# Patient Record
Sex: MALE | Race: WHITE | NOT HISPANIC OR LATINO | Employment: PART TIME | URBAN - METROPOLITAN AREA
[De-identification: names, ages, dates, MRNs, and addresses within clinical notes are randomized per-mention and may not be internally consistent; named-entity substitution may affect disease eponyms.]

---

## 2023-12-28 ENCOUNTER — ANCILLARY PROCEDURE (OUTPATIENT)
Dept: RADIOLOGY | Facility: CLINIC | Age: 68
End: 2023-12-28
Payer: COMMERCIAL

## 2023-12-28 DIAGNOSIS — C67.9 MALIGNANT NEOPLASM OF BLADDER, UNSPECIFIED (MULTI): Primary | ICD-10-CM

## 2023-12-28 PROCEDURE — 74176 CT ABD & PELVIS W/O CONTRAST: CPT

## 2023-12-28 PROCEDURE — 71250 CT THORAX DX C-: CPT | Performed by: RADIOLOGY

## 2023-12-28 PROCEDURE — 74176 CT ABD & PELVIS W/O CONTRAST: CPT | Performed by: RADIOLOGY

## 2024-01-04 ENCOUNTER — OFFICE VISIT (OUTPATIENT)
Dept: HEMATOLOGY/ONCOLOGY | Facility: HOSPITAL | Age: 69
End: 2024-01-04
Payer: MEDICARE

## 2024-01-04 VITALS
DIASTOLIC BLOOD PRESSURE: 81 MMHG | HEART RATE: 91 BPM | RESPIRATION RATE: 18 BRPM | WEIGHT: 273.59 LBS | SYSTOLIC BLOOD PRESSURE: 155 MMHG | BODY MASS INDEX: 38.16 KG/M2 | OXYGEN SATURATION: 99 % | TEMPERATURE: 96.6 F

## 2024-01-04 DIAGNOSIS — C67.9 MALIGNANT NEOPLASM OF URINARY BLADDER, UNSPECIFIED SITE (MULTI): Primary | ICD-10-CM

## 2024-01-04 DIAGNOSIS — C77.9 REGIONAL LYMPH NODE METASTASIS PRESENT (MULTI): ICD-10-CM

## 2024-01-04 DIAGNOSIS — Z92.21 S/P CHEMOTHERAPY, TIME SINCE GREATER THAN 12 WEEKS: ICD-10-CM

## 2024-01-04 PROCEDURE — 1159F MED LIST DOCD IN RCRD: CPT | Performed by: INTERNAL MEDICINE

## 2024-01-04 PROCEDURE — 99213 OFFICE O/P EST LOW 20 MIN: CPT | Performed by: INTERNAL MEDICINE

## 2024-01-04 PROCEDURE — 1126F AMNT PAIN NOTED NONE PRSNT: CPT | Performed by: INTERNAL MEDICINE

## 2024-01-04 RX ORDER — ASPIRIN 81 MG/1
1 TABLET ORAL DAILY
COMMUNITY
Start: 2022-10-20

## 2024-01-04 RX ORDER — SERTRALINE HYDROCHLORIDE 50 MG/1
50 TABLET, FILM COATED ORAL DAILY
COMMUNITY

## 2024-01-04 RX ORDER — METOPROLOL TARTRATE 25 MG/1
25 TABLET, FILM COATED ORAL 2 TIMES DAILY
COMMUNITY

## 2024-01-04 RX ORDER — LISINOPRIL 20 MG/1
20 TABLET ORAL DAILY
COMMUNITY

## 2024-01-04 RX ORDER — CEPHALEXIN 250 MG/1
CAPSULE ORAL
COMMUNITY

## 2024-01-04 RX ORDER — PANTOPRAZOLE SODIUM 40 MG/1
40 TABLET, DELAYED RELEASE ORAL DAILY
COMMUNITY

## 2024-01-04 RX ORDER — ROSUVASTATIN CALCIUM 40 MG/1
40 TABLET, COATED ORAL DAILY
COMMUNITY

## 2024-01-04 RX ORDER — ALPRAZOLAM 0.5 MG/1
TABLET ORAL
COMMUNITY
Start: 2022-08-26 | End: 2024-07-22

## 2024-01-04 RX ORDER — ACETAMINOPHEN 500 MG
1000 TABLET ORAL EVERY 8 HOURS PRN
COMMUNITY
Start: 2019-02-04

## 2024-01-04 ASSESSMENT — PATIENT HEALTH QUESTIONNAIRE - PHQ9
2. FEELING DOWN, DEPRESSED OR HOPELESS: NOT AT ALL
1. LITTLE INTEREST OR PLEASURE IN DOING THINGS: NOT AT ALL
SUM OF ALL RESPONSES TO PHQ9 QUESTIONS 1 AND 2: 0

## 2024-01-04 ASSESSMENT — COLUMBIA-SUICIDE SEVERITY RATING SCALE - C-SSRS
1. IN THE PAST MONTH, HAVE YOU WISHED YOU WERE DEAD OR WISHED YOU COULD GO TO SLEEP AND NOT WAKE UP?: NO
6. HAVE YOU EVER DONE ANYTHING, STARTED TO DO ANYTHING, OR PREPARED TO DO ANYTHING TO END YOUR LIFE?: NO
2. HAVE YOU ACTUALLY HAD ANY THOUGHTS OF KILLING YOURSELF?: NO

## 2024-01-04 ASSESSMENT — PAIN SCALES - GENERAL: PAINLEVEL: 0-NO PAIN

## 2024-01-04 ASSESSMENT — ENCOUNTER SYMPTOMS
LOSS OF SENSATION IN FEET: 0
DEPRESSION: 0
OCCASIONAL FEELINGS OF UNSTEADINESS: 0

## 2024-01-04 NOTE — PROGRESS NOTES
" Medical Oncology Out Patient Clinic Note    Patient's Name: Toro Lujan  MRN: [id number]  Date of Service: 1/4/2024  In- Person Visit: YES    Reason for Visit: F/U and scans    HPI  67 yo male known to us with Met TCC. S/p radical cystoprostatectomy and extended LND. LN + disease soon after. Received JAVELIN 100 therapy = Carbo/Crane x 4 and Avelumab maintenance  TAYLER after completion of about 1 year of IO  Off therapy now and restaging scans showed PD in LNs  Dealing with family issues with children that are affecting him psychologically  No constitutional issues and no LT Us  Parastomal hernia -mostly esthetics at this time    Updated PMHx  None  Review of Systems  13 point review negative  Physical Exam:  /81 (BP Location: Left arm, Patient Position: Sitting)   Pulse 91   Temp 35.9 °C (96.6 °F)   Resp 18   Wt 124 kg (273 lb 9.5 oz)   SpO2 99%   BMI 38.16 kg/m²   ECOG Performance Status 0  HEENT: normal  ENT: normal  CV: RRR, no murmurs  Pulmonary: Clear b/l  GI: parastomal hernia; stoma clean  : NA  Extremities: Pitting edema 1/4 below knees b/l  Neurologic: NA  Skin:NA  Psych: appropriate mood      LABS  No results found for: \"PSA\"  Lab Results   Component Value Date    WBC 10.6 08/10/2023    HGB 16.3 08/10/2023    HCT 48.2 08/10/2023    MCV 88 08/10/2023     08/10/2023     Lab Results   Component Value Date    GLUCOSE 124 (H) 08/10/2023    CALCIUM 9.5 08/10/2023     (L) 08/10/2023    K 4.6 08/10/2023    CO2 25 08/10/2023     08/10/2023    BUN 18 08/10/2023    CREATININE 1.25 08/10/2023     No results found for: \"TESTOSTERONE\"  Lab Results   Component Value Date    ALT 18 08/10/2023    AST 13 08/10/2023    ALKPHOS 124 08/10/2023    BILITOT 0.6 08/10/2023       IMAGING  Narrative & Impression   Interpreted By:  Thai Orta and Tavana Shahrzad   STUDY:  CT CHEST ABDOMEN PELVIS WO CONTRAST;  12/28/2023 11:38 am      INDICATION:  Signs/Symptoms: staging bladder cancer  C67.9: " Bladder cancer.      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 Status post multiple TURP, showing disease  recurrence but no muscle invasive disease, underwent radical  cystoprostatectomy may 2021, with pathology consistent with T1  disease with no vinicio involvement at the time of surgery. Pathology:  High-grade invasive urothelial carcinoma, superficially involving the  lamina propria.      COMPARISON:  CT 08/03/2023, 08/02/2023, 10/12/2022, 08/24/2022, 04/20/2022,  12/02/2021      ACCESSION NUMBER(S):  [id number]      ORDERING CLINICIAN:  YRN MANUEL      TECHNIQUE:  Contiguous axial images of the chest , abdomen, and pelvis were  obtained without IV contrast. The study was performed with oral  contrast. Coronal and sagittal reformatted images were reconstructed  from the axial data.      FINDINGS:          CT CHEST:      MEDIASTINUM AND LYMPH NODES: No evidence of new or enlarging  intrathoracic lymphadenopathy identified.      VESSELS: Normal caliber aorta. Mild atherosclerotic calcifications  throughout the aorta.      HEART:Normal size.Severe coronary calcifications are noted with  stents in place. No significant pericardial effusion.      LUNG, AIRWAYS, PLEURA: A 3 mm nodule is noted in the left upper lobe  (series 204, image 81), unchanged dating back to 04/20/2022.  Unchanged 4 mm nodule in the left upper lobe (series 204, image 77).  No new or enlarging pulmonary nodules identified. No consolidation,  pulmonary edema, pleural effusion or pneumothorax.      CHEST WALL SOFT TISSUES:Likely an intramammary lymph node in lateral  left breast is unchanged compared to prior examinations dating back  to 04/20/2022.The visualized thyroid gland is unremarkable. No  significant abnormalities involving the esophagus.      OSSEOUS STRUCTURES:No  acute osseous abnormality. No suspicious  osseous lesions.  Diffuse demineralization of the osseous structures.          CT ABDOMEN/PELVIS:          ABDOMINAL WALL: A right lower quadrant urostomy is noted without  evidence of bowel containing parastomal hernia. Medial to the  ileostomy, a ventral abdominal wall hernia is noted with  approximately 5 cm of defect in the rectus abdominus fascia,  containing a loop of ileum. No evidence of strangulation. The  findings are unchanged compared to prior examinations dating back to  04/20/2022.      LIVER: Hypodense foci throughout the liver, some too small to  characterize, unchanged dating back to 04/20/2022. While nonspecific,  these are favored to represent small benign cysts.      BILE DUCTS: No significant intrahepatic or extrahepatic dilatation.      GALLBLADDER: No significant abnormality.      SPLEEN: The splenic size is at the upper limit of normal, measuring  12.2 cm in length, unchanged dating back to 04/20/2022.      PANCREAS: No focal lesions identified within the pancreas on this  noncontrast enhanced examination. No evidence of main pancreatic  ductal dilatation.      ADRENALS: Hyperplasia of the left adrenal gland with fat density is  unchanged compared to prior examination. There is a 1.3 cm benign  lipid rich right adrenal nodule with fat density, similar to prior  examinations.      KIDNEYS, URETERS, BLADDER: No nephroureterolithiasis or  hydroureteronephrosis. Mild perinephric stranding is noted on the  left, unchanged dating back to 03/02/2023.      Postsurgical changes radical cystectomy with ileal conduit formation.  No abnormal soft tissue density noted at the surgical bed.      REPRODUCTIVE ORGANS: Postsurgical changes of prostatectomy without  abnormal soft tissue density at the resection bed.      VESSELS: Mild vascular calcifications. No aneurysm.      RETROPERITONEUM/LYMPH NODES: Compared to prior examination on  08/03/2023, there has been   interval increase in the size of a left  para-aortic lymph node, now measuring 2.5 x 1.9 cm increased from 1.7  x 1.3 cm on 08/03/2023. The remainder of the subcentimeter  abdominopelvic lymph nodes are unchanged compared to prior  examination.      BOWEL/MESENTERY/PERITONEUM: No significant abnormalities involving  the stomach.Ventral abdominal wall hernia containing a short segment  of the ileum including the anastomosis. Ileal conduit formation is  again noted. No inflammatory bowel wall thickening or dilatation. The  appendix is normal.      No ascites, free air, or fluid collection.      MUSCULOSKELETAL: No acute osseous abnormality. Small sclerotic foci  throughout the pelvic bones are unchanged dating back to 04/20/2022  and likely represent small bone islands. These include a small  sclerotic focus involving L4 vertebral body on the left (series 201,  image 143), the right femur and the left acetabulum. Note is made of  grade 1 anterolisthesis at the level of L5-S1 with associated  bilateral pars interarticularis defects.      IMPRESSION:  Transitional carcinoma restaging scan.  1. Interval increase in the size of left para aortic lymph node  compared to prior examination on 08/03/2023, highly suspicious for  metastatic lymphadenopathy. Otherwise, no definite evidence of new  metastatic disease identified in the chest, abdomen, or pelvis.  Additional subcentimeter retroperitoneal lymph nodes are unchanged.  Scattered solid nodules within the left upper lobe are unchanged  compared to prior examinations dating back to 04/20/2022.  2. Mild left perinephric stranding is unchanged compared to prior  examinations dating back to 03/02/2023.  3.  Additional unchanged chronic findings as above.          I personally reviewed the images/study and I agree with the findings  as stated. This study was interpreted at [facility], [place], [place].      MACRO:  None      Signed by: Thai  You 12/29/2023 10:10 PM  Dictation workstation:   [id number]     GENOMICS  NA    A/P 67 yo male known to us with Met TCC    New PD in scans - PAraAo LN apx 2 x2.6 cm    Discussed significance and natural history of his disease. Also reviewed treatment options including new Pembro/EV phase III data vs. Chemo+Nivo  vs. IO alone vs. EV alone vs. Saci G vs. Chemo   Also discussed Erdafitinib if FGF mutation    Discussed trials an favored study with HER-2 Antibody drug conjugate- tissue needs to be tested for HER 2. He expressed interest and met study CRC  If for some reason not a trial candidate then I would offer him IO alone with Pembrolizumab    I have consented him for Pembrolizumab today in case a trial is a no go  Pt verbalized understanding, asked appropriate questions and wished to proceed as above    Topher Castanon MD, FACP  Chief, Solid Tumor Oncology Division   Medical Oncology  Professor of Medicine and Urology  /[facility]

## 2024-01-05 DIAGNOSIS — Z00.6 RESEARCH SUBJECT: Primary | ICD-10-CM

## 2024-01-08 DIAGNOSIS — C61 PROSTATE CANCER (MULTI): ICD-10-CM

## 2024-01-08 DIAGNOSIS — Z00.6 RESEARCH SUBJECT: Primary | ICD-10-CM

## 2024-01-09 ENCOUNTER — TELEPHONE (OUTPATIENT)
Dept: HEMATOLOGY/ONCOLOGY | Facility: HOSPITAL | Age: 69
End: 2024-01-09

## 2024-01-09 DIAGNOSIS — Z00.6 RESEARCH SUBJECT: Primary | ICD-10-CM

## 2024-01-11 ENCOUNTER — APPOINTMENT (OUTPATIENT)
Dept: HEMATOLOGY/ONCOLOGY | Facility: HOSPITAL | Age: 69
End: 2024-01-11

## 2024-01-17 DIAGNOSIS — Z00.6 ENCOUNTER FOR EXAMINATION FOR NORMAL COMPARISON AND CONTROL IN CLINICAL RESEARCH PROGRAM: Primary | ICD-10-CM

## 2024-01-18 ENCOUNTER — OFFICE VISIT (OUTPATIENT)
Dept: HEMATOLOGY/ONCOLOGY | Facility: HOSPITAL | Age: 69
End: 2024-01-18

## 2024-01-18 ENCOUNTER — HOSPITAL ENCOUNTER (OUTPATIENT)
Dept: RADIOLOGY | Facility: HOSPITAL | Age: 69
End: 2024-01-18

## 2024-01-18 ENCOUNTER — HOSPITAL ENCOUNTER (OUTPATIENT)
Dept: RESEARCH | Facility: HOSPITAL | Age: 69
Discharge: HOME | End: 2024-01-18

## 2024-01-18 ENCOUNTER — HOSPITAL ENCOUNTER (OUTPATIENT)
Dept: RADIOLOGY | Facility: HOSPITAL | Age: 69
Discharge: HOME | End: 2024-01-18

## 2024-01-18 ENCOUNTER — APPOINTMENT (OUTPATIENT)
Dept: RADIOLOGY | Facility: HOSPITAL | Age: 69
End: 2024-01-18

## 2024-01-18 ENCOUNTER — APPOINTMENT (OUTPATIENT)
Dept: CARDIOLOGY | Facility: HOSPITAL | Age: 69
End: 2024-01-18

## 2024-01-18 VITALS
RESPIRATION RATE: 17 BRPM | HEIGHT: 70 IN | TEMPERATURE: 97.3 F | WEIGHT: 272.49 LBS | OXYGEN SATURATION: 97 % | DIASTOLIC BLOOD PRESSURE: 81 MMHG | BODY MASS INDEX: 39.01 KG/M2 | SYSTOLIC BLOOD PRESSURE: 123 MMHG | HEART RATE: 72 BPM

## 2024-01-18 VITALS — BODY MASS INDEX: 39.66 KG/M2 | WEIGHT: 272.71 LBS

## 2024-01-18 DIAGNOSIS — C67.9 MALIGNANT NEOPLASM OF URINARY BLADDER, UNSPECIFIED SITE (MULTI): Primary | ICD-10-CM

## 2024-01-18 DIAGNOSIS — Z00.6 RESEARCH SUBJECT: ICD-10-CM

## 2024-01-18 LAB
ALBUMIN SERPL BCP-MCNC: 4.2 G/DL (ref 3.4–5)
ALP SERPL-CCNC: 123 U/L (ref 33–136)
ALT SERPL W P-5'-P-CCNC: 18 U/L (ref 10–52)
ANION GAP SERPL CALC-SCNC: 13 MMOL/L (ref 10–20)
APTT PPP: 32 SECONDS (ref 27–38)
AST SERPL W P-5'-P-CCNC: 14 U/L (ref 9–39)
BASOPHILS # BLD AUTO: 0.07 X10*3/UL (ref 0–0.1)
BASOPHILS NFR BLD AUTO: 0.7 %
BILIRUB DIRECT SERPL-MCNC: 0.1 MG/DL (ref 0–0.3)
BILIRUB SERPL-MCNC: 0.6 MG/DL (ref 0–1.2)
BUN SERPL-MCNC: 18 MG/DL (ref 6–23)
CALCIUM SERPL-MCNC: 9.3 MG/DL (ref 8.6–10.6)
CHLORIDE SERPL-SCNC: 105 MMOL/L (ref 98–107)
CHOLEST SERPL-MCNC: 202 MG/DL (ref 0–199)
CHOLESTEROL/HDL RATIO: 5.2
CO2 SERPL-SCNC: 25 MMOL/L (ref 21–32)
CREAT SERPL-MCNC: 1.23 MG/DL (ref 0.5–1.3)
EGFRCR SERPLBLD CKD-EPI 2021: 64 ML/MIN/1.73M*2
EOSINOPHIL # BLD AUTO: 0.42 X10*3/UL (ref 0–0.7)
EOSINOPHIL NFR BLD AUTO: 4.5 %
ERYTHROCYTE [DISTWIDTH] IN BLOOD BY AUTOMATED COUNT: 13.9 % (ref 11.5–14.5)
GLUCOSE P FAST SERPL-MCNC: 131 MG/DL (ref 74–99)
GLUCOSE SERPL-MCNC: 131 MG/DL (ref 74–99)
HCT VFR BLD AUTO: 47.8 % (ref 41–52)
HDLC SERPL-MCNC: 38.5 MG/DL
HGB BLD-MCNC: 16 G/DL (ref 13.5–17.5)
IMM GRANULOCYTES # BLD AUTO: 0.06 X10*3/UL (ref 0–0.7)
IMM GRANULOCYTES NFR BLD AUTO: 0.6 % (ref 0–0.9)
INR PPP: 1 (ref 0.9–1.1)
LDLC SERPL CALC-MCNC: 143 MG/DL
LYMPHOCYTES # BLD AUTO: 1.92 X10*3/UL (ref 1.2–4.8)
LYMPHOCYTES NFR BLD AUTO: 20.6 %
MAGNESIUM SERPL-MCNC: 1.91 MG/DL (ref 1.6–2.4)
MCH RBC QN AUTO: 29.7 PG (ref 26–34)
MCHC RBC AUTO-ENTMCNC: 33.5 G/DL (ref 32–36)
MCV RBC AUTO: 89 FL (ref 80–100)
MONOCYTES # BLD AUTO: 0.59 X10*3/UL (ref 0.1–1)
MONOCYTES NFR BLD AUTO: 6.3 %
NEUTROPHILS # BLD AUTO: 6.28 X10*3/UL (ref 1.2–7.7)
NEUTROPHILS NFR BLD AUTO: 67.3 %
NON HDL CHOLESTEROL: 164 MG/DL (ref 0–149)
NRBC BLD-RTO: 0 /100 WBCS (ref 0–0)
PHOSPHATE SERPL-MCNC: 3.3 MG/DL (ref 2.5–4.9)
PLATELET # BLD AUTO: 213 X10*3/UL (ref 150–450)
POTASSIUM SERPL-SCNC: 4.7 MMOL/L (ref 3.5–5.3)
PROT SERPL-MCNC: 6.6 G/DL (ref 6.4–8.2)
PROTHROMBIN TIME: 11 SECONDS (ref 9.8–12.8)
RBC # BLD AUTO: 5.39 X10*6/UL (ref 4.5–5.9)
SODIUM SERPL-SCNC: 138 MMOL/L (ref 136–145)
T3 SERPL-MCNC: 145 NG/DL (ref 60–200)
T3FREE SERPL-MCNC: 3.5 PG/ML (ref 2.3–4.2)
T4 FREE SERPL-MCNC: 0.95 NG/DL (ref 0.78–1.48)
TRIGL SERPL-MCNC: 104 MG/DL (ref 0–149)
TSH SERPL-ACNC: 1.69 MIU/L (ref 0.44–3.98)
VLDL: 21 MG/DL (ref 0–40)
WBC # BLD AUTO: 9.3 X10*3/UL (ref 4.4–11.3)

## 2024-01-18 PROCEDURE — 1126F AMNT PAIN NOTED NONE PRSNT: CPT | Performed by: NURSE PRACTITIONER

## 2024-01-18 PROCEDURE — 85610 PROTHROMBIN TIME: CPT | Performed by: INTERNAL MEDICINE

## 2024-01-18 PROCEDURE — 74177 CT ABD & PELVIS W/CONTRAST: CPT

## 2024-01-18 PROCEDURE — 82248 BILIRUBIN DIRECT: CPT | Performed by: INTERNAL MEDICINE

## 2024-01-18 PROCEDURE — 84480 ASSAY TRIIODOTHYRONINE (T3): CPT | Performed by: INTERNAL MEDICINE

## 2024-01-18 PROCEDURE — 82947 ASSAY GLUCOSE BLOOD QUANT: CPT | Performed by: INTERNAL MEDICINE

## 2024-01-18 PROCEDURE — 83735 ASSAY OF MAGNESIUM: CPT | Performed by: INTERNAL MEDICINE

## 2024-01-18 PROCEDURE — 1159F MED LIST DOCD IN RCRD: CPT | Performed by: NURSE PRACTITIONER

## 2024-01-18 PROCEDURE — 84481 FREE ASSAY (FT-3): CPT | Performed by: INTERNAL MEDICINE

## 2024-01-18 PROCEDURE — 84443 ASSAY THYROID STIM HORMONE: CPT | Performed by: INTERNAL MEDICINE

## 2024-01-18 PROCEDURE — 84100 ASSAY OF PHOSPHORUS: CPT | Performed by: INTERNAL MEDICINE

## 2024-01-18 PROCEDURE — 80053 COMPREHEN METABOLIC PANEL: CPT | Performed by: INTERNAL MEDICINE

## 2024-01-18 PROCEDURE — 84439 ASSAY OF FREE THYROXINE: CPT | Performed by: INTERNAL MEDICINE

## 2024-01-18 PROCEDURE — 80061 LIPID PANEL: CPT | Performed by: INTERNAL MEDICINE

## 2024-01-18 PROCEDURE — 71260 CT THORAX DX C+: CPT | Performed by: RADIOLOGY

## 2024-01-18 PROCEDURE — 99213 OFFICE O/P EST LOW 20 MIN: CPT | Performed by: NURSE PRACTITIONER

## 2024-01-18 PROCEDURE — 74177 CT ABD & PELVIS W/CONTRAST: CPT | Performed by: RADIOLOGY

## 2024-01-18 PROCEDURE — 2550000001 HC RX 255 CONTRASTS: Performed by: INTERNAL MEDICINE

## 2024-01-18 PROCEDURE — 85025 COMPLETE CBC W/AUTO DIFF WBC: CPT | Performed by: INTERNAL MEDICINE

## 2024-01-18 RX ADMIN — IOHEXOL 75 ML: 350 INJECTION, SOLUTION INTRAVENOUS at 13:07

## 2024-01-18 ASSESSMENT — PAIN SCALES - GENERAL: PAINLEVEL: 0-NO PAIN

## 2024-01-18 NOTE — PROGRESS NOTES
Patient ID: Toro Lujan is a 68 y.o. male.    Cancer History:   Treatment Synopsis:    2/17/22 commenced chemo with split dose cis/gem  3/10/22 cycle 2 cis/gem  3/31/22 cycle 3 cis/gem     4/20/22 restaging CT scans showed response      Two more cycles cis/gem on 4/21 and 5/12/22 6/24/22 commenced maintenance Avelumab  Treatment on hold due to patient working on insurance coverage issues  8/24/22 CT scans overall stable   9/2/22 restarted avelumab every 2 weeks  10/22 stable CT scans  12/2022 no new disease seen on CT scans      Subjective    HPI  Seen for a study related screening visit for SEGE 1822.     Here with daughter, Michelle.    Energy some fatigue, but active. Appetite okay.   6 weeks ago had flu like illness. Lasted 4 days. Okay now.   Denies n/v.   Bowels okay. Usually daily, intermittent constipation.   +hernia at stoma. Not causing pain/discomfort. Draining okay.   Denies any pain that is new or unusual.      Objective    BSA: 2.47 meters squared  Wt 124 kg (272 lb 11.3 oz)   BMI 39.66 kg/m²   36.3, HR 72, /81     Physical Exam    Performance Status:  Symptomatic; fully ambulatory  ECOG 1      Assessment/Plan     Labs pending    Proceed and follow up per protocol. Per research nurse, will commence treatment next week on SEGE 1822.            Melania Schilling, MERLIN-CNP

## 2024-01-18 NOTE — PROGRESS NOTES
[facility]<[id number]><SCREENING><ALL>    RESEARCH CLINICAL VISIT NOTE  Study Name: [id number]  IRB#: [id number]  DCRU#: # [id number]  Protocol Version Dated: v7 12.22.2022  PI: Fan Rapp MD.    Time point: screening    Encounter Date: 01/18/2024  Encounter Time:  8:30 AM EST  Encounter Department: [facility] HEMATOLOGY AND ONCOLOGY     #1     Phone Pager   Emma Mann [phone] [place]    #2 Phone Pager   Martita Helms [phone] [place]   Other Phone Pager          Study Regimen and Dosing   Cohort A, Cohort B & Intensive PK -patients with HER2-overexpressing immunohistochemistry (IHC) 1+, 2+, 3+ locally advanced unresectable or metastatic urothelial carcinoma who have received prior platinum-based chemotherapy.  Cohort C - patients with no prior systemic therapy for locally advanced unresectable or metastatic urothelial carcinoma  Cycle = 14 days Cohorts A & B  Cycle = 6 weeks Cohort C  Disitamab Vedotin administered IV Day 1 every 2 weeks for Cohorts A & B; Days 1, 15 & 29 every 6 weeks Cohort C  Pembrolizumab administered IV Day 1 every 6 weeks.     Admission and Prior to Starting Study Activities   1. Notify  when patient arrives to unit.  2.  will obtain consent prior to other activities.  3.  to complete study Questionnaires.  4. Complete DCRU/Jenkins intake form in EMR.  5. Obtain height in centimeters - with shoes off.  6. Obtain weight in kilograms - with shoes off & heavy items removed.  7. Obtain vital signs. Record on flow sheet & in EMR.  8. Confirm patient fasting for safety labs.  9. Perform venipuncture or access mediport/central line (if available) for sample collection procedures.  10. Physical Exam.     Dietary Guidelines   REGULAR     Subjective   Toro Lujan is a 68 y.o. male and is here for a Research clinical visit.    Visit Provider: [id number], [facility] ROOM 2 [facility]     Allergies: No Known Allergies    Objective  "    Vital Signs:    Vitals:    01/18/24 0900   BP: 123/81   Pulse: 72   Resp: 17   Temp: 36.3 °C (97.3 °F)   TempSrc: Oral   SpO2: 97%   Weight: 124 kg (272 lb 7.8 oz)   Height: 1.766 m (5' 9.53\")       Physical Exam     ASSESSMENT and PLAN:  Problem List Items Addressed This Visit    None       Medications as of the completion of today's visit:  Current Outpatient Medications   Medication Sig Dispense Refill    acetaminophen (Tylenol) 500 mg tablet Take 2 tablets (1,000 mg) by mouth every 8 hours if needed.      Advair Diskus 100-50 mcg/dose diskus inhaler INHALE 1 PUFF BY MOUTH TWICE DAILY AS DIRECTED. RINSE AND GARGLE MOUTH WITH WATER AFTER EACH USE      ALPRAZolam (Xanax) 0.5 mg tablet Take one tablet one hour before anxiety-provoking situation. Limits one tab/day, three tabs/week.      aspirin 81 mg EC tablet Take 1 tablet (81 mg) by mouth once daily.      lisinopril 20 mg tablet Take 1 tablet (20 mg) by mouth once daily.      metoprolol tartrate (Lopressor) 25 mg tablet Take 1 tablet (25 mg) by mouth 2 times a day.      pantoprazole (ProtoNix) 40 mg EC tablet Take 1 tablet (40 mg) by mouth once daily.      rosuvastatin (Crestor) 40 mg tablet Take 1 tablet (40 mg) by mouth once daily.      sertraline (Zoloft) 50 mg tablet Take 1 tablet (50 mg) by mouth once daily.       No current facility-administered medications for this encounter.           Orders placed during today's visit:  No orders of the defined types were placed in this encounter.       [facility]@     Study Specific Instructions and Documentation   HEIGHT  WEIGHT  VITALS   ECG  LABS  DISCHARGE     12 LEAD ECG   Single -Rest 2 minutes prior  MISTI 150c Study-Specific ECG machine   Contact MD/Provider if QTcF > 500 msec.  QTcF calculation of ECG using Fridericia's formula:     S:\DCRU_Staff\Nursing\Protocols\QTcF  calculator  ECG #1: QTcF ***    Time Point Completion Time   SCREENING ***        Safety Labs     Refer to [place] Treatment Plan for " orders     Discharge Instructions   Discharge patient to home after study requirements completed or PK sample obtained.    Remind patient to return: Per      Esequiel Aguilar RN  01/18/24

## 2024-01-18 NOTE — RESEARCH NOTES
[facility]<[id number]><SCREENING><ALL>    RESEARCH CLINICAL VISIT NOTE  Study Name: [id number]  IRB#: [id number]  DCRU#: # [id number]  Protocol Version Dated: v7 12.22.2022  PI: Fan Rapp MD.  Time point: screening  Encounter Date: 01/18/2024  Encounter Time:  8:30 AM EST  Encounter Department: [facility] HEMATOLOGY AND ONCOLOGY   #1     Phone Pager   Emma De Los Santosnikkie [phone] [place]    #2 Phone Pager   [facility]ms [phone] [facility]     Study Regimen and Dosing   Cohort A, Cohort B & Intensive PK -patients with HER2-overexpressing immunohistochemistry (IHC) 1+, 2+, 3+ locally advanced unresectable or metastatic urothelial carcinoma who have received prior platinum-based chemotherapy.  Cohort C - patients with no prior systemic therapy for locally advanced unresectable or metastatic urothelial carcinoma  Cycle = 14 days Cohorts A & B  Cycle = 6 weeks Cohort C  Disitamab Vedotin administered IV Day 1 every 2 weeks for Cohorts A & B; Days 1, 15 & 29 every 6 weeks Cohort C  Pembrolizumab administered IV Day 1 every 6 weeks.     Admission and Prior to Starting Study Activities   1. Notify  when patient arrives to unit.  2.  will obtain consent prior to other activities.  3.  to complete study Questionnaires.  4. Complete DCRU/Jenkins intake form in EMR.  5. Obtain height in centimeters - with shoes off.  6. Obtain weight in kilograms - with shoes off & heavy items removed.  7. Obtain vital signs. Record on flow sheet & in EMR.  8. Confirm patient fasting for safety labs.  9. Perform venipuncture or access mediport/central line (if available) for sample collection procedures.  10. Physical Exam.     Dietary Guidelines   REGULAR   Subjective   Toro Lujan is a 68 y.o. male and is here for a Research clinical visit.  Visit Provider: [id number]-Breana, [facility] ROOM 2 [facility]   Allergies: No Known Allergies  Objective   Vital Signs:    Vitals:    01/18/24  "0900   BP: 123/81   Pulse: 72   Resp: 17   Temp: 36.3 °C (97.3 °F)   TempSrc: Oral   SpO2: 97%   Weight: 124 kg (272 lb 7.8 oz)   Height: 1.766 m (5' 9.53\")     Physical Exam   ASSESSMENT and PLAN:  Problem List Items Addressed This Visit    None  Visit Diagnoses       Research subject        Relevant Orders    Bilirubin, Direct    Coagulation Screen    Comprehensive Metabolic Panel    CBC and Auto Differential    Glucose, Fasting    Lipid Panel    Magnesium    Phosphorus    Thyroid Stimulating Hormone    Thyroxine, Free    Triiodothyronine, Free    Triiodothyronine, Total         Medications as of the completion of today's visit:  Current Outpatient Medications   Medication Sig Dispense Refill   • acetaminophen (Tylenol) 500 mg tablet Take 2 tablets (1,000 mg) by mouth every 8 hours if needed.     • Advair Diskus 100-50 mcg/dose diskus inhaler INHALE 1 PUFF BY MOUTH TWICE DAILY AS DIRECTED. RINSE AND GARGLE MOUTH WITH WATER AFTER EACH USE     • ALPRAZolam (Xanax) 0.5 mg tablet Take one tablet one hour before anxiety-provoking situation. Limits one tab/day, three tabs/week.     • aspirin 81 mg EC tablet Take 1 tablet (81 mg) by mouth once daily.     • lisinopril 20 mg tablet Take 1 tablet (20 mg) by mouth once daily.     • metoprolol tartrate (Lopressor) 25 mg tablet Take 1 tablet (25 mg) by mouth 2 times a day.     • pantoprazole (ProtoNix) 40 mg EC tablet Take 1 tablet (40 mg) by mouth once daily.     • rosuvastatin (Crestor) 40 mg tablet Take 1 tablet (40 mg) by mouth once daily.     • sertraline (Zoloft) 50 mg tablet Take 1 tablet (50 mg) by mouth once daily.       No current facility-administered medications for this encounter.       Orders placed during today's visit:  Orders Placed This Encounter   Procedures   • Bilirubin, Direct     Standing Status:   Standing     Number of Occurrences:   1     Order Specific Question:   Release result to [facility]     Answer:   Immediate [1]   • Coagulation Screen     " Standing Status:   Standing     Number of Occurrences:   1     Order Specific Question:   Release result to MyChart     Answer:   Immediate [1]   • Comprehensive Metabolic Panel     Standing Status:   Standing     Number of Occurrences:   1     Order Specific Question:   Release result to MyChart     Answer:   Immediate [1]   • CBC and Auto Differential     Standing Status:   Standing     Number of Occurrences:   1     Order Specific Question:   Release result to MyChart     Answer:   Immediate [1]   • Glucose, Fasting     Standing Status:   Standing     Number of Occurrences:   1     Order Specific Question:   Release result to MyChart     Answer:   Immediate [1]   • Lipid Panel     fasting     Standing Status:   Standing     Number of Occurrences:   1     Order Specific Question:   Release result to MyChart     Answer:   Immediate [1]   • Magnesium     Standing Status:   Standing     Number of Occurrences:   1     Order Specific Question:   Release result to MyChart     Answer:   Immediate [1]   • Phosphorus     Standing Status:   Standing     Number of Occurrences:   1     Order Specific Question:   Release result to MyChart     Answer:   Immediate [1]   • Thyroid Stimulating Hormone     Standing Status:   Standing     Number of Occurrences:   1     Order Specific Question:   Release result to MyChart     Answer:   Immediate [1]   • Thyroxine, Free     Standing Status:   Standing     Number of Occurrences:   1     Order Specific Question:   Release result to MyChart     Answer:   Immediate [1]   • Triiodothyronine, Free     Standing Status:   Standing     Number of Occurrences:   1     Order Specific Question:   Release result to MyChart     Answer:   Immediate [1]   • Triiodothyronine, Total     Standing Status:   Standing     Number of Occurrences:   1     Order Specific Question:   Release result to MyChart     Answer:   Immediate [1]      Study Specific Instructions and Documentation   HEIGHT  WEIGHT  VITALS    ECG  LABS  DISCHARGE     12 LEAD ECG   Single -Rest 2 minutes prior  MISTI 150c Study-Specific ECG machine   Contact MD/Provider if QTcF > 500 msec.  QTcF calculation of ECG using Fridericia's formula:     S:\DCRU_Staff\Nursing\Protocols\QTcF  calculator  ECG #1: QTcF 382    Time Point Completion Time   SCREENING 0939        Safety Labs     Refer to [place] Treatment Plan for orders     Discharge Instructions   Discharge patient to home after study requirements completed or PK sample obtained.    Remind patient to return: Per      Esequiel Aguilar RN  01/18/24

## 2024-01-19 ENCOUNTER — APPOINTMENT (OUTPATIENT)
Dept: RADIOLOGY | Facility: HOSPITAL | Age: 69
End: 2024-01-19

## 2024-01-22 ENCOUNTER — HOSPITAL ENCOUNTER (OUTPATIENT)
Dept: RADIOLOGY | Facility: HOSPITAL | Age: 69
Discharge: HOME | End: 2024-01-22
Payer: COMMERCIAL

## 2024-01-22 ENCOUNTER — HOSPITAL ENCOUNTER (OUTPATIENT)
Dept: CARDIOLOGY | Facility: HOSPITAL | Age: 69
Discharge: HOME | End: 2024-01-22
Payer: COMMERCIAL

## 2024-01-22 DIAGNOSIS — Z00.6 RESEARCH SUBJECT: Primary | ICD-10-CM

## 2024-01-22 DIAGNOSIS — Z00.6 RESEARCH SUBJECT: ICD-10-CM

## 2024-01-22 DIAGNOSIS — C61 PROSTATE CANCER (MULTI): ICD-10-CM

## 2024-01-22 DIAGNOSIS — Z00.6 EXAMINATION OF PARTICIPANT IN CLINICAL TRIAL: Primary | ICD-10-CM

## 2024-01-22 PROCEDURE — 78306 BONE IMAGING WHOLE BODY: CPT

## 2024-01-22 PROCEDURE — 2500000004 HC RX 250 GENERAL PHARMACY W/ HCPCS (ALT 636 FOR OP/ED): Performed by: STUDENT IN AN ORGANIZED HEALTH CARE EDUCATION/TRAINING PROGRAM

## 2024-01-22 PROCEDURE — A9503 TC99M MEDRONATE: HCPCS | Performed by: INTERNAL MEDICINE

## 2024-01-22 PROCEDURE — 93306 TTE W/DOPPLER COMPLETE: CPT

## 2024-01-22 PROCEDURE — 93306 TTE W/DOPPLER COMPLETE: CPT | Performed by: INTERNAL MEDICINE

## 2024-01-22 PROCEDURE — 3430000001 HC RX 343 DIAGNOSTIC RADIOPHARMACEUTICALS: Performed by: INTERNAL MEDICINE

## 2024-01-22 RX ADMIN — TECHNETIUM TC 99M MEDRONATE 21 MILLICURIE: 25 INJECTION, POWDER, FOR SOLUTION INTRAVENOUS at 09:38

## 2024-01-22 RX ADMIN — PERFLUTREN 2 ML OF DILUTION: 6.52 INJECTION, SUSPENSION INTRAVENOUS at 15:25

## 2024-01-23 LAB
AORTIC VALVE PEAK VELOCITY: 1.59 M/S
AV PEAK GRADIENT: 10.1 MMHG
AVA (PEAK VEL): 4.15 CM2
EJECTION FRACTION APICAL 4 CHAMBER: 61.9
EJECTION FRACTION: 67 %
LEFT ATRIUM VOLUME AREA LENGTH INDEX BSA: 9.3 ML/M2
LEFT VENTRICULAR OUTFLOW TRACT DIAMETER: 2.4 CM
MITRAL VALVE E/A RATIO: 0.76
MITRAL VALVE E/E' RATIO: 7.54
RIGHT VENTRICLE FREE WALL PEAK S': 13.5 CM/S
TRICUSPID ANNULAR PLANE SYSTOLIC EXCURSION: 1.8 CM

## 2024-01-24 ENCOUNTER — APPOINTMENT (OUTPATIENT)
Dept: HEMATOLOGY/ONCOLOGY | Facility: HOSPITAL | Age: 69
End: 2024-01-24
Payer: COMMERCIAL

## 2024-01-31 ENCOUNTER — OFFICE VISIT (OUTPATIENT)
Dept: HEMATOLOGY/ONCOLOGY | Facility: HOSPITAL | Age: 69
End: 2024-01-31
Payer: COMMERCIAL

## 2024-01-31 ENCOUNTER — HOSPITAL ENCOUNTER (OUTPATIENT)
Dept: RESEARCH | Facility: HOSPITAL | Age: 69
Discharge: HOME | End: 2024-01-31
Payer: COMMERCIAL

## 2024-01-31 VITALS
HEART RATE: 78 BPM | DIASTOLIC BLOOD PRESSURE: 82 MMHG | TEMPERATURE: 97.2 F | RESPIRATION RATE: 18 BRPM | WEIGHT: 271.61 LBS | BODY MASS INDEX: 38.88 KG/M2 | OXYGEN SATURATION: 99 % | HEIGHT: 70 IN | SYSTOLIC BLOOD PRESSURE: 130 MMHG

## 2024-01-31 DIAGNOSIS — C67.9 MALIGNANT NEOPLASM OF URINARY BLADDER, UNSPECIFIED SITE (MULTI): Primary | ICD-10-CM

## 2024-01-31 DIAGNOSIS — Z00.6 RESEARCH SUBJECT: ICD-10-CM

## 2024-01-31 LAB
ALBUMIN SERPL BCP-MCNC: 4.1 G/DL (ref 3.4–5)
ALP SERPL-CCNC: 116 U/L (ref 33–136)
ALT SERPL W P-5'-P-CCNC: 17 U/L (ref 10–52)
ANION GAP SERPL CALC-SCNC: 11 MMOL/L (ref 10–20)
APPEARANCE UR: ABNORMAL
APTT PPP: 32 SECONDS (ref 27–38)
AST SERPL W P-5'-P-CCNC: 11 U/L (ref 9–39)
BACTERIA #/AREA URNS AUTO: ABNORMAL /HPF
BASOPHILS # BLD AUTO: 0.08 X10*3/UL (ref 0–0.1)
BASOPHILS NFR BLD AUTO: 0.8 %
BILIRUB DIRECT SERPL-MCNC: 0.1 MG/DL (ref 0–0.3)
BILIRUB SERPL-MCNC: 0.5 MG/DL (ref 0–1.2)
BILIRUB UR STRIP.AUTO-MCNC: NEGATIVE MG/DL
BUN SERPL-MCNC: 25 MG/DL (ref 6–23)
CALCIUM SERPL-MCNC: 9.1 MG/DL (ref 8.6–10.6)
CHLORIDE SERPL-SCNC: 105 MMOL/L (ref 98–107)
CHOLEST SERPL-MCNC: 220 MG/DL (ref 0–199)
CHOLESTEROL/HDL RATIO: 5.9
CO2 SERPL-SCNC: 25 MMOL/L (ref 21–32)
COLOR UR: YELLOW
CREAT SERPL-MCNC: 1.15 MG/DL (ref 0.5–1.3)
EGFRCR SERPLBLD CKD-EPI 2021: 69 ML/MIN/1.73M*2
EOSINOPHIL # BLD AUTO: 0.46 X10*3/UL (ref 0–0.7)
EOSINOPHIL NFR BLD AUTO: 4.5 %
ERYTHROCYTE [DISTWIDTH] IN BLOOD BY AUTOMATED COUNT: 13.8 % (ref 11.5–14.5)
GLUCOSE P FAST SERPL-MCNC: 128 MG/DL (ref 74–99)
GLUCOSE SERPL-MCNC: 128 MG/DL (ref 74–99)
GLUCOSE UR STRIP.AUTO-MCNC: NEGATIVE MG/DL
HCT VFR BLD AUTO: 48.3 % (ref 41–52)
HDLC SERPL-MCNC: 37.2 MG/DL
HGB BLD-MCNC: 16.5 G/DL (ref 13.5–17.5)
IMM GRANULOCYTES # BLD AUTO: 0.09 X10*3/UL (ref 0–0.7)
IMM GRANULOCYTES NFR BLD AUTO: 0.9 % (ref 0–0.9)
INR PPP: 1 (ref 0.9–1.1)
KETONES UR STRIP.AUTO-MCNC: NEGATIVE MG/DL
LDLC SERPL CALC-MCNC: 158 MG/DL
LEUKOCYTE ESTERASE UR QL STRIP.AUTO: ABNORMAL
LYMPHOCYTES # BLD AUTO: 2.12 X10*3/UL (ref 1.2–4.8)
LYMPHOCYTES NFR BLD AUTO: 21 %
MAGNESIUM SERPL-MCNC: 1.7 MG/DL (ref 1.6–2.4)
MCH RBC QN AUTO: 30.2 PG (ref 26–34)
MCHC RBC AUTO-ENTMCNC: 34.2 G/DL (ref 32–36)
MCV RBC AUTO: 89 FL (ref 80–100)
MONOCYTES # BLD AUTO: 0.81 X10*3/UL (ref 0.1–1)
MONOCYTES NFR BLD AUTO: 8 %
MUCOUS THREADS #/AREA URNS AUTO: ABNORMAL /LPF
NEUTROPHILS # BLD AUTO: 6.55 X10*3/UL (ref 1.2–7.7)
NEUTROPHILS NFR BLD AUTO: 64.8 %
NITRITE UR QL STRIP.AUTO: POSITIVE
NON HDL CHOLESTEROL: 183 MG/DL (ref 0–149)
NRBC BLD-RTO: 0 /100 WBCS (ref 0–0)
PH UR STRIP.AUTO: 5 [PH]
PHOSPHATE SERPL-MCNC: 3.3 MG/DL (ref 2.5–4.9)
PLATELET # BLD AUTO: 219 X10*3/UL (ref 150–450)
POTASSIUM SERPL-SCNC: 4.3 MMOL/L (ref 3.5–5.3)
PROT SERPL-MCNC: 6.7 G/DL (ref 6.4–8.2)
PROT UR STRIP.AUTO-MCNC: ABNORMAL MG/DL
PROTHROMBIN TIME: 11.7 SECONDS (ref 9.8–12.8)
RBC # BLD AUTO: 5.46 X10*6/UL (ref 4.5–5.9)
RBC # UR STRIP.AUTO: ABNORMAL /UL
RBC #/AREA URNS AUTO: ABNORMAL /HPF
SODIUM SERPL-SCNC: 137 MMOL/L (ref 136–145)
SP GR UR STRIP.AUTO: 1.01
T3 SERPL-MCNC: 122 NG/DL (ref 60–200)
T3FREE SERPL-MCNC: 3.2 PG/ML (ref 2.3–4.2)
T4 FREE SERPL-MCNC: 0.9 NG/DL (ref 0.78–1.48)
TRIGL SERPL-MCNC: 123 MG/DL (ref 0–149)
TSH SERPL-ACNC: 1.76 MIU/L (ref 0.44–3.98)
UROBILINOGEN UR STRIP.AUTO-MCNC: <2 MG/DL
VLDL: 25 MG/DL (ref 0–40)
WBC # BLD AUTO: 10.1 X10*3/UL (ref 4.4–11.3)
WBC #/AREA URNS AUTO: >50 /HPF
WBC CLUMPS #/AREA URNS AUTO: ABNORMAL /HPF

## 2024-01-31 PROCEDURE — 80061 LIPID PANEL: CPT | Performed by: INTERNAL MEDICINE

## 2024-01-31 PROCEDURE — 82248 BILIRUBIN DIRECT: CPT | Performed by: INTERNAL MEDICINE

## 2024-01-31 PROCEDURE — 99215 OFFICE O/P EST HI 40 MIN: CPT | Mod: 27 | Performed by: NURSE PRACTITIONER

## 2024-01-31 PROCEDURE — 85730 THROMBOPLASTIN TIME PARTIAL: CPT | Performed by: INTERNAL MEDICINE

## 2024-01-31 PROCEDURE — 84439 ASSAY OF FREE THYROXINE: CPT | Performed by: INTERNAL MEDICINE

## 2024-01-31 PROCEDURE — 82947 ASSAY GLUCOSE BLOOD QUANT: CPT | Performed by: INTERNAL MEDICINE

## 2024-01-31 PROCEDURE — 81001 URINALYSIS AUTO W/SCOPE: CPT | Performed by: INTERNAL MEDICINE

## 2024-01-31 PROCEDURE — 84443 ASSAY THYROID STIM HORMONE: CPT | Performed by: INTERNAL MEDICINE

## 2024-01-31 PROCEDURE — 83735 ASSAY OF MAGNESIUM: CPT | Performed by: INTERNAL MEDICINE

## 2024-01-31 PROCEDURE — 80053 COMPREHEN METABOLIC PANEL: CPT | Performed by: INTERNAL MEDICINE

## 2024-01-31 PROCEDURE — 1159F MED LIST DOCD IN RCRD: CPT | Performed by: NURSE PRACTITIONER

## 2024-01-31 PROCEDURE — 1126F AMNT PAIN NOTED NONE PRSNT: CPT | Performed by: NURSE PRACTITIONER

## 2024-01-31 PROCEDURE — 85025 COMPLETE CBC W/AUTO DIFF WBC: CPT | Performed by: INTERNAL MEDICINE

## 2024-01-31 PROCEDURE — 85610 PROTHROMBIN TIME: CPT | Performed by: INTERNAL MEDICINE

## 2024-01-31 PROCEDURE — 84481 FREE ASSAY (FT-3): CPT | Performed by: INTERNAL MEDICINE

## 2024-01-31 PROCEDURE — 99215 OFFICE O/P EST HI 40 MIN: CPT | Performed by: NURSE PRACTITIONER

## 2024-01-31 PROCEDURE — 84100 ASSAY OF PHOSPHORUS: CPT | Performed by: INTERNAL MEDICINE

## 2024-01-31 PROCEDURE — 84480 ASSAY TRIIODOTHYRONINE (T3): CPT | Mod: CCI | Performed by: INTERNAL MEDICINE

## 2024-01-31 NOTE — RESEARCH NOTES
[facility]<[id number]><SCREENING><ALL>    RESEARCH CLINICAL VISIT NOTE  Study Name: [id number]  IRB#: [id number]  DCRU#: # [id number]  Protocol Version Dated: v7 12.22.2022  PI: Fan Rapp MD.    Time point: screening    Encounter Date: 01/31/2024  Encounter Time: 10:30 AM EST  Encounter Department: [facility] HEMATOLOGY AND ONCOLOGY     #1     Phone Pager   Emma Mann [phone] [place]    #2 Phone Pager   Martita Helms [phone] [place]   Other Phone Pager          Study Regimen and Dosing   Cohort A, Cohort B & Intensive PK -patients with HER2-overexpressing immunohistochemistry (IHC) 1+, 2+, 3+ locally advanced unresectable or metastatic urothelial carcinoma who have received prior platinum-based chemotherapy.  Cohort C - patients with no prior systemic therapy for locally advanced unresectable or metastatic urothelial carcinoma  Cycle = 14 days Cohorts A & B  Cycle = 6 weeks Cohort C  Disitamab Vedotin administered IV Day 1 every 2 weeks for Cohorts A & B; Days 1, 15 & 29 every 6 weeks Cohort C  Pembrolizumab administered IV Day 1 every 6 weeks.     Admission and Prior to Starting Study Activities   1. Notify  when patient arrives to unit.  2.  will obtain consent prior to other activities.  3.  to complete study Questionnaires.  4. Complete DCRU/Jenkins intake form in EMR.  5. Obtain height in centimeters - with shoes off.  6. Obtain weight in kilograms - with shoes off & heavy items removed.  7. Obtain vital signs. Record on flow sheet & in EMR.  8. Confirm patient fasting for safety labs.  9. Perform venipuncture or access mediport/central line (if available) for sample collection procedures.  10. Physical Exam.     Dietary Guidelines   REGULAR     Subjective   Toro Lujan is a 68 y.o. male and is here for a Research clinical visit.    Visit Provider: [id number], [facility] ROOM 4 [facility]     Allergies: No Known Allergies    Objective      Vital Signs:    Visit Vitals  /82   Pulse 78   Temp 36.2 °C (97.2 °F) (Temporal)   Resp 18        Physical Exam     ASSESSMENT and PLAN:  Problem List Items Addressed This Visit    None       Medications as of the completion of today's visit:  Current Outpatient Medications   Medication Sig Dispense Refill    acetaminophen (Tylenol) 500 mg tablet Take 2 tablets (1,000 mg) by mouth every 8 hours if needed.      Advair Diskus 100-50 mcg/dose diskus inhaler INHALE 1 PUFF BY MOUTH TWICE DAILY AS DIRECTED. RINSE AND GARGLE MOUTH WITH WATER AFTER EACH USE      ALPRAZolam (Xanax) 0.5 mg tablet Take one tablet one hour before anxiety-provoking situation. Limits one tab/day, three tabs/week.      aspirin 81 mg EC tablet Take 1 tablet (81 mg) by mouth once daily.      lisinopril 20 mg tablet Take 1 tablet (20 mg) by mouth once daily.      metoprolol tartrate (Lopressor) 25 mg tablet Take 1 tablet (25 mg) by mouth 2 times a day.      pantoprazole (ProtoNix) 40 mg EC tablet Take 1 tablet (40 mg) by mouth once daily.      rosuvastatin (Crestor) 40 mg tablet Take 1 tablet (40 mg) by mouth once daily.      sertraline (Zoloft) 50 mg tablet Take 1 tablet (50 mg) by mouth once daily.       No current facility-administered medications for this visit.           Orders placed during today's visit:  No orders of the defined types were placed in this encounter.       @RSHAE (STUDY#)@     Study Specific Instructions and Documentation   HEIGHT  WEIGHT  VITALS   ECG  LABS  DISCHARGE     12 LEAD ECG   Single -Rest 2 minutes prior  MISTI 150c Study-Specific ECG machine   Contact MD/Provider if QTcF > 500 msec.  QTcF calculation of ECG using Fridericia's formula:     S:\DCRU_Staff\Nursing\Protocols\QTcF  calculator  ECG #1: QTcF 392    Time Point Completion Time   SCREENING 1057        Safety Labs     Refer to [facility] for orders     Discharge Instructions   Discharge patient to home after study requirements completed or PK  sample obtained.    Remind patient to return: Per   Discharge time: 1130     Kailee Chavarria RN  01/31/24

## 2024-01-31 NOTE — PROGRESS NOTES
Patient ID: Toro Lujan is a 68 y.o. male.  Attending Physician: Dr. Topher Castanon  Cancer Diagnosis: Cancer Staging   No matching staging information was found for the patient.   Current Therapy: Screening for SEGE 1822: Disitamab Vedotin, cohorts A and B in HER2-overexpressing IHC 1+, 2+, 3+ for locally advanced unresectable or metastatic UC who have received prior platinum-based chemotherapy  Genetics: NA    Subjective      Cancer History:  Oncology History    No history exists.     11/5/2020: CTU shows mildly asymmetric generalized urinary bladder wall thickening with mild mucosal hyperenhancement, large prostate  1/29/2021: TURBT. 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 Posterior tumor reveals papillary high-grade at least pTa urothelial carcinoma with no definite invasion, no muscle present  2/24/2021: Bladder tumor path shows high grade focally invasive urothelial carcinoma into the lamina propria, non-muscle invasive.   5/6/2021: Underwent radical cystectomy with ilial conduit due to multiple multiple disease recurrences without MIBC.  12/13/2021: CTU suspicious for new enlarged pelvic LN  2/17/2022: Started chemotherapy with split-dose gemcitabine and cisplatin  3/10/2022: C2 gem/cis  3/31/2022: C3 gem/cis  4/20/2022: Restaging scans revealed response  4/21/2022: C4 gem/cis  5/12/2022: C5 gem/cis  6/24/2022: Started maintenance avelumab. Treatment was placed on hold due to insurance coverage issues after 1 cycle  8/24/2022: CT scans stable  9/2/2022: Restarted avelumab    3/2/2023: Last dose avelumab, off after  12/28/2023: CT reveals increase in left para-aortic LN highly suspicious for metastatic LAD.  1/18/2024: Again increase in para-aortic LN.  1/31/2024: Screening for SEGE 1822: Disitamab vedotin    Interval History:  Mr. Lujan presents today in screening for DV study. He is overall feeling well. No real symptoms of his cancer of  note. He has baseline constipation about 3x per month, otherwise has daily BM's. Eats about two meals a day and is mostly vegetarian. Has some dry skin in winter. No residual neuropathy or tinnitus/hearing changes from previous cisplatin. Needs to see his ophthalmologist due to baseline vision loss. No issues with energy, though admits he could be more active. Has 9 acres and an organic garden. Considering getting some goats. The remainder of his ROS is otherwise negative.    HPI    Objective    BSA: There is no height or weight on file to calculate BSA.  There were no vitals taken for this visit.    Physical Exam  General: alert, well-dressed in NAD. Speech is fluent and coherent, words clear. Good insight. Oriented x4   Skin: warm, dry, and pink without cyanosis or nail clubbing. No rash, petechiae, or ecchymoses  HEENT: Normocephalic atraumatic. Sclera white, conjunctiva pink. EOMs intact. Hearing intact to spoken voice. Oral mucosa pink. No visible lesions  Respiratory: Chest expansion symmetric. Breath sounds vesicular in all lobes without crackles, wheezes, or rhonchi bilaterally.  CV: S1S2 audible and crisp without murmurs, rubs, or extra sounds. RRR. Radial pulses strong and regular. Extremities warm and pink. No edema bilaterally.  Lymph: no palpable cervical, submandibular, or supraclavicular lymphadenopathy.  GI: abdomen is round, soft, and non-tender. Active bowel sounds. No palpable masses or hepatosplenomegaly.   : IC bag present  Psych: engaged, polite, appropriate conversation and eye contact.    Current Medications:    Current Outpatient Medications:     acetaminophen (Tylenol) 500 mg tablet, Take 2 tablets (1,000 mg) by mouth every 8 hours if needed., Disp: , Rfl:     Advair Diskus 100-50 mcg/dose diskus inhaler, INHALE 1 PUFF BY MOUTH TWICE DAILY AS DIRECTED. RINSE AND GARGLE MOUTH WITH WATER AFTER EACH USE, Disp: , Rfl:     ALPRAZolam (Xanax) 0.5 mg tablet, Take one tablet one hour before  "anxiety-provoking situation. Limits one tab/day, three tabs/week., Disp: , Rfl:     aspirin 81 mg EC tablet, Take 1 tablet (81 mg) by mouth once daily., Disp: , Rfl:     lisinopril 20 mg tablet, Take 1 tablet (20 mg) by mouth once daily., Disp: , Rfl:     metoprolol tartrate (Lopressor) 25 mg tablet, Take 1 tablet (25 mg) by mouth 2 times a day., Disp: , Rfl:     pantoprazole (ProtoNix) 40 mg EC tablet, Take 1 tablet (40 mg) by mouth once daily., Disp: , Rfl:     rosuvastatin (Crestor) 40 mg tablet, Take 1 tablet (40 mg) by mouth once daily., Disp: , Rfl:     sertraline (Zoloft) 50 mg tablet, Take 1 tablet (50 mg) by mouth once daily., Disp: , Rfl:      Most Recent Labs:  Results for orders placed or performed during the hospital encounter of 01/22/24   Transthoracic Echo (TTE) Complete   Result Value Ref Range    AV pk isamar 1.59 m/s    LVOT diam 2.40 cm    LV biplane EF 67 %    MV avg E/e' ratio 7.54     MV E/A ratio 0.76     LA vol index A/L 9.3 ml/m2    Tricuspid annular plane systolic excursion 1.8 cm    RV free wall pk S' 13.50 cm/s    Aortic Valve Area by Continuity of Peak Velocity 4.15 cm2    AV pk grad 10.1 mmHg    LV A4C EF 61.9       No results found for: \"PSA\"     Performance Status:  ECOG Score: 0- Fully active, able to carry on all pre-disease performance w/o restriction.  Karnofsky Score: 100 - Fully active, able to carry on all pre-disease performed without restriction      Assessment/Plan   Toro Lujan is a 68 y.o. 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 He looks and feels well.      # mUC  - Plan to start SEGE 1822 with DV    # Health Maintenance  - Continue with PCP and other healthcare providers  - Advised exercise, heart-healthy diet    RTC per protocol    Total time spent on this encounter was 50 minutes, which included preparation, direct time with patient, documentation, and care coordination on the day of visit.    Daniella Pineda, MSN, APRN, AGNP-C, " AOCNP  Associate Nurse Practitioner  [facility]

## 2024-02-07 ENCOUNTER — LAB (OUTPATIENT)
Dept: LAB | Facility: HOSPITAL | Age: 69
End: 2024-02-07
Payer: COMMERCIAL

## 2024-02-07 ENCOUNTER — OFFICE VISIT (OUTPATIENT)
Dept: HEMATOLOGY/ONCOLOGY | Facility: HOSPITAL | Age: 69
End: 2024-02-07
Payer: COMMERCIAL

## 2024-02-07 ENCOUNTER — APPOINTMENT (OUTPATIENT)
Dept: RESEARCH | Facility: HOSPITAL | Age: 69
End: 2024-02-07
Payer: COMMERCIAL

## 2024-02-07 VITALS
WEIGHT: 274.91 LBS | SYSTOLIC BLOOD PRESSURE: 153 MMHG | DIASTOLIC BLOOD PRESSURE: 76 MMHG | HEART RATE: 83 BPM | TEMPERATURE: 96.6 F | OXYGEN SATURATION: 98 % | BODY MASS INDEX: 39.58 KG/M2 | RESPIRATION RATE: 18 BRPM

## 2024-02-07 DIAGNOSIS — Z00.6 RESEARCH SUBJECT: ICD-10-CM

## 2024-02-07 DIAGNOSIS — Z00.6 RESEARCH SUBJECT: Primary | ICD-10-CM

## 2024-02-07 DIAGNOSIS — C67.9 MALIGNANT NEOPLASM OF URINARY BLADDER, UNSPECIFIED SITE (MULTI): Primary | ICD-10-CM

## 2024-02-07 LAB
ALBUMIN SERPL BCP-MCNC: 4.4 G/DL (ref 3.4–5)
ALP SERPL-CCNC: 130 U/L (ref 33–136)
ALT SERPL W P-5'-P-CCNC: 19 U/L (ref 10–52)
ANION GAP SERPL CALC-SCNC: 13 MMOL/L (ref 10–20)
APTT PPP: 32 SECONDS (ref 27–38)
AST SERPL W P-5'-P-CCNC: 14 U/L (ref 9–39)
BASOPHILS # BLD AUTO: 0.06 X10*3/UL (ref 0–0.1)
BASOPHILS NFR BLD AUTO: 0.7 %
BILIRUB DIRECT SERPL-MCNC: 0.1 MG/DL (ref 0–0.3)
BILIRUB SERPL-MCNC: 0.7 MG/DL (ref 0–1.2)
BUN SERPL-MCNC: 16 MG/DL (ref 6–23)
CALCIUM SERPL-MCNC: 9.3 MG/DL (ref 8.6–10.3)
CHLORIDE SERPL-SCNC: 100 MMOL/L (ref 98–107)
CHOLEST SERPL-MCNC: 233 MG/DL (ref 0–199)
CHOLESTEROL/HDL RATIO: 5.7
CO2 SERPL-SCNC: 28 MMOL/L (ref 21–32)
CREAT SERPL-MCNC: 1.16 MG/DL (ref 0.5–1.3)
EGFRCR SERPLBLD CKD-EPI 2021: 69 ML/MIN/1.73M*2
EOSINOPHIL # BLD AUTO: 0.43 X10*3/UL (ref 0–0.7)
EOSINOPHIL NFR BLD AUTO: 5.1 %
ERYTHROCYTE [DISTWIDTH] IN BLOOD BY AUTOMATED COUNT: 13.9 % (ref 11.5–14.5)
GLUCOSE P FAST SERPL-MCNC: 117 MG/DL (ref 74–99)
GLUCOSE SERPL-MCNC: 117 MG/DL (ref 74–99)
HCT VFR BLD AUTO: 51.5 % (ref 41–52)
HDLC SERPL-MCNC: 40.9 MG/DL
HGB BLD-MCNC: 17.3 G/DL (ref 13.5–17.5)
IMM GRANULOCYTES # BLD AUTO: 0.06 X10*3/UL (ref 0–0.7)
IMM GRANULOCYTES NFR BLD AUTO: 0.7 % (ref 0–0.9)
INR PPP: 1 (ref 0.9–1.1)
LDLC SERPL CALC-MCNC: 153 MG/DL
LYMPHOCYTES # BLD AUTO: 2.06 X10*3/UL (ref 1.2–4.8)
LYMPHOCYTES NFR BLD AUTO: 24.4 %
MAGNESIUM SERPL-MCNC: 1.78 MG/DL (ref 1.6–2.4)
MCH RBC QN AUTO: 29.7 PG (ref 26–34)
MCHC RBC AUTO-ENTMCNC: 33.6 G/DL (ref 32–36)
MCV RBC AUTO: 89 FL (ref 80–100)
MONOCYTES # BLD AUTO: 0.61 X10*3/UL (ref 0.1–1)
MONOCYTES NFR BLD AUTO: 7.2 %
NEUTROPHILS # BLD AUTO: 5.22 X10*3/UL (ref 1.2–7.7)
NEUTROPHILS NFR BLD AUTO: 61.9 %
NON HDL CHOLESTEROL: 192 MG/DL (ref 0–149)
NRBC BLD-RTO: 0 /100 WBCS (ref 0–0)
PHOSPHATE SERPL-MCNC: 3.3 MG/DL (ref 2.5–4.9)
PLATELET # BLD AUTO: 229 X10*3/UL (ref 150–450)
POTASSIUM SERPL-SCNC: 4.6 MMOL/L (ref 3.5–5.3)
PROT SERPL-MCNC: 7.6 G/DL (ref 6.4–8.2)
PROTHROMBIN TIME: 11 SECONDS (ref 9.8–12.8)
RBC # BLD AUTO: 5.82 X10*6/UL (ref 4.5–5.9)
SODIUM SERPL-SCNC: 136 MMOL/L (ref 136–145)
T3 SERPL-MCNC: 157 NG/DL (ref 60–200)
T3FREE SERPL-MCNC: 3.7 PG/ML (ref 2.3–4.2)
T4 FREE SERPL-MCNC: 0.98 NG/DL (ref 0.78–1.48)
TRIGL SERPL-MCNC: 198 MG/DL (ref 0–149)
TSH SERPL-ACNC: 1.94 MIU/L (ref 0.44–3.98)
VLDL: 40 MG/DL (ref 0–40)
WBC # BLD AUTO: 8.4 X10*3/UL (ref 4.4–11.3)

## 2024-02-07 PROCEDURE — 83735 ASSAY OF MAGNESIUM: CPT

## 2024-02-07 PROCEDURE — 84480 ASSAY TRIIODOTHYRONINE (T3): CPT | Mod: 59

## 2024-02-07 PROCEDURE — 36415 COLL VENOUS BLD VENIPUNCTURE: CPT

## 2024-02-07 PROCEDURE — 84075 ASSAY ALKALINE PHOSPHATASE: CPT

## 2024-02-07 PROCEDURE — 80061 LIPID PANEL: CPT

## 2024-02-07 PROCEDURE — 82248 BILIRUBIN DIRECT: CPT

## 2024-02-07 PROCEDURE — 84443 ASSAY THYROID STIM HORMONE: CPT

## 2024-02-07 PROCEDURE — 84481 FREE ASSAY (FT-3): CPT

## 2024-02-07 PROCEDURE — 85610 PROTHROMBIN TIME: CPT

## 2024-02-07 PROCEDURE — 1126F AMNT PAIN NOTED NONE PRSNT: CPT | Performed by: NURSE PRACTITIONER

## 2024-02-07 PROCEDURE — 82947 ASSAY GLUCOSE BLOOD QUANT: CPT | Mod: 59

## 2024-02-07 PROCEDURE — 99215 OFFICE O/P EST HI 40 MIN: CPT | Performed by: NURSE PRACTITIONER

## 2024-02-07 PROCEDURE — 84100 ASSAY OF PHOSPHORUS: CPT

## 2024-02-07 PROCEDURE — 85025 COMPLETE CBC W/AUTO DIFF WBC: CPT

## 2024-02-07 PROCEDURE — 1159F MED LIST DOCD IN RCRD: CPT | Performed by: NURSE PRACTITIONER

## 2024-02-07 PROCEDURE — 85730 THROMBOPLASTIN TIME PARTIAL: CPT

## 2024-02-07 PROCEDURE — 84439 ASSAY OF FREE THYROXINE: CPT

## 2024-02-07 ASSESSMENT — PAIN SCALES - GENERAL: PAINLEVEL: 0-NO PAIN

## 2024-02-07 NOTE — PROGRESS NOTES
Patient ID: Toro Lujan is a 68 y.o. male.  Attending Physician: Dr. Topher Castanon  Cancer Diagnosis:  Cancer Staging   No matching staging information was found for the patient.     Current Therapy: Screening for SEGE 1822: Disitamab Vedotin, cohorts A and B in HER2-overexpressing IHC 1+, 2+, 3+ for locally advanced unresectable or metastatic UC who have received prior platinum-based chemotherapy  Genetics: NA    Subjective      Cancer History:  Oncology History    No history exists.     11/5/2020: CTU shows mildly asymmetric generalized urinary bladder wall thickening with mild mucosal hyperenhancement, large prostate  1/29/2021: TURBT. 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 Posterior tumor reveals papillary high-grade at least pTa urothelial carcinoma with no definite invasion, no muscle present  2/24/2021: Bladder tumor path shows high grade focally invasive urothelial carcinoma into the lamina propria, non-muscle invasive.   5/6/2021: Underwent radical cystectomy with ilial conduit due to multiple multiple disease recurrences without MIBC.  12/13/2021: CTU suspicious for new enlarged pelvic LN  2/17/2022: Started chemotherapy with split-dose gemcitabine and cisplatin  3/10/2022: C2 gem/cis  3/31/2022: C3 gem/cis  4/20/2022: Restaging scans revealed response  4/21/2022: C4 gem/cis  5/12/2022: C5 gem/cis  6/24/2022: Started maintenance avelumab. Treatment was placed on hold due to insurance coverage issues after 1 cycle  8/24/2022: CT scans stable  9/2/2022: Restarted avelumab    3/2/2023: Last dose avelumab, off after  12/28/2023: CT reveals increase in left para-aortic LN highly suspicious for metastatic LAD.  1/18/2024: Again increase in para-aortic LN.  1/31/2024: Screening for SEGE 1822: Disitamab vedotin  2/7/2024: Screening for SEGE 1822    Interval History:  Mr. Lujan presents today in screening for DV study. He continues to feel  well. No new or concerning symptoms. The remainder of his ROS is otherwise negative.  HPI    Objective    BSA: 2.48 meters squared  /76   Pulse 83   Temp 35.9 °C (96.6 °F)   Resp 18   Wt 125 kg (274 lb 14.6 oz)   SpO2 98%   BMI 39.58 kg/m²     Physical Exam  General: alert, well-dressed in NAD. Speech is fluent and coherent, words clear. Good insight. Oriented x4   Skin: warm, dry, and pink without cyanosis or nail clubbing. No rash, petechiae, or ecchymoses  HEENT: Normocephalic atraumatic. Sclera white, conjunctiva pink. EOMs intact. Hearing intact to spoken voice. Oral mucosa pink. No visible lesions  Respiratory: Chest expansion symmetric. Breath sounds vesicular in all lobes without crackles, wheezes, or rhonchi bilaterally.  CV: S1S2 audible and crisp without murmurs, rubs, or extra sounds. RRR. Radial pulses strong and regular. Extremities warm and pink. No edema bilaterally.  Lymph: no palpable cervical, submandibular, or supraclavicular lymphadenopathy.  GI: abdomen is round, soft, and non-tender. Active bowel sounds. No palpable masses or hepatosplenomegaly.   : IC bag present  Psych: engaged, polite, appropriate conversation and eye contact.    Current Medications:    Current Outpatient Medications:     acetaminophen (Tylenol) 500 mg tablet, Take 2 tablets (1,000 mg) by mouth every 8 hours if needed., Disp: , Rfl:     Advair Diskus 100-50 mcg/dose diskus inhaler, INHALE 1 PUFF BY MOUTH TWICE DAILY AS DIRECTED. RINSE AND GARGLE MOUTH WITH WATER AFTER EACH USE, Disp: , Rfl:     ALPRAZolam (Xanax) 0.5 mg tablet, Take one tablet one hour before anxiety-provoking situation. Limits one tab/day, three tabs/week., Disp: , Rfl:     aspirin 81 mg EC tablet, Take 1 tablet (81 mg) by mouth once daily., Disp: , Rfl:     lisinopril 20 mg tablet, Take 1 tablet (20 mg) by mouth once daily., Disp: , Rfl:     metoprolol tartrate (Lopressor) 25 mg tablet, Take 1 tablet (25 mg) by mouth 2 times a day., Disp: ,  Rfl:     pantoprazole (ProtoNix) 40 mg EC tablet, Take 1 tablet (40 mg) by mouth once daily., Disp: , Rfl:     rosuvastatin (Crestor) 40 mg tablet, Take 1 tablet (40 mg) by mouth once daily., Disp: , Rfl:     sertraline (Zoloft) 50 mg tablet, Take 1 tablet (50 mg) by mouth once daily., Disp: , Rfl:      Most Recent Labs:  Results for orders placed or performed during the hospital encounter of 01/31/24   Magnesium   Result Value Ref Range    Magnesium 1.70 1.60 - 2.40 mg/dL   CBC and Auto Differential   Result Value Ref Range    WBC 10.1 4.4 - 11.3 x10*3/uL    nRBC 0.0 0.0 - 0.0 /100 WBCs    RBC 5.46 4.50 - 5.90 x10*6/uL    Hemoglobin 16.5 13.5 - 17.5 g/dL    Hematocrit 48.3 41.0 - 52.0 %    MCV 89 80 - 100 fL    MCH 30.2 26.0 - 34.0 pg    MCHC 34.2 32.0 - 36.0 g/dL    RDW 13.8 11.5 - 14.5 %    Platelets 219 150 - 450 x10*3/uL    Neutrophils % 64.8 40.0 - 80.0 %    Immature Granulocytes %, Automated 0.9 0.0 - 0.9 %    Lymphocytes % 21.0 13.0 - 44.0 %    Monocytes % 8.0 2.0 - 10.0 %    Eosinophils % 4.5 0.0 - 6.0 %    Basophils % 0.8 0.0 - 2.0 %    Neutrophils Absolute 6.55 1.20 - 7.70 x10*3/uL    Immature Granulocytes Absolute, Automated 0.09 0.00 - 0.70 x10*3/uL    Lymphocytes Absolute 2.12 1.20 - 4.80 x10*3/uL    Monocytes Absolute 0.81 0.10 - 1.00 x10*3/uL    Eosinophils Absolute 0.46 0.00 - 0.70 x10*3/uL    Basophils Absolute 0.08 0.00 - 0.10 x10*3/uL   Comprehensive Metabolic Panel   Result Value Ref Range    Glucose 128 (H) 74 - 99 mg/dL    Sodium 137 136 - 145 mmol/L    Potassium 4.3 3.5 - 5.3 mmol/L    Chloride 105 98 - 107 mmol/L    Bicarbonate 25 21 - 32 mmol/L    Anion Gap 11 10 - 20 mmol/L    Urea Nitrogen 25 (H) 6 - 23 mg/dL    Creatinine 1.15 0.50 - 1.30 mg/dL    eGFR 69 >60 mL/min/1.73m*2    Calcium 9.1 8.6 - 10.6 mg/dL    Albumin 4.1 3.4 - 5.0 g/dL    Alkaline Phosphatase 116 33 - 136 U/L    Total Protein 6.7 6.4 - 8.2 g/dL    AST 11 9 - 39 U/L    Bilirubin, Total 0.5 0.0 - 1.2 mg/dL    ALT 17 10 - 52  U/L   Bilirubin, Direct   Result Value Ref Range    Bilirubin, Direct 0.1 0.0 - 0.3 mg/dL   Phosphorus   Result Value Ref Range    Phosphorus 3.3 2.5 - 4.9 mg/dL   Glucose, Fasting   Result Value Ref Range    Glucose, Fasting 128 (H) 74 - 99 mg/dL   Lipid Panel   Result Value Ref Range    Cholesterol 220 (H) 0 - 199 mg/dL    HDL-Cholesterol 37.2 mg/dL    Cholesterol/HDL Ratio 5.9     LDL Calculated 158 (H) <=99 mg/dL    VLDL 25 0 - 40 mg/dL    Triglycerides 123 0 - 149 mg/dL    Non HDL Cholesterol 183 (H) 0 - 149 mg/dL   Thyroid Stimulating Hormone   Result Value Ref Range    Thyroid Stimulating Hormone 1.76 0.44 - 3.98 mIU/L   Triiodothyronine, Free   Result Value Ref Range    Triiodothyronine, Free 3.2 2.3 - 4.2 pg/mL   Triiodothyronine, Total   Result Value Ref Range    Triiodothyronine 122 60 - 200 ng/dL   Thyroxine, Free   Result Value Ref Range    Thyroxine, Free 0.90 0.78 - 1.48 ng/dL   APTT   Result Value Ref Range    aPTT 32 27 - 38 seconds   Protime-INR   Result Value Ref Range    Protime 11.7 9.8 - 12.8 seconds    INR 1.0 0.9 - 1.1   Urinalysis with Reflex Microscopic   Result Value Ref Range    Color, Urine Yellow Straw, Yellow    Appearance, Urine Hazy (N) Clear    Specific Gravity, Urine 1.014 1.005 - 1.035    pH, Urine 5.0 5.0, 5.5, 6.0, 6.5, 7.0, 7.5, 8.0    Protein, Urine 30 (1+) (N) NEGATIVE mg/dL    Glucose, Urine NEGATIVE NEGATIVE mg/dL    Blood, Urine SMALL (1+) (A) NEGATIVE    Ketones, Urine NEGATIVE NEGATIVE mg/dL    Bilirubin, Urine NEGATIVE NEGATIVE    Urobilinogen, Urine <2.0 <2.0 mg/dL    Nitrite, Urine POSITIVE (A) NEGATIVE    Leukocyte Esterase, Urine SMALL (1+) (A) NEGATIVE   Microscopic Only, Urine   Result Value Ref Range    WBC, Urine >50 (A) 1-5, NONE /HPF    WBC Clumps, Urine RARE Reference range not established. /HPF    RBC, Urine 11-20 (A) NONE, 1-2, 3-5 /HPF    Bacteria, Urine 3+ (A) NONE SEEN /HPF    Mucus, Urine 1+ Reference range not established. /LPF      No results found  "for: \"PSA\"     Performance Status:  ECOG Score: 0- Fully active, able to carry on all pre-disease performance w/o restriction.  Karnofsky Score: 100 - Fully active, able to carry on all pre-disease performed without restriction      Assessment/Plan   Toro Lujan is a 68 y.o. 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 He looks and feels well.      # mUC  - Plan to start SEGE 1822 with DV    # Health Maintenance  - Continue with PCP and other healthcare providers  - Advised exercise, heart-healthy diet    RTC per protocol    Total time spent on this encounter was 50 minutes, which included preparation, direct time with patient, documentation, and care coordination on the day of visit.    Daniella Pineda, MSN, APRN, AGNP-C, AOCNP  Associate Nurse Practitioner  [facility]  "

## 2024-02-08 ENCOUNTER — APPOINTMENT (OUTPATIENT)
Dept: HEMATOLOGY/ONCOLOGY | Facility: HOSPITAL | Age: 69
End: 2024-02-08
Payer: COMMERCIAL

## 2024-02-09 ENCOUNTER — APPOINTMENT (OUTPATIENT)
Dept: RESEARCH | Facility: HOSPITAL | Age: 69
End: 2024-02-09
Payer: COMMERCIAL

## 2024-02-13 PROBLEM — C67.8 MALIGNANT NEOPLASM OF OVERLAPPING SITES OF BLADDER (MULTI): Status: ACTIVE | Noted: 2024-02-13

## 2024-02-14 ENCOUNTER — OFFICE VISIT (OUTPATIENT)
Dept: HEMATOLOGY/ONCOLOGY | Facility: HOSPITAL | Age: 69
End: 2024-02-14
Payer: COMMERCIAL

## 2024-02-14 ENCOUNTER — HOSPITAL ENCOUNTER (OUTPATIENT)
Dept: RESEARCH | Facility: HOSPITAL | Age: 69
Discharge: HOME | End: 2024-02-14
Payer: COMMERCIAL

## 2024-02-14 VITALS
BODY MASS INDEX: 39.64 KG/M2 | RESPIRATION RATE: 20 BRPM | DIASTOLIC BLOOD PRESSURE: 77 MMHG | OXYGEN SATURATION: 97 % | SYSTOLIC BLOOD PRESSURE: 110 MMHG | TEMPERATURE: 97.2 F | WEIGHT: 275.35 LBS | HEART RATE: 79 BPM

## 2024-02-14 VITALS
BODY MASS INDEX: 39.64 KG/M2 | WEIGHT: 275.35 LBS | TEMPERATURE: 97 F | HEART RATE: 90 BPM | SYSTOLIC BLOOD PRESSURE: 154 MMHG | OXYGEN SATURATION: 99 % | DIASTOLIC BLOOD PRESSURE: 90 MMHG | RESPIRATION RATE: 18 BRPM

## 2024-02-14 DIAGNOSIS — C67.8 MALIGNANT NEOPLASM OF OVERLAPPING SITES OF BLADDER (MULTI): Primary | ICD-10-CM

## 2024-02-14 DIAGNOSIS — C67.8 MALIGNANT NEOPLASM OF OVERLAPPING SITES OF BLADDER (MULTI): ICD-10-CM

## 2024-02-14 LAB
ALBUMIN SERPL BCP-MCNC: 4.1 G/DL (ref 3.4–5)
ALP SERPL-CCNC: 124 U/L (ref 33–136)
ALT SERPL W P-5'-P-CCNC: 19 U/L (ref 10–52)
ANION GAP SERPL CALC-SCNC: 14 MMOL/L (ref 10–20)
AST SERPL W P-5'-P-CCNC: 12 U/L (ref 9–39)
BASOPHILS # BLD AUTO: 0.07 X10*3/UL (ref 0–0.1)
BASOPHILS NFR BLD AUTO: 0.7 %
BILIRUB DIRECT SERPL-MCNC: 0.1 MG/DL (ref 0–0.3)
BILIRUB SERPL-MCNC: 0.4 MG/DL (ref 0–1.2)
BUN SERPL-MCNC: 23 MG/DL (ref 6–23)
CALCIUM SERPL-MCNC: 9.3 MG/DL (ref 8.6–10.6)
CHLORIDE SERPL-SCNC: 104 MMOL/L (ref 98–107)
CHOLEST SERPL-MCNC: 231 MG/DL (ref 0–199)
CHOLESTEROL/HDL RATIO: 6
CO2 SERPL-SCNC: 25 MMOL/L (ref 21–32)
CREAT SERPL-MCNC: 1.31 MG/DL (ref 0.5–1.3)
EGFRCR SERPLBLD CKD-EPI 2021: 59 ML/MIN/1.73M*2
EOSINOPHIL # BLD AUTO: 0.46 X10*3/UL (ref 0–0.7)
EOSINOPHIL NFR BLD AUTO: 4.7 %
ERYTHROCYTE [DISTWIDTH] IN BLOOD BY AUTOMATED COUNT: 13.8 % (ref 11.5–14.5)
GLUCOSE SERPL-MCNC: 141 MG/DL (ref 74–99)
HBV CORE AB SER QL: NONREACTIVE
HBV SURFACE AB SER-ACNC: <3.1 MIU/ML
HBV SURFACE AG SERPL QL IA: NONREACTIVE
HCT VFR BLD AUTO: 49.2 % (ref 41–52)
HDLC SERPL-MCNC: 38.8 MG/DL
HGB BLD-MCNC: 16.6 G/DL (ref 13.5–17.5)
HOLD SPECIMEN: NORMAL
IMM GRANULOCYTES # BLD AUTO: 0.07 X10*3/UL (ref 0–0.7)
IMM GRANULOCYTES NFR BLD AUTO: 0.7 % (ref 0–0.9)
LDLC SERPL CALC-MCNC: 154 MG/DL
LYMPHOCYTES # BLD AUTO: 2.28 X10*3/UL (ref 1.2–4.8)
LYMPHOCYTES NFR BLD AUTO: 23.4 %
MAGNESIUM SERPL-MCNC: 1.72 MG/DL (ref 1.6–2.4)
MCH RBC QN AUTO: 30.3 PG (ref 26–34)
MCHC RBC AUTO-ENTMCNC: 33.7 G/DL (ref 32–36)
MCV RBC AUTO: 90 FL (ref 80–100)
MONOCYTES # BLD AUTO: 0.83 X10*3/UL (ref 0.1–1)
MONOCYTES NFR BLD AUTO: 8.5 %
NEUTROPHILS # BLD AUTO: 6.05 X10*3/UL (ref 1.2–7.7)
NEUTROPHILS NFR BLD AUTO: 62 %
NON HDL CHOLESTEROL: 192 MG/DL (ref 0–149)
NRBC BLD-RTO: 0 /100 WBCS (ref 0–0)
PHOSPHATE SERPL-MCNC: 3.2 MG/DL (ref 2.5–4.9)
PLATELET # BLD AUTO: 205 X10*3/UL (ref 150–450)
POTASSIUM SERPL-SCNC: 4.4 MMOL/L (ref 3.5–5.3)
PROT SERPL-MCNC: 6.9 G/DL (ref 6.4–8.2)
RBC # BLD AUTO: 5.47 X10*6/UL (ref 4.5–5.9)
SODIUM SERPL-SCNC: 139 MMOL/L (ref 136–145)
TRIGL SERPL-MCNC: 192 MG/DL (ref 0–149)
VLDL: 38 MG/DL (ref 0–40)
WBC # BLD AUTO: 9.8 X10*3/UL (ref 4.4–11.3)

## 2024-02-14 PROCEDURE — 80053 COMPREHEN METABOLIC PANEL: CPT | Performed by: STUDENT IN AN ORGANIZED HEALTH CARE EDUCATION/TRAINING PROGRAM

## 2024-02-14 PROCEDURE — 86706 HEP B SURFACE ANTIBODY: CPT | Performed by: STUDENT IN AN ORGANIZED HEALTH CARE EDUCATION/TRAINING PROGRAM

## 2024-02-14 PROCEDURE — 83735 ASSAY OF MAGNESIUM: CPT | Performed by: STUDENT IN AN ORGANIZED HEALTH CARE EDUCATION/TRAINING PROGRAM

## 2024-02-14 PROCEDURE — 86704 HEP B CORE ANTIBODY TOTAL: CPT | Performed by: STUDENT IN AN ORGANIZED HEALTH CARE EDUCATION/TRAINING PROGRAM

## 2024-02-14 PROCEDURE — 85025 COMPLETE CBC W/AUTO DIFF WBC: CPT | Performed by: STUDENT IN AN ORGANIZED HEALTH CARE EDUCATION/TRAINING PROGRAM

## 2024-02-14 PROCEDURE — 96413 CHEMO IV INFUSION 1 HR: CPT

## 2024-02-14 PROCEDURE — 84100 ASSAY OF PHOSPHORUS: CPT | Performed by: STUDENT IN AN ORGANIZED HEALTH CARE EDUCATION/TRAINING PROGRAM

## 2024-02-14 PROCEDURE — 1159F MED LIST DOCD IN RCRD: CPT | Performed by: NURSE PRACTITIONER

## 2024-02-14 PROCEDURE — 2500000005 HC RX 250 GENERAL PHARMACY W/O HCPCS: Performed by: STUDENT IN AN ORGANIZED HEALTH CARE EDUCATION/TRAINING PROGRAM

## 2024-02-14 PROCEDURE — 99214 OFFICE O/P EST MOD 30 MIN: CPT | Performed by: NURSE PRACTITIONER

## 2024-02-14 PROCEDURE — 1126F AMNT PAIN NOTED NONE PRSNT: CPT | Performed by: NURSE PRACTITIONER

## 2024-02-14 PROCEDURE — C9399 UNCLASSIFIED DRUGS OR BIOLOG: HCPCS | Performed by: STUDENT IN AN ORGANIZED HEALTH CARE EDUCATION/TRAINING PROGRAM

## 2024-02-14 PROCEDURE — 80061 LIPID PANEL: CPT | Performed by: STUDENT IN AN ORGANIZED HEALTH CARE EDUCATION/TRAINING PROGRAM

## 2024-02-14 PROCEDURE — 82248 BILIRUBIN DIRECT: CPT | Performed by: STUDENT IN AN ORGANIZED HEALTH CARE EDUCATION/TRAINING PROGRAM

## 2024-02-14 PROCEDURE — 87340 HEPATITIS B SURFACE AG IA: CPT | Performed by: STUDENT IN AN ORGANIZED HEALTH CARE EDUCATION/TRAINING PROGRAM

## 2024-02-14 RX ORDER — FAMOTIDINE 10 MG/ML
20 INJECTION INTRAVENOUS ONCE AS NEEDED
Status: CANCELLED | OUTPATIENT
Start: 2024-02-14

## 2024-02-14 RX ORDER — PROCHLORPERAZINE MALEATE 10 MG
10 TABLET ORAL EVERY 6 HOURS PRN
Status: CANCELLED | OUTPATIENT
Start: 2024-02-14

## 2024-02-14 RX ORDER — EPINEPHRINE 0.3 MG/.3ML
0.3 INJECTION SUBCUTANEOUS EVERY 5 MIN PRN
Status: CANCELLED | OUTPATIENT
Start: 2024-02-14

## 2024-02-14 RX ORDER — PROCHLORPERAZINE EDISYLATE 5 MG/ML
10 INJECTION INTRAMUSCULAR; INTRAVENOUS EVERY 6 HOURS PRN
Status: CANCELLED | OUTPATIENT
Start: 2024-02-14

## 2024-02-14 RX ORDER — DIPHENHYDRAMINE HYDROCHLORIDE 50 MG/ML
50 INJECTION INTRAMUSCULAR; INTRAVENOUS AS NEEDED
Status: CANCELLED | OUTPATIENT
Start: 2024-02-14

## 2024-02-14 RX ORDER — ALBUTEROL SULFATE 0.83 MG/ML
3 SOLUTION RESPIRATORY (INHALATION) AS NEEDED
Status: CANCELLED | OUTPATIENT
Start: 2024-02-14

## 2024-02-14 RX ADMIN — Medication 150 MG: at 14:17

## 2024-02-14 ASSESSMENT — PAIN SCALES - GENERAL: PAINLEVEL: 0-NO PAIN

## 2024-02-14 NOTE — PROGRESS NOTES
Patient ID: Toro Lujan is a 68 y.o. male.  Attending Physician: Dr. Topher Castanon  Cancer Diagnosis:  Cancer Staging   No matching staging information was found for the patient.     Current Therapy: SEGE 1822: Disitamab Vedotin, cohorts A and B in HER2-overexpressing IHC 1+, 2+, 3+ for locally advanced unresectable or metastatic UC who have received prior platinum-based chemotherapy  Genetics: NA    Subjective      Cancer History:  Oncology History   Malignant neoplasm of overlapping sites of bladder (CMS/HCC)   2/13/2024 Initial Diagnosis    Malignant neoplasm of overlapping sites of bladder (CMS/HCC)     2/14/2024 -  Research Study Participant    (RS) [id number] Intensive PK Cohort - Disitamab Vedotin, 14 Day Cycles  Plan Provider: Fan Rapp MD PhD  Treatment goal: Control  Line of treatment: Second Line  Associated studies: A Study of Disitamab Vedotin in Subjects With HER2 Expressing Urothelial Carcinoma       11/5/2020: CTU shows mildly asymmetric generalized urinary bladder wall thickening with mild mucosal hyperenhancement, large prostate  1/29/2021: TURBT. 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 Posterior tumor reveals papillary high-grade at least pTa urothelial carcinoma with no definite invasion, no muscle present  2/24/2021: Bladder tumor path shows high grade focally invasive urothelial carcinoma into the lamina propria, non-muscle invasive.   5/6/2021: Underwent radical cystectomy with ilial conduit due to multiple multiple disease recurrences without MIBC.  12/13/2021: CTU suspicious for new enlarged pelvic LN  2/17/2022: Started chemotherapy with split-dose gemcitabine and cisplatin  3/10/2022: C2 gem/cis  3/31/2022: C3 gem/cis  4/20/2022: Restaging scans revealed response  4/21/2022: C4 gem/cis  5/12/2022: C5 gem/cis  6/24/2022: Started maintenance avelumab. Treatment was placed on hold due to insurance coverage issues after  1 cycle  8/24/2022: CT scans stable  9/2/2022: Restarted avelumab    3/2/2023: Last dose avelumab, off after  12/28/2023: CT reveals increase in left para-aortic LN highly suspicious for metastatic LAD.  1/18/2024: Again increase in para-aortic LN.  1/31/2024: Screening for SEGE 1822: Disitamab vedotin  2/7/2024: Screening for SEGE 1822  2/14/2024: Cycle 1 day 1 disitamab vedotin as part of clinical trial    Interval History:  Mr. Lujan presents today in consideration of cycle 1 day 1. He feels well overall. Notes no changes since last week's visit. The remainder of his ROS is otherwise negative.    HPI    Objective    BSA: 2.48 meters squared  /90   Pulse 90   Temp 36.1 °C (97 °F)   Resp 18   Wt 125 kg (275 lb 5.7 oz)   SpO2 99%   BMI 39.64 kg/m²     Physical Exam  General: alert, well-dressed in NAD. Speech is fluent and coherent, words clear. Good insight. Oriented x4   Skin: warm, dry, and pink without cyanosis or nail clubbing. No rash, petechiae, or ecchymoses  HEENT: Normocephalic atraumatic. Sclera white, conjunctiva pink. EOMs intact. Hearing intact to spoken voice. Oral mucosa pink. No visible lesions  Respiratory: Chest expansion symmetric. Breath sounds vesicular in all lobes without crackles, wheezes, or rhonchi bilaterally.  CV: S1S2 audible and crisp without murmurs, rubs, or extra sounds. RRR. Radial pulses strong and regular. Extremities warm and pink. No edema bilaterally.  Lymph: no palpable cervical, submandibular, or supraclavicular lymphadenopathy.  GI: abdomen is round, soft, and non-tender. Active bowel sounds. No palpable masses or hepatosplenomegaly.   : IC bag present  Psych: engaged, polite, appropriate conversation and eye contact.    Current Medications:    Current Outpatient Medications:     acetaminophen (Tylenol) 500 mg tablet, Take 2 tablets (1,000 mg) by mouth every 8 hours if needed., Disp: , Rfl:     Advair Diskus 100-50 mcg/dose diskus inhaler, INHALE 1 PUFF BY  MOUTH TWICE DAILY AS DIRECTED. RINSE AND GARGLE MOUTH WITH WATER AFTER EACH USE, Disp: , Rfl:     ALPRAZolam (Xanax) 0.5 mg tablet, Take one tablet one hour before anxiety-provoking situation. Limits one tab/day, three tabs/week., Disp: , Rfl:     aspirin 81 mg EC tablet, Take 1 tablet (81 mg) by mouth once daily., Disp: , Rfl:     lisinopril 20 mg tablet, Take 1 tablet (20 mg) by mouth once daily., Disp: , Rfl:     metoprolol tartrate (Lopressor) 25 mg tablet, Take 1 tablet (25 mg) by mouth 2 times a day., Disp: , Rfl:     pantoprazole (ProtoNix) 40 mg EC tablet, Take 1 tablet (40 mg) by mouth once daily., Disp: , Rfl:     rosuvastatin (Crestor) 40 mg tablet, Take 1 tablet (40 mg) by mouth once daily., Disp: , Rfl:     sertraline (Zoloft) 50 mg tablet, Take 1 tablet (50 mg) by mouth once daily., Disp: , Rfl:      Most Recent Labs:  Results for orders placed or performed in visit on 02/07/24   CBC and Auto Differential   Result Value Ref Range    WBC 8.4 4.4 - 11.3 x10*3/uL    nRBC 0.0 0.0 - 0.0 /100 WBCs    RBC 5.82 4.50 - 5.90 x10*6/uL    Hemoglobin 17.3 13.5 - 17.5 g/dL    Hematocrit 51.5 41.0 - 52.0 %    MCV 89 80 - 100 fL    MCH 29.7 26.0 - 34.0 pg    MCHC 33.6 32.0 - 36.0 g/dL    RDW 13.9 11.5 - 14.5 %    Platelets 229 150 - 450 x10*3/uL    Neutrophils % 61.9 40.0 - 80.0 %    Immature Granulocytes %, Automated 0.7 0.0 - 0.9 %    Lymphocytes % 24.4 13.0 - 44.0 %    Monocytes % 7.2 2.0 - 10.0 %    Eosinophils % 5.1 0.0 - 6.0 %    Basophils % 0.7 0.0 - 2.0 %    Neutrophils Absolute 5.22 1.20 - 7.70 x10*3/uL    Immature Granulocytes Absolute, Automated 0.06 0.00 - 0.70 x10*3/uL    Lymphocytes Absolute 2.06 1.20 - 4.80 x10*3/uL    Monocytes Absolute 0.61 0.10 - 1.00 x10*3/uL    Eosinophils Absolute 0.43 0.00 - 0.70 x10*3/uL    Basophils Absolute 0.06 0.00 - 0.10 x10*3/uL   Comprehensive Metabolic Panel   Result Value Ref Range    Glucose 117 (H) 74 - 99 mg/dL    Sodium 136 136 - 145 mmol/L    Potassium 4.6 3.5 -  "5.3 mmol/L    Chloride 100 98 - 107 mmol/L    Bicarbonate 28 21 - 32 mmol/L    Anion Gap 13 10 - 20 mmol/L    Urea Nitrogen 16 6 - 23 mg/dL    Creatinine 1.16 0.50 - 1.30 mg/dL    eGFR 69 >60 mL/min/1.73m*2    Calcium 9.3 8.6 - 10.3 mg/dL    Albumin 4.4 3.4 - 5.0 g/dL    Alkaline Phosphatase 130 33 - 136 U/L    Total Protein 7.6 6.4 - 8.2 g/dL    AST 14 9 - 39 U/L    Bilirubin, Total 0.7 0.0 - 1.2 mg/dL    ALT 19 10 - 52 U/L   Magnesium   Result Value Ref Range    Magnesium 1.78 1.60 - 2.40 mg/dL   Bilirubin, Direct   Result Value Ref Range    Bilirubin, Direct 0.1 0.0 - 0.3 mg/dL   Phosphorus   Result Value Ref Range    Phosphorus 3.3 2.5 - 4.9 mg/dL   Glucose, Fasting   Result Value Ref Range    Glucose, Fasting 117 (H) 74 - 99 mg/dL   Lipid Panel   Result Value Ref Range    Cholesterol 233 (H) 0 - 199 mg/dL    HDL-Cholesterol 40.9 mg/dL    Cholesterol/HDL Ratio 5.7     LDL Calculated 153 (H) <=99 mg/dL    VLDL 40 0 - 40 mg/dL    Triglycerides 198 (H) 0 - 149 mg/dL    Non HDL Cholesterol 192 (H) 0 - 149 mg/dL   Thyroid Stimulating Hormone   Result Value Ref Range    Thyroid Stimulating Hormone 1.94 0.44 - 3.98 mIU/L   Triiodothyronine, Free   Result Value Ref Range    Triiodothyronine, Free 3.7 2.3 - 4.2 pg/mL   Triiodothyronine, Total   Result Value Ref Range    Triiodothyronine 157 60 - 200 ng/dL   Thyroxine, Free   Result Value Ref Range    Thyroxine, Free 0.98 0.78 - 1.48 ng/dL   APTT   Result Value Ref Range    aPTT 32 27 - 38 seconds   Protime-INR   Result Value Ref Range    Protime 11.0 9.8 - 12.8 seconds    INR 1.0 0.9 - 1.1      No results found for: \"PSA\"     Performance Status:  ECOG Score: 0- Fully active, able to carry on all pre-disease performance w/o restriction.  Karnofsky Score: 100 - Fully active, able to carry on all pre-disease performed without restriction      Assessment/Plan   Toro Lujan is a 68 y.o. male with mUC to LN who presents in follow up and consideration of cycle 1 day 1 SEGE " 1822 on DV. He looks and feels well. Labs pending. To start therapy today    # mUC  - Plan to start SEGE 1822 with DV today  - Continue to monitor labs    # Health Maintenance  - Continue with PCP and other healthcare providers  - Advised exercise, heart-healthy diet    RTC per protocol    Total time spent on this encounter was 30 minutes, which included preparation, direct time with patient, documentation, and care coordination on the day of visit.    Daniella Pineda, MSN, APRN, AGNP-C, AOCNP  Associate Nurse Practitioner  [facility]

## 2024-02-14 NOTE — ADDENDUM NOTE
Encounter addended by: Kailee Quezada RN on: 2/14/2024 6:26 PM   Actions taken: Specialty comments modified, MAR administration accepted

## 2024-02-14 NOTE — RESEARCH NOTES
[facility]<[id number]>    DCRU NURSING VISIT NOTE  Study Name: [id number]  IRB#: [id number]  DCRU#: # [id number]  Protocol Version Dated: v7 12.22.2022  PI: Fan Rapp MD.    Time point: Cycle 1 AND 4 - Day 1 INTENSIVE PK  CYCLE 1    Encounter Date: 02/14/2024  Encounter Time: 10:30 AM EST  Encounter Department: [facility] HEMATOLOGY AND ONCOLOGY     #1     Phone Pager   Emma Mackenzie [phone] [place]    #2 Phone Pager   [facility] Helms [phone] [facility]   Other Phone Pager          Study Regimen and Dosing   Intensive PK patients with HER2-overexpressing immunohistochemistry (IHC) 1+, 2+, 3+ locally advanced unresectable or metastatic urothelial carcinoma who have received prior platinum-based chemotherapy.  Cycle = 14 days  Disitamab Vedotin administered IV Day 1 every 2 weeks     Admission and Prior to Starting Study Activities   1. Notify  when patient arrives to unit.  2. Complete DCRU/[facility] intake form in EPIC.  4. Study Questionnaires   5. Obtain weight in kilograms - with shoes off & heavy items removed.  6. Obtain vital signs. Record in EMR.  7. Insert one peripheral IV line for sample collection procedures (flush line with normal saline following each blood draw). Access mediport (if available) otherwise insert second peripheral line in opposite arm for Investigative drug administration (if peripheral line, flush line with normal saline before & after infusion)  8. Do Not draw research samples from the same line the investigational drug infused through.  9. Physical Exam < 1 day  10. Confirm patient fasting for clinical safety labs (every 3rd month, if ordered).     Criteria to Treat   DCRU RN reviewed and meets eligibility to proceed with treatment plan   Time team notified: 1114   DCRU RN notifies study team to review eligibility and approval before dosing procedures  Time team approves: 1239      Dietary Guidelines   REGULAR      Subjective   Toro Lujan is a 68 y.o. male and is here for a Research clinical visit.    Visit Provider: Dorinda, [facility] ROOM 13 [facility]     Allergies: No Known Allergies    Objective     Vital Signs:    Vitals:    02/14/24 1100 02/14/24 1300   BP: (!) 135/93 110/77   Pulse: 86 79   Resp: 20 20   Temp: 36.2 °C (97.2 °F) 36.2 °C (97.2 °F)   TempSrc: Temporal Temporal   SpO2: 97% 97%   Weight: 125 kg (275 lb 5.7 oz)        Physical Exam     ASSESSMENT and PLAN:  Problem List Items Addressed This Visit          Hematology and Neoplasia    Malignant neoplasm of overlapping sites of bladder (CMS/HCC)    Relevant Medications    Study [id number] disitamab vedotin 150 mg in sodium chloride 0.9% 250 mL IV (Completed)    Other Relevant Orders    CBC and Auto Differential (Completed)    Comprehensive metabolic panel (Completed)    Hepatitis B surface antigen (Completed)    Hepatitis B Core Antibody, Total (Completed)    Hepatitis B surface antibody (Completed)    Bilirubin, Direct (Completed)    Lipid panel (Completed)    Magnesium (Completed)    Phosphorus (Completed)    Research Triplicate ECG (Completed)    Research Triplicate ECG (Completed)    Research collection: 6mL Red Top - Research Collect (Completed)    Research collection: 6mL Red Top - Research Collect (Completed)    Research collection: 4mL Red Top - Research Collect (Completed)    Research collection: 6mL Red Top - Research Collect    Research collection: 6mL Red Top - Research Collect        Medications as of the completion of today's visit:  Current Outpatient Medications   Medication Sig Dispense Refill    acetaminophen (Tylenol) 500 mg tablet Take 2 tablets (1,000 mg) by mouth every 8 hours if needed.      Advair Diskus 100-50 mcg/dose diskus inhaler INHALE 1 PUFF BY MOUTH TWICE DAILY AS DIRECTED. RINSE AND GARGLE MOUTH WITH WATER AFTER EACH USE      ALPRAZolam (Xanax) 0.5 mg tablet Take one tablet one hour before anxiety-provoking situation. Limits one  tab/day, three tabs/week.      aspirin 81 mg EC tablet Take 1 tablet (81 mg) by mouth once daily.      lisinopril 20 mg tablet Take 1 tablet (20 mg) by mouth once daily.      metoprolol tartrate (Lopressor) 25 mg tablet Take 1 tablet (25 mg) by mouth 2 times a day.      pantoprazole (ProtoNix) 40 mg EC tablet Take 1 tablet (40 mg) by mouth once daily.      rosuvastatin (Crestor) 40 mg tablet Take 1 tablet (40 mg) by mouth once daily.      sertraline (Zoloft) 50 mg tablet Take 1 tablet (50 mg) by mouth once daily.       No current facility-administered medications for this encounter.       Administrations This Visit       Study [id number] disitamab vedotin 150 mg in sodium chloride 0.9% 250 mL IV       Admin Date  02/14/2024 Action  New Bag Dose  150 mg Rate  250 mL/hr Route  intravenous Administered By  Kailee Quezada RN                    Orders placed during today's visit:  Orders Placed This Encounter   Procedures    CBC and Auto Differential     Standing Status:   Standing     Number of Occurrences:   1     Order Specific Question:   Release result to MyChart     Answer:   Immediate [1]    Comprehensive metabolic panel     Standing Status:   Standing     Number of Occurrences:   1     Order Specific Question:   Release result to MyChart     Answer:   Immediate [1]    Hepatitis B surface antigen     Standing Status:   Standing     Number of Occurrences:   1     Order Specific Question:   Release result to MyChart     Answer:   Immediate [1]    Hepatitis B Core Antibody, Total     Standing Status:   Standing     Number of Occurrences:   1     Order Specific Question:   Release result to MyChart     Answer:   Immediate [1]    Hepatitis B surface antibody     Standing Status:   Standing     Number of Occurrences:   1     Order Specific Question:   Release result to MyChart     Answer:   Immediate [1]    Bilirubin, Direct     Standing Status:   Standing     Number of Occurrences:   1     Order Specific Question:    Release result to MyChart     Answer:   Immediate [1]    Lipid panel     Standing Status:   Standing     Number of Occurrences:   1     Order Specific Question:   Release result to MyChart     Answer:   Immediate [1]    Magnesium     Standing Status:   Standing     Number of Occurrences:   1     Order Specific Question:   Release result to MyChart     Answer:   Immediate [1]    Phosphorus     Standing Status:   Standing     Number of Occurrences:   1     Order Specific Question:   Release result to MyChart     Answer:   Immediate [1]    Research collection: 6mL Red Top Blockboard Collect     Standing Status:   Standing     Number of Occurrences:   1     Order Specific Question:   Test     Answer:   PK     Order Specific Question:   Timepoint     Answer:   Pre-Dose     Order Specific Question:   Pre-Dose     Answer:   Within 1 hour     Order Specific Question:   Temperature     Answer:   Ambient     Order Specific Question:   Invert     Answer:   5x     Order Specific Question:   Optional?     Answer:   No    Research collection: 6mL Red Top Blockboard Collect     Standing Status:   Standing     Number of Occurrences:   1     Order Specific Question:   Test     Answer:   PK     Order Specific Question:   Timepoint     Answer:   Pre-Dose     Order Specific Question:   Pre-Dose     Answer:   Within 1 hour     Order Specific Question:   Temperature     Answer:   Ambient     Order Specific Question:   Invert     Answer:   5x     Order Specific Question:   Optional?     Answer:   No    Research collection: 4mL Red Top Blockboard Collect     Standing Status:   Standing     Number of Occurrences:   1     Order Specific Question:   Test     Answer:   ADA     Order Specific Question:   Timepoint     Answer:   Pre-Dose     Order Specific Question:   Pre-Dose     Answer:   Within 1 hour     Order Specific Question:   Temperature     Answer:   Ambient     Order Specific Question:   Invert     Answer:   5x     Order Specific  Question:   Optional?     Answer:   No    Research collection: 6mL Red Top - Research Collect     Standing Status:   Standing     Number of Occurrences:   1     Order Specific Question:   Test     Answer:   PK     Order Specific Question:   Timepoint     Answer:   Post-Dose     Order Specific Question:   Post-Dose     Answer:   EOI     Order Specific Question:   Post-Dose Window     Answer:   +/- 15 minutes     Order Specific Question:   Temperature     Answer:   Ambient     Order Specific Question:   Invert     Answer:   5x     Order Specific Question:   Optional?     Answer:   No    Research collection: 6mL Red Top - Research Collect     Standing Status:   Standing     Number of Occurrences:   1     Order Specific Question:   Test     Answer:   PK     Order Specific Question:   Timepoint     Answer:   Post-Dose     Order Specific Question:   Post-Dose     Answer:   EOI     Order Specific Question:   Post-Dose Window     Answer:   +/- 15 minutes     Order Specific Question:   Temperature     Answer:   Ambient     Order Specific Question:   Invert     Answer:   5x     Order Specific Question:   Optional?     Answer:   No    Research Triplicate ECG     Refer to study [employer] for instructions and documentation of the research triplicate ECG.     Standing Status:   Standing     Number of Occurrences:   1    Research Triplicate ECG     Refer to study [facility] for instructions and documentation of the research triplicate ECG.     Standing Status:   Standing     Number of Occurrences:   1        No study found     Study Specific Instructions and Documentation   VITALS  LABS  VITALS   ECG   CORRELATIVES   STUDY DRUG (DV)  ECG  CORRELATIVES  DISCHARGE     PRE-DOSE Safety Labs  < 1 Day prior to Day 1     Refer to [place] Treatment Plan for orders     Weight-Based Dosing   Baseline (screening) weight (kg) ____125kg___       Date measured 02/07/2024 Actual weight (kg) ___124.9kg____   (Weight on Day 1)   Date measured  02/14/2024   Dosing should be based on the patient's actual weight    Adjust dose if patient's weight changes +/- 10% from baseline during the study.   Patients > 100 kg, total dose will be calculated using 100 kg (Maximum = 200 mg) once every 2 week cycle.      Safety Parameters and Special Instructions   Disitamab Vedotin (EL83-ETZ) is an antibody drug conjugate composed of a recombinant humanized IgG1 anti-HER2 mAb linked to the cytotoxic agent MMAE (monomethyl auristatin E).  Potential Side Effects/Adverse Events: infusion-related reaction, neutropenia, thrombocytopenia, anemia, peripheral neuropathy, elevated liver enzymes, fever, n/v, constipation, diarrhea, pruritus, arthralgia, alopecia, hyperglycemia, elevated triglycerides, hypokalemia, proteinuria, hypoalbuminemia.     PRE-DOSE Vital Signs   Temp, Heart Rate, Respiration, Blood Pressure      PRE-DOSE ECG: Before Research Labs   Triplicate - Each approximately 2 minutes apart  MISTI 150c Study-specific ECG Machine   Rest x2 min prior  Collect within 30 minutes prior to pre-dose research labs.   Contact MD/Provider &  if abnormal from baseline or if QTcF >500 msec  QTcF calculation Fridericia's formula: S:\DCRU_Staff\Nursing\Protocols\QTcF calculator    ECG #1: QTcF 385    ECG #2: QTcF 393    ECG #3: QTcF 385  Time Point Completion Time      Pre-dose   1349    1351    1353        PRE-DOSE Research Correlatives: After ECGs  [facility]   Access Type: #22 PIV Location: R lateral AC   Refer to [place] Treatment Plan for orders   Time point Specimen Test Volume Tube Handling Draw Time   Pre-dose  (within 1hour) PK 2 x 6 mL Red Top Ambient, invert 5x 1401    ADA 4 mL Red Top Ambient, invert 5x 1401        Safety, Side and Adverse Effects with Special Instructions  Research Study Drugs   SEE  Safety Parameters and Special Instructions     Infusion-Related Reactions   [facility] SOC Hypersensivity Orders      Day 1 Disitamab  Vedotin Investigational Product Administration (sponsor provided)   Refer to [place] Treatment Plan for orders   Document medication administration in MAR activity. Document Research specific instructions below.   Disitamab Vedotin   If Infusion-Related Reaction, notify MD/Provider &     Grade 1: Mild reaction (transient flushing, rash or fever 38° C): may continue at investigator's discretion; administer symptomatic treatment following DCRU management of hypersensitivity reaction; monitor vital signs.    Grade 2: Moderate reaction (rash, flushing, urticaria, dyspnea, fever 38° C, chest discomfort, or back pain): interrupt infusion & follow DCRU management of hypersensitivity reaction; monitor vital signs.   If promptly responds to symptomatic treatment & is medically stable in the opinion of the investigator, infusion may be resumed at a slower rate.   Grade 3: Prolonged recurrence of symptoms or Grade 4: Life-threatening: stop infusion & follow DCRU management of hypersensitivity reaction. Remain at bedside and monitor until recovery from symptoms. Do not restart infusion.      POST-DOSE ECG: Before Research Labs   Triplicate - Each approximately 2 minutes apart  MISTI 150c Study-specific ECG Machine   Rest x2 min prior  Collect within 30 minutes prior to pre-dose research labs.   Contact MD/Provider &  if abnormal from baseline or if QTcF >500 msec  QTcF calculation Fridericia's formula: S:\DCRU_Staff\Nursing\Protocols\QTcF calculator    ECG #1: QTcF 377    ECG #2: QTcF 402    ECG #3: QTcF 377  Time Point Completion Time      End of Infusion (+/- 15 mins ) 1525    1527    1529        Day 1 Post-Dose Disitamab Vedotin Research Correlatives  [facility]   Access Type: #22 PIV Location: RAC   Refer to [place] Treatment Plan for orders   Time point Specimen Test Volume Tube Handling Draw Time   End of Infusion 1518 (+/-15mins ) PK  2 x 6 mL  Red Top  Ambient,  invert 5x  1533   +2 hr (+/- 1 hour) from EOI     1645,1712        Discharge Instructions   Discharge patient to home after study requirements completed or PK sample obtained.    Remind patient to return: on Day 3 for +48 hr PK & ECGs (+/- 4 hrs) from EOI of Day 1 Disitamab Vedotin  Discharge time: 1730     Kailee Quezada RN  02/14/24

## 2024-02-15 DIAGNOSIS — Z00.6 RESEARCH SUBJECT: Primary | ICD-10-CM

## 2024-02-16 ENCOUNTER — HOSPITAL ENCOUNTER (OUTPATIENT)
Dept: RESEARCH | Facility: HOSPITAL | Age: 69
Discharge: HOME | End: 2024-02-16
Payer: COMMERCIAL

## 2024-02-16 VITALS
RESPIRATION RATE: 20 BRPM | SYSTOLIC BLOOD PRESSURE: 118 MMHG | OXYGEN SATURATION: 95 % | DIASTOLIC BLOOD PRESSURE: 77 MMHG | HEART RATE: 80 BPM | TEMPERATURE: 98.2 F

## 2024-02-16 DIAGNOSIS — C67.8 MALIGNANT NEOPLASM OF OVERLAPPING SITES OF BLADDER (MULTI): ICD-10-CM

## 2024-02-16 LAB
HOLD SPECIMEN: NORMAL
HOLD SPECIMEN: NORMAL

## 2024-02-16 PROCEDURE — 36415 COLL VENOUS BLD VENIPUNCTURE: CPT

## 2024-02-16 NOTE — RESEARCH NOTES
"              After Visit Summary   2018    Deshawn Burrows    MRN: [id number]           Patient Information     Date Of Birth          3/8/1933        Visit Information        Provider Department      2018 3:00 PM Tess Leigh MD [facility]        Today's Diagnoses     Essential hypertension with goal blood pressure less than 140/90    -  1    Coronary artery disease involving native heart, angina presence unspecified, unspecified vessel or lesion type           Follow-ups after your visit        Who to contact     If you have questions or need follow up information about today's clinic visit or your schedule please contact [facility] directly at [phone].  Normal or non-critical lab and imaging results will be communicated to you by MyChart, letter or phone within 4 business days after the clinic has received the results. If you do not hear from us within 7 days, please contact the clinic through MyChart or phone. If you have a critical or abnormal lab result, we will notify you by phone as soon as possible.  Submit refill requests through [employer] or call your pharmacy and they will forward the refill request to us. Please allow 3 business days for your refill to be completed.          Additional Information About Your Visit        MyChart Information     [employer] lets you send messages to your doctor, view your test results, renew your prescriptions, schedule appointments and more. To sign up, go to [email or web address] . Click on \"Log in\" on the left side of the screen, which will take you to the Welcome page. Then click on \"Sign up Now\" on the right side of the page.     You will be asked to enter the access code listed below, as well as some personal information. Please follow the directions to create your username and password.     Your access code is: [email or web address]  Expires: 2018  3:47 PM     Your access code will  in 90 days. If " [facility]<[id number]><[id number] D3><INTENSIVEPK>    DCRU NURSING VISIT NOTE  Study Name: [id number]  IRB#: [id number]  DCRU#: # [id number]  Protocol Version Dated: v7 12.22.2022  PI: Fan Rapp MD.    Time point: Cycle 1 AND 4 Day 3 INTENSIVE PK    Encounter Date: 02/16/2024  Encounter Time: 12:00 PM EST  Encounter Department: [facility] HEMATOLOGY AND ONCOLOGY     #1     Phone Pager   Emma De Los Santos[place]kie [phone] [facility]    #2 Phone Pager   Martita Helms [phone] [facility]   Other Phone Pager          Study Regimen and Dosing   Intensive PK patients with HER2-overexpressing immunohistochemistry (IHC) 1+, 2+, 3+ locally advanced unresectable or metastatic urothelial carcinoma who have received prior platinum-based chemotherapy.  Cycle = 14 days  Disitamab Vedotin administered IV Day 1 every 2 weeks     Admission and Prior to Starting Study Activities   1. Notify  when patient arrives to unit.  2. Patient review/update DCRU/[facility] intake form.  3. Obtain vital signs. Record on flowsheet & in EMR - Day 8 only  4. Perform venipuncture for sample collection procedures. Do Not draw research samples from mediport/central line if used for infusion     Criteria to Treat   DCRU RN reviewed and meets eligibility to proceed with treatment plan   Time team notified: N/A   DCRU RN notifies study team to review eligibility and approval before dosing procedures  Time team approves: N/A      Dietary Guidelines   REGULAR     Subjective   Toro Lujan is a 68 y.o. male and is here for a Research clinical visit.    Visit Provider: 6[address]Breana [facility] ROOM 10 [facility]     Allergies: No Known Allergies    Objective     Vital Signs:    Vitals:    02/16/24 1221   BP: 118/77   Pulse: 80   Resp: 20   Temp: 36.8 °C (98.2 °F)   SpO2: 95%         Physical Exam     ASSESSMENT and PLAN:  Problem List Items Addressed This Visit       Malignant neoplasm of overlapping sites of bladder (CMS/HCC)    Relevant Orders     Infusion Appointment Request (Completed)        Medications as of the completion of today's visit:  Current Outpatient Medications   Medication Sig Dispense Refill    acetaminophen (Tylenol) 500 mg tablet Take 2 tablets (1,000 mg) by mouth every 8 hours if needed.      Advair Diskus 100-50 mcg/dose diskus inhaler INHALE 1 PUFF BY MOUTH TWICE DAILY AS DIRECTED. RINSE AND GARGLE MOUTH WITH WATER AFTER EACH USE      ALPRAZolam (Xanax) 0.5 mg tablet Take one tablet one hour before anxiety-provoking situation. Limits one tab/day, three tabs/week.      aspirin 81 mg EC tablet Take 1 tablet (81 mg) by mouth once daily.      lisinopril 20 mg tablet Take 1 tablet (20 mg) by mouth once daily.      metoprolol tartrate (Lopressor) 25 mg tablet Take 1 tablet (25 mg) by mouth 2 times a day.      pantoprazole (ProtoNix) 40 mg EC tablet Take 1 tablet (40 mg) by mouth once daily.      rosuvastatin (Crestor) 40 mg tablet Take 1 tablet (40 mg) by mouth once daily.      sertraline (Zoloft) 50 mg tablet Take 1 tablet (50 mg) by mouth once daily.       No current facility-administered medications for this encounter.           Orders placed during today's visit:  No orders of the defined types were placed in this encounter.       [id number] - A Study of Disitamab Vedotin in Subjects With HER2 Expressing Urothelial Carcinoma    Patient has no adverse events documented in the Research Adverse Events activity.       Study Specific Instructions and Documentation   ECG   CORRELATIVES   DISCHARGE     Day 3 ECG: Before Research Labs   Triplicate - Each approximately 2 minutes apart  MISTI 150c Study-specific ECG Machine   Rest x2 min prior  Collect within 30 minutes prior to pre-dose research labs.   Contact MD/Provider &  if abnormal from baseline or if QTcF >500 msec  QTcF calculation Fridericia's formula: S:\DCRU_Staff\Nursing\Protocols\QTcF calculator    ECG #1: QTcF 375    ECG #2: QTcF 377    ECG #3: QTcF 374  Time Point  you need help or a new code, please call your [place] clinic or [phone].        Care EveryWhere ID     This is your Care EveryWhere ID. This could be used by other organizations to access your [place] medical records  [id number]        Your Vitals Were     Pulse Temperature Respirations Pulse Oximetry BMI (Body Mass Index)       77 98.1  F (36.7  C) (Oral) 12 97% 20.34 kg/m2        Blood Pressure from Last 3 Encounters:   07/20/18 132/84   05/15/18 162/80   09/26/17 130/80    Weight from Last 3 Encounters:   07/20/18 154 lb 3.2 oz (69.9 kg)   05/15/18 153 lb (69.4 kg)   09/26/17 157 lb (71.2 kg)              Today, you had the following     No orders found for display         Today's Medication Changes          These changes are accurate as of 7/20/18 11:59 PM.  If you have any questions, ask your nurse or doctor.               These medicines have changed or have updated prescriptions.        Dose/Directions    metoprolol succinate 50 MG 24 hr tablet   Commonly known as:  TOPROL-XL   This may have changed:  Another medication with the same name was removed. Continue taking this medication, and follow the directions you see here.   Used for:  Coronary artery disease involving native heart, angina presence unspecified, unspecified vessel or lesion type, Essential hypertension with goal blood pressure less than 140/90   Changed by:  Tess Leigh MD        Dose:  50 mg   Take 1 tablet (50 mg) by mouth daily   Quantity:  90 tablet   Refills:  3            Where to get your medicines      These medications were sent to [id number] [address] [address]     Phone:  [phone]     metoprolol succinate 50 MG 24 hr tablet                Primary Care Provider Office Phone # Fax #    Tess Leigh [phone]       [address] [facility] [address]        Equal Access to Services     [facility]  Completion Time      +48 hours from EOI (+/- 4 hours ) 1213    1215    1217        Day 3 Post-Dose Disitamab Vedotin Research Correlatives  Deliver to DCRU/TRPC for Processing   Access Type: butterfly 23g Location: Left Hand   Refer to [place] Treatment Plan for orders   Time point Specimen Test Volume Tube Handling Draw Time Draw Time   +48 hours (+/- 4 hours) from EOI PK  2 x 6 mL  Red Top  Ambient, invert 5x    1231 1231                 Discharge Instructions   Discharge patient to home after study requirements completed or PK sample obtained.    Remind patient to return: on Day 8 for +168 hr PK (+/- 4 hrs) from EOI of Day 1 Disitamab Vedotin  Discharge time: 1240     Frankie Nation RN  02/16/24    GAAR : Hadii aad ame syd Stevens, waaxda luqadaha, qaybta kajayroda buster, waxdhruv pradeep haykarla watsonmaxineifeoma rosales . So [facility] [phone].    ATENCIÓN: Si habla español, tiene a christina disposición servicios gratuitos de asistencia lingüística. Llame al [phone].    We comply with applicable federal civil rights laws and [place] laws. We do not discriminate on the basis of race, color, national origin, age, disability, sex, sexual orientation, or gender identity.            Thank you!     Thank you for choosing [facility]  for your care. Our goal is always to provide you with excellent care. Hearing back from our patients is one way we can continue to improve our services. Please take a few minutes to complete the written survey that you may receive in the mail after your visit with us. Thank you!             Your Updated Medication List - Protect others around you: Learn how to safely use, store and throw away your medicines at www.disposemymeds.org.          This list is accurate as of 7/20/18 11:59 PM.  Always use your most recent med list.                   Brand Name Dispense Instructions for use Diagnosis    atorvastatin 40 MG tablet    LIPITOR    45 tablet    TAKE 1/2 TABLET BY MOUTH AT BEDTIME    Coronary artery disease involving native heart, angina presence unspecified, unspecified vessel or lesion type       metoprolol succinate 50 MG 24 hr tablet    TOPROL-XL    90 tablet    Take 1 tablet (50 mg) by mouth daily    Coronary artery disease involving native heart, angina presence unspecified, unspecified vessel or lesion type, Essential hypertension with goal blood pressure less than 140/90       multivitamin, therapeutic with minerals Tabs tablet      Take 1 tablet by mouth daily

## 2024-02-21 ENCOUNTER — APPOINTMENT (OUTPATIENT)
Dept: HEMATOLOGY/ONCOLOGY | Facility: HOSPITAL | Age: 69
End: 2024-02-21
Payer: COMMERCIAL

## 2024-02-21 ENCOUNTER — HOSPITAL ENCOUNTER (OUTPATIENT)
Dept: RESEARCH | Facility: HOSPITAL | Age: 69
Discharge: HOME | End: 2024-02-21
Payer: COMMERCIAL

## 2024-02-21 ENCOUNTER — EDUCATION (OUTPATIENT)
Dept: HEMATOLOGY/ONCOLOGY | Facility: HOSPITAL | Age: 69
End: 2024-02-21
Payer: COMMERCIAL

## 2024-02-21 VITALS
OXYGEN SATURATION: 96 % | HEART RATE: 91 BPM | DIASTOLIC BLOOD PRESSURE: 81 MMHG | SYSTOLIC BLOOD PRESSURE: 131 MMHG | RESPIRATION RATE: 20 BRPM | TEMPERATURE: 97.9 F

## 2024-02-21 DIAGNOSIS — C67.8 MALIGNANT NEOPLASM OF OVERLAPPING SITES OF BLADDER (MULTI): ICD-10-CM

## 2024-02-21 LAB
ALBUMIN SERPL BCP-MCNC: 4.4 G/DL (ref 3.4–5)
ALP SERPL-CCNC: 116 U/L (ref 33–136)
ALT SERPL W P-5'-P-CCNC: 32 U/L (ref 10–52)
ANION GAP SERPL CALC-SCNC: 19 MMOL/L (ref 10–20)
AST SERPL W P-5'-P-CCNC: 21 U/L (ref 9–39)
BASOPHILS # BLD AUTO: 0.04 X10*3/UL (ref 0–0.1)
BASOPHILS NFR BLD AUTO: 0.5 %
BILIRUB DIRECT SERPL-MCNC: 0.1 MG/DL (ref 0–0.3)
BILIRUB SERPL-MCNC: 0.7 MG/DL (ref 0–1.2)
BUN SERPL-MCNC: 15 MG/DL (ref 6–23)
CALCIUM SERPL-MCNC: 9.2 MG/DL (ref 8.6–10.6)
CHLORIDE SERPL-SCNC: 98 MMOL/L (ref 98–107)
CO2 SERPL-SCNC: 25 MMOL/L (ref 21–32)
CREAT SERPL-MCNC: 1.37 MG/DL (ref 0.5–1.3)
EGFRCR SERPLBLD CKD-EPI 2021: 56 ML/MIN/1.73M*2
EOSINOPHIL # BLD AUTO: 0.41 X10*3/UL (ref 0–0.7)
EOSINOPHIL NFR BLD AUTO: 4.9 %
ERYTHROCYTE [DISTWIDTH] IN BLOOD BY AUTOMATED COUNT: 13.3 % (ref 11.5–14.5)
GLUCOSE SERPL-MCNC: 228 MG/DL (ref 74–99)
HCT VFR BLD AUTO: 47 % (ref 41–52)
HGB BLD-MCNC: 16 G/DL (ref 13.5–17.5)
HOLD SPECIMEN: NORMAL
HOLD SPECIMEN: NORMAL
IMM GRANULOCYTES # BLD AUTO: 0.04 X10*3/UL (ref 0–0.7)
IMM GRANULOCYTES NFR BLD AUTO: 0.5 % (ref 0–0.9)
LYMPHOCYTES # BLD AUTO: 1.91 X10*3/UL (ref 1.2–4.8)
LYMPHOCYTES NFR BLD AUTO: 23 %
MAGNESIUM SERPL-MCNC: 1.63 MG/DL (ref 1.6–2.4)
MCH RBC QN AUTO: 29.7 PG (ref 26–34)
MCHC RBC AUTO-ENTMCNC: 34 G/DL (ref 32–36)
MCV RBC AUTO: 87 FL (ref 80–100)
MONOCYTES # BLD AUTO: 0.71 X10*3/UL (ref 0.1–1)
MONOCYTES NFR BLD AUTO: 8.6 %
NEUTROPHILS # BLD AUTO: 5.19 X10*3/UL (ref 1.2–7.7)
NEUTROPHILS NFR BLD AUTO: 62.5 %
NRBC BLD-RTO: 0 /100 WBCS (ref 0–0)
PHOSPHATE SERPL-MCNC: 3.2 MG/DL (ref 2.5–4.9)
PLATELET # BLD AUTO: 243 X10*3/UL (ref 150–450)
POTASSIUM SERPL-SCNC: 4.5 MMOL/L (ref 3.5–5.3)
PROT SERPL-MCNC: 6.6 G/DL (ref 6.4–8.2)
RBC # BLD AUTO: 5.38 X10*6/UL (ref 4.5–5.9)
SODIUM SERPL-SCNC: 137 MMOL/L (ref 136–145)
WBC # BLD AUTO: 8.3 X10*3/UL (ref 4.4–11.3)

## 2024-02-21 PROCEDURE — 83735 ASSAY OF MAGNESIUM: CPT | Performed by: STUDENT IN AN ORGANIZED HEALTH CARE EDUCATION/TRAINING PROGRAM

## 2024-02-21 PROCEDURE — 84075 ASSAY ALKALINE PHOSPHATASE: CPT | Performed by: STUDENT IN AN ORGANIZED HEALTH CARE EDUCATION/TRAINING PROGRAM

## 2024-02-21 PROCEDURE — 82248 BILIRUBIN DIRECT: CPT | Performed by: STUDENT IN AN ORGANIZED HEALTH CARE EDUCATION/TRAINING PROGRAM

## 2024-02-21 PROCEDURE — 85025 COMPLETE CBC W/AUTO DIFF WBC: CPT | Performed by: STUDENT IN AN ORGANIZED HEALTH CARE EDUCATION/TRAINING PROGRAM

## 2024-02-21 PROCEDURE — 84100 ASSAY OF PHOSPHORUS: CPT | Performed by: STUDENT IN AN ORGANIZED HEALTH CARE EDUCATION/TRAINING PROGRAM

## 2024-02-21 PROCEDURE — 36415 COLL VENOUS BLD VENIPUNCTURE: CPT

## 2024-02-21 NOTE — RESEARCH NOTES
[facility]<[id number]><[id number]><INTENSIVEPK>    DCRU NURSING VISIT NOTE  Study Name: [id number]  IRB#: [id number]  DCRU#: # [id number]  Protocol Version Dated: v7 12.22.2022  PI: Fan Rapp MD.    Time point: Cycle 1 AND 4 - Day 8 INTENSIVE PK    Encounter Date: 02/21/2024  Encounter Time: 12:00 PM EST  Encounter Department: [facility] HEMATOLOGY AND ONCOLOGY     #1     Phone Pager   Emma De Los Santos[place]kie [phone] [facility]    #2 Phone Pager   Martita Helms [phone] [facility]   Other Phone Pager          Study Regimen and Dosing   Intensive PK patients with HER2-overexpressing immunohistochemistry (IHC) 1+, 2+, 3+ locally advanced unresectable or metastatic urothelial carcinoma who have received prior platinum-based chemotherapy.  Cycle = 14 days  Disitamab Vedotin administered IV Day 1 every 2 weeks     Admission and Prior to Starting Study Activities   1. Notify  when patient arrives to unit.  2. Patient review/update DCRU/[facility] intake form.  3. Obtain vital signs. Record on flowsheet & in EMR - Day 8 only  4. Perform venipuncture for sample collection procedures. Do Not draw research samples from mediport/central line if used for infusion     Criteria to Treat   DCRU RN reviewed and meets eligibility to proceed with treatment plan   Time team notified: N/A   DCRU RN notifies study team to review eligibility and approval before dosing procedures  Time team approves: N/A      Dietary Guidelines   REGULAR     Subjective   Toro Lujan is a 68 y.o. male and is here for a Research clinical visit.    Visit Provider: [address] ROOM 12 [facility]     Allergies: No Known Allergies    Objective     Vital Signs:    Vitals:    02/21/24 1210   BP: 131/81   Pulse: 91   Resp: 20   Temp: 36.6 °C (97.9 °F)   TempSrc: Temporal   SpO2: 96%       Physical Exam     ASSESSMENT and PLAN:  Problem List Items Addressed This Visit          Hematology and Neoplasia    Malignant neoplasm of overlapping  sites of bladder (CMS/[facility])    Relevant Orders    Infusion Appointment Request (Completed)    CBC and Auto Differential    Comprehensive metabolic panel    Bilirubin, Direct    Magnesium    Phosphorus    Research collection: 6mL Red Top - Research Collect    Research collection: 6mL Red Top - Research Collect        Medications as of the completion of today's visit:  Current Outpatient Medications   Medication Sig Dispense Refill    acetaminophen (Tylenol) 500 mg tablet Take 2 tablets (1,000 mg) by mouth every 8 hours if needed.      Advair Diskus 100-50 mcg/dose diskus inhaler INHALE 1 PUFF BY MOUTH TWICE DAILY AS DIRECTED. RINSE AND GARGLE MOUTH WITH WATER AFTER EACH USE      ALPRAZolam (Xanax) 0.5 mg tablet Take one tablet one hour before anxiety-provoking situation. Limits one tab/day, three tabs/week.      aspirin 81 mg EC tablet Take 1 tablet (81 mg) by mouth once daily.      lisinopril 20 mg tablet Take 1 tablet (20 mg) by mouth once daily.      metoprolol tartrate (Lopressor) 25 mg tablet Take 1 tablet (25 mg) by mouth 2 times a day.      pantoprazole (ProtoNix) 40 mg EC tablet Take 1 tablet (40 mg) by mouth once daily.      rosuvastatin (Crestor) 40 mg tablet Take 1 tablet (40 mg) by mouth once daily.      sertraline (Zoloft) 50 mg tablet Take 1 tablet (50 mg) by mouth once daily.       No current facility-administered medications for this encounter.           Orders placed during today's visit:  Orders Placed This Encounter   Procedures    CBC and Auto Differential     Standing Status:   Standing     Number of Occurrences:   1     Order Specific Question:   [facility]     Answer:   Immediate [1]    Comprehensive metabolic panel     Standing Status:   Standing     Number of Occurrences:   1     Order Specific Question:   Release result to MyChart     Answer:   Immediate [1]    Bilirubin, Direct     Standing Status:   Standing     Number of Occurrences:   1     Order Specific Question:    Release result to MyChart     Answer:   Immediate [1]    Magnesium     Standing Status:   Standing     Number of Occurrences:   1     Order Specific Question:   Release result to MyChart     Answer:   Immediate [1]    Phosphorus     Standing Status:   Standing     Number of Occurrences:   1     Order Specific Question:   Release result to MyChart     Answer:   Immediate [1]    Research collection: 6mL Red Top - Research Collect     Standing Status:   Standing     Number of Occurrences:   1     Order Specific Question:   Test     Answer:   PK     Order Specific Question:   Timepoint     Answer:   Post-Dose     Order Specific Question:   Post-Dose     Answer:   Other     Comments:   EOI +168 hours     Order Specific Question:   Post-Dose Window     Answer:   +/-4 hours     Order Specific Question:   Temperature     Answer:   Ambient     Order Specific Question:   Invert     Answer:   5x     Order Specific Question:   Optional?     Answer:   No    Research collection: 6mL Red Top - Research Collect     Standing Status:   Standing     Number of Occurrences:   1     Order Specific Question:   Test     Answer:   PK     Order Specific Question:   Timepoint     Answer:   Post-Dose     Order Specific Question:   Post-Dose     Answer:   Other     Comments:   EOI +168 hours     Order Specific Question:   Post-Dose Window     Answer:   +/-4 hours     Order Specific Question:   Temperature     Answer:   Ambient     Order Specific Question:   Invert     Answer:   5x     Order Specific Question:   Optional?     Answer:   No        [id number] - A Study of Disitamab Vedotin in Subjects With HER2 Expressing Urothelial Carcinoma    Patient has no adverse events documented in the Research Adverse Events activity.       Study Specific Instructions and Documentation   VITALS (flowsheet)  CORRELATIVES   DISCHARGE     Clinical Safety Labs (Local) - Day 8   Refer to [place] Treatment Plan for orders     Day 8 Post-Dose Disitamab Vedotin  Research Correlatives  Deliver to DCRU/TRPC for Processing   Access Type: Venipuncture Location: [facility]   Refer to [place] Treatment Plan for orders   Time point Specimen Test Volume Tube Handling Draw Time Draw Time   +168 hours   (+/- 4 hours) from EOI PK  2 x 6 mL  Red Top  Ambient, invert 5x    1222 1222                 Discharge Instructions   Discharge patient to home after study requirements completed or PK sample obtained.    Remind patient to return: per   Discharge time: 1243     VASQUEZ DOHERTY RN  02/21/24

## 2024-02-22 ENCOUNTER — APPOINTMENT (OUTPATIENT)
Dept: HEMATOLOGY/ONCOLOGY | Facility: HOSPITAL | Age: 69
End: 2024-02-22
Payer: COMMERCIAL

## 2024-02-22 ENCOUNTER — APPOINTMENT (OUTPATIENT)
Dept: RESEARCH | Facility: HOSPITAL | Age: 69
End: 2024-02-22
Payer: COMMERCIAL

## 2024-02-28 DIAGNOSIS — C67.8 MALIGNANT NEOPLASM OF OVERLAPPING SITES OF BLADDER (MULTI): ICD-10-CM

## 2024-02-29 ENCOUNTER — OFFICE VISIT (OUTPATIENT)
Dept: HEMATOLOGY/ONCOLOGY | Facility: HOSPITAL | Age: 69
End: 2024-02-29
Payer: MEDICARE

## 2024-02-29 ENCOUNTER — EDUCATION (OUTPATIENT)
Dept: HEMATOLOGY/ONCOLOGY | Facility: HOSPITAL | Age: 69
End: 2024-02-29
Payer: COMMERCIAL

## 2024-02-29 ENCOUNTER — HOSPITAL ENCOUNTER (OUTPATIENT)
Dept: RESEARCH | Facility: HOSPITAL | Age: 69
Discharge: HOME | End: 2024-02-29
Payer: MEDICARE

## 2024-02-29 VITALS
SYSTOLIC BLOOD PRESSURE: 121 MMHG | BODY MASS INDEX: 39.07 KG/M2 | DIASTOLIC BLOOD PRESSURE: 75 MMHG | TEMPERATURE: 97.9 F | RESPIRATION RATE: 18 BRPM | HEART RATE: 77 BPM | WEIGHT: 271.39 LBS | OXYGEN SATURATION: 96 %

## 2024-02-29 DIAGNOSIS — C67.8 MALIGNANT NEOPLASM OF OVERLAPPING SITES OF BLADDER (MULTI): Primary | ICD-10-CM

## 2024-02-29 DIAGNOSIS — C67.8 MALIGNANT NEOPLASM OF OVERLAPPING SITES OF BLADDER (MULTI): ICD-10-CM

## 2024-02-29 LAB
ALBUMIN SERPL BCP-MCNC: 4.4 G/DL (ref 3.4–5)
ALP SERPL-CCNC: 122 U/L (ref 33–136)
ALT SERPL W P-5'-P-CCNC: 32 U/L (ref 10–52)
ANION GAP SERPL CALC-SCNC: 13 MMOL/L (ref 10–20)
AST SERPL W P-5'-P-CCNC: 18 U/L (ref 9–39)
BASOPHILS # BLD AUTO: 0.08 X10*3/UL (ref 0–0.1)
BASOPHILS NFR BLD AUTO: 0.9 %
BILIRUB DIRECT SERPL-MCNC: 0.1 MG/DL (ref 0–0.3)
BILIRUB SERPL-MCNC: 0.7 MG/DL (ref 0–1.2)
BUN SERPL-MCNC: 20 MG/DL (ref 6–23)
CALCIUM SERPL-MCNC: 9 MG/DL (ref 8.6–10.6)
CHLORIDE SERPL-SCNC: 100 MMOL/L (ref 98–107)
CHOLEST SERPL-MCNC: 219 MG/DL (ref 0–199)
CHOLESTEROL/HDL RATIO: 6.3
CO2 SERPL-SCNC: 28 MMOL/L (ref 21–32)
CREAT SERPL-MCNC: 1.25 MG/DL (ref 0.5–1.3)
EGFRCR SERPLBLD CKD-EPI 2021: 63 ML/MIN/1.73M*2
EOSINOPHIL # BLD AUTO: 0.45 X10*3/UL (ref 0–0.7)
EOSINOPHIL NFR BLD AUTO: 5.3 %
ERYTHROCYTE [DISTWIDTH] IN BLOOD BY AUTOMATED COUNT: 13.9 % (ref 11.5–14.5)
GLUCOSE P FAST SERPL-MCNC: 146 MG/DL (ref 74–99)
GLUCOSE SERPL-MCNC: 146 MG/DL (ref 74–99)
HCT VFR BLD AUTO: 47.8 % (ref 41–52)
HDLC SERPL-MCNC: 34.6 MG/DL
HGB BLD-MCNC: 15.6 G/DL (ref 13.5–17.5)
HOLD SPECIMEN: NORMAL
IMM GRANULOCYTES # BLD AUTO: 0.06 X10*3/UL (ref 0–0.7)
IMM GRANULOCYTES NFR BLD AUTO: 0.7 % (ref 0–0.9)
LDLC SERPL CALC-MCNC: 143 MG/DL
LYMPHOCYTES # BLD AUTO: 1.88 X10*3/UL (ref 1.2–4.8)
LYMPHOCYTES NFR BLD AUTO: 22.2 %
MAGNESIUM SERPL-MCNC: 1.93 MG/DL (ref 1.6–2.4)
MCH RBC QN AUTO: 29.2 PG (ref 26–34)
MCHC RBC AUTO-ENTMCNC: 32.6 G/DL (ref 32–36)
MCV RBC AUTO: 89 FL (ref 80–100)
MONOCYTES # BLD AUTO: 0.78 X10*3/UL (ref 0.1–1)
MONOCYTES NFR BLD AUTO: 9.2 %
NEUTROPHILS # BLD AUTO: 5.23 X10*3/UL (ref 1.2–7.7)
NEUTROPHILS NFR BLD AUTO: 61.7 %
NON HDL CHOLESTEROL: 184 MG/DL (ref 0–149)
NRBC BLD-RTO: 0 /100 WBCS (ref 0–0)
PHOSPHATE SERPL-MCNC: 3.1 MG/DL (ref 2.5–4.9)
PLATELET # BLD AUTO: 233 X10*3/UL (ref 150–450)
POTASSIUM SERPL-SCNC: 4.6 MMOL/L (ref 3.5–5.3)
PROT SERPL-MCNC: 6.8 G/DL (ref 6.4–8.2)
RBC # BLD AUTO: 5.35 X10*6/UL (ref 4.5–5.9)
SODIUM SERPL-SCNC: 136 MMOL/L (ref 136–145)
TRIGL SERPL-MCNC: 206 MG/DL (ref 0–149)
VLDL: 41 MG/DL (ref 0–40)
WBC # BLD AUTO: 8.5 X10*3/UL (ref 4.4–11.3)

## 2024-02-29 PROCEDURE — 99214 OFFICE O/P EST MOD 30 MIN: CPT | Performed by: INTERNAL MEDICINE

## 2024-02-29 PROCEDURE — 80053 COMPREHEN METABOLIC PANEL: CPT | Performed by: INTERNAL MEDICINE

## 2024-02-29 PROCEDURE — 1159F MED LIST DOCD IN RCRD: CPT | Performed by: INTERNAL MEDICINE

## 2024-02-29 PROCEDURE — 82248 BILIRUBIN DIRECT: CPT | Performed by: INTERNAL MEDICINE

## 2024-02-29 PROCEDURE — 1126F AMNT PAIN NOTED NONE PRSNT: CPT | Performed by: INTERNAL MEDICINE

## 2024-02-29 PROCEDURE — 2500000005 HC RX 250 GENERAL PHARMACY W/O HCPCS: Performed by: INTERNAL MEDICINE

## 2024-02-29 PROCEDURE — 84100 ASSAY OF PHOSPHORUS: CPT | Performed by: INTERNAL MEDICINE

## 2024-02-29 PROCEDURE — 96413 CHEMO IV INFUSION 1 HR: CPT

## 2024-02-29 PROCEDURE — 80061 LIPID PANEL: CPT | Performed by: INTERNAL MEDICINE

## 2024-02-29 PROCEDURE — C9399 UNCLASSIFIED DRUGS OR BIOLOG: HCPCS | Performed by: INTERNAL MEDICINE

## 2024-02-29 PROCEDURE — 83735 ASSAY OF MAGNESIUM: CPT | Performed by: INTERNAL MEDICINE

## 2024-02-29 PROCEDURE — 99214 OFFICE O/P EST MOD 30 MIN: CPT | Mod: 25 | Performed by: INTERNAL MEDICINE

## 2024-02-29 PROCEDURE — 82947 ASSAY GLUCOSE BLOOD QUANT: CPT | Mod: 59 | Performed by: INTERNAL MEDICINE

## 2024-02-29 PROCEDURE — 85025 COMPLETE CBC W/AUTO DIFF WBC: CPT | Performed by: INTERNAL MEDICINE

## 2024-02-29 RX ORDER — EPINEPHRINE 0.3 MG/.3ML
0.3 INJECTION SUBCUTANEOUS EVERY 5 MIN PRN
Status: CANCELLED | OUTPATIENT
Start: 2024-02-29

## 2024-02-29 RX ORDER — FAMOTIDINE 10 MG/ML
20 INJECTION INTRAVENOUS ONCE AS NEEDED
Status: DISCONTINUED | OUTPATIENT
Start: 2024-02-29 | End: 2024-03-01 | Stop reason: HOSPADM

## 2024-02-29 RX ORDER — PROCHLORPERAZINE EDISYLATE 5 MG/ML
10 INJECTION INTRAMUSCULAR; INTRAVENOUS EVERY 6 HOURS PRN
Status: CANCELLED | OUTPATIENT
Start: 2024-02-29

## 2024-02-29 RX ORDER — EPINEPHRINE 1 MG/ML
0.3 INJECTION INTRAMUSCULAR; INTRAVENOUS; SUBCUTANEOUS EVERY 5 MIN PRN
Status: DISCONTINUED | OUTPATIENT
Start: 2024-02-29 | End: 2024-03-01 | Stop reason: HOSPADM

## 2024-02-29 RX ORDER — PROCHLORPERAZINE MALEATE 10 MG
10 TABLET ORAL EVERY 6 HOURS PRN
Status: DISCONTINUED | OUTPATIENT
Start: 2024-02-29 | End: 2024-03-01 | Stop reason: HOSPADM

## 2024-02-29 RX ORDER — PROCHLORPERAZINE MALEATE 10 MG
10 TABLET ORAL EVERY 6 HOURS PRN
Status: CANCELLED | OUTPATIENT
Start: 2024-02-29

## 2024-02-29 RX ORDER — ALBUTEROL SULFATE 0.83 MG/ML
3 SOLUTION RESPIRATORY (INHALATION) AS NEEDED
Status: CANCELLED | OUTPATIENT
Start: 2024-02-29

## 2024-02-29 RX ORDER — PROCHLORPERAZINE EDISYLATE 5 MG/ML
10 INJECTION INTRAMUSCULAR; INTRAVENOUS EVERY 6 HOURS PRN
Status: DISCONTINUED | OUTPATIENT
Start: 2024-02-29 | End: 2024-03-01 | Stop reason: HOSPADM

## 2024-02-29 RX ORDER — DIPHENHYDRAMINE HYDROCHLORIDE 50 MG/ML
50 INJECTION INTRAMUSCULAR; INTRAVENOUS AS NEEDED
Status: DISCONTINUED | OUTPATIENT
Start: 2024-02-29 | End: 2024-03-01 | Stop reason: HOSPADM

## 2024-02-29 RX ORDER — DIPHENHYDRAMINE HYDROCHLORIDE 50 MG/ML
50 INJECTION INTRAMUSCULAR; INTRAVENOUS AS NEEDED
Status: CANCELLED | OUTPATIENT
Start: 2024-02-29

## 2024-02-29 RX ORDER — ALBUTEROL SULFATE 0.83 MG/ML
3 SOLUTION RESPIRATORY (INHALATION) AS NEEDED
Status: DISCONTINUED | OUTPATIENT
Start: 2024-02-29 | End: 2024-03-01 | Stop reason: HOSPADM

## 2024-02-29 RX ORDER — FAMOTIDINE 10 MG/ML
20 INJECTION INTRAVENOUS ONCE AS NEEDED
Status: CANCELLED | OUTPATIENT
Start: 2024-02-29

## 2024-02-29 RX ADMIN — Medication 150 MG: at 13:35

## 2024-02-29 NOTE — PROGRESS NOTES
Research Note Follow Up Visit       Toro Lujan is here today for C1D8 follow up on SEGE 1822.  Follow up procedures completed per protocol. Patient is aware of continued follow up plan.    Saturday ate carol chili mussels.  More Bms than normal; small amount; formed. Saturday went 2-3 times.    Sunday 2-3 times. Small volume; formed.    Monday daughter david called and shared argument with sister, Michelle, which was stressful.    Monday evening, through this morning,   pain in abdomen bottom center; cramps; sometimes relieved by gas release; sometimes would self resolve.    No pain now.     Appetite less since Monday.    Fatigue grade 1.    No PN.  Itchy spot on right hand same as BL - a few weeks old.    Friday afternoon Left front top tooth broke off on Friday to gum line.    Discussed seeing a counselor and meditation for stress relief and grief counseling.     BL = 1x daily          Education Documentation  Tips for Daily Living, taught by Emma Mann RN at 2/29/2024  3:04 PM.  Learner: Patient  Readiness: Eager  Method: Explanation  Response: Verbalizes Understanding    Nutrition/Diet, taught by Emma Mann RN at 2/29/2024  3:04 PM.  Learner: Patient  Readiness: Eager  Method: Explanation  Response: Verbalizes Understanding    Nutrition, taught by Emma Mann RN at 2/29/2024  3:04 PM.  Learner: Patient  Readiness: Eager  Method: Explanation  Response: Verbalizes Understanding    Healthy Lifestyle, taught by Emma Mann RN at 2/29/2024  3:04 PM.  Learner: Patient  Readiness: Eager  Method: Explanation  Response: Verbalizes Understanding    Bleeding Precautions, taught by Emma Mann RN at 2/29/2024  3:04 PM.  Learner: Patient  Readiness: Eager  Method: Explanation  Response: Verbalizes Understanding    Edema Management, taught by Emma Mann RN at 2/29/2024  3:04 PM.  Learner: Patient  Readiness: Eager  Method: Explanation  Response: Verbalizes Understanding    Shortness of Breath, taught by  Emma Mann RN at 2/29/2024  3:04 PM.  Learner: Patient  Readiness: Eager  Method: Explanation  Response: Verbalizes Understanding    Changes in Appetite, taught by Emma Mann RN at 2/29/2024  3:04 PM.  Learner: Patient  Readiness: Eager  Method: Explanation  Response: Verbalizes Understanding    Memory Problems, taught by Emma Mann RN at 2/29/2024  3:04 PM.  Learner: Patient  Readiness: Eager  Method: Explanation  Response: Verbalizes Understanding    Skin and Nail Changes, taught by Emma Mann RN at 2/29/2024  3:04 PM.  Learner: Patient  Readiness: Eager  Method: Explanation  Response: Verbalizes Understanding    Changes in Urination, taught by Emma Mann RN at 2/29/2024  3:04 PM.  Learner: Patient  Readiness: Eager  Method: Explanation  Response: Verbalizes Understanding    Care of Neuropathy, taught by Emma Mann RN at 2/29/2024  3:04 PM.  Learner: Patient  Readiness: Eager  Method: Explanation  Response: Verbalizes Understanding    Infection Control, taught by Emma Mann RN at 2/29/2024  3:04 PM.  Learner: Patient  Readiness: Eager  Method: Explanation  Response: Verbalizes Understanding    Alopecia, taught by Emma Mann RN at 2/29/2024  3:04 PM.  Learner: Patient  Readiness: Eager  Method: Explanation  Response: Verbalizes Understanding    Diarrhea, taught by Emma Mann RN at 2/29/2024  3:04 PM.  Learner: Patient  Readiness: Eager  Method: Explanation  Response: Verbalizes Understanding    Constipation, taught by Emma Mann RN at 2/29/2024  3:04 PM.  Learner: Patient  Readiness: Eager  Method: Explanation  Response: Verbalizes Understanding    Mouth Sores, taught by Emma Mann RN at 2/29/2024  3:04 PM.  Learner: Patient  Readiness: Eager  Method: Explanation  Response: Verbalizes Understanding    Nausea Management, taught by Emma Mann RN at 2/29/2024  3:04 PM.  Learner: Patient  Readiness: Eager  Method: Explanation  Response: Verbalizes  Understanding    Fatigue, taught by Emma Mann RN at 2/29/2024  3:04 PM.  Learner: Patient  Readiness: Eager  Method: Explanation  Response: Verbalizes Understanding    Treatment Plan and Schedule, taught by Emma Mann RN at 2/29/2024  3:04 PM.  Learner: Patient  Readiness: Eager  Method: Explanation  Response: Verbalizes Understanding    General Medication Information, taught by Emma Mann RN at 2/29/2024  3:04 PM.  Learner: Patient  Readiness: Eager  Method: Explanation  Response: Verbalizes Understanding    Healthy Lifestyle, taught by Emma Mann RN at 2/29/2024  2:59 PM.  Learner: Patient  Readiness: Eager  Method: Explanation  Response: Verbalizes Understanding    Bleeding Precautions, taught by Emma Mann RN at 2/29/2024  2:59 PM.  Learner: Patient  Readiness: Eager  Method: Explanation  Response: Verbalizes Understanding    Edema Management, taught by Emma Mann RN at 2/29/2024  2:59 PM.  Learner: Patient  Readiness: Eager  Method: Explanation  Response: Verbalizes Understanding    Shortness of Breath, taught by Emma Mann RN at 2/29/2024  2:59 PM.  Learner: Patient  Readiness: Eager  Method: Explanation  Response: Verbalizes Understanding    Changes in Appetite, taught by Emma Mann RN at 2/29/2024  2:59 PM.  Learner: Patient  Readiness: Eager  Method: Explanation  Response: Verbalizes Understanding    Memory Problems, taught by Emma Mann RN at 2/29/2024  2:59 PM.  Learner: Patient  Readiness: Eager  Method: Explanation  Response: Verbalizes Understanding    Skin and Nail Changes, taught by Emma Mann RN at 2/29/2024  2:59 PM.  Learner: Patient  Readiness: Eager  Method: Explanation  Response: Verbalizes Understanding    Changes in Urination, taught by Emma Mann RN at 2/29/2024  2:59 PM.  Learner: Patient  Readiness: Eager  Method: Explanation  Response: Verbalizes Understanding    Care of Neuropathy, taught by Emma Mann RN at 2/29/2024  2:59 PM.  Learner:  Patient  Readiness: Eager  Method: Explanation  Response: Verbalizes Understanding    Infection Control, taught by Emma Mann RN at 2/29/2024  2:59 PM.  Learner: Patient  Readiness: Eager  Method: Explanation  Response: Verbalizes Understanding    Alopecia, taught by Emma Mann RN at 2/29/2024  2:59 PM.  Learner: Patient  Readiness: Eager  Method: Explanation  Response: Verbalizes Understanding    Diarrhea, taught by Emma Mann RN at 2/29/2024  2:59 PM.  Learner: Patient  Readiness: Eager  Method: Explanation  Response: Verbalizes Understanding    Constipation, taught by Emma Mann RN at 2/29/2024  2:59 PM.  Learner: Patient  Readiness: Eager  Method: Explanation  Response: Verbalizes Understanding    Mouth Sores, taught by Emma Mann RN at 2/29/2024  2:59 PM.  Learner: Patient  Readiness: Eager  Method: Explanation  Response: Verbalizes Understanding    Nausea Management, taught by Emma Mann RN at 2/29/2024  2:59 PM.  Learner: Patient  Readiness: Eager  Method: Explanation  Response: Verbalizes Understanding    Fatigue, taught by Emma Mann RN at 2/29/2024  2:59 PM.  Learner: Patient  Readiness: Eager  Method: Explanation  Response: Verbalizes Understanding    Treatment Plan and Schedule, taught by Emma Mann RN at 2/29/2024  2:59 PM.  Learner: Patient  Readiness: Eager  Method: Explanation  Response: Verbalizes Understanding    General Medication Information, taught by Emma Mann RN at 2/29/2024  2:59 PM.  Learner: Patient  Readiness: Eager  Method: Explanation  Response: Verbalizes Understanding    Education Comments  No comments found.

## 2024-02-29 NOTE — SIGNIFICANT EVENT
02/29/24 1315   Prechemo Checklist   Has the patient been in the hospital, ED, or urgent care since last date of service No   Chemo/Immuno Consent Signed Yes   Protocol/Indications Verified Yes   Confirmed to previous date/time of medication Yes   Compared to previous dose Yes   All medications are dated accurately Yes   Pregnancy Test Negative Not applicable   Parameters Met Yes   BSA/Weight-Height Verified Yes   Dose Calculations Verified Yes

## 2024-02-29 NOTE — RESEARCH NOTES
[facility]<[id number]><C2,3,5+D1><INTENSIVEPK>  DCRU NURSING VISIT NOTE  Study Name: [id number]  IRB#: [id number]  DCRU#: # [id number]  Protocol Version Dated: v7 12.22.2022  PI: Fan Rapp MD.    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  Cycle = 14 days  Disitamab Vedotin administered IV Day 1 every 2 weeks     Admission and Prior to Starting Study Activities   1. Notify  when patient arrives to unit.  2. Complete DCRU/[facility] intake form in EPIC.  4. Study Questionnaires   Coordinator will obtain - Cycle 2, 3, then every other cycle.  5. Obtain weight in kilograms - with shoes off & heavy items removed. (Done @ 0935)  6. Obtain vital signs. Record in EMR. (Done @ 0935)  7. Insert one peripheral IV line for sample collection procedures (flush line with normal saline following each blood draw). Access mediport (if available) otherwise insert second peripheral line in opposite arm for Investigative drug administration (if peripheral line, flush line with normal saline before & after infusion)  8. Do Not draw research samples from the same line the investigational drug infused through.  9. Physical Exam < 1 day  10. Confirm patient fasting for clinical safety labs (every 3rd month, if ordered). (Pt. had tea with sugar @ 0700, Mackenzie CANALES aware)     Criteria to Treat   DCRU RN reviewed and meets eligibility to proceed with treatment plan   Time team notified: 1104 am  DCRU  RN notifies study team to review eligibility and approval before dosing procedures  Time team approves: 1104 am     Dietary Guidelines   REGULAR     Subjective   Toro Lujan is a 68 y.o. male and is here for a Research clinical visit.    Visit Provider: Steve, [facility] ROOM 4 [facility]     Allergies: No Known Allergies    Objective     Vital Signs:    Vitals:    02/29/24 0935 02/29/24 1313 02/29/24 1457   BP: 125/85 102/69 121/75   Pulse: 71 73 77   Resp: 18 18 18   Temp: 36.3 °C (97.3 °F) 36.4 °C (97.5 °F) 36.6 °C (97.9 °F)   TempSrc: Oral Oral Oral   SpO2: 99% 95% 96%   Weight: 123 kg (271 lb 6.2 oz)         Physical Exam     ASSESSMENT and PLAN:  Problem List Items Addressed This Visit          Hematology and Neoplasia    Malignant neoplasm of overlapping sites of bladder (CMS/HCC)    Relevant Medications    prochlorperazine (Compazine) tablet 10 mg    prochlorperazine (Compazine) injection 10 mg    Study [id number] disitamab vedotin 150 mg in sodium chloride 0.9% 250 mL IV (Completed)    sodium chloride 0.9 % bolus 500 mL    dextrose 5 % in water (D5W) bolus    diphenhydrAMINE (BENADryl) injection 50 mg    methylPREDNISolone sod succinate (PF) (SOLU-Medrol) 40 mg/mL injection 40 mg    famotidine PF (Pepcid) injection 20 mg    EPINEPHrine (Adrenalin) injection 0.3 mg    albuterol 2.5 mg /3 mL (0.083 %) nebulizer solution 3 mL    Other Relevant Orders    CBC and Auto Differential (Completed)    Comprehensive metabolic panel (Completed)    Bilirubin, Direct (Completed)    Glucose, Fasting (Completed)    Lipid panel (Completed)    Magnesium (Completed)    Phosphorus (Completed)    Treatment Conditions (Completed)    Provider Communication - [id number] (Completed)    Research Triplicate ECG (Completed)    Research Triplicate ECG (Completed)    Nursing Communication - Hypersensitivity Management, Moderate (Completed)    Nursing Communication - Hypersensitivity Management, Severe (Completed)    Nursing Communication -  Respiratory Management (Completed)    Pulse oximetry, continuous        Medications as of the completion of today's visit:  Current Outpatient Medications   Medication Sig Dispense Refill    acetaminophen (Tylenol) 500 mg tablet Take 2 tablets (1,000 mg) by mouth every 8 hours if needed.      Advair Diskus 100-50 mcg/dose diskus inhaler INHALE 1 PUFF BY MOUTH TWICE DAILY AS DIRECTED. RINSE AND GARGLE MOUTH WITH WATER AFTER EACH USE      ALPRAZolam (Xanax) 0.5 mg tablet Take one tablet one hour before anxiety-provoking situation. Limits one tab/day, three tabs/week.      aspirin 81 mg EC tablet Take 1 tablet (81 mg) by mouth once daily.      lisinopril 20 mg tablet Take 1 tablet (20 mg) by mouth once daily.      metoprolol tartrate (Lopressor) 25 mg tablet Take 1 tablet (25 mg) by mouth 2 times a day.      pantoprazole (ProtoNix) 40 mg EC tablet Take 1 tablet (40 mg) by mouth once daily.      rosuvastatin (Crestor) 40 mg tablet Take 1 tablet (40 mg) by mouth once daily.      sertraline (Zoloft) 50 mg tablet Take 1 tablet (50 mg) by mouth once daily.       Current Facility-Administered Medications   Medication Dose Route Frequency Provider Last Rate Last Admin    albuterol 2.5 mg /3 mL (0.083 %) nebulizer solution 3 mL  3 mL nebulization PRN Topher Castanon MD        dextrose 5 % in water (D5W) bolus  500 mL intravenous PRN Topher Castanon MD        diphenhydrAMINE (BENADryl) injection 50 mg  50 mg intravenous PRN Topher Castanon MD        EPINEPHrine (Adrenalin) injection 0.3 mg  0.3 mg intramuscular q5 min PRN Topher Castanon MD        famotidine PF (Pepcid) injection 20 mg  20 mg intravenous Once PRN Topher Castanon MD        methylPREDNISolone sod succinate (PF) (SOLU-Medrol) 40 mg/mL injection 40 mg  40 mg intravenous PRN Topher Castanon MD        prochlorperazine (Compazine) injection 10 mg  10 mg intravenous q6h PRN Topher Castanon MD        prochlorperazine (Compazine) tablet 10 mg  10 mg oral q6h PRKRISTEN GARZA  MD Lg        sodium chloride 0.9 % bolus 500 mL  500 mL intravenous PRN Topher Castanon MD           Administrations This Visit       Study [id number] disitamab vedotin 150 mg in sodium chloride 0.9% 250 mL IV       Admin Date  02/29/2024 Action  New Bag Dose  150 mg Rate  250 mL/hr Route  intravenous Administered By  Philomena Holley RN                    Orders placed during today's visit:  Orders Placed This Encounter   Procedures    CBC and Auto Differential     Standing Status:   Standing     Number of Occurrences:   1     Order Specific Question:   Release result to MyChart     Answer:   Immediate [1]    Comprehensive metabolic panel     Standing Status:   Standing     Number of Occurrences:   1     Order Specific Question:   Release result to MyChart     Answer:   Immediate [1]    Bilirubin, Direct     Standing Status:   Standing     Number of Occurrences:   1     Order Specific Question:   Release result to MyChart     Answer:   Immediate [1]    Glucose, Fasting     Standing Status:   Standing     Number of Occurrences:   1     Order Specific Question:   Release result to MyChart     Answer:   Immediate [1]    Lipid panel     Standing Status:   Standing     Number of Occurrences:   1     Order Specific Question:   Release result to MyChart     Answer:   Immediate [1]    Magnesium     Standing Status:   Standing     Number of Occurrences:   1     Order Specific Question:   Release result to MyChart     Answer:   Immediate [1]    Phosphorus     Standing Status:   Standing     Number of Occurrences:   1     Order Specific Question:   Release result to MyChart     Answer:   Immediate [1]    Adult diet     Order Specific Question:   Diet type     Answer:   Regular    Treatment Conditions     Hold treatment and notify provider if:   ANC LESS THAN 1 x 10*3/uL   AST/ALT GREATER THAN 3 x ULN   Total Bilirubin GREATER THAN 2 x ULN   LVEF LESS THAN 50%    Fasting or Non-fasting Glucose GREATER THAN 250 mg/dL   Peripheral  Neuropathy GREATER THAN OR EQUAL TO Grade 2   Adverse Event GREATER THAN OR EQUAL TO Grade 3     Standing Status:   Standing     Number of Occurrences:   1    Provider Communication - [id number]      Starting Dose Level             Disitamab Vedotin 1.5 mg/kg   Dose Level -1             Disitamab Vedotin 1.1 mg/kg   Dose Level -2             Disitamab Vedotin 0.75 mg/kg     Standing Status:   Standing     Number of Occurrences:   1    Research Triplicate ECG     Refer to study SmartPhrase for instructions and documentation of the research triplicate ECG.     Standing Status:   Standing     Number of Occurrences:   1    Research Triplicate ECG     Refer to study SmartPhrase for instructions and documentation of the research triplicate ECG.     Standing Status:   Standing     Number of Occurrences:   1    Nursing Communication - Hypersensitivity Management, Moderate     Flushing, rash, pruritus, dyspnea, chest discomfort, back pain, angioedema, SBP LESS THAN 90 mmHg, and/or change in mental status above or below patient's baseline. In the event of moderate or severe hypersensitivity reaction to any medication:   Stop infusion.  Assess vital signs, pulse oximetry, nursing assessment, and patient complaints.  Administer medications per Hypersensitivity Reaction Medication Protocol.   Notify physician.     Standing Status:   Standing     Number of Occurrences:   1    Nursing Communication - Hypersensitivity Management, Severe     Worsening of moderate reaction symptoms, SBP LESS THAN 80 mmHg, and/or respiratory distress (respirations GREATER THAN 40 breaths per minute, wheezing, life-threatening symptoms). In the event of moderate or severe hypersensitivity reaction to any medication:   Stop infusion.  Assess vital signs, pulse oximetry, nursing assessment, and patient complaints.  Administer medications per Hypersensitivity Reaction Medication Protocol.   Notify physician.     Standing Status:   Standing     Number of  Occurrences:   1    Nursing Communication - Respiratory Management     Oxygen via nasal cannula at 4 L per minute for respiratory rate GREATER THAN OR EQUAL TO to 28 breaths/minute and/or wheezing. Pulse Oximetry continuous monitoring.     Standing Status:   Standing     Number of Occurrences:   1    Pulse oximetry, continuous     Continuous monitoring to maintain SPO2 GREATER THAN 90%     Standing Status:   Standing     Number of Occurrences:   1        [id number] - A Study of Disitamab Vedotin in Subjects With HER2 Expressing Urothelial Carcinoma    Patient has no adverse events documented in the Research Adverse Events activity.       Study Specific Instructions and Documentation   VITALS  LABS  VITALS   ECG (Cycles 2, 3, 5 and Every 3rd cycle thereafter)  CORRELATIVES (Cycles 2, 3, 5 and Every 3rd cycle thereafter)  PREMEDS  STUDY DRUG (DV)  CORRELATIVES (Cycles 2, 3, 5 and Every 3rd cycle thereafter)  DISCHARGE     PRE-DOSE Safety Labs  < 1 Day prior to Day 1     Refer to [place] Treatment Plan for orders (done @ 0946)     Weight-Based Dosing   Baseline (screening) weight (kg) 125 kg      Date measured 02/07/2024 Actual weight (kg) 123.1 kg (Weight on Day 1)   Date measured 02/29/2024   Dosing should be based on the patient's actual weight    Adjust dose if patient's weight changes +/- 10% from baseline during the study.   Patients > 100 kg, total dose will be calculated using 100 kg (Maximum = 200 mg) once every 2 week cycle.      Safety Parameters and Special Instructions   Disitamab Vedotin ([id number]) is an antibody drug conjugate composed of a recombinant humanized IgG1 anti-HER2 mAb linked to the cytotoxic agent MMAE (monomethyl auristatin E).  Potential Side Effects/Adverse Events: infusion-related reaction, neutropenia, thrombocytopenia, anemia, peripheral neuropathy, elevated liver enzymes, fever, n/v, constipation, diarrhea, pruritus, arthralgia, alopecia, hyperglycemia, elevated triglycerides,  hypokalemia, proteinuria, hypoalbuminemia.     PRE-DOSE Vital Signs   Temp, Heart Rate, Respiration, Blood Pressure (done @ 1313)     PRE-DOSE ECG: Before Research Labs   Triplicate - Each approximately 2 minutes apart  MISTI 150c Study-specific ECG Machine   Rest at least 10 minutes prior.  Collect within 30 minutes prior to pre-dose research labs.   Contact MD/Provider &  if abnormal from baseline or if QTcF >500 msec  QTcF calculation Fridericia's formula: S:\DCRU_Staff\Nursing\Protocols\QTcF calculator    ECG #1: QTcF 402 msec    ECG #2: QTcF 387 msec    ECG #3: QTcF 396 msec  Time Point Cycle Completion Time      Pre-dose   2, 3, 5 and  every 3rd cycle thereafter 13:22:58     13:25:07     13:26:55        PRE-DOSE Research Correlatives: After ECGs  Deliver to DCRU/[facility] lab for processing   Access Type: 22G PIV Location: left AC   Refer to [place] Treatment Plan for orders   Time point Cycle Specimen Test Volume Tube Handling Draw Time   Pre-dose  (within 1hour) 2,3,5 & every 3rd cycle after PK 2 x 6 mL Red Top Ambient, invert 5x 1330     ADA 4 mL Red Top Ambient, invert 5x 1330        PRE-DOSE Medications (if previous infusion reaction)   Refer to [place] Treatment Plan for orders   Premedication 30 minutes prior to Disitamab Vedotin      Safety, Side and Adverse Effects with Special Instructions  Research Study Drugs   SEE  Safety Parameters and Special Instructions     Infusion-Related Reactions   [facility] SOC Hypersensivity Orders      Day 1 Disitamab Vedotin Investigational Product Administration (sponsor provided)   Refer to [place] Treatment Plan for orders   Document medication administration in MAR activity. Document Research specific instructions below.   Disitamab Vedotin   If Infusion-Related Reaction, notify MD/Provider &     Grade 1: Mild reaction (transient flushing, rash or fever 38° C): may continue at investigator's discretion; administer symptomatic treatment following  DCRU management of hypersensitivity reaction; monitor vital signs.    Grade 2: Moderate reaction (rash, flushing, urticaria, dyspnea, fever 38° C, chest discomfort, or back pain): interrupt infusion & follow DCRU management of hypersensitivity reaction; monitor vital signs.   If promptly responds to symptomatic treatment & is medically stable in the opinion of the investigator, infusion may be resumed at a slower rate.   Grade 3: Prolonged recurrence of symptoms or Grade 4: Life-threatening: stop infusion & follow DCRU management of hypersensitivity reaction. Remain at bedside and monitor until recovery from symptoms. Do not restart infusion.      POST-DOSE ECG: Before Research Labs   Triplicate - Each approximately 2 minutes apart  MISTI 150c Study-specific ECG Machine   Rest at least 10 minutes prior.  Collect within 30 minutes prior to pre-dose research labs.   Contact MD/Provider &  if abnormal from baseline or if QTcF >500 msec  QTcF calculation Fridericia's formula: S:\DCRU_Staff\Nursing\Protocols\QTcF calculator    ECG #1: QTcF 394 msec    ECG #2: QTcF 381 msec    ECG #3: QTcF 390 msec  Time Point Cycle Completion Time      POST-dose   2, 3, 5 and  every 3rd cycle thereafter 14:38:10     14:40:01     14:42:00        Day 1 Post-Dose Disitamab Vedotin Research Correlatives  Deliver to DCRU/[facility] for Processing   Access Type: 23 G butterfly  Location: rt hand   Refer to [place] Treatment Plan for orders   Time point Cycle Specimen Test Volume Tube Handling  Draw Time   End of Infusion  (+/-15mins ) 2,3,5 & every 3rd cycle after   PK  2 x 6 mL  Red Top  Ambient, invert 5x   1448        Discharge Instructions   Discharge patient to home after study requirements completed or PK sample obtained.    Remind patient to return: per   Discharge time: 1503     Philomena Holley RN  02/29/24

## 2024-02-29 NOTE — PROGRESS NOTES
Research Note Follow Up Visit       Toro Lujan is here today for C2D1 follow up and treatment on SEGE 1822.  Follow up procedures completed per protocol. Patient is aware of continued follow up plan. C1D1 occurred on 14FEB24.    After review of Aes, conmeds, vitals, labs and exam, patient treated at initial dose of 1.5 mg/kg and treatment was capped at 100 kg. (True BL weight 125 kg.    Patient 123 kg today; lost 2 kg since C1D1; a loss of 2%.    Patient experiencing G1 intermittent diarrhea (max 3x daily when it happens; small amount, formed) since C1D8, 21FEB24.  Abdominal pain (cramping), 5/10 at worst started at the same time. BL BM = 1X DAILY.    Patient here with his son, Blair, today.      Parking pass given.  Calendar discussed and emailed.           Education Documentation  Pain Management, taught by Emma Mann RN at 2/29/2024  2:17 PM.  Learner: Patient  Readiness: Eager  Method: Explanation  Response: Verbalizes Understanding    Pain Rating Scale, taught by Emma Mann RN at 2/29/2024  2:17 PM.  Learner: Patient  Readiness: Eager  Method: Explanation  Response: Verbalizes Understanding    Healthy Lifestyle, taught by Emma Mann RN at 2/29/2024  2:17 PM.  Learner: Patient  Readiness: Eager  Method: Explanation  Response: Verbalizes Understanding    Bleeding Precautions, taught by Emma Mann RN at 2/29/2024  2:17 PM.  Learner: Patient  Readiness: Eager  Method: Explanation  Response: Verbalizes Understanding    Edema Management, taught by Emma Mann RN at 2/29/2024  2:17 PM.  Learner: Patient  Readiness: Eager  Method: Explanation  Response: Verbalizes Understanding    Shortness of Breath, taught by Emam Mann RN at 2/29/2024  2:17 PM.  Learner: Patient  Readiness: Eager  Method: Explanation  Response: Verbalizes Understanding    Changes in Appetite, taught by Emma Mann RN at 2/29/2024  2:17 PM.  Learner: Patient  Readiness: Eager  Method: Explanation  Response: Verbalizes  Understanding    Memory Problems, taught by Emma Mann RN at 2/29/2024  2:17 PM.  Learner: Patient  Readiness: Eager  Method: Explanation  Response: Verbalizes Understanding    Skin and Nail Changes, taught by Emma Mann RN at 2/29/2024  2:17 PM.  Learner: Patient  Readiness: Eager  Method: Explanation  Response: Verbalizes Understanding    Changes in Urination, taught by Emma Mann RN at 2/29/2024  2:17 PM.  Learner: Patient  Readiness: Eager  Method: Explanation  Response: Verbalizes Understanding    Care of Neuropathy, taught by Emma Mann RN at 2/29/2024  2:17 PM.  Learner: Patient  Readiness: Eager  Method: Explanation  Response: Verbalizes Understanding    Infection Control, taught by Emma Mann RN at 2/29/2024  2:17 PM.  Learner: Patient  Readiness: Eager  Method: Explanation  Response: Verbalizes Understanding    Alopecia, taught by Emma Mann RN at 2/29/2024  2:17 PM.  Learner: Patient  Readiness: Eager  Method: Explanation  Response: Verbalizes Understanding    Diarrhea, taught by Emma Mann RN at 2/29/2024  2:17 PM.  Learner: Patient  Readiness: Eager  Method: Explanation  Response: Verbalizes Understanding    Constipation, taught by Emma Mann RN at 2/29/2024  2:17 PM.  Learner: Patient  Readiness: Eager  Method: Explanation  Response: Verbalizes Understanding    Mouth Sores, taught by Emma Mann RN at 2/29/2024  2:17 PM.  Learner: Patient  Readiness: Eager  Method: Explanation  Response: Verbalizes Understanding    Nausea Management, taught by Emma Mann RN at 2/29/2024  2:17 PM.  Learner: Patient  Readiness: Eager  Method: Explanation  Response: Verbalizes Understanding    Fatigue, taught by Emma Mann RN at 2/29/2024  2:17 PM.  Learner: Patient  Readiness: Eager  Method: Explanation  Response: Verbalizes Understanding    Post-Chemotherapy Education, taught by Emma Mann RN at 2/29/2024  2:17 PM.  Learner: Patient  Readiness: Eager  Method:  Explanation  Response: Verbalizes Understanding    Chemotherapy Safety at Home, taught by Emma Mann RN at 2/29/2024  2:17 PM.  Learner: Patient  Readiness: Eager  Method: Explanation  Response: Verbalizes Understanding    Treatment Plan and Schedule, taught by Emma Mann RN at 2/29/2024  2:17 PM.  Learner: Patient  Readiness: Eager  Method: Explanation  Response: Verbalizes Understanding    General Medication Information, taught by Emma Mann RN at 2/29/2024  2:17 PM.  Learner: Patient  Readiness: Eager  Method: Explanation  Response: Verbalizes Understanding    Comprehensive Metabolic Panel (CMP), taught by Emma Mann RN at 2/29/2024  2:17 PM.  Learner: Patient  Readiness: Eager  Method: Explanation  Response: Verbalizes Understanding    Complete Blood Count with Differential (CBC w/ Diff), taught by Emma Mann RN at 2/29/2024  2:17 PM.  Learner: Patient  Readiness: Eager  Method: Explanation  Response: Verbalizes Understanding    Education Comments  No comments found.

## 2024-03-01 NOTE — PROGRESS NOTES
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 EV sv. EV+ Pembro  Favored trial with [id number] -Disitamab Vedotin administered IV Day 1 every 2 weeks  Doing ok  AES listed in CRC notes  No new symptoms  Heavy smoker    Updated PMHx  None    Review of Systems  13 point review negative    Physical Exam:  There were no vitals taken for this visit.  ECOG Performance Status: 0  HEENT: Normal  ENT: Normal  CV: RRR, no murmurs  Pulmonary: CTA, clear BL  GI: Stoma  :NA  Extremities: No Edema   Neurologic: Normal  Skin: Normal skin turgor  Psych: Appropriate mood      LABS:  No results found for: \"PSA\"  Lab Results   Component Value Date    WBC 8.5 02/29/2024    HGB 15.6 02/29/2024    HCT 47.8 02/29/2024    MCV 89 02/29/2024     02/29/2024     Lab Results   Component Value Date    GLUCOSE 146 (H) 02/29/2024    CALCIUM 9.0 02/29/2024     02/29/2024    K 4.6 02/29/2024    CO2 28 02/29/2024     02/29/2024    BUN 20 02/29/2024    CREATININE 1.25 02/29/2024     No results found for: \"TESTOSTERONE\"  Lab Results   Component Value Date    ALT 32 02/29/2024    AST 18 02/29/2024    ALKPHOS 122 02/29/2024    BILITOT 0.7 02/29/2024       IMAGING:  NA  GENOMICS:  NA    A/P:Met TCC    Doing well on trial No new AEs  Labs adequate  Will continue on study    Discussed importance of a healthier diet - offered to see Nutritional Services; Also discussed the importance of daily regular exercise (aerobic and resistance differences noted)  Offered to see Supportive Services if needed " as well as integrative Oncology and Psychosocial Support    Topher Castanon MD, FACP  Chief, Solid Tumor Oncology Division   Medical Oncology  Professor of Medicine and Urology  /[facility]

## 2024-03-05 NOTE — ADDENDUM NOTE
Encounter addended by: Tara Nielsen RN on: 3/5/2024 8:53 AM   Actions taken: Charge Capture section accepted

## 2024-03-05 NOTE — ADDENDUM NOTE
Encounter addended by: Tara Nielsen RN on: 3/5/2024 4:47 PM   Actions taken: Charge Capture section accepted

## 2024-03-06 ENCOUNTER — APPOINTMENT (OUTPATIENT)
Dept: HEMATOLOGY/ONCOLOGY | Facility: HOSPITAL | Age: 69
End: 2024-03-06
Payer: COMMERCIAL

## 2024-03-12 DIAGNOSIS — C67.8 MALIGNANT NEOPLASM OF OVERLAPPING SITES OF BLADDER (MULTI): ICD-10-CM

## 2024-03-12 RX ORDER — EPINEPHRINE 0.3 MG/.3ML
0.3 INJECTION SUBCUTANEOUS EVERY 5 MIN PRN
Status: CANCELLED | OUTPATIENT
Start: 2024-03-13

## 2024-03-12 RX ORDER — DIPHENHYDRAMINE HYDROCHLORIDE 50 MG/ML
50 INJECTION INTRAMUSCULAR; INTRAVENOUS AS NEEDED
Status: CANCELLED | OUTPATIENT
Start: 2024-03-13

## 2024-03-12 RX ORDER — ALBUTEROL SULFATE 0.83 MG/ML
3 SOLUTION RESPIRATORY (INHALATION) AS NEEDED
Status: CANCELLED | OUTPATIENT
Start: 2024-03-13

## 2024-03-12 RX ORDER — FAMOTIDINE 10 MG/ML
20 INJECTION INTRAVENOUS ONCE AS NEEDED
Status: CANCELLED | OUTPATIENT
Start: 2024-03-13

## 2024-03-12 RX ORDER — PROCHLORPERAZINE EDISYLATE 5 MG/ML
10 INJECTION INTRAMUSCULAR; INTRAVENOUS EVERY 6 HOURS PRN
Status: CANCELLED | OUTPATIENT
Start: 2024-03-13

## 2024-03-12 RX ORDER — PROCHLORPERAZINE MALEATE 10 MG
10 TABLET ORAL EVERY 6 HOURS PRN
Status: CANCELLED | OUTPATIENT
Start: 2024-03-13

## 2024-03-13 ENCOUNTER — EDUCATION (OUTPATIENT)
Dept: HEMATOLOGY/ONCOLOGY | Facility: HOSPITAL | Age: 69
End: 2024-03-13

## 2024-03-13 ENCOUNTER — OFFICE VISIT (OUTPATIENT)
Dept: HEMATOLOGY/ONCOLOGY | Facility: HOSPITAL | Age: 69
End: 2024-03-13
Payer: MEDICARE

## 2024-03-13 ENCOUNTER — HOSPITAL ENCOUNTER (OUTPATIENT)
Dept: RESEARCH | Facility: HOSPITAL | Age: 69
Discharge: HOME | End: 2024-03-13
Payer: MEDICARE

## 2024-03-13 VITALS
TEMPERATURE: 97.9 F | OXYGEN SATURATION: 97 % | BODY MASS INDEX: 39.01 KG/M2 | RESPIRATION RATE: 18 BRPM | WEIGHT: 270.95 LBS | SYSTOLIC BLOOD PRESSURE: 126 MMHG | HEART RATE: 88 BPM | DIASTOLIC BLOOD PRESSURE: 75 MMHG

## 2024-03-13 VITALS — WEIGHT: 271.83 LBS | BODY MASS INDEX: 39.13 KG/M2

## 2024-03-13 DIAGNOSIS — C67.8 MALIGNANT NEOPLASM OF OVERLAPPING SITES OF BLADDER (MULTI): Primary | ICD-10-CM

## 2024-03-13 DIAGNOSIS — C67.8 MALIGNANT NEOPLASM OF OVERLAPPING SITES OF BLADDER (MULTI): ICD-10-CM

## 2024-03-13 LAB
ALBUMIN SERPL BCP-MCNC: 4 G/DL (ref 3.4–5)
ALP SERPL-CCNC: 110 U/L (ref 33–136)
ALT SERPL W P-5'-P-CCNC: 50 U/L (ref 10–52)
ANION GAP SERPL CALC-SCNC: 14 MMOL/L (ref 10–20)
AST SERPL W P-5'-P-CCNC: 24 U/L (ref 9–39)
BASOPHILS # BLD AUTO: 0.06 X10*3/UL (ref 0–0.1)
BASOPHILS NFR BLD AUTO: 0.7 %
BILIRUB DIRECT SERPL-MCNC: 0.1 MG/DL (ref 0–0.3)
BILIRUB SERPL-MCNC: 0.5 MG/DL (ref 0–1.2)
BUN SERPL-MCNC: 19 MG/DL (ref 6–23)
CALCIUM SERPL-MCNC: 9 MG/DL (ref 8.6–10.6)
CHLORIDE SERPL-SCNC: 102 MMOL/L (ref 98–107)
CHOLEST SERPL-MCNC: 205 MG/DL (ref 0–199)
CHOLESTEROL/HDL RATIO: 6.2
CO2 SERPL-SCNC: 26 MMOL/L (ref 21–32)
CREAT SERPL-MCNC: 1.24 MG/DL (ref 0.5–1.3)
EGFRCR SERPLBLD CKD-EPI 2021: 63 ML/MIN/1.73M*2
EOSINOPHIL # BLD AUTO: 0.42 X10*3/UL (ref 0–0.7)
EOSINOPHIL NFR BLD AUTO: 5.2 %
ERYTHROCYTE [DISTWIDTH] IN BLOOD BY AUTOMATED COUNT: 14.1 % (ref 11.5–14.5)
GLUCOSE P FAST SERPL-MCNC: 157 MG/DL (ref 74–99)
GLUCOSE SERPL-MCNC: 157 MG/DL (ref 74–99)
HCT VFR BLD AUTO: 46.9 % (ref 41–52)
HDLC SERPL-MCNC: 33.2 MG/DL
HGB BLD-MCNC: 15.5 G/DL (ref 13.5–17.5)
HOLD SPECIMEN: NORMAL
IMM GRANULOCYTES # BLD AUTO: 0.09 X10*3/UL (ref 0–0.7)
IMM GRANULOCYTES NFR BLD AUTO: 1.1 % (ref 0–0.9)
LDLC SERPL CALC-MCNC: 110 MG/DL
LYMPHOCYTES # BLD AUTO: 1.94 X10*3/UL (ref 1.2–4.8)
LYMPHOCYTES NFR BLD AUTO: 24 %
MAGNESIUM SERPL-MCNC: 1.69 MG/DL (ref 1.6–2.4)
MCH RBC QN AUTO: 29.4 PG (ref 26–34)
MCHC RBC AUTO-ENTMCNC: 33 G/DL (ref 32–36)
MCV RBC AUTO: 89 FL (ref 80–100)
MONOCYTES # BLD AUTO: 0.98 X10*3/UL (ref 0.1–1)
MONOCYTES NFR BLD AUTO: 12.1 %
NEUTROPHILS # BLD AUTO: 4.61 X10*3/UL (ref 1.2–7.7)
NEUTROPHILS NFR BLD AUTO: 56.9 %
NON HDL CHOLESTEROL: 172 MG/DL (ref 0–149)
NRBC BLD-RTO: 0 /100 WBCS (ref 0–0)
PHOSPHATE SERPL-MCNC: 3.8 MG/DL (ref 2.5–4.9)
PLATELET # BLD AUTO: 225 X10*3/UL (ref 150–450)
POTASSIUM SERPL-SCNC: 4.1 MMOL/L (ref 3.5–5.3)
PROT SERPL-MCNC: 6.5 G/DL (ref 6.4–8.2)
RBC # BLD AUTO: 5.28 X10*6/UL (ref 4.5–5.9)
SODIUM SERPL-SCNC: 138 MMOL/L (ref 136–145)
TRIGL SERPL-MCNC: 311 MG/DL (ref 0–149)
VLDL: 62 MG/DL (ref 0–40)
WBC # BLD AUTO: 8.1 X10*3/UL (ref 4.4–11.3)

## 2024-03-13 PROCEDURE — 99215 OFFICE O/P EST HI 40 MIN: CPT | Performed by: NURSE PRACTITIONER

## 2024-03-13 PROCEDURE — 83735 ASSAY OF MAGNESIUM: CPT | Performed by: INTERNAL MEDICINE

## 2024-03-13 PROCEDURE — 82248 BILIRUBIN DIRECT: CPT | Performed by: INTERNAL MEDICINE

## 2024-03-13 PROCEDURE — 96413 CHEMO IV INFUSION 1 HR: CPT

## 2024-03-13 PROCEDURE — 80061 LIPID PANEL: CPT | Performed by: INTERNAL MEDICINE

## 2024-03-13 PROCEDURE — 1126F AMNT PAIN NOTED NONE PRSNT: CPT | Performed by: NURSE PRACTITIONER

## 2024-03-13 PROCEDURE — 84100 ASSAY OF PHOSPHORUS: CPT | Performed by: INTERNAL MEDICINE

## 2024-03-13 PROCEDURE — C9399 UNCLASSIFIED DRUGS OR BIOLOG: HCPCS | Performed by: INTERNAL MEDICINE

## 2024-03-13 PROCEDURE — 80053 COMPREHEN METABOLIC PANEL: CPT | Performed by: INTERNAL MEDICINE

## 2024-03-13 PROCEDURE — 85025 COMPLETE CBC W/AUTO DIFF WBC: CPT | Performed by: INTERNAL MEDICINE

## 2024-03-13 PROCEDURE — 2500000005 HC RX 250 GENERAL PHARMACY W/O HCPCS: Performed by: INTERNAL MEDICINE

## 2024-03-13 PROCEDURE — 1159F MED LIST DOCD IN RCRD: CPT | Performed by: NURSE PRACTITIONER

## 2024-03-13 PROCEDURE — 82947 ASSAY GLUCOSE BLOOD QUANT: CPT | Mod: 59 | Performed by: INTERNAL MEDICINE

## 2024-03-13 PROCEDURE — 99215 OFFICE O/P EST HI 40 MIN: CPT | Mod: 25 | Performed by: NURSE PRACTITIONER

## 2024-03-13 RX ORDER — HEPARIN 100 UNIT/ML
500 SYRINGE INTRAVENOUS AS NEEDED
Status: DISCONTINUED | OUTPATIENT
Start: 2024-03-13 | End: 2024-03-14 | Stop reason: HOSPADM

## 2024-03-13 RX ORDER — HEPARIN 100 UNIT/ML
500 SYRINGE INTRAVENOUS AS NEEDED
Status: CANCELLED | OUTPATIENT
Start: 2024-03-13

## 2024-03-13 RX ORDER — HEPARIN SODIUM,PORCINE/PF 10 UNIT/ML
50 SYRINGE (ML) INTRAVENOUS AS NEEDED
Status: CANCELLED | OUTPATIENT
Start: 2024-03-13

## 2024-03-13 RX ORDER — ALBUTEROL SULFATE 0.83 MG/ML
3 SOLUTION RESPIRATORY (INHALATION) AS NEEDED
Status: DISCONTINUED | OUTPATIENT
Start: 2024-03-13 | End: 2024-03-14 | Stop reason: HOSPADM

## 2024-03-13 RX ORDER — EPINEPHRINE 0.3 MG/.3ML
0.3 INJECTION SUBCUTANEOUS EVERY 5 MIN PRN
Status: DISCONTINUED | OUTPATIENT
Start: 2024-03-13 | End: 2024-03-14 | Stop reason: HOSPADM

## 2024-03-13 RX ORDER — HEPARIN SODIUM,PORCINE/PF 10 UNIT/ML
50 SYRINGE (ML) INTRAVENOUS AS NEEDED
Status: DISCONTINUED | OUTPATIENT
Start: 2024-03-13 | End: 2024-03-14 | Stop reason: HOSPADM

## 2024-03-13 RX ORDER — PROCHLORPERAZINE MALEATE 10 MG
10 TABLET ORAL EVERY 6 HOURS PRN
Status: DISCONTINUED | OUTPATIENT
Start: 2024-03-13 | End: 2024-03-14 | Stop reason: HOSPADM

## 2024-03-13 RX ORDER — DIPHENHYDRAMINE HYDROCHLORIDE 50 MG/ML
50 INJECTION INTRAMUSCULAR; INTRAVENOUS AS NEEDED
Status: DISCONTINUED | OUTPATIENT
Start: 2024-03-13 | End: 2024-03-14 | Stop reason: HOSPADM

## 2024-03-13 RX ORDER — FAMOTIDINE 10 MG/ML
20 INJECTION INTRAVENOUS ONCE AS NEEDED
Status: DISCONTINUED | OUTPATIENT
Start: 2024-03-13 | End: 2024-03-14 | Stop reason: HOSPADM

## 2024-03-13 RX ORDER — PROCHLORPERAZINE EDISYLATE 5 MG/ML
10 INJECTION INTRAMUSCULAR; INTRAVENOUS EVERY 6 HOURS PRN
Status: DISCONTINUED | OUTPATIENT
Start: 2024-03-13 | End: 2024-03-14 | Stop reason: HOSPADM

## 2024-03-13 RX ADMIN — Medication 150 MG: at 12:20

## 2024-03-13 NOTE — PATIENT INSTRUCTIONS
Please call [phone] option 5, option 2 for any questions or concerns. Please call [phone] option 1, for any scheduling concerns or issues.

## 2024-03-13 NOTE — PROGRESS NOTES
Research Note Follow Up Visit       Toro Lujan is here today for consideration of treatment for C3D1 and follow up on SEGE 1822.  Follow up procedures completed per protocol. Patient is aware of continued follow up plan.    Questionnaire completed on ePRO.    Patient treated at initial dose.      C/o increased bowel movements.  At worst, 4 Bms a day.  Formed.  Small amount.  Some abdominal discomfort including cramping resolves with BM.  Discussed use of immodium.    Overall, patient doing very well.  Having some teeth issues after one tooth broke.  Needs a new partial and so self conscious.    Calendar emailed.  Parking pass given.      Education Documentation  Monitoring Weight, taught by Emma Mann RN at 3/13/2024  2:21 PM.  Learner: Patient  Readiness: Eager  Method: Explanation  Response: Verbalizes Understanding    Tips for Daily Living, taught by Emma Mann RN at 3/13/2024  2:21 PM.  Learner: Patient  Readiness: Eager  Method: Explanation  Response: Verbalizes Understanding    Nutrition/Diet, taught by Emma Mann RN at 3/13/2024  2:21 PM.  Learner: Patient  Readiness: Eager  Method: Explanation  Response: Verbalizes Understanding    Food-Drug Interactions, taught by Emma Mann RN at 3/13/2024  2:21 PM.  Learner: Patient  Readiness: Eager  Method: Explanation  Response: Verbalizes Understanding    Nutrition, taught by Emma Mann RN at 3/13/2024  2:21 PM.  Learner: Patient  Readiness: Eager  Method: Explanation  Response: Verbalizes Understanding    Pain Rating Scale, taught by Emma Mann RN at 3/13/2024  2:21 PM.  Learner: Patient  Readiness: Eager  Method: Explanation  Response: Verbalizes Understanding    Healthy Lifestyle, taught by Emma Mann RN at 3/13/2024  2:21 PM.  Learner: Patient  Readiness: Eager  Method: Explanation  Response: Verbalizes Understanding    Bleeding Precautions, taught by Emma Mann RN at 3/13/2024  2:21 PM.  Learner: Patient  Readiness:  Eager  Method: Explanation  Response: Verbalizes Understanding    Edema Management, taught by Emma Mann RN at 3/13/2024  2:21 PM.  Learner: Patient  Readiness: Eager  Method: Explanation  Response: Verbalizes Understanding    Shortness of Breath, taught by Emma Mann RN at 3/13/2024  2:21 PM.  Learner: Patient  Readiness: Eager  Method: Explanation  Response: Verbalizes Understanding    Changes in Appetite, taught by Emma Mann RN at 3/13/2024  2:21 PM.  Learner: Patient  Readiness: Eager  Method: Explanation  Response: Verbalizes Understanding    Memory Problems, taught by Emma Mann RN at 3/13/2024  2:21 PM.  Learner: Patient  Readiness: Eager  Method: Explanation  Response: Verbalizes Understanding    Skin and Nail Changes, taught by Emma Mann RN at 3/13/2024  2:21 PM.  Learner: Patient  Readiness: Eager  Method: Explanation  Response: Verbalizes Understanding    Changes in Urination, taught by Emma Mann RN at 3/13/2024  2:21 PM.  Learner: Patient  Readiness: Eager  Method: Explanation  Response: Verbalizes Understanding    Care of Neuropathy, taught by Emma Mann RN at 3/13/2024  2:21 PM.  Learner: Patient  Readiness: Eager  Method: Explanation  Response: Verbalizes Understanding    Infection Control, taught by Emma Mann RN at 3/13/2024  2:21 PM.  Learner: Patient  Readiness: Eager  Method: Explanation  Response: Verbalizes Understanding    Alopecia, taught by Emma Mann RN at 3/13/2024  2:21 PM.  Learner: Patient  Readiness: Eager  Method: Explanation  Response: Verbalizes Understanding    Diarrhea, taught by Emma Mann RN at 3/13/2024  2:21 PM.  Learner: Patient  Readiness: Eager  Method: Explanation  Response: Verbalizes Understanding    Constipation, taught by Emma Mann RN at 3/13/2024  2:21 PM.  Learner: Patient  Readiness: Eager  Method: Explanation  Response: Verbalizes Understanding    Mouth Sores, taught by Emma Mann RN at 3/13/2024  2:21  PM.  Learner: Patient  Readiness: Eager  Method: Explanation  Response: Verbalizes Understanding    Nausea Management, taught by Emma Mann RN at 3/13/2024  2:21 PM.  Learner: Patient  Readiness: Eager  Method: Explanation  Response: Verbalizes Understanding    Fatigue, taught by Emma Mann RN at 3/13/2024  2:21 PM.  Learner: Patient  Readiness: Eager  Method: Explanation  Response: Verbalizes Understanding    Supportive Medications, taught by Emma Mann RN at 3/13/2024  2:21 PM.  Learner: Patient  Readiness: Eager  Method: Explanation  Response: Verbalizes Understanding    Treatment Plan and Schedule, taught by Emma Mann RN at 3/13/2024  2:21 PM.  Learner: Patient  Readiness: Eager  Method: Explanation  Response: Verbalizes Understanding    Comprehensive Metabolic Panel (CMP), taught by Emma Mann RN at 3/13/2024  2:21 PM.  Learner: Patient  Readiness: Eager  Method: Explanation  Response: Verbalizes Understanding    Complete Blood Count with Differential (CBC w/ Diff), taught by Emma Mann RN at 3/13/2024  2:21 PM.  Learner: Patient  Readiness: Eager  Method: Explanation  Response: Verbalizes Understanding    Education Comments  No comments found.

## 2024-03-13 NOTE — RESEARCH NOTES
[facility]<[id number]><C2,3,5+D1><CHTA&BINTENSIVEPK>  DCRU NURSING VISIT NOTE  Study Name: [id number]  IRB#: [id number]  DCRU#: # [id number]  Protocol Version Dated: v7 12.22.2022  PI: Fan Rapp MD.    Time point: Cycle 2, 3, 5 AND BEYOND - Day 1 INTENSIVE PK  CYCLE C3D1    Encounter Date: 03/13/2024  Encounter Time:  8:00 AM EDT  Encounter Department: [facility] HEMATOLOGY AND ONCOLOGY     #1     Phone Pager   Emma [place] [phone] HAIKU    #2 Phone Pager   [facility] [phone] [facility]   Other Phone Pager          Study Regimen and Dosing   Intensive PK patients with HER2-overexpressing immunohistochemistry (IHC) 1+, 2+, 3+ locally advanced unresectable or metastatic urothelial carcinoma who have received prior platinum-based chemotherapy.  Cycle = 14 days  Disitamab Vedotin administered IV Day 1 every 2 weeks     Admission and Prior to Starting Study Activities   1. Notify  when patient arrives to unit.  2. Complete DCRU/[facility] intake form in EPIC.  4. Study Questionnaires   Coordinator will obtain - Cycle 2, 3, then every other cycle.  5. Obtain weight in kilograms - with shoes off & heavy items removed.  6. Obtain vital signs. Record in EMR.  7. Insert one peripheral IV line for sample collection procedures (flush line with normal saline following each blood draw). Access mediport (if available) otherwise insert second peripheral line in opposite arm for Investigative drug administration (if peripheral line, flush line with normal saline before & after infusion)  8. Do Not draw research samples from the same line the investigational drug infused through.  9. Physical Exam < 1 day  10. Confirm patient fasting for clinical safety labs (every 3rd month, if ordered).     Criteria to Treat   DCRU RN reviewed and meets eligibility to proceed with treatment plan   Time team notified: 1043 am  DCRU RN notifies study team to review eligibility and approval before  dosing procedures  Time team approves: 1043 am     Dietary Guidelines   REGULAR     Subjective   Toro Lujan is a 69 y.o. male and is here for a Research clinical visit.    Visit Provider: Eva8-1, [facility] ROOM 1 [facility]     Allergies: No Known Allergies    Objective     Vital Signs:    Patient Vitals for the past 24 hrs:   BP Temp Temp src Pulse Resp SpO2 Weight   03/13/24 1320 126/75 36.6 °C (97.9 °F) Temporal 88 18 97 % --   03/13/24 1156 110/73 36.2 °C (97.2 °F) Temporal 87 18 96 % --   03/13/24 0808 134/86 37.8 °C (100 °F) Temporal 79 20 97 % 123 kg (270 lb 15.1 oz)          Physical Exam     ASSESSMENT and PLAN:  Problem List Items Addressed This Visit    None       Medications as of the completion of today's visit:            Orders placed during today's visit:  No orders of the defined types were placed in this encounter.       [id number] - A Study of Disitamab Vedotin in Subjects With HER2 Expressing Urothelial Carcinoma    Patient has no adverse events documented in the Research Adverse Events activity.       Study Specific Instructions and Documentation   VITALS-See EPIC Flowsheet  LABS-drawn at 0820 from Right Forearm 22G IV  VITALS See EPIC Flowsheet  ECG (Cycles 2, 3, 5 and Every 3rd cycle thereafter)  CORRELATIVES (Cycles 2, 3, 5 and Every 3rd cycle thereafter)-drawn at 1212 from Right Forearm 22G IV  PREMEDS-NA   STUDY DRUG (DV)-KZH=1542 VWP=4161   CORRELATIVES (Cycles 2, 3, 5 and Every 3rd cycle thereafter)drawn at 1332 from Left Hand   DISCHARGE-1342     PRE-DOSE Safety Labs  < 1 Day prior to Day 1     Refer to [place] Treatment Plan for orders     Weight-Based Dosing   Baseline (screening) weight (kg) _______       Date measured _____/_____/20___ Actual weight (kg) _________   (Weight on Day 1)   Date measured _____/_____/20___   Dosing should be based on the patient's actual weight    Adjust dose if patient's weight changes +/- 10% from baseline during the study.   Patients > 100 kg, total dose will be  calculated using 100 kg (Maximum = 200 mg) once every 2 week cycle.      Safety Parameters and Special Instructions   Disitamab Vedotin (MV50-MFT) is an antibody drug conjugate composed of a recombinant humanized IgG1 anti-HER2 mAb linked to the cytotoxic agent MMAE (monomethyl auristatin E).  Potential Side Effects/Adverse Events: infusion-related reaction, neutropenia, thrombocytopenia, anemia, peripheral neuropathy, elevated liver enzymes, fever, n/v, constipation, diarrhea, pruritus, arthralgia, alopecia, hyperglycemia, elevated triglycerides, hypokalemia, proteinuria, hypoalbuminemia.     PRE-DOSE Vital Signs   Temp, Heart Rate, Respiration, Blood Pressure      PRE-DOSE ECG: Before Research Labs   Triplicate - Each approximately 2 minutes apart  MISTI 150c Study-specific ECG Machine   Rest at least 10 minutes prior.  Collect within 30 minutes prior to pre-dose research labs.   Contact MD/Provider &  if abnormal from baseline or if QTcF >500 msec  QTcF calculation Fridericia's formula: S:\DCRU_Staff\Nursing\Protocols\QTcF calculator    ECG #1: QTcF 389    ECG #2: QTcF 384    ECG #3: QTcF 387  Time Point Cycle Completion Time      Pre-dose   2, 3, 5 and  every 3rd cycle thereafter 1204     1206     1208        PRE-DOSE Research Correlatives: After ECGs  Deliver to DCRU/TRPC lab for processing   Access Type: 22G IV Left Forearm   Refer to [place] Treatment Plan for orders   Time point Cycle Specimen Test Volume Tube Handling Draw Time   Pre-dose  (within 1hour) 2,3,5 & every 3rd cycle after PK 2 x 6 mL Red Top Ambient, invert 5x 1212     ADA 4 mL Red Top Ambient, invert 5x 1212        PRE-DOSE Medications (if previous infusion reaction)   Refer to [place] Treatment Plan for orders   Premedication 30 minutes prior to Disitamab Vedotin      Safety, Side and Adverse Effects with Special Instructions  Research Study Drugs   SEE  Safety Parameters and Special Instructions     Infusion-Related Reactions    [facility] SOC Hypersensivity Orders      Weight-Based Dosing   Baseline (screening) weight (kg) _______       Date measured _____/_____/20___ Actual weight (kg) _________   (Weight on Day 1)   Date measured _____/_____/20___   Dosing should be based on the patient's actual weight    Adjust dose if patient's weight changes +/- 10% from baseline during the study.   Patients > 100 kg, total dose will be calculated using 100 kg (Maximum = 200 mg) once every 2 week cycle.      Day 1 Disitamab Vedotin Investigational Product Administration (sponsor provided)   Refer to [place] Treatment Plan for orders   Document medication administration in MAR activity. Document Research specific instructions below.   Disitamab Vedotin   If Infusion-Related Reaction, notify MD/Provider &     Grade 1: Mild reaction (transient flushing, rash or fever 38° C): may continue at investigator's discretion; administer symptomatic treatment following DCRU management of hypersensitivity reaction; monitor vital signs.    Grade 2: Moderate reaction (rash, flushing, urticaria, dyspnea, fever 38° C, chest discomfort, or back pain): interrupt infusion & follow DCRU management of hypersensitivity reaction; monitor vital signs.   If promptly responds to symptomatic treatment & is medically stable in the opinion of the investigator, infusion may be resumed at a slower rate.   Grade 3: Prolonged recurrence of symptoms or Grade 4: Life-threatening: stop infusion & follow DCRU management of hypersensitivity reaction. Remain at bedside and monitor until recovery from symptoms. Do not restart infusion.      POST-DOSE ECG: Before Research Labs   Triplicate - Each approximately 2 minutes apart  MISTI 150c Study-specific ECG Machine   Rest at least 10 minutes prior.  Collect within 30 minutes prior to pre-dose research labs.   Contact MD/Provider &  if abnormal from baseline or if QTcF >500 msec  QTcF calculation Fridericia's formula:  S:\DCRU_Staff\Nursing\Protocols\QTcF calculator    ECG #1: QTcF 384    ECG #2: QTcF 393    ECG #3: QTcF 387  Time Point Cycle Completion Time      POST-dose   2, 3, 5 and  every 3rd cycle thereafter 1322     1324     1326        Day 1 Post-Dose Disitamab Vedotin Research Correlatives  Deliver to DCRU/[facility] for Processing   Access Type: 23 Butterfly Location: Left Hand   Refer to [place] Treatment Plan for orders   Time point Cycle Specimen Test Volume Tube Handling  Draw Time   End of Infusion  (+/-15mins ) 2,3,5 & every 3rd cycle after   PK  2 x 6 mL  Red Top  Ambient, invert 5x   1332        Discharge Instructions   Discharge patient to home after study requirements completed or PK sample obtained.    Remind patient to return: per   Discharge time: 1342     Hayley Simons RN  03/13/24

## 2024-03-13 NOTE — PROGRESS NOTES
Patient ID: Toro Lujan is a 69 y.o. male.  Attending Physician: Dr. Topher Castanon  Cancer Diagnosis:  Cancer Staging   No matching staging information was found for the patient.     Current Therapy: SEGE 1822: Disitamab Vedotin, cohorts A and B in HER2-overexpressing IHC 1+, 2+, 3+ for locally advanced unresectable or metastatic UC who have received prior platinum-based chemotherapy  Genetics: NA    Subjective      Cancer History:  Oncology History   Malignant neoplasm of overlapping sites of bladder (CMS/HCC)   2/13/2024 Initial Diagnosis    Malignant neoplasm of overlapping sites of bladder (CMS/HCC)     2/14/2024 -  Research Study Participant    (RS) [id number] Intensive PK Cohort - Disitamab Vedotin, 14 Day Cycles  Plan Provider: Fan Rapp MD PhD  Treatment goal: Control  Line of treatment: Second Line  Associated studies: A Study of Disitamab Vedotin in Subjects With HER2 Expressing Urothelial Carcinoma       11/5/2020: CTU shows mildly asymmetric generalized urinary bladder wall thickening with mild mucosal hyperenhancement, large prostate  1/29/2021: TURBT. 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 Posterior tumor reveals papillary high-grade at least pTa urothelial carcinoma with no definite invasion, no muscle present  2/24/2021: Bladder tumor path shows high grade focally invasive urothelial carcinoma into the lamina propria, non-muscle invasive.   5/6/2021: Underwent radical cystectomy with ilial conduit due to multiple multiple disease recurrences without MIBC.  12/13/2021: CTU suspicious for new enlarged pelvic LN  2/17/2022: Started chemotherapy with split-dose gemcitabine and cisplatin  3/10/2022: C2 gem/cis  3/31/2022: C3 gem/cis  4/20/2022: Restaging scans revealed response  4/21/2022: C4 gem/cis  5/12/2022: C5 gem/cis  6/24/2022: Started maintenance avelumab. Treatment was placed on hold due to insurance coverage issues after  1 cycle  8/24/2022: CT scans stable  9/2/2022: Restarted avelumab    3/2/2023: Last dose avelumab, off after  12/28/2023: CT reveals increase in left para-aortic LN highly suspicious for metastatic LAD.  1/18/2024: Again increase in para-aortic LN.  1/31/2024: Screening for SEGE 1822: Disitamab vedotin  2/7/2024: Screening for SEGE 1822  2/14/2024: Cycle 1 day 1 disitamab vedotin as part of clinical trial    Interval History:  Mr. Lujan presents today in consideration of cycle 3 day 1 DV. He has felt better the past few days, though notes he had up to 4x BM's which were mostly formed occasionally since starting therapy. He has not tried medicaitons to manage this. Has been eating more cake due to his birthday. Needs replacement of his partial, which will be another two weeks. Has been opting for more soft foods due to this. Otherwise, no new or concerning symptoms. The remainder of his ROS is otherwise negative.    HPI    Objective    BSA: 2.46 meters squared  Wt 123 kg (271 lb 13.2 oz)   BMI 39.13 kg/m²     Physical Exam  General: alert, well-dressed in NAD. Speech is fluent and coherent, words clear. Good insight. Oriented x4   Skin: warm, dry, and pink without cyanosis or nail clubbing. No rash, petechiae, or ecchymoses  HEENT: Normocephalic atraumatic. Sclera white, conjunctiva pink. EOMs intact. Hearing intact to spoken voice. Oral mucosa pink. No visible lesions  Respiratory: Chest expansion symmetric. Breath sounds vesicular in all lobes without crackles, wheezes, or rhonchi bilaterally.  CV: S1S2 audible and crisp without murmurs, rubs, or extra sounds. RRR. Radial pulses strong and regular. Extremities warm and pink. No edema bilaterally.  Lymph: no palpable cervical, submandibular, or supraclavicular lymphadenopathy.  GI: abdomen is round, soft, and non-tender. Active bowel sounds.   : IC bag present  Psych: engaged, polite, appropriate conversation and eye contact.    Current Medications:    Current  Outpatient Medications:     acetaminophen (Tylenol) 500 mg tablet, Take 2 tablets (1,000 mg) by mouth every 8 hours if needed., Disp: , Rfl:     Advair Diskus 100-50 mcg/dose diskus inhaler, INHALE 1 PUFF BY MOUTH TWICE DAILY AS DIRECTED. RINSE AND GARGLE MOUTH WITH WATER AFTER EACH USE, Disp: , Rfl:     ALPRAZolam (Xanax) 0.5 mg tablet, Take one tablet one hour before anxiety-provoking situation. Limits one tab/day, three tabs/week., Disp: , Rfl:     aspirin 81 mg EC tablet, Take 1 tablet (81 mg) by mouth once daily., Disp: , Rfl:     lisinopril 20 mg tablet, Take 1 tablet (20 mg) by mouth once daily., Disp: , Rfl:     metoprolol tartrate (Lopressor) 25 mg tablet, Take 1 tablet (25 mg) by mouth 2 times a day., Disp: , Rfl:     pantoprazole (ProtoNix) 40 mg EC tablet, Take 1 tablet (40 mg) by mouth once daily., Disp: , Rfl:     rosuvastatin (Crestor) 40 mg tablet, Take 1 tablet (40 mg) by mouth once daily., Disp: , Rfl:     sertraline (Zoloft) 50 mg tablet, Take 1 tablet (50 mg) by mouth once daily., Disp: , Rfl:   No current facility-administered medications for this visit.    Facility-Administered Medications Ordered in Other Visits:     albuterol 2.5 mg /3 mL (0.083 %) nebulizer solution 3 mL, 3 mL, nebulization, PRN, Topher Castanon MD    dextrose 5 % in water (D5W) bolus, 500 mL, intravenous, PRN, Topher Castanon MD    diphenhydrAMINE (BENADryl) injection 50 mg, 50 mg, intravenous, PRN, Topher Castanon MD    EPINEPHrine (Epipen) injection syringe 0.3 mg, 0.3 mg, intramuscular, q5 min PRN, Topher Castanon MD    famotidine PF (Pepcid) injection 20 mg, 20 mg, intravenous, Once PRN, Topher Castanon MD    methylPREDNISolone sod succinate (PF) (SOLU-Medrol) 40 mg/mL injection 40 mg, 40 mg, intravenous, PRN, Topher Castanon MD    sodium chloride 0.9 % bolus 500 mL, 500 mL, intravenous, PRN, Topher Castanon MD     Most Recent Labs:  Results for orders placed or performed during the hospital encounter of 03/13/24   CBC  and Auto Differential   Result Value Ref Range    WBC 8.1 4.4 - 11.3 x10*3/uL    nRBC 0.0 0.0 - 0.0 /100 WBCs    RBC 5.28 4.50 - 5.90 x10*6/uL    Hemoglobin 15.5 13.5 - 17.5 g/dL    Hematocrit 46.9 41.0 - 52.0 %    MCV 89 80 - 100 fL    MCH 29.4 26.0 - 34.0 pg    MCHC 33.0 32.0 - 36.0 g/dL    RDW 14.1 11.5 - 14.5 %    Platelets 225 150 - 450 x10*3/uL    Neutrophils % 56.9 40.0 - 80.0 %    Immature Granulocytes %, Automated 1.1 (H) 0.0 - 0.9 %    Lymphocytes % 24.0 13.0 - 44.0 %    Monocytes % 12.1 2.0 - 10.0 %    Eosinophils % 5.2 0.0 - 6.0 %    Basophils % 0.7 0.0 - 2.0 %    Neutrophils Absolute 4.61 1.20 - 7.70 x10*3/uL    Immature Granulocytes Absolute, Automated 0.09 0.00 - 0.70 x10*3/uL    Lymphocytes Absolute 1.94 1.20 - 4.80 x10*3/uL    Monocytes Absolute 0.98 0.10 - 1.00 x10*3/uL    Eosinophils Absolute 0.42 0.00 - 0.70 x10*3/uL    Basophils Absolute 0.06 0.00 - 0.10 x10*3/uL   Comprehensive metabolic panel   Result Value Ref Range    Glucose 157 (H) 74 - 99 mg/dL    Sodium 138 136 - 145 mmol/L    Potassium 4.1 3.5 - 5.3 mmol/L    Chloride 102 98 - 107 mmol/L    Bicarbonate 26 21 - 32 mmol/L    Anion Gap 14 10 - 20 mmol/L    Urea Nitrogen 19 6 - 23 mg/dL    Creatinine 1.24 0.50 - 1.30 mg/dL    eGFR 63 >60 mL/min/1.73m*2    Calcium 9.0 8.6 - 10.6 mg/dL    Albumin 4.0 3.4 - 5.0 g/dL    Alkaline Phosphatase 110 33 - 136 U/L    Total Protein 6.5 6.4 - 8.2 g/dL    AST 24 9 - 39 U/L    Bilirubin, Total 0.5 0.0 - 1.2 mg/dL    ALT 50 10 - 52 U/L   Bilirubin, Direct   Result Value Ref Range    Bilirubin, Direct 0.1 0.0 - 0.3 mg/dL   Glucose, Fasting   Result Value Ref Range    Glucose, Fasting 157 (H) 74 - 99 mg/dL   Lipid panel   Result Value Ref Range    Cholesterol 205 (H) 0 - 199 mg/dL    HDL-Cholesterol 33.2 mg/dL    Cholesterol/HDL Ratio 6.2     LDL Calculated 110 (H) <=99 mg/dL    VLDL 62 (H) 0 - 40 mg/dL    Triglycerides 311 (H) 0 - 149 mg/dL    Non HDL Cholesterol 172 (H) 0 - 149 mg/dL   Magnesium   Result  "Value Ref Range    Magnesium 1.69 1.60 - 2.40 mg/dL   Phosphorus   Result Value Ref Range    Phosphorus 3.8 2.5 - 4.9 mg/dL      No results found for: \"PSA\"     Performance Status:  ECOG Score: 0- Fully active, able to carry on all pre-disease performance w/o restriction.  Karnofsky Score: 100 - Fully active, able to carry on all pre-disease performed without restriction      Assessment/Plan   Toro Lujan is a 69 y.o. male with mUC to LN who presents in follow up and consideration of cycle 3 day 1 SEGE 1822 on DV. He looks and feels well. Will start imodium of diarrhea re-presents. Awaiting replacement of partial. Labs relatively unremarkable, aside from triglycerides, likely 2/2 cake and sweets intake. Will monitor closely, and follows with cardiology.    # mUC  - Plan to start SEGE 1822 with DV today  - Continue to monitor labs    # Diarrhea  - Imodium PRN, discussed OTC instructions  - Notify if worsening, or needing to use imodium more    # Triglycerides  - Continue with cardiology  - Monitor, likely 2/2 cake intake due to birthday    # Health Maintenance  - Continue with PCP and other healthcare providers  - Advised exercise, heart-healthy diet    RTC per protocol    Total time spent on this encounter was 40 minutes, which included preparation, direct time with patient, documentation, and care coordination on the day of visit.    Daniella Pineda, MSN, APRN, AGNP-C, AOCNP  Associate Nurse Practitioner  [facility]  "

## 2024-03-25 DIAGNOSIS — C67.8 MALIGNANT NEOPLASM OF OVERLAPPING SITES OF BLADDER (MULTI): ICD-10-CM

## 2024-03-26 ENCOUNTER — HOSPITAL ENCOUNTER (OUTPATIENT)
Dept: RADIOLOGY | Facility: CLINIC | Age: 69
Discharge: HOME | End: 2024-03-26
Payer: MEDICARE

## 2024-03-26 DIAGNOSIS — Z00.6 RESEARCH SUBJECT: ICD-10-CM

## 2024-03-26 PROCEDURE — 74177 CT ABD & PELVIS W/CONTRAST: CPT | Performed by: RADIOLOGY

## 2024-03-26 PROCEDURE — 2550000001 HC RX 255 CONTRASTS: Performed by: INTERNAL MEDICINE

## 2024-03-26 PROCEDURE — 71260 CT THORAX DX C+: CPT | Performed by: RADIOLOGY

## 2024-03-26 PROCEDURE — 74177 CT ABD & PELVIS W/CONTRAST: CPT

## 2024-03-26 RX ORDER — PROCHLORPERAZINE MALEATE 10 MG
10 TABLET ORAL EVERY 6 HOURS PRN
Status: CANCELLED | OUTPATIENT
Start: 2024-03-27

## 2024-03-26 RX ORDER — ALBUTEROL SULFATE 0.83 MG/ML
3 SOLUTION RESPIRATORY (INHALATION) AS NEEDED
Status: CANCELLED | OUTPATIENT
Start: 2024-03-27

## 2024-03-26 RX ORDER — FAMOTIDINE 10 MG/ML
20 INJECTION INTRAVENOUS ONCE AS NEEDED
Status: CANCELLED | OUTPATIENT
Start: 2024-03-27

## 2024-03-26 RX ORDER — EPINEPHRINE 0.3 MG/.3ML
0.3 INJECTION SUBCUTANEOUS EVERY 5 MIN PRN
Status: CANCELLED | OUTPATIENT
Start: 2024-03-27

## 2024-03-26 RX ORDER — DIPHENHYDRAMINE HYDROCHLORIDE 50 MG/ML
50 INJECTION INTRAMUSCULAR; INTRAVENOUS AS NEEDED
Status: CANCELLED | OUTPATIENT
Start: 2024-03-27

## 2024-03-26 RX ORDER — PROCHLORPERAZINE EDISYLATE 5 MG/ML
10 INJECTION INTRAMUSCULAR; INTRAVENOUS EVERY 6 HOURS PRN
Status: CANCELLED | OUTPATIENT
Start: 2024-03-27

## 2024-03-26 RX ADMIN — IOHEXOL 75 ML: 350 INJECTION, SOLUTION INTRAVENOUS at 09:46

## 2024-03-27 ENCOUNTER — APPOINTMENT (OUTPATIENT)
Dept: RADIOLOGY | Facility: HOSPITAL | Age: 69
End: 2024-03-27
Payer: MEDICARE

## 2024-03-27 ENCOUNTER — APPOINTMENT (OUTPATIENT)
Dept: HEMATOLOGY/ONCOLOGY | Facility: HOSPITAL | Age: 69
End: 2024-03-27
Payer: MEDICARE

## 2024-03-27 ENCOUNTER — OFFICE VISIT (OUTPATIENT)
Dept: HEMATOLOGY/ONCOLOGY | Facility: HOSPITAL | Age: 69
End: 2024-03-27
Payer: MEDICARE

## 2024-03-27 ENCOUNTER — HOSPITAL ENCOUNTER (OUTPATIENT)
Dept: RADIOLOGY | Facility: CLINIC | Age: 69
Discharge: HOME | End: 2024-03-27
Payer: MEDICARE

## 2024-03-27 ENCOUNTER — EDUCATION (OUTPATIENT)
Dept: HEMATOLOGY/ONCOLOGY | Facility: HOSPITAL | Age: 69
End: 2024-03-27
Payer: COMMERCIAL

## 2024-03-27 ENCOUNTER — HOSPITAL ENCOUNTER (OUTPATIENT)
Dept: RESEARCH | Facility: HOSPITAL | Age: 69
Discharge: HOME | End: 2024-03-27
Payer: MEDICARE

## 2024-03-27 VITALS
HEART RATE: 76 BPM | RESPIRATION RATE: 18 BRPM | BODY MASS INDEX: 38.85 KG/M2 | DIASTOLIC BLOOD PRESSURE: 85 MMHG | SYSTOLIC BLOOD PRESSURE: 126 MMHG | TEMPERATURE: 97.7 F | OXYGEN SATURATION: 97 % | WEIGHT: 269.84 LBS

## 2024-03-27 DIAGNOSIS — C67.8 MALIGNANT NEOPLASM OF OVERLAPPING SITES OF BLADDER (MULTI): Primary | ICD-10-CM

## 2024-03-27 DIAGNOSIS — C67.8 MALIGNANT NEOPLASM OF OVERLAPPING SITES OF BLADDER (MULTI): ICD-10-CM

## 2024-03-27 DIAGNOSIS — C77.9 REGIONAL LYMPH NODE METASTASIS PRESENT (MULTI): ICD-10-CM

## 2024-03-27 DIAGNOSIS — Z00.6 RESEARCH SUBJECT: ICD-10-CM

## 2024-03-27 DIAGNOSIS — K59.1 FUNCTIONAL DIARRHEA: ICD-10-CM

## 2024-03-27 LAB
ALBUMIN SERPL BCP-MCNC: 4.2 G/DL (ref 3.4–5)
ALP SERPL-CCNC: 116 U/L (ref 33–136)
ALT SERPL W P-5'-P-CCNC: 47 U/L (ref 10–52)
ANION GAP SERPL CALC-SCNC: 13 MMOL/L (ref 10–20)
AST SERPL W P-5'-P-CCNC: 24 U/L (ref 9–39)
BASOPHILS # BLD AUTO: 0.08 X10*3/UL (ref 0–0.1)
BASOPHILS NFR BLD AUTO: 1 %
BILIRUB DIRECT SERPL-MCNC: 0.1 MG/DL (ref 0–0.3)
BILIRUB SERPL-MCNC: 0.5 MG/DL (ref 0–1.2)
BUN SERPL-MCNC: 20 MG/DL (ref 6–23)
CALCIUM SERPL-MCNC: 9.4 MG/DL (ref 8.6–10.6)
CHLORIDE SERPL-SCNC: 100 MMOL/L (ref 98–107)
CHOLEST SERPL-MCNC: 227 MG/DL (ref 0–199)
CHOLESTEROL/HDL RATIO: 6.2
CO2 SERPL-SCNC: 25 MMOL/L (ref 21–32)
CREAT SERPL-MCNC: 1.16 MG/DL (ref 0.5–1.3)
EGFRCR SERPLBLD CKD-EPI 2021: 68 ML/MIN/1.73M*2
EOSINOPHIL # BLD AUTO: 0.31 X10*3/UL (ref 0–0.7)
EOSINOPHIL NFR BLD AUTO: 3.7 %
ERYTHROCYTE [DISTWIDTH] IN BLOOD BY AUTOMATED COUNT: 14.5 % (ref 11.5–14.5)
GLUCOSE P FAST SERPL-MCNC: 197 MG/DL (ref 74–99)
GLUCOSE SERPL-MCNC: 197 MG/DL (ref 74–99)
HCT VFR BLD AUTO: 44.4 % (ref 41–52)
HDLC SERPL-MCNC: 36.9 MG/DL
HGB BLD-MCNC: 15.4 G/DL (ref 13.5–17.5)
HOLD SPECIMEN: NORMAL
IMM GRANULOCYTES # BLD AUTO: 0.12 X10*3/UL (ref 0–0.7)
IMM GRANULOCYTES NFR BLD AUTO: 1.4 % (ref 0–0.9)
LDLC SERPL CALC-MCNC: 145 MG/DL
LYMPHOCYTES # BLD AUTO: 1.73 X10*3/UL (ref 1.2–4.8)
LYMPHOCYTES NFR BLD AUTO: 20.5 %
MAGNESIUM SERPL-MCNC: 1.84 MG/DL (ref 1.6–2.4)
MCH RBC QN AUTO: 30.1 PG (ref 26–34)
MCHC RBC AUTO-ENTMCNC: 34.7 G/DL (ref 32–36)
MCV RBC AUTO: 87 FL (ref 80–100)
MONOCYTES # BLD AUTO: 0.92 X10*3/UL (ref 0.1–1)
MONOCYTES NFR BLD AUTO: 10.9 %
NEUTROPHILS # BLD AUTO: 5.26 X10*3/UL (ref 1.2–7.7)
NEUTROPHILS NFR BLD AUTO: 62.5 %
NON HDL CHOLESTEROL: 190 MG/DL (ref 0–149)
NRBC BLD-RTO: 0 /100 WBCS (ref 0–0)
PHOSPHATE SERPL-MCNC: 3.9 MG/DL (ref 2.5–4.9)
PLATELET # BLD AUTO: 237 X10*3/UL (ref 150–450)
POTASSIUM SERPL-SCNC: 4.4 MMOL/L (ref 3.5–5.3)
PROT SERPL-MCNC: 6.8 G/DL (ref 6.4–8.2)
RBC # BLD AUTO: 5.12 X10*6/UL (ref 4.5–5.9)
SODIUM SERPL-SCNC: 134 MMOL/L (ref 136–145)
TRIGL SERPL-MCNC: 225 MG/DL (ref 0–149)
VLDL: 45 MG/DL (ref 0–40)
WBC # BLD AUTO: 8.4 X10*3/UL (ref 4.4–11.3)

## 2024-03-27 PROCEDURE — 99215 OFFICE O/P EST HI 40 MIN: CPT | Performed by: STUDENT IN AN ORGANIZED HEALTH CARE EDUCATION/TRAINING PROGRAM

## 2024-03-27 PROCEDURE — 80061 LIPID PANEL: CPT | Performed by: STUDENT IN AN ORGANIZED HEALTH CARE EDUCATION/TRAINING PROGRAM

## 2024-03-27 PROCEDURE — C9399 UNCLASSIFIED DRUGS OR BIOLOG: HCPCS | Performed by: STUDENT IN AN ORGANIZED HEALTH CARE EDUCATION/TRAINING PROGRAM

## 2024-03-27 PROCEDURE — 96413 CHEMO IV INFUSION 1 HR: CPT

## 2024-03-27 PROCEDURE — 1159F MED LIST DOCD IN RCRD: CPT | Performed by: STUDENT IN AN ORGANIZED HEALTH CARE EDUCATION/TRAINING PROGRAM

## 2024-03-27 PROCEDURE — 82248 BILIRUBIN DIRECT: CPT | Performed by: STUDENT IN AN ORGANIZED HEALTH CARE EDUCATION/TRAINING PROGRAM

## 2024-03-27 PROCEDURE — 82947 ASSAY GLUCOSE BLOOD QUANT: CPT | Mod: 59 | Performed by: STUDENT IN AN ORGANIZED HEALTH CARE EDUCATION/TRAINING PROGRAM

## 2024-03-27 PROCEDURE — 84100 ASSAY OF PHOSPHORUS: CPT | Performed by: STUDENT IN AN ORGANIZED HEALTH CARE EDUCATION/TRAINING PROGRAM

## 2024-03-27 PROCEDURE — 2500000005 HC RX 250 GENERAL PHARMACY W/O HCPCS: Performed by: STUDENT IN AN ORGANIZED HEALTH CARE EDUCATION/TRAINING PROGRAM

## 2024-03-27 PROCEDURE — 83735 ASSAY OF MAGNESIUM: CPT | Performed by: STUDENT IN AN ORGANIZED HEALTH CARE EDUCATION/TRAINING PROGRAM

## 2024-03-27 PROCEDURE — 80053 COMPREHEN METABOLIC PANEL: CPT | Performed by: STUDENT IN AN ORGANIZED HEALTH CARE EDUCATION/TRAINING PROGRAM

## 2024-03-27 PROCEDURE — 85025 COMPLETE CBC W/AUTO DIFF WBC: CPT | Performed by: STUDENT IN AN ORGANIZED HEALTH CARE EDUCATION/TRAINING PROGRAM

## 2024-03-27 RX ORDER — PROCHLORPERAZINE EDISYLATE 5 MG/ML
10 INJECTION INTRAMUSCULAR; INTRAVENOUS EVERY 6 HOURS PRN
Status: DISCONTINUED | OUTPATIENT
Start: 2024-03-27 | End: 2024-03-28 | Stop reason: HOSPADM

## 2024-03-27 RX ORDER — HEPARIN SODIUM,PORCINE/PF 10 UNIT/ML
50 SYRINGE (ML) INTRAVENOUS AS NEEDED
Status: CANCELLED | OUTPATIENT
Start: 2024-03-27

## 2024-03-27 RX ORDER — HEPARIN 100 UNIT/ML
500 SYRINGE INTRAVENOUS AS NEEDED
Status: CANCELLED | OUTPATIENT
Start: 2024-03-27

## 2024-03-27 RX ORDER — ALBUTEROL SULFATE 0.83 MG/ML
3 SOLUTION RESPIRATORY (INHALATION) AS NEEDED
Status: DISCONTINUED | OUTPATIENT
Start: 2024-03-27 | End: 2024-03-28 | Stop reason: HOSPADM

## 2024-03-27 RX ORDER — FAMOTIDINE 10 MG/ML
20 INJECTION INTRAVENOUS ONCE AS NEEDED
Status: DISCONTINUED | OUTPATIENT
Start: 2024-03-27 | End: 2024-03-28 | Stop reason: HOSPADM

## 2024-03-27 RX ORDER — DIPHENHYDRAMINE HYDROCHLORIDE 50 MG/ML
50 INJECTION INTRAMUSCULAR; INTRAVENOUS AS NEEDED
Status: DISCONTINUED | OUTPATIENT
Start: 2024-03-27 | End: 2024-03-28 | Stop reason: HOSPADM

## 2024-03-27 RX ORDER — EPINEPHRINE 0.3 MG/.3ML
0.3 INJECTION SUBCUTANEOUS EVERY 5 MIN PRN
Status: DISCONTINUED | OUTPATIENT
Start: 2024-03-27 | End: 2024-03-28 | Stop reason: HOSPADM

## 2024-03-27 RX ORDER — PROCHLORPERAZINE MALEATE 10 MG
10 TABLET ORAL EVERY 6 HOURS PRN
Status: DISCONTINUED | OUTPATIENT
Start: 2024-03-27 | End: 2024-03-28 | Stop reason: HOSPADM

## 2024-03-27 RX ADMIN — Medication 150 MG: at 11:33

## 2024-03-27 NOTE — PROGRESS NOTES
Research Note Follow Up Visit       Toro Lujan is here today for consideration of treatment and follow up for C4D1 on SEGE 1822.  Follow up procedures completed per protocol. Patient is aware of continued follow up plan.    Dr. Rapp reviewed Recist and approved continued treatment.    After review of vitals, exam, labs, Aes and conmeds, patient RECEIVED TREATMENT.     G1 diarrhea stable and intermittent  G1 fatigue stable and intermittent    NO neuropathy  Appetite generally good.  Some things are not appetizing.    Planning to go out of town May 30.  Unsure of return date yet.    Calendar reviewed and parking pass given.        Education Documentation  Monitoring Weight, taught by Emma Mann RN at 3/27/2024 11:19 AM.  Learner: Patient  Readiness: Eager  Method: Explanation  Response: Verbalizes Understanding    Tips for Daily Living, taught by Emma Mann RN at 3/27/2024 11:19 AM.  Learner: Patient  Readiness: Eager  Method: Explanation  Response: Verbalizes Understanding    Nutrition/Diet, taught by Emma Mann RN at 3/27/2024 11:19 AM.  Learner: Patient  Readiness: Eager  Method: Explanation  Response: Verbalizes Understanding    Food-Drug Interactions, taught by Emma Mann RN at 3/27/2024 11:19 AM.  Learner: Patient  Readiness: Eager  Method: Explanation  Response: Verbalizes Understanding    Nutrition, taught by Emma Mann RN at 3/27/2024 11:19 AM.  Learner: Patient  Readiness: Eager  Method: Explanation  Response: Verbalizes Understanding    Pain Rating Scale, taught by Emma Mann RN at 3/27/2024 11:19 AM.  Learner: Patient  Readiness: Eager  Method: Explanation  Response: Verbalizes Understanding    Healthy Lifestyle, taught by Emma Mann RN at 3/27/2024 11:19 AM.  Learner: Patient  Readiness: Eager  Method: Explanation  Response: Verbalizes Understanding    Bleeding Precautions, taught by Emma Mann RN at 3/27/2024 11:19 AM.  Learner: Patient  Readiness: Eager  Method:  Explanation  Response: Verbalizes Understanding    Edema Management, taught by Emma Mann RN at 3/27/2024 11:19 AM.  Learner: Patient  Readiness: Eager  Method: Explanation  Response: Verbalizes Understanding    Shortness of Breath, taught by Emma Mann RN at 3/27/2024 11:19 AM.  Learner: Patient  Readiness: Eager  Method: Explanation  Response: Verbalizes Understanding    Changes in Appetite, taught by Emma Mann RN at 3/27/2024 11:19 AM.  Learner: Patient  Readiness: Eager  Method: Explanation  Response: Verbalizes Understanding    Memory Problems, taught by Emma Mann RN at 3/27/2024 11:19 AM.  Learner: Patient  Readiness: Eager  Method: Explanation  Response: Verbalizes Understanding    Skin and Nail Changes, taught by Emma Mann RN at 3/27/2024 11:19 AM.  Learner: Patient  Readiness: Eager  Method: Explanation  Response: Verbalizes Understanding    Changes in Urination, taught by Emma Mann RN at 3/27/2024 11:19 AM.  Learner: Patient  Readiness: Eager  Method: Explanation  Response: Verbalizes Understanding    Care of Neuropathy, taught by Emma Mann RN at 3/27/2024 11:19 AM.  Learner: Patient  Readiness: Eager  Method: Explanation  Response: Verbalizes Understanding    Infection Control, taught by Emma Mann RN at 3/27/2024 11:19 AM.  Learner: Patient  Readiness: Eager  Method: Explanation  Response: Verbalizes Understanding    Alopecia, taught by Emma Mann RN at 3/27/2024 11:19 AM.  Learner: Patient  Readiness: Eager  Method: Explanation  Response: Verbalizes Understanding    Diarrhea, taught by Emma Mann RN at 3/27/2024 11:19 AM.  Learner: Patient  Readiness: Eager  Method: Explanation  Response: Verbalizes Understanding    Constipation, taught by Emma Mann RN at 3/27/2024 11:19 AM.  Learner: Patient  Readiness: Eager  Method: Explanation  Response: Verbalizes Understanding    Mouth Sores, taught by Emma Mann RN at 3/27/2024 11:19 AM.  Learner:  Patient  Readiness: Eager  Method: Explanation  Response: Verbalizes Understanding    Nausea Management, taught by Emma Mann RN at 3/27/2024 11:19 AM.  Learner: Patient  Readiness: Eager  Method: Explanation  Response: Verbalizes Understanding    Fatigue, taught by Emma Mann RN at 3/27/2024 11:19 AM.  Learner: Patient  Readiness: Eager  Method: Explanation  Response: Verbalizes Understanding    Chemotherapy Safety at Home, taught by Emma Mann RN at 3/27/2024 11:19 AM.  Learner: Patient  Readiness: Eager  Method: Explanation  Response: Verbalizes Understanding    Treatment Plan and Schedule, taught by Emma Mann RN at 3/27/2024 11:19 AM.  Learner: Patient  Readiness: Eager  Method: Explanation  Response: Verbalizes Understanding    General Medication Information, taught by Emma Mann RN at 3/27/2024 11:19 AM.  Learner: Patient  Readiness: Eager  Method: Explanation  Response: Verbalizes Understanding    Diagnostic Studies, taught by Emma Mann RN at 3/27/2024 11:19 AM.  Learner: Patient  Readiness: Eager  Method: Explanation  Response: Verbalizes Understanding    Comprehensive Metabolic Panel (CMP), taught by Emma Mann RN at 3/27/2024 11:19 AM.  Learner: Patient  Readiness: Eager  Method: Explanation  Response: Verbalizes Understanding    Complete Blood Count with Differential (CBC w/ Diff), taught by Emma Mann RN at 3/27/2024 11:19 AM.  Learner: Patient  Readiness: Eager  Method: Explanation  Response: Verbalizes Understanding    Education Comments  No comments found.

## 2024-03-27 NOTE — SIGNIFICANT EVENT
03/27/24 1057   Prechemo Checklist   Has the patient been in the hospital, ED, or urgent care since last date of service No   Chemo/Immuno Consent Signed Yes   Protocol/Indications Verified Yes   Confirmed to previous date/time of medication Yes   Compared to previous dose Yes   All medications are dated accurately Yes   Pregnancy Test Negative Not applicable   Parameters Met Yes   BSA/Weight-Height Verified Yes   Dose Calculations Verified (current, total, cumulative) Yes

## 2024-03-27 NOTE — RESEARCH NOTES
[facility]<[id number]><[id number]>    DCRU NURSING VISIT NOTE  Study Name: [id number]  IRB#: [id number]  DCRU#: # [id number]  Protocol Version Dated: V9 A8 06.06.23   PI: Fan Rapp MD.    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  Cycle = 14 days  Disitamab Vedotin administered IV Day 1 every 2 weeks     Admission and Prior to Starting Study Activities   1. Notify  when patient arrives to unit.  2. Complete DCRU/[facility] intake form in EPIC.  4. Study Questionnaires   5. Obtain weight in kilograms - with shoes off & heavy items removed.  6. Obtain vital signs. Record in EMR. (Done @ 0807)  7. Insert one peripheral IV line for sample collection procedures (flush line with normal saline following each blood draw). Access mediport (if available) otherwise insert second peripheral line in opposite arm for Investigative drug administration (if peripheral line, flush line with normal saline before & after infusion)  8. Do Not draw research samples from the same line the investigational drug infused through.  9. Physical Exam < 1 day  10. Confirm patient fasting for clinical safety labs (every 3rd month, if ordered).     Criteria to Treat   DCRU RN reviewed and meets eligibility to proceed with treatment plan   Time team notified: 0955 am  DCRU RN notifies study team to review eligibility and approval before dosing procedures  Time team approves: 0955 am     Dietary Guidelines    REGULAR     Subjective   Toro Lujan is a 69 y.o. male and is here for a Research clinical visit.    Visit Provider: Eva8-1, [facility] ROOM 1 [facility]     Allergies: No Known Allergies    Objective     Vital Signs:    Vitals:    03/27/24 0807 03/27/24 1100   BP: 112/80 126/85   Pulse: 82 76   Resp: 18 18   Temp: 36.5 °C (97.7 °F) 36.5 °C (97.7 °F)   TempSrc: Oral Oral   SpO2: 98% 97%   Weight: 122 kg (269 lb 13.5 oz)        Physical Exam     ASSESSMENT and PLAN:  Problem List Items Addressed This Visit          Hematology and Neoplasia    Malignant neoplasm of overlapping sites of bladder (CMS/HCC)    Relevant Medications    prochlorperazine (Compazine) tablet 10 mg    prochlorperazine (Compazine) injection 10 mg    Study [id number] disitamab vedotin 150 mg in sodium chloride 0.9% 250 mL IV    sodium chloride 0.9 % bolus 500 mL    dextrose 5 % in water (D5W) bolus    diphenhydrAMINE (BENADryl) injection 50 mg    methylPREDNISolone sod succinate (SOLU-Medrol) 40 mg/mL injection 40 mg    famotidine PF (Pepcid) injection 20 mg    EPINEPHrine (Epipen) injection syringe 0.3 mg    albuterol 2.5 mg /3 mL (0.083 %) nebulizer solution 3 mL    Other Relevant Orders    CBC and Auto Differential (Completed)    Comprehensive metabolic panel (Completed)    Bilirubin, Direct (Completed)    Glucose, Fasting (Completed)    Lipid panel (Completed)    Magnesium (Completed)    Phosphorus (Completed)    Nursing Communication - Vascular Access (Completed)    Venous Access, CVAD    Insert peripheral IV    Convert IV to saline lock    Treatment Conditions (Completed)    Provider Communication - [id number] (Completed)    Research Triplicate ECG (Completed)    Research Triplicate ECG (Completed)    Nursing Communication - Hypersensitivity Management, Moderate (Completed)    Nursing Communication - Hypersensitivity Management, Severe (Completed)    Nursing Communication - Respiratory Management (Completed)    Pulse oximetry, continuous    Research  collection: 6mL Red Top - Research Collect    Research collection: 6mL Red Top - Research Collect    Research collection: 4mL Red Top - Research Collect        Medications as of the completion of today's visit:  Current Outpatient Medications   Medication Sig Dispense Refill    acetaminophen (Tylenol) 500 mg tablet Take 2 tablets (1,000 mg) by mouth every 8 hours if needed.      Advair Diskus 100-50 mcg/dose diskus inhaler INHALE 1 PUFF BY MOUTH TWICE DAILY AS DIRECTED. RINSE AND GARGLE MOUTH WITH WATER AFTER EACH USE      ALPRAZolam (Xanax) 0.5 mg tablet Take one tablet one hour before anxiety-provoking situation. Limits one tab/day, three tabs/week.      aspirin 81 mg EC tablet Take 1 tablet (81 mg) by mouth once daily.      lisinopril 20 mg tablet Take 1 tablet (20 mg) by mouth once daily.      metoprolol tartrate (Lopressor) 25 mg tablet Take 1 tablet (25 mg) by mouth 2 times a day.      pantoprazole (ProtoNix) 40 mg EC tablet Take 1 tablet (40 mg) by mouth once daily.      rosuvastatin (Crestor) 40 mg tablet Take 1 tablet (40 mg) by mouth once daily.      sertraline (Zoloft) 50 mg tablet Take 1 tablet (50 mg) by mouth once daily.       Current Facility-Administered Medications   Medication Dose Route Frequency Provider Last Rate Last Admin    albuterol 2.5 mg /3 mL (0.083 %) nebulizer solution 3 mL  3 mL nebulization PRN Fan Rapp MD PhD        dextrose 5 % in water (D5W) bolus  500 mL intravenous PRN Fan Rapp MD PhD        diphenhydrAMINE (BENADryl) injection 50 mg  50 mg intravenous PRN Fan Rapp MD PhD        EPINEPHrine (Epipen) injection syringe 0.3 mg  0.3 mg intramuscular q5 min PRN Fan Rapp MD PhD        famotidine PF (Pepcid) injection 20 mg  20 mg intravenous Once PRN Fan Rapp MD PhD        methylPREDNISolone sod succinate (SOLU-Medrol) 40 mg/mL injection 40 mg  40 mg intravenous PRN Fan Rapp MD PhD        prochlorperazine (Compazine) injection 10 mg  10 mg  intravenous q6h PRN Fan Rapp MD PhD        prochlorperazine (Compazine) tablet 10 mg  10 mg oral q6h PRN Fan Rapp MD PhD        sodium chloride 0.9 % bolus 500 mL  500 mL intravenous PRN Fan Rapp MD PhD        Study [id number] disitamab vedotin 150 mg in sodium chloride 0.9% 250 mL IV  1.5 mg/kg (Order-Specific) intravenous Once Fan Rapp MD PhD 250 mL/hr at 03/27/24 1133 150 mg at 03/27/24 1133       Administrations This Visit       Study [id number] disitamab vedotin 150 mg in sodium chloride 0.9% 250 mL IV       Admin Date  03/27/2024 Action  New Bag Dose  150 mg Rate  250 mL/hr Route  intravenous Administered By  Philomena Holley RN                    Orders placed during today's visit:  Orders Placed This Encounter   Procedures    CBC and Auto Differential     Standing Status:   Standing     Number of Occurrences:   1     Order Specific Question:   Release result to MyChart     Answer:   Immediate [1]    Comprehensive metabolic panel     Standing Status:   Standing     Number of Occurrences:   1     Order Specific Question:   Release result to MyChart     Answer:   Immediate [1]    Bilirubin, Direct     Standing Status:   Standing     Number of Occurrences:   1     Order Specific Question:   Release result to MyChart     Answer:   Immediate [1]    Glucose, Fasting     Standing Status:   Standing     Number of Occurrences:   1     Order Specific Question:   Release result to MyChart     Answer:   Immediate [1]    Lipid panel     Standing Status:   Standing     Number of Occurrences:   1     Order Specific Question:   Release result to MyChart     Answer:   Immediate [1]    Magnesium     Standing Status:   Standing     Number of Occurrences:   1     Order Specific Question:   Release result to MyChart     Answer:   Immediate [1]    Phosphorus     Standing Status:   Standing     Number of Occurrences:   1     Order Specific Question:   Release result to MyChart     Answer:   Immediate [1]    Research  collection: 6mL Red [facility] - Research Collect     Standing Status:   Standing     Number of Occurrences:   1     Order Specific Question:   Test     Answer:   PK     Order Specific Question:   Timepoint     Answer:   Pre-Dose     Order Specific Question:   Pre-Dose     Answer:   Within 1 hour     Order Specific Question:   Temperature     Answer:   Ambient     Order Specific Question:   Invert     Answer:   5x     Order Specific Question:   Optional?     Answer:   No    Research collection: 6mL Red Top - Research Collect     Standing Status:   Standing     Number of Occurrences:   1     Order Specific Question:   Test     Answer:   PK     Order Specific Question:   Timepoint     Answer:   Pre-Dose     Order Specific Question:   Pre-Dose     Answer:   Within 1 hour     Order Specific Question:   Temperature     Answer:   Ambient     Order Specific Question:   Invert     Answer:   5x     Order Specific Question:   Optional?     Answer:   No    Research collection: 4mL Red Top - Research Collect     Standing Status:   Standing     Number of Occurrences:   1     Order Specific Question:   Test     Answer:   ADA     Order Specific Question:   Timepoint     Answer:   Pre-Dose     Order Specific Question:   Pre-Dose     Answer:   Within 1 hour     Order Specific Question:   Temperature     Answer:   Ambient     Order Specific Question:   Invert     Answer:   5x     Order Specific Question:   Optional?     Answer:   No    Adult diet     Order Specific Question:   Diet type     Answer:   Regular    Nursing Communication - Vascular Access     Refer to the vascular access device resource page and the following policies for additional information on line insertion, maintenance and removal.    CP-148 - Central Vascular Access Device Insertion, Maintenance, and Removal, Adult  NP-28 - Midline Cathter Maintenance & Removal, Adult  NP-34 - High-flow Catheters, (e.g. Hemodialysis, Apheresis, and Continuous Renal Replacement Therapy  [CRRT]), Care and Utilization, Adult  NP-39 - Peripheral Intravenous Catheter (PIV) Insertion, Maintenance, Blood Collection & Removal - Adult     Standing Status:   Standing     Number of Occurrences:   1    Treatment Conditions     Hold treatment and notify provider if:   ANC LESS THAN 1 x 10*3/uL   AST/ALT GREATER THAN 3 x ULN   Total Bilirubin GREATER THAN 2 x ULN   LVEF LESS THAN 50%    Fasting or Non-fasting Glucose GREATER THAN 250 mg/dL   Peripheral Neuropathy GREATER THAN OR EQUAL TO Grade 2   Adverse Event GREATER THAN OR EQUAL TO Grade 3     Standing Status:   Standing     Number of Occurrences:   1    Provider Communication - [id number]      Starting Dose Level             Disitamab Vedotin 1.5 mg/kg   Dose Level -1             Disitamab Vedotin 1.1 mg/kg   Dose Level -2             Disitamab Vedotin 0.75 mg/kg     Standing Status:   Standing     Number of Occurrences:   1    Research Triplicate ECG     Refer to study [employer] for instructions and documentation of the research triplicate ECG.     Standing Status:   Standing     Number of Occurrences:   1    Research Triplicate ECG     Refer to study [employer] for instructions and documentation of the research triplicate ECG.     Standing Status:   Standing     Number of Occurrences:   1    Nursing Communication - Hypersensitivity Management, Moderate     Flushing, rash, pruritus, dyspnea, chest discomfort, back pain, angioedema, SBP LESS THAN 90 mmHg, and/or change in mental status above or below patient's baseline. In the event of moderate or severe hypersensitivity reaction to any medication:   Stop infusion.  Assess vital signs, pulse oximetry, nursing assessment, and patient complaints.  Administer medications per Hypersensitivity Reaction Medication Protocol.   Notify physician.     Standing Status:   Standing     Number of Occurrences:   1    Nursing Communication - Hypersensitivity Management, Severe     Worsening of moderate reaction symptoms,  SBP LESS THAN 80 mmHg, and/or respiratory distress (respirations GREATER THAN 40 breaths per minute, wheezing, life-threatening symptoms). In the event of moderate or severe hypersensitivity reaction to any medication:   Stop infusion.  Assess vital signs, pulse oximetry, nursing assessment, and patient complaints.  Administer medications per Hypersensitivity Reaction Medication Protocol.   Notify physician.     Standing Status:   Standing     Number of Occurrences:   1    Nursing Communication - Respiratory Management     Oxygen via nasal cannula at 4 L per minute for respiratory rate GREATER THAN OR EQUAL TO to 28 breaths/minute and/or wheezing. Pulse Oximetry continuous monitoring.     Standing Status:   Standing     Number of Occurrences:   1    Pulse oximetry, continuous     Continuous monitoring to maintain SPO2 GREATER THAN 90%     Standing Status:   Standing     Number of Occurrences:   1    Venous Access, CVAD     Standing Status:   Standing     Number of Occurrences:   1    Insert peripheral IV     Obtain venous access for treatment via PIV if no CVAD access or if unable to obtain blood return from CVAD.     Standing Status:   Standing     Number of Occurrences:   1    Convert IV to saline lock     Only if venous access is required over consecutive days. Peripheral catheter can remain in place until completion of last consecutive day infusion, unless IV-related complications are encountered.     Standing Status:   Standing     Number of Occurrences:   1        [id number] - A Study of Disitamab Vedotin in Subjects With HER2 Expressing Urothelial Carcinoma    Patient has no adverse events documented in the Research Adverse Events activity.       Study Specific Instructions and Documentation   VITALS  LABS  VITALS   ECG   CORRELATIVES   STUDY DRUG (DV)  ECG  CORRELATIVES  DISCHARGE     PRE-DOSE Safety Labs  < 1 Day prior to Day 1     Refer to [place] Treatment Plan for orders     Weight-Based Dosing   Baseline  (screening) weight (kg) 125 kg       Date measured 2/7/2024 Actual weight (kg) 122.4 kg   (Weight on Day 1)   Date measured 3/27/2024   Dosing should be based on the patient's actual weight    Adjust dose if patient's weight changes +/- 10% from baseline during the study.   Patients > 100 kg, total dose will be calculated using 100 kg (Maximum = 200 mg) once every 2 week cycle.      Safety Parameters and Special Instructions   Disitamab Vedotin ([id number]) is an antibody drug conjugate composed of a recombinant humanized IgG1 anti-HER2 mAb linked to the cytotoxic agent MMAE (monomethyl auristatin E).  Potential Side Effects/Adverse Events: infusion-related reaction, neutropenia, thrombocytopenia, anemia, peripheral neuropathy, elevated liver enzymes, fever, n/v, constipation, diarrhea, pruritus, arthralgia, alopecia, hyperglycemia, elevated triglycerides, hypokalemia, proteinuria, hypoalbuminemia.     PRE-DOSE Vital Signs   Temp, Heart Rate, Respiration, Blood Pressure (Done @ 1100)     PRE-DOSE ECG: Before Research Labs   Triplicate - Each approximately 2 minutes apart  MISTI 150c Study-specific ECG Machine   Rest x2 min prior  Collect within 30 minutes prior to pre-dose research labs.   Contact MD/Provider &  if abnormal from baseline or if QTcF >500 msec  QTcF calculation Fridericia's formula: S:\DCRU_Staff\Nursing\Protocols\QTcF calculator    ECG #1: QTcF 430 msec    ECG #2: QTcF 397 msec    ECG #3: QTcF 397 msec  Time Point Completion Time      Pre-dose   11:11:18    11:13:20    11:15:26        PRE-DOSE Research Correlatives: After ECGs  Deliver to [facility] lab for processing   Access Type: 22g PIV Location: Left wrist   Refer to [place] Treatment Plan for orders   Time point Specimen Test Volume Tube Handling Draw Time   Pre-dose  (within 1hour) PK 2 x 6 mL Red Top Ambient, invert 5x 1121    ADA 4 mL Red Top Ambient, invert 5x 1121        Safety, Side and Adverse Effects with Special Instructions   Research Study Drugs   SEE  Safety Parameters and Special Instructions     Infusion-Related Reactions   [facility] Hypersensivity Orders      Day 1 Disitamab Vedotin Investigational Product Administration (sponsor provided)   Refer to [place] Treatment Plan for orders   Document medication administration in MAR activity. Document Research specific instructions below.   Disitamab Vedotin   If Infusion-Related Reaction, notify MD/Provider &     Grade 1: Mild reaction (transient flushing, rash or fever 38° C): may continue at investigator's discretion; administer symptomatic treatment following DCRU management of hypersensitivity reaction; monitor vital signs.    Grade 2: Moderate reaction (rash, flushing, urticaria, dyspnea, fever 38° C, chest discomfort, or back pain): interrupt infusion & follow DCRU management of hypersensitivity reaction; monitor vital signs.   If promptly responds to symptomatic treatment & is medically stable in the opinion of the investigator, infusion may be resumed at a slower rate.   Grade 3: Prolonged recurrence of symptoms or Grade 4: Life-threatening: stop infusion & follow DCRU management of hypersensitivity reaction. Remain at bedside and monitor until recovery from symptoms. Do not restart infusion.      POST-DOSE ECG: Before Research Labs   Triplicate - Each approximately 2 minutes apart  MISTI 150c Study-specific ECG Machine   Rest x2 min prior  Collect within 30 minutes prior to post-dose research labs.   Contact MD/Provider &  if abnormal from baseline or if QTcF >500 msec  QTcF calculation Fridericia's formula: S:\DCRU_Staff\Nursing\Protocols\QTcF calculator    ECG #1: QTcF 403 msec    ECG #2: QTcF 399 msec    ECG #3: QTcF 394 msec  Time Point Completion Time      End of Infusion (+/- 15 mins ) 12:33:41    12:35:41    12:37:42        Day 1 Post-Dose Disitamab Vedotin Research Correlatives  Deliver to [facility] for Processing   Access Type: 22g PIV  Location: left wrist*   Refer to [place] Treatment Plan for orders   Time point Specimen Test Volume Tube Handling Draw Time   End of Infusion (+/-15mins ) PK  2 x 6 mL  Red Top  Ambient, invert 5x  1240   +2 hr (+/- 1 hour) from EOI     1335        *Pt. Declined further venipuncture after IV placed. IV line flushed with approx. 30mL NS before both EOI and +2 hr sample collection.    Discharge Instructions   Discharge patient to home after study requirements completed or PK sample obtained.    Remind patient to return: on Day 3 for +48 hr PK & ECGs (+/- 4 hrs) from EOI of Day 1 Disitamab Vedotin  Discharge time: 1344     Philomena Holley RN  03/27/24

## 2024-03-27 NOTE — PROGRESS NOTES
Patient ID: Toro Lujan is a 69 y.o. male.  Attending Physician: Dr. Topher Castanon  Cancer Diagnosis:  Cancer Staging   No matching staging information was found for the patient.     Current Therapy: SEGE 1822: Disitamab Vedotin, cohorts A and B in HER2-overexpressing IHC 1+, 2+, 3+ for locally advanced unresectable or metastatic UC who have received prior platinum-based chemotherapy  Genetics: NA    Subjective      Cancer History:  Oncology History   Malignant neoplasm of overlapping sites of bladder (CMS/HCC)   2/13/2024 Initial Diagnosis    Malignant neoplasm of overlapping sites of bladder (CMS/HCC)     2/14/2024 -  Research Study Participant    (RS) [id number] Intensive PK Cohort - Disitamab Vedotin, 14 Day Cycles  Plan Provider: Fan Rapp MD PhD  Treatment goal: Control  Line of treatment: Second Line  Associated studies: A Study of Disitamab Vedotin in Subjects With HER2 Expressing Urothelial Carcinoma       11/5/2020: CTU shows mildly asymmetric generalized urinary bladder wall thickening with mild mucosal hyperenhancement, large prostate  1/29/2021: TURBT. 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 Posterior tumor reveals papillary high-grade at least pTa urothelial carcinoma with no definite invasion, no muscle present  2/24/2021: Bladder tumor path shows high grade focally invasive urothelial carcinoma into the lamina propria, non-muscle invasive.   5/6/2021: Underwent radical cystectomy with ilial conduit due to multiple multiple disease recurrences without MIBC.  12/13/2021: CTU suspicious for new enlarged pelvic LN  2/17/2022: Started chemotherapy with split-dose gemcitabine and cisplatin  3/10/2022: C2 gem/cis  3/31/2022: C3 gem/cis  4/20/2022: Restaging scans revealed response  4/21/2022: C4 gem/cis  5/12/2022: C5 gem/cis  6/24/2022: Started maintenance avelumab. Treatment was placed on hold due to insurance coverage issues after  1 cycle  8/24/2022: CT scans stable  9/2/2022: Restarted avelumab    3/2/2023: Last dose avelumab, off after  12/28/2023: CT reveals increase in left para-aortic LN highly suspicious for metastatic LAD.  1/18/2024: Again increase in para-aortic LN.  1/31/2024: Screening for SEGE 1822: Disitamab vedotin  2/7/2024: Screening for SEGE 1822  2/14/2024: Cycle 1 day 1 disitamab vedotin as part of clinical trial    Interval History:  Mr. Lujan presents today in consideration of cycle 4 day 1 DV. He says that he felt better over the last 2 weeks. He is having fewer issues with his bowels and less cramping and diarrhea. He did have two days in which he felt tired but has otherwise been physically active, working in his barn. He has good appetite. Diarrhea is now intermittent. He has not had any neuropathy. He has no fevers, chills, dyspnea, chest pain, palpitations, nausea, vomiting, extremity weakness, edema, vision changes, or headaches.     ROS: A 14 point review of systems was performed and is negative unless otherwise stated in the HPI    HPI    Objective    BSA: There is no height or weight on file to calculate BSA.  There were no vitals taken for this visit.    Physical Exam  General: Alert, well-dressed in NAD. Speech is fluent and coherent, words clear. Good insight. Oriented x4   Skin: No rash, petechiae, or ecchymoses  Respiratory: Chest expansion symmetric. CTAB without wheeze or crackles bilaterally .  CV: S1S2 audible and crisp without murmurs, rubs, or extra sounds. RRR. Extremities: Warm, well perfused. No edema bilaterally.  Lymph: no palpable cervical, or supraclavicular lymphadenopathy.  GI: Abdomen is round, soft, and non-tender. Active bowel sounds.   : IC bag present  Psych: Engaged, polite, appropriate conversation and eye contact.    Current Medications:    Current Outpatient Medications:     acetaminophen (Tylenol) 500 mg tablet, Take 2 tablets (1,000 mg) by mouth every 8 hours if needed., Disp:  , Rfl:     Advair Diskus 100-50 mcg/dose diskus inhaler, INHALE 1 PUFF BY MOUTH TWICE DAILY AS DIRECTED. RINSE AND GARGLE MOUTH WITH WATER AFTER EACH USE, Disp: , Rfl:     ALPRAZolam (Xanax) 0.5 mg tablet, Take one tablet one hour before anxiety-provoking situation. Limits one tab/day, three tabs/week., Disp: , Rfl:     aspirin 81 mg EC tablet, Take 1 tablet (81 mg) by mouth once daily., Disp: , Rfl:     lisinopril 20 mg tablet, Take 1 tablet (20 mg) by mouth once daily., Disp: , Rfl:     metoprolol tartrate (Lopressor) 25 mg tablet, Take 1 tablet (25 mg) by mouth 2 times a day., Disp: , Rfl:     pantoprazole (ProtoNix) 40 mg EC tablet, Take 1 tablet (40 mg) by mouth once daily., Disp: , Rfl:     rosuvastatin (Crestor) 40 mg tablet, Take 1 tablet (40 mg) by mouth once daily., Disp: , Rfl:     sertraline (Zoloft) 50 mg tablet, Take 1 tablet (50 mg) by mouth once daily., Disp: , Rfl:   No current facility-administered medications for this visit.    Facility-Administered Medications Ordered in Other Visits:     albuterol 2.5 mg /3 mL (0.083 %) nebulizer solution 3 mL, 3 mL, nebulization, PRN, Fan Rapp MD PhD    dextrose 5 % in water (D5W) bolus, 500 mL, intravenous, PRN, Fan Rapp MD PhD    diphenhydrAMINE (BENADryl) injection 50 mg, 50 mg, intravenous, PRN, Fan Rapp MD PhD    EPINEPHrine (Epipen) injection syringe 0.3 mg, 0.3 mg, intramuscular, q5 min PRN, Fan Rapp MD PhD    famotidine PF (Pepcid) injection 20 mg, 20 mg, intravenous, Once PRN, Fan Rapp MD PhD    methylPREDNISolone sod succinate (SOLU-Medrol) 40 mg/mL injection 40 mg, 40 mg, intravenous, PRN, Fan Rapp MD PhD    prochlorperazine (Compazine) injection 10 mg, 10 mg, intravenous, q6h PRN, Fan Rapp MD PhD    prochlorperazine (Compazine) tablet 10 mg, 10 mg, oral, q6h PRN, Fan Rapp MD PhD    sodium chloride 0.9 % bolus 500 mL, 500 mL, intravenous, PRN, Fan Rapp MD PhD     Most Recent Labs:  Results for  orders placed or performed during the hospital encounter of 03/27/24   CBC and Auto Differential   Result Value Ref Range    WBC 8.4 4.4 - 11.3 x10*3/uL    nRBC 0.0 0.0 - 0.0 /100 WBCs    RBC 5.12 4.50 - 5.90 x10*6/uL    Hemoglobin 15.4 13.5 - 17.5 g/dL    Hematocrit 44.4 41.0 - 52.0 %    MCV 87 80 - 100 fL    MCH 30.1 26.0 - 34.0 pg    MCHC 34.7 32.0 - 36.0 g/dL    RDW 14.5 11.5 - 14.5 %    Platelets 237 150 - 450 x10*3/uL    Neutrophils % 62.5 40.0 - 80.0 %    Immature Granulocytes %, Automated 1.4 (H) 0.0 - 0.9 %    Lymphocytes % 20.5 13.0 - 44.0 %    Monocytes % 10.9 2.0 - 10.0 %    Eosinophils % 3.7 0.0 - 6.0 %    Basophils % 1.0 0.0 - 2.0 %    Neutrophils Absolute 5.26 1.20 - 7.70 x10*3/uL    Immature Granulocytes Absolute, Automated 0.12 0.00 - 0.70 x10*3/uL    Lymphocytes Absolute 1.73 1.20 - 4.80 x10*3/uL    Monocytes Absolute 0.92 0.10 - 1.00 x10*3/uL    Eosinophils Absolute 0.31 0.00 - 0.70 x10*3/uL    Basophils Absolute 0.08 0.00 - 0.10 x10*3/uL   Comprehensive metabolic panel   Result Value Ref Range    Glucose 197 (H) 74 - 99 mg/dL    Sodium 134 (L) 136 - 145 mmol/L    Potassium 4.4 3.5 - 5.3 mmol/L    Chloride 100 98 - 107 mmol/L    Bicarbonate 25 21 - 32 mmol/L    Anion Gap 13 10 - 20 mmol/L    Urea Nitrogen 20 6 - 23 mg/dL    Creatinine 1.16 0.50 - 1.30 mg/dL    eGFR 68 >60 mL/min/1.73m*2    Calcium 9.4 8.6 - 10.6 mg/dL    Albumin 4.2 3.4 - 5.0 g/dL    Alkaline Phosphatase 116 33 - 136 U/L    Total Protein 6.8 6.4 - 8.2 g/dL    AST 24 9 - 39 U/L    Bilirubin, Total 0.5 0.0 - 1.2 mg/dL    ALT 47 10 - 52 U/L   Bilirubin, Direct   Result Value Ref Range    Bilirubin, Direct 0.1 0.0 - 0.3 mg/dL   Glucose, Fasting   Result Value Ref Range    Glucose, Fasting 197 (H) 74 - 99 mg/dL   Lipid panel   Result Value Ref Range    Cholesterol 227 (H) 0 - 199 mg/dL    HDL-Cholesterol 36.9 mg/dL    Cholesterol/HDL Ratio 6.2     LDL Calculated 145 (H) <=99 mg/dL    VLDL 45 (H) 0 - 40 mg/dL    Triglycerides 225 (H) 0  "- 149 mg/dL    Non HDL Cholesterol 190 (H) 0 - 149 mg/dL   Magnesium   Result Value Ref Range    Magnesium 1.84 1.60 - 2.40 mg/dL   Phosphorus   Result Value Ref Range    Phosphorus 3.9 2.5 - 4.9 mg/dL   Research collection: 6mL Red Top - Research Collect   Result Value Ref Range    Extra Tube Hold for add-ons.    Research collection: 6mL Red Top - Research Collect   Result Value Ref Range    Extra Tube Hold for add-ons.    Research collection: 4mL Red Top - Research Collect   Result Value Ref Range    Extra Tube Hold for add-ons.    Research collection: 6mL Red Top - Research Collect   Result Value Ref Range    Extra Tube Hold for add-ons.    Research collection: 6mL Red Top - Research Collect   Result Value Ref Range    Extra Tube Hold for add-ons.    Research collection: 6mL Red Top - Research Collect   Result Value Ref Range    Extra Tube Hold for add-ons.    Research collection: 6mL Red Top - Research Collect   Result Value Ref Range    Extra Tube Hold for add-ons.       No results found for: \"PSA\"     Performance Status:  ECOG Score: 0- Fully active, able to carry on all pre-disease performance w/o restriction.  Karnofsky Score: 100 - Fully active, able to carry on all pre-disease performed without restriction    Imaging    Imaging personally reviewed in EHR and summarized below:    Decreased size of enlarged para-aortic lymph node. No new lesions in chest, abdomen, or pelvis    Assessment/Plan   Toro Lujan is a 69 y.o. male with mUC to LN who presents in follow up and consideration of cycle 4 day 1 SEGE 1822 on DV. Prior gastrointestinal symptoms have largely resolved. He appears to be responding to treatment on imaging and has no neuropathy at this time. He will continue on study without dose change.     # mUC  - Proceed with cycle 4 SEGE 1822 with DV today  - Continue to monitor labs    # Diarrhea  - Imodium PRN  - Notify if worsening, or needing to use imodium more    # Triglycerides  - Continue with " cardiology    # Health Maintenance  - Continue with PCP and other healthcare providers  - Advised exercise, heart-healthy diet    RTC per protocol    Total time spent on this encounter was 40 minutes, which included preparation, direct time with patient, documentation, and care coordination on the day of visit.

## 2024-03-29 ENCOUNTER — EDUCATION (OUTPATIENT)
Dept: HEMATOLOGY/ONCOLOGY | Facility: HOSPITAL | Age: 69
End: 2024-03-29
Payer: COMMERCIAL

## 2024-03-29 ENCOUNTER — HOSPITAL ENCOUNTER (OUTPATIENT)
Dept: RESEARCH | Facility: HOSPITAL | Age: 69
Discharge: HOME | End: 2024-03-29
Payer: COMMERCIAL

## 2024-03-29 VITALS
DIASTOLIC BLOOD PRESSURE: 79 MMHG | HEART RATE: 77 BPM | RESPIRATION RATE: 16 BRPM | OXYGEN SATURATION: 96 % | SYSTOLIC BLOOD PRESSURE: 126 MMHG | TEMPERATURE: 97.5 F

## 2024-03-29 DIAGNOSIS — C67.8 MALIGNANT NEOPLASM OF OVERLAPPING SITES OF BLADDER (MULTI): ICD-10-CM

## 2024-03-29 LAB
HOLD SPECIMEN: NORMAL
HOLD SPECIMEN: NORMAL

## 2024-03-29 PROCEDURE — 36415 COLL VENOUS BLD VENIPUNCTURE: CPT

## 2024-03-29 ASSESSMENT — PAIN SCALES - GENERAL: PAINLEVEL: 0-NO PAIN

## 2024-03-29 NOTE — PROGRESS NOTES
Research Note Follow Up Visit       Toro Lujan is here today for C4D3 follow up on SEGE 1822.  Follow up procedures completed per protocol. Patient is aware of continued follow up plan.    All Aes stable.  No conmed changes.    Parking pass given.  Calendar reviewed.    Education Documentation  Healthy Lifestyle, taught by Emma Mann RN at 3/29/2024 12:50 PM.  Learner: Patient  Readiness: Eager  Method: Explanation  Response: Verbalizes Understanding    Constipation, taught by Emma Mann RN at 3/29/2024 12:50 PM.  Learner: Patient  Readiness: Eager  Method: Explanation  Response: Verbalizes Understanding    Treatment Plan and Schedule, taught by Emma Mann RN at 3/29/2024 12:50 PM.  Learner: Patient  Readiness: Eager  Method: Explanation  Response: Verbalizes Understanding    Education Comments  No comments found.

## 2024-03-29 NOTE — RESEARCH NOTES
[facility]<[id number]><[id number] D3><INTENSIVEPK>    DCRU NURSING VISIT NOTE  Study Name: [id number]  IRB#: [id number]  DCRU#: # [id number]  Protocol Version Dated: V9 A8 06.06.23   PI: Fan Rapp MD.    Time point: Cycle 1 AND 4 Day 3 INTENSIVE PK     C4D3    Encounter Date: 03/29/2024  Encounter Time:  9:00 AM EDT  Encounter Department: [facility] HEMATOLOGY AND ONCOLOGY     #1     Phone Pager   Emma Mackenzie [phone] [place]    #2 Phone Pager   Martita Helms [phone] [place]   Other Phone Pager          Study Regimen and Dosing   Intensive PK patients with HER2-overexpressing immunohistochemistry (IHC) 1+, 2+, 3+ locally advanced unresectable or metastatic urothelial carcinoma who have received prior platinum-based chemotherapy.  Cycle = 14 days  Disitamab Vedotin administered IV Day 1 every 2 weeks     Admission and Prior to Starting Study Activities   1. Notify  when patient arrives to unit.  2. Patient review/update DCRU/[facility] intake form.  3. Obtain vital signs. Record on flowsheet & in EMR - Day 8 only  4. Perform venipuncture for sample collection procedures. Do Not draw research samples from mediport/central line if used for infusion     Criteria to Treat   DCRU RN reviewed and meets eligibility to proceed with treatment plan   Time team notified: JAZMIN  DCRU RN notifies study team to review eligibility and approval before dosing procedures  Time team approves: JAZMIN      Dietary Guidelines   REGULAR     Subjective   Toro Lujan is a 69 y.o. male and is here for a Research clinical visit.    Visit Provider: [address] [facility] ROOM 5 [facility]     Allergies: No Known Allergies    Objective     Vital Signs:    There were no vitals filed for this visit.    Physical Exam     ASSESSMENT and PLAN:  Problem List Items Addressed This Visit          Hematology and Neoplasia    Malignant neoplasm of overlapping sites of bladder (CMS/HCC)    Relevant Orders    Infusion Appointment  Request (Completed)    Research collection: 6mL Red Top - Research Collect (Completed)    Research collection: 6mL Red Top - Research Collect (Completed)        Medications as of the completion of today's visit:  Current Outpatient Medications   Medication Sig Dispense Refill    acetaminophen (Tylenol) 500 mg tablet Take 2 tablets (1,000 mg) by mouth every 8 hours if needed.      Advair Diskus 100-50 mcg/dose diskus inhaler INHALE 1 PUFF BY MOUTH TWICE DAILY AS DIRECTED. RINSE AND GARGLE MOUTH WITH WATER AFTER EACH USE      ALPRAZolam (Xanax) 0.5 mg tablet Take one tablet one hour before anxiety-provoking situation. Limits one tab/day, three tabs/week.      aspirin 81 mg EC tablet Take 1 tablet (81 mg) by mouth once daily.      lisinopril 20 mg tablet Take 1 tablet (20 mg) by mouth once daily.      metoprolol tartrate (Lopressor) 25 mg tablet Take 1 tablet (25 mg) by mouth 2 times a day.      pantoprazole (ProtoNix) 40 mg EC tablet Take 1 tablet (40 mg) by mouth once daily.      rosuvastatin (Crestor) 40 mg tablet Take 1 tablet (40 mg) by mouth once daily.      sertraline (Zoloft) 50 mg tablet Take 1 tablet (50 mg) by mouth once daily.       No current facility-administered medications for this encounter.           Orders placed during today's visit:  Orders Placed This Encounter   Procedures    Research collection: 6mL Red Top - Research Collect     Standing Status:   Standing     Number of Occurrences:   1     Order Specific Question:   Test     Answer:   PK     Order Specific Question:   Timepoint     Answer:   Post-Dose     Order Specific Question:   Post-Dose     Answer:   EOI +48 hours     Order Specific Question:   Post-Dose Window     Answer:   +/-4 hours     Order Specific Question:   Temperature     Answer:   Ambient     Order Specific Question:   Invert     Answer:   5x     Order Specific Question:   Optional?     Answer:   No    Research collection: 6mL Red Top - Research Collect     Standing Status:    Standing     Number of Occurrences:   1     Order Specific Question:   Test     Answer:   PK     Order Specific Question:   Timepoint     Answer:   Post-Dose     Order Specific Question:   Post-Dose     Answer:   EOI +48 hours     Order Specific Question:   Post-Dose Window     Answer:   +/-4 hours     Order Specific Question:   Temperature     Answer:   Ambient     Order Specific Question:   Invert     Answer:   5x     Order Specific Question:   Optional?     Answer:   No        [id number] - A Study of Disitamab Vedotin in Subjects With HER2 Expressing Urothelial Carcinoma    Patient has no adverse events documented in the Research Adverse Events activity.       Study Specific Instructions and Documentation   ECG   CORRELATIVES   DISCHARGE     Day 3 ECG: Before Research Labs   Triplicate - Each approximately 2 minutes apart  MISTI 150c Study-specific ECG Machine   Rest x2 min prior  Collect within 30 minutes prior to pre-dose research labs.   Contact MD/Provider &  if abnormal from baseline or if QTcF >500 msec  QTcF calculation Fridericia's formula: S:\[email or web address]\Nursing\Protocols\QTcF calculator    ECG #1: QTcF 388    ECG #2: QTcF 392    ECG #3: QTcF 399  Time Point Completion Time      +48 hours from EOI (+/- 4 hours ) 1051    1053    1055        Day 3 Post-Dose Disitamab Vedotin Research Correlatives  [facility]   Access Type: #23g butterfly needle Location: right wrist   Refer to [facility] Treatment Plan for orders   Time point Specimen Test Volume Tube Handling Draw Time Draw Time   +48 hours (+/- 4 hours) from EOI PK  2 x 6 mL  Red Top  Ambient, invert 5x    1105 1105                 Discharge Instructions   Discharge patient to home after study requirements completed or PK sample obtained.    Remind patient to return: on Day 8 for +168 hr PK (+/- 4 hrs) from EOI of Day 1 Disitamab Vedotin  Discharge time: 1113     Yesica Wright RN  03/29/24

## 2024-04-03 ENCOUNTER — HOSPITAL ENCOUNTER (OUTPATIENT)
Dept: RESEARCH | Facility: HOSPITAL | Age: 69
Discharge: HOME | End: 2024-04-03
Payer: COMMERCIAL

## 2024-04-03 ENCOUNTER — EDUCATION (OUTPATIENT)
Dept: HEMATOLOGY/ONCOLOGY | Facility: HOSPITAL | Age: 69
End: 2024-04-03
Payer: COMMERCIAL

## 2024-04-03 VITALS
BODY MASS INDEX: 38.25 KG/M2 | OXYGEN SATURATION: 97 % | SYSTOLIC BLOOD PRESSURE: 129 MMHG | WEIGHT: 265.65 LBS | TEMPERATURE: 97.2 F | RESPIRATION RATE: 18 BRPM | HEART RATE: 89 BPM | DIASTOLIC BLOOD PRESSURE: 81 MMHG

## 2024-04-03 DIAGNOSIS — C67.8 MALIGNANT NEOPLASM OF OVERLAPPING SITES OF BLADDER (MULTI): ICD-10-CM

## 2024-04-03 LAB
HOLD SPECIMEN: NORMAL
HOLD SPECIMEN: NORMAL

## 2024-04-03 PROCEDURE — 36415 COLL VENOUS BLD VENIPUNCTURE: CPT

## 2024-04-03 NOTE — ADDENDUM NOTE
Encounter addended by: Tremayne Broussard RN on: 4/3/2024 4:35 PM   Actions taken: Clinical Note Signed

## 2024-04-03 NOTE — RESEARCH NOTES
[facility]<[id number]><[id number]><INTENSIVEPK>    DCRU NURSING VISIT NOTE  Study Name: [id number]  IRB#: [id number]  DCRU#: # [id number]  Protocol Version Dated: V9 A8 06.06.23   PI: Fan Rapp MD.    Time point: Cycle 1 AND 4 - Day 8 INTENSIVE PK    Encounter Date: 04/03/2024  Encounter Time:  9:00 AM EDT  Encounter Department: [facility] HEMATOLOGY AND ONCOLOGY     #1     Phone Pager   Emma Mackenzie [phone] [place]    #2 Phone Pager   Martita Helms [phone] [place]   Other Phone Pager          Study Regimen and Dosing   Intensive PK patients with HER2-overexpressing immunohistochemistry (IHC) 1+, 2+, 3+ locally advanced unresectable or metastatic urothelial carcinoma who have received prior platinum-based chemotherapy.  Cycle = 14 days  Disitamab Vedotin administered IV Day 1 every 2 weeks     Admission and Prior to Starting Study Activities   1. Notify  when patient arrives to unit.  2. Patient review/update DCRU/[place] intake form.  3. Obtain vital signs. Record on flowsheet & in EMR - Day 8 only  4. Perform venipuncture for sample collection procedures. Do Not draw research samples from mediport/central line if used for infusion     Criteria to Treat   DCRU RN reviewed and meets eligibility to proceed with treatment plan   Time team notified: N/A   DCRU RN notifies study team to review eligibility and approval before dosing procedures  Time team approves: N/A      Dietary Guidelines   REGULAR     Subjective   Toro Lujan is a 69 y.o. male and is here for a Research clinical visit.    Visit Provider: [address], [facility] BLOOD DRAW [facility]     Allergies: No Known Allergies    Objective     Vital Signs:    Vitals:    04/03/24 0910   BP: 129/81   Pulse: 89   Resp: 18   Temp: 36.2 °C (97.2 °F)   TempSrc: Oral   SpO2: 97%   Weight: 121 kg (265 lb 10.5 oz)       Physical Exam     ASSESSMENT and PLAN:  Problem List Items Addressed This Visit          Hematology and Neoplasia     Malignant neoplasm of overlapping sites of bladder (CMS/HCC)    Relevant Orders    Infusion Appointment Request (Completed)    Research collection: 6mL Red Top - Research Collect (Completed)    Research collection: 6mL Red Top - Research Collect (Completed)        Medications as of the completion of today's visit:  Current Outpatient Medications   Medication Sig Dispense Refill    acetaminophen (Tylenol) 500 mg tablet Take 2 tablets (1,000 mg) by mouth every 8 hours if needed.      Advair Diskus 100-50 mcg/dose diskus inhaler INHALE 1 PUFF BY MOUTH TWICE DAILY AS DIRECTED. RINSE AND GARGLE MOUTH WITH WATER AFTER EACH USE      ALPRAZolam (Xanax) 0.5 mg tablet Take one tablet one hour before anxiety-provoking situation. Limits one tab/day, three tabs/week.      aspirin 81 mg EC tablet Take 1 tablet (81 mg) by mouth once daily.      lisinopril 20 mg tablet Take 1 tablet (20 mg) by mouth once daily.      metoprolol tartrate (Lopressor) 25 mg tablet Take 1 tablet (25 mg) by mouth 2 times a day.      pantoprazole (ProtoNix) 40 mg EC tablet Take 1 tablet (40 mg) by mouth once daily.      rosuvastatin (Crestor) 40 mg tablet Take 1 tablet (40 mg) by mouth once daily.      sertraline (Zoloft) 50 mg tablet Take 1 tablet (50 mg) by mouth once daily.       No current facility-administered medications for this encounter.           Orders placed during today's visit:  Orders Placed This Encounter   Procedures    Research collection: 6mL Red Top - Research Collect     Standing Status:   Standing     Number of Occurrences:   1     Order Specific Question:   Test     Answer:   PK     Order Specific Question:   Timepoint     Answer:   Post-Dose     Order Specific Question:   Post-Dose     Answer:   Other     Comments:   EOI +168 hours     Order Specific Question:   Post-Dose Window     Answer:   +/-4 hours     Order Specific Question:   Temperature     Answer:   Ambient     Order Specific Question:   Invert     Answer:   5x      Order Specific Question:   Optional?     Answer:   No    Research collection: 6mL Red Top - Research Collect     Standing Status:   Standing     Number of Occurrences:   1     Order Specific Question:   Test     Answer:   PK     Order Specific Question:   Timepoint     Answer:   Post-Dose     Order Specific Question:   Post-Dose     Answer:   Other     Comments:   EOI +168 hours     Order Specific Question:   Post-Dose Window     Answer:   +/-4 hours     Order Specific Question:   Temperature     Answer:   Ambient     Order Specific Question:   Invert     Answer:   5x     Order Specific Question:   Optional?     Answer:   No        [id number] - A Study of Disitamab Vedotin in Subjects With HER2 Expressing Urothelial Carcinoma    Patient has no adverse events documented in the Research Adverse Events activity.       Study Specific Instructions and Documentation   VITALS (flowsheet)  CORRELATIVES   DISCHARGE     Clinical Safety Labs (Local) - Day 8   Refer to [facility] Treatment Plan for orders     Day 8 Post-Dose Disitamab Vedotin Research Correlatives  Deliver to [facility] for Processing   Access Type: Venipuncture Location: Right Hand   Refer to [facility] Treatment Plan for orders   Time point Specimen Test Volume Tube Handling Draw Time Draw Time   +168 hours   (+/- 4 hours) from EOI PK  2 x 6 mL  Red Top  Ambient, invert 5x    0931 0931                 Discharge Instructions   Discharge patient to home after study requirements completed or PK sample obtained.    Remind patient to return: per   Discharge time: 0936     VASQUEZ DOHERTY RN  04/03/24

## 2024-04-05 DIAGNOSIS — C61 PROSTATE CANCER (MULTI): Primary | ICD-10-CM

## 2024-04-05 NOTE — PROGRESS NOTES
Patient ID: Toro Lujan is a 69 y.o. male.  Attending Physician: Dr. Topher Castanon  Cancer Diagnosis:  Cancer Staging   No matching staging information was found for the patient.     Current Therapy: SEGE 1822: Disitamab Vedotin, cohorts A and B in HER2-overexpressing IHC 1+, 2+, 3+ for locally advanced unresectable or metastatic UC who have received prior platinum-based chemotherapy  Genetics: NA    Subjective      Cancer History:  Oncology History   Malignant neoplasm of overlapping sites of bladder (CMS/HCC)   2/13/2024 Initial Diagnosis    Malignant neoplasm of overlapping sites of bladder (CMS/HCC)     2/14/2024 -  Research Study Participant    (RS) [id number] Intensive PK Cohort - Disitamab Vedotin, 14 Day Cycles  Plan Provider: Fan Rapp MD PhD  Treatment goal: Control  Line of treatment: Second Line  Associated studies: A Study of Disitamab Vedotin in Subjects With HER2 Expressing Urothelial Carcinoma       11/5/2020: CTU shows mildly asymmetric generalized urinary bladder wall thickening with mild mucosal hyperenhancement, large prostate  1/29/2021: TURBT. 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 Posterior tumor reveals papillary high-grade at least pTa urothelial carcinoma with no definite invasion, no muscle present  2/24/2021: Bladder tumor path shows high grade focally invasive urothelial carcinoma into the lamina propria, non-muscle invasive.   5/6/2021: Underwent radical cystectomy with ilial conduit due to multiple multiple disease recurrences without MIBC.  12/13/2021: CTU suspicious for new enlarged pelvic LN  2/17/2022: Started chemotherapy with split-dose gemcitabine and cisplatin  3/10/2022: C2 gem/cis  3/31/2022: C3 gem/cis  4/20/2022: Restaging scans revealed response  4/21/2022: C4 gem/cis  5/12/2022: C5 gem/cis  6/24/2022: Started maintenance avelumab. Treatment was placed on hold due to insurance coverage issues after  "1 cycle  8/24/2022: CT scans stable  9/2/2022: Restarted avelumab    3/2/2023: Last dose avelumab, off after  12/28/2023: CT reveals increase in left para-aortic LN highly suspicious for metastatic LAD.  1/18/2024: Again increase in para-aortic LN.  1/31/2024: Screening for SEGE 1822: Disitamab vedotin  2/7/2024: Screening for SEGE 1822  2/14/2024: Cycle 1 day 1 disitamab vedotin as part of clinical trial    Interval History:  Mr. Lujan presents today in consideration of cycle 3 day 1 DV. He feels well overall. Some concerns with family lately, which is affecting his mental health. Though, physically he feels pretty well. He has very sparse vague potential feelings of \"almost\" neuropathy in his fingertips, which dissipates very quickly. Happens a couple times a day. He otherwise has good energy. Diarrhea has improved with dietary changes. No other new or concerning symptoms .The remainder of her ROS is otherwise negative.  HPI    Objective    BSA: There is no height or weight on file to calculate BSA.  There were no vitals taken for this visit.    Physical Exam  General: alert, well-dressed in NAD. Speech is fluent and coherent, words clear. Good insight. Oriented x4   Skin: warm, dry, and pink without cyanosis or nail clubbing. No rash, petechiae, or ecchymoses  HEENT: Normocephalic atraumatic. Sclera white, conjunctiva pink. EOMs intact. Hearing intact to spoken voice. Oral mucosa pink. No visible lesions  Respiratory: Chest expansion symmetric. Breath sounds vesicular in all lobes without crackles, wheezes, or rhonchi bilaterally.  CV: S1S2 audible and crisp without murmurs, rubs, or extra sounds. RRR. Radial pulses strong and regular. Extremities warm and pink. No edema bilaterally.  Lymph: no palpable cervical, submandibular, or supraclavicular lymphadenopathy.  GI: abdomen is round, soft, and non-tender. Active bowel sounds.   : IC bag present  Psych: engaged, polite, appropriate conversation and eye " "contact.    Current Medications:    Current Outpatient Medications:     acetaminophen (Tylenol) 500 mg tablet, Take 2 tablets (1,000 mg) by mouth every 8 hours if needed., Disp: , Rfl:     Advair Diskus 100-50 mcg/dose diskus inhaler, INHALE 1 PUFF BY MOUTH TWICE DAILY AS DIRECTED. RINSE AND GARGLE MOUTH WITH WATER AFTER EACH USE, Disp: , Rfl:     ALPRAZolam (Xanax) 0.5 mg tablet, Take one tablet one hour before anxiety-provoking situation. Limits one tab/day, three tabs/week., Disp: , Rfl:     aspirin 81 mg EC tablet, Take 1 tablet (81 mg) by mouth once daily., Disp: , Rfl:     lisinopril 20 mg tablet, Take 1 tablet (20 mg) by mouth once daily., Disp: , Rfl:     metoprolol tartrate (Lopressor) 25 mg tablet, Take 1 tablet (25 mg) by mouth 2 times a day., Disp: , Rfl:     pantoprazole (ProtoNix) 40 mg EC tablet, Take 1 tablet (40 mg) by mouth once daily., Disp: , Rfl:     rosuvastatin (Crestor) 40 mg tablet, Take 1 tablet (40 mg) by mouth once daily., Disp: , Rfl:     sertraline (Zoloft) 50 mg tablet, Take 1 tablet (50 mg) by mouth once daily., Disp: , Rfl:      Most Recent Labs:  Results for orders placed or performed during the hospital encounter of 04/03/24   Research collection: 6mL Red Top - Research Collect   Result Value Ref Range    Extra Tube Hold for add-ons.    Research collection: 6mL Red Top - Research Collect   Result Value Ref Range    Extra Tube Hold for add-ons.       No results found for: \"PSA\"     Performance Status:  ECOG Score: 0- Fully active, able to carry on all pre-disease performance w/o restriction.  Karnofsky Score: 100 - Fully active, able to carry on all pre-disease performed without restriction      Assessment/Plan   Toro Lujan is a 69 y.o. male with mUC to LN who presents in follow up and consideration of cycle 3 day 1 SEGE 1822 on DV. He looks and feels well. His creatinine is slightly elevated today, and BP is decreased. Will hydrate with 1 L NSB in clinic today. Otherwise Ok'd " to continue    # mUC  - Continue SEGE 1822 with DV  - Continue to monitor labs    # Elevated creatinine  # Low BP  - 1 L NSB in clinic today    # Diarrhea, resolved  - Imodium PRN, discussed OTC instructions  - Notify if worsening, or needing to use imodium more    # Triglycerides  - Continue with cardiology  - Monitor    # Health Maintenance  - Continue with PCP and other healthcare providers  - Advised exercise, heart-healthy diet    RTC per protocol    Total time spent on this encounter was 45 minutes, which included preparation, direct time with patient, documentation, and care coordination on the day of visit.    Daniella Pineda, MSN, APRN, AGNP-C, AOCNP  Associate Nurse Practitioner  [facility], [facility]

## 2024-04-09 DIAGNOSIS — C67.8 MALIGNANT NEOPLASM OF OVERLAPPING SITES OF BLADDER (MULTI): ICD-10-CM

## 2024-04-09 RX ORDER — EPINEPHRINE 0.3 MG/.3ML
0.3 INJECTION SUBCUTANEOUS EVERY 5 MIN PRN
Status: CANCELLED | OUTPATIENT
Start: 2024-04-10

## 2024-04-09 RX ORDER — DIPHENHYDRAMINE HYDROCHLORIDE 50 MG/ML
50 INJECTION INTRAMUSCULAR; INTRAVENOUS AS NEEDED
Status: CANCELLED | OUTPATIENT
Start: 2024-04-10

## 2024-04-09 RX ORDER — ALBUTEROL SULFATE 0.83 MG/ML
3 SOLUTION RESPIRATORY (INHALATION) AS NEEDED
Status: CANCELLED | OUTPATIENT
Start: 2024-04-10

## 2024-04-09 RX ORDER — PROCHLORPERAZINE EDISYLATE 5 MG/ML
10 INJECTION INTRAMUSCULAR; INTRAVENOUS EVERY 6 HOURS PRN
Status: CANCELLED | OUTPATIENT
Start: 2024-04-10

## 2024-04-09 RX ORDER — PROCHLORPERAZINE MALEATE 5 MG
10 TABLET ORAL EVERY 6 HOURS PRN
Status: CANCELLED | OUTPATIENT
Start: 2024-04-10

## 2024-04-09 RX ORDER — FAMOTIDINE 10 MG/ML
20 INJECTION INTRAVENOUS ONCE AS NEEDED
Status: CANCELLED | OUTPATIENT
Start: 2024-04-10

## 2024-04-10 ENCOUNTER — EDUCATION (OUTPATIENT)
Dept: HEMATOLOGY/ONCOLOGY | Facility: HOSPITAL | Age: 69
End: 2024-04-10

## 2024-04-10 ENCOUNTER — HOSPITAL ENCOUNTER (OUTPATIENT)
Dept: RESEARCH | Facility: HOSPITAL | Age: 69
Discharge: HOME | End: 2024-04-10
Payer: COMMERCIAL

## 2024-04-10 ENCOUNTER — OFFICE VISIT (OUTPATIENT)
Dept: HEMATOLOGY/ONCOLOGY | Facility: HOSPITAL | Age: 69
End: 2024-04-10
Payer: COMMERCIAL

## 2024-04-10 VITALS
RESPIRATION RATE: 18 BRPM | WEIGHT: 263.67 LBS | OXYGEN SATURATION: 95 % | SYSTOLIC BLOOD PRESSURE: 97 MMHG | DIASTOLIC BLOOD PRESSURE: 65 MMHG | BODY MASS INDEX: 37.96 KG/M2 | TEMPERATURE: 97.5 F | HEART RATE: 79 BPM

## 2024-04-10 DIAGNOSIS — C67.8 MALIGNANT NEOPLASM OF OVERLAPPING SITES OF BLADDER (MULTI): Primary | ICD-10-CM

## 2024-04-10 DIAGNOSIS — C67.8 MALIGNANT NEOPLASM OF OVERLAPPING SITES OF BLADDER (MULTI): ICD-10-CM

## 2024-04-10 LAB
ALBUMIN SERPL BCP-MCNC: 3.7 G/DL (ref 3.4–5)
ALP SERPL-CCNC: 107 U/L (ref 33–136)
ALT SERPL W P-5'-P-CCNC: 33 U/L (ref 10–52)
ANION GAP SERPL CALC-SCNC: 14 MMOL/L (ref 10–20)
AST SERPL W P-5'-P-CCNC: 24 U/L (ref 9–39)
BASOPHILS # BLD AUTO: 0.09 X10*3/UL (ref 0–0.1)
BASOPHILS NFR BLD AUTO: 0.9 %
BILIRUB DIRECT SERPL-MCNC: 0.1 MG/DL (ref 0–0.3)
BILIRUB SERPL-MCNC: 0.5 MG/DL (ref 0–1.2)
BUN SERPL-MCNC: 30 MG/DL (ref 6–23)
CALCIUM SERPL-MCNC: 8.7 MG/DL (ref 8.6–10.6)
CHLORIDE SERPL-SCNC: 99 MMOL/L (ref 98–107)
CHOLEST SERPL-MCNC: 198 MG/DL (ref 0–199)
CHOLESTEROL/HDL RATIO: 6.3
CO2 SERPL-SCNC: 24 MMOL/L (ref 21–32)
CREAT SERPL-MCNC: 1.34 MG/DL (ref 0.5–1.3)
EGFRCR SERPLBLD CKD-EPI 2021: 57 ML/MIN/1.73M*2
EOSINOPHIL # BLD AUTO: 0.43 X10*3/UL (ref 0–0.7)
EOSINOPHIL NFR BLD AUTO: 4.3 %
ERYTHROCYTE [DISTWIDTH] IN BLOOD BY AUTOMATED COUNT: 15.2 % (ref 11.5–14.5)
GLUCOSE P FAST SERPL-MCNC: 178 MG/DL (ref 74–99)
GLUCOSE SERPL-MCNC: 178 MG/DL (ref 74–99)
HCT VFR BLD AUTO: 41.1 % (ref 41–52)
HDLC SERPL-MCNC: 31.4 MG/DL
HGB BLD-MCNC: 14.2 G/DL (ref 13.5–17.5)
HOLD SPECIMEN: NORMAL
HOLD SPECIMEN: NORMAL
IMM GRANULOCYTES # BLD AUTO: 0.14 X10*3/UL (ref 0–0.7)
IMM GRANULOCYTES NFR BLD AUTO: 1.4 % (ref 0–0.9)
LDLC SERPL CALC-MCNC: 124 MG/DL
LYMPHOCYTES # BLD AUTO: 2.28 X10*3/UL (ref 1.2–4.8)
LYMPHOCYTES NFR BLD AUTO: 22.6 %
MAGNESIUM SERPL-MCNC: 1.82 MG/DL (ref 1.6–2.4)
MCH RBC QN AUTO: 30.3 PG (ref 26–34)
MCHC RBC AUTO-ENTMCNC: 34.5 G/DL (ref 32–36)
MCV RBC AUTO: 88 FL (ref 80–100)
MONOCYTES # BLD AUTO: 1.08 X10*3/UL (ref 0.1–1)
MONOCYTES NFR BLD AUTO: 10.7 %
NEUTROPHILS # BLD AUTO: 6.09 X10*3/UL (ref 1.2–7.7)
NEUTROPHILS NFR BLD AUTO: 60.1 %
NON HDL CHOLESTEROL: 167 MG/DL (ref 0–149)
NRBC BLD-RTO: 0 /100 WBCS (ref 0–0)
PHOSPHATE SERPL-MCNC: 3.7 MG/DL (ref 2.5–4.9)
PLATELET # BLD AUTO: 210 X10*3/UL (ref 150–450)
POTASSIUM SERPL-SCNC: 5.1 MMOL/L (ref 3.5–5.3)
PROT SERPL-MCNC: 6.2 G/DL (ref 6.4–8.2)
RBC # BLD AUTO: 4.68 X10*6/UL (ref 4.5–5.9)
SODIUM SERPL-SCNC: 132 MMOL/L (ref 136–145)
TRIGL SERPL-MCNC: 215 MG/DL (ref 0–149)
VLDL: 43 MG/DL (ref 0–40)
WBC # BLD AUTO: 10.1 X10*3/UL (ref 4.4–11.3)

## 2024-04-10 PROCEDURE — 1159F MED LIST DOCD IN RCRD: CPT | Performed by: NURSE PRACTITIONER

## 2024-04-10 PROCEDURE — 85025 COMPLETE CBC W/AUTO DIFF WBC: CPT | Performed by: INTERNAL MEDICINE

## 2024-04-10 PROCEDURE — 84100 ASSAY OF PHOSPHORUS: CPT | Performed by: INTERNAL MEDICINE

## 2024-04-10 PROCEDURE — 80053 COMPREHEN METABOLIC PANEL: CPT | Performed by: INTERNAL MEDICINE

## 2024-04-10 PROCEDURE — 2500000005 HC RX 250 GENERAL PHARMACY W/O HCPCS: Performed by: INTERNAL MEDICINE

## 2024-04-10 PROCEDURE — 82248 BILIRUBIN DIRECT: CPT | Performed by: INTERNAL MEDICINE

## 2024-04-10 PROCEDURE — 99215 OFFICE O/P EST HI 40 MIN: CPT | Performed by: NURSE PRACTITIONER

## 2024-04-10 PROCEDURE — C9399 UNCLASSIFIED DRUGS OR BIOLOG: HCPCS | Performed by: INTERNAL MEDICINE

## 2024-04-10 PROCEDURE — 82947 ASSAY GLUCOSE BLOOD QUANT: CPT | Performed by: INTERNAL MEDICINE

## 2024-04-10 PROCEDURE — 83735 ASSAY OF MAGNESIUM: CPT | Performed by: INTERNAL MEDICINE

## 2024-04-10 PROCEDURE — 96413 CHEMO IV INFUSION 1 HR: CPT

## 2024-04-10 PROCEDURE — 80061 LIPID PANEL: CPT | Performed by: INTERNAL MEDICINE

## 2024-04-10 RX ORDER — DIPHENHYDRAMINE HYDROCHLORIDE 50 MG/ML
50 INJECTION INTRAMUSCULAR; INTRAVENOUS AS NEEDED
Status: DISCONTINUED | OUTPATIENT
Start: 2024-04-10 | End: 2024-04-11 | Stop reason: HOSPADM

## 2024-04-10 RX ORDER — PROCHLORPERAZINE MALEATE 10 MG
10 TABLET ORAL EVERY 6 HOURS PRN
Status: DISCONTINUED | OUTPATIENT
Start: 2024-04-10 | End: 2024-04-11 | Stop reason: HOSPADM

## 2024-04-10 RX ORDER — FAMOTIDINE 10 MG/ML
20 INJECTION INTRAVENOUS ONCE AS NEEDED
Status: DISCONTINUED | OUTPATIENT
Start: 2024-04-10 | End: 2024-04-11 | Stop reason: HOSPADM

## 2024-04-10 RX ORDER — ALBUTEROL SULFATE 0.83 MG/ML
3 SOLUTION RESPIRATORY (INHALATION) AS NEEDED
Status: DISCONTINUED | OUTPATIENT
Start: 2024-04-10 | End: 2024-04-11 | Stop reason: HOSPADM

## 2024-04-10 RX ORDER — EPINEPHRINE 1 MG/ML
0.3 INJECTION INTRAMUSCULAR; INTRAVENOUS; SUBCUTANEOUS EVERY 5 MIN PRN
Status: DISCONTINUED | OUTPATIENT
Start: 2024-04-10 | End: 2024-04-11 | Stop reason: HOSPADM

## 2024-04-10 RX ORDER — PROCHLORPERAZINE EDISYLATE 5 MG/ML
10 INJECTION INTRAMUSCULAR; INTRAVENOUS EVERY 6 HOURS PRN
Status: DISCONTINUED | OUTPATIENT
Start: 2024-04-10 | End: 2024-04-11 | Stop reason: HOSPADM

## 2024-04-10 RX ADMIN — Medication 150 MG: at 11:48

## 2024-04-10 ASSESSMENT — PAIN SCALES - GENERAL: PAINLEVEL: 0-NO PAIN

## 2024-04-10 NOTE — RESEARCH NOTES
[facility]<[id number]><C2,3,5+D1><INTENSIVEPK>  DCRU NURSING VISIT NOTE  Study Name: [id number]  IRB#: [id number]  DCRU#: # [id number]  Protocol Version Dated: V9 A8 06.06.23   PI: Fan Rapp MD.  Time point: Cycle 2, 3, 5 AND BEYOND - Day 1 INTENSIVE PK  CYCLE 5  Encounter Date: 04/10/2024  Encounter Time:  8:00 AM EDT  Encounter Department: [facility] HEMATOLOGY AND ONCOLOGY   #1     Phone Pager   Emma Mackenzie [phone] [address]    #2 Phone Pager   Martita Helms [phone] [address]     Study Regimen and Dosing   Intensive PK patients with HER2-overexpressing immunohistochemistry (IHC) 1+, 2+, 3+ locally advanced unresectable or metastatic urothelial carcinoma who have received prior platinum-based chemotherapy.  Cycle = 14 days  Disitamab Vedotin administered IV Day 1 every 2 weeks     Admission and Prior to Starting Study Activities   1. Notify  when patient arrives to unit.  2. Complete DCRU/[facility] intake form in EPIC.  4. Study Questionnaires   Coordinator will obtain - Cycle 2, 3, then every other cycle.  5. Obtain weight in kilograms - with shoes off & heavy items removed.  6. Obtain vital signs. Record in EMR.  7. Insert one peripheral IV line for sample collection procedures (flush line with normal saline following each blood draw). Access mediport (if available) otherwise insert second peripheral line in opposite arm for Investigative drug administration (if peripheral line, flush line with normal saline before & after infusion)  8. Do Not draw research samples from the same line the investigational drug infused through.  9. Physical Exam < 1 day  10. Confirm patient fasting for clinical safety labs (every 3rd month, if ordered).     Criteria to Treat   DCRU RN reviewed and meets eligibility to proceed with treatment plan   Time team notified: 0925 am  DCRU RN notifies study team to review eligibility and approval before dosing procedures  Time team approves:  0925 am     Dietary Guidelines   REGULAR   Subjective   Toro Lujan is a 69 y.o. male and is here for a Research clinical visit.  Visit Provider: [email or web address], [facility] ROOM 2 [facility]   Allergies: No Known Allergies  Objective   Vital Signs:    Vitals:    04/10/24 0803 04/10/24 1113   BP: 96/61 97/65   Pulse: 81 79   Resp: 16 18   Temp: 36 °C (96.8 °F) 36.4 °C (97.5 °F)   TempSrc: Temporal Oral   SpO2: 96% 95%   Weight: 120 kg (263 lb 10.7 oz)    PainSc: 0-No pain    Physical Exam   ASSESSMENT and PLAN:  Problem List Items Addressed This Visit          Hematology and Neoplasia    Malignant neoplasm of overlapping sites of bladder (CMS/HCC)    Relevant Medications    prochlorperazine (Compazine) tablet 10 mg    prochlorperazine (Compazine) injection 10 mg    Study [id number] disitamab vedotin 150 mg in sodium chloride 0.9% 250 mL IV (Completed)    sodium chloride 0.9 % bolus 500 mL    dextrose 5 % in water (D5W) bolus    diphenhydrAMINE (BENADryl) injection 50 mg    methylPREDNISolone sod succinate (SOLU-Medrol) 40 mg/mL injection 40 mg    famotidine PF (Pepcid) injection 20 mg    EPINEPHrine (Adrenalin) injection 0.3 mg    albuterol 2.5 mg /3 mL (0.083 %) nebulizer solution 3 mL    Other Relevant Orders    CBC and Auto Differential (Completed)    Comprehensive metabolic panel (Completed)    Bilirubin, Direct (Completed)    Glucose, Fasting (Completed)    Lipid panel (Completed)    Magnesium (Completed)    Phosphorus (Completed)    Research collection: 6mL Red Top - Research Collect    Treatment Conditions (Completed)    Provider Communication - [id number] (Completed)    Research Triplicate ECG (Completed)    Research Triplicate ECG (Completed)    Nursing Communication - Hypersensitivity Management, Moderate (Completed)    Nursing Communication - Hypersensitivity Management, Severe (Completed)    Nursing Communication - Respiratory Management (Completed)    Pulse oximetry, continuous    Research collection: 4mL Red Top - Research  Collect (Completed)    Research collection: 6mL Red Top - Research Collect (Completed)   Medications as of the completion of today's visit:  Current Outpatient Medications   Medication Sig Dispense Refill    acetaminophen (Tylenol) 500 mg tablet Take 2 tablets (1,000 mg) by mouth every 8 hours if needed.      Advair Diskus 100-50 mcg/dose diskus inhaler INHALE 1 PUFF BY MOUTH TWICE DAILY AS DIRECTED. RINSE AND GARGLE MOUTH WITH WATER AFTER EACH USE      ALPRAZolam (Xanax) 0.5 mg tablet Take one tablet one hour before anxiety-provoking situation. Limits one tab/day, three tabs/week.      aspirin 81 mg EC tablet Take 1 tablet (81 mg) by mouth once daily.      lisinopril 20 mg tablet Take 1 tablet (20 mg) by mouth once daily.      metoprolol tartrate (Lopressor) 25 mg tablet Take 1 tablet (25 mg) by mouth 2 times a day.      pantoprazole (ProtoNix) 40 mg EC tablet Take 1 tablet (40 mg) by mouth once daily.      rosuvastatin (Crestor) 40 mg tablet Take 1 tablet (40 mg) by mouth once daily.      sertraline (Zoloft) 50 mg tablet Take 1 tablet (50 mg) by mouth once daily.       Current Facility-Administered Medications   Medication Dose Route Frequency Provider Last Rate Last Admin    albuterol 2.5 mg /3 mL (0.083 %) nebulizer solution 3 mL  3 mL nebulization PRN Topher Castanon MD        dextrose 5 % in water (D5W) bolus  500 mL intravenous PRN Topher Castanon MD        diphenhydrAMINE (BENADryl) injection 50 mg  50 mg intravenous PRN Topher Castanon MD        EPINEPHrine (Adrenalin) injection 0.3 mg  0.3 mg intramuscular q5 min PRN Topher Castanon MD        famotidine PF (Pepcid) injection 20 mg  20 mg intravenous Once PRN Topher Castanon MD        methylPREDNISolone sod succinate (SOLU-Medrol) 40 mg/mL injection 40 mg  40 mg intravenous PRN Topher Castanon MD        prochlorperazine (Compazine) injection 10 mg  10 mg intravenous q6h PRN Topher Castanon MD        prochlorperazine (Compazine) tablet 10 mg  10 mg oral q6h  NADYA Castanon MD        sodium chloride 0.9 % bolus 500 mL  500 mL intravenous NADYA Castanon MD           Administrations This Visit       Study [id number] disitamab vedotin 150 mg in sodium chloride 0.9% 250 mL IV       Admin Date  04/10/2024 Action  New Bag Dose  150 mg Rate  250 mL/hr Route  intravenous Documented By  Esequiel Callahan RN                Orders placed during today's visit:  Orders Placed This Encounter   Procedures    CBC and Auto Differential     Standing Status:   Standing     Number of Occurrences:   1     Order Specific Question:   Release result to MyChart     Answer:   Immediate [1]    Comprehensive metabolic panel     Standing Status:   Standing     Number of Occurrences:   1     Order Specific Question:   Release result to MyChart     Answer:   Immediate [1]    Bilirubin, Direct     Standing Status:   Standing     Number of Occurrences:   1     Order Specific Question:   Release result to MyChart     Answer:   Immediate [1]    Glucose, Fasting     Standing Status:   Standing     Number of Occurrences:   1     Order Specific Question:   Release result to MyChart     Answer:   Immediate [1]    Lipid panel     Standing Status:   Standing     Number of Occurrences:   1     Order Specific Question:   Release result to MyChart     Answer:   Immediate [1]    Magnesium     Standing Status:   Standing     Number of Occurrences:   1     Order Specific Question:   Release result to MyChart     Answer:   Immediate [1]    Phosphorus     Standing Status:   Standing     Number of Occurrences:   1     Order Specific Question:   Release result to MyChart     Answer:   Immediate [1]    Research collection: 6mL Red Top - Research Collect     Standing Status:   Standing     Number of Occurrences:   1     Order Specific Question:   Test     Answer:   PK     Order Specific Question:   Timepoint     Answer:   Post-Dose     Order Specific Question:   Post-Dose     Answer:   EOI     Order Specific  Question:   Post-Dose Window     Answer:   +/- 15 minutes     Order Specific Question:   Temperature     Answer:   Ambient     Order Specific Question:   Invert     Answer:   5x     Order Specific Question:   Optional?     Answer:   No    Research collection: 4mL Red Top - Research Collect     Standing Status:   Standing     Number of Occurrences:   1     Order Specific Question:   Test     Answer:   ADA     Order Specific Question:   Timepoint     Answer:   Pre-Dose     Order Specific Question:   Pre-Dose     Answer:   Within 1 hour     Order Specific Question:   Temperature     Answer:   Ambient     Order Specific Question:   Invert     Answer:   5x     Order Specific Question:   Optional?     Answer:   No    Research collection: 6mL Red Top - Research Collect     Standing Status:   Standing     Number of Occurrences:   1     Order Specific Question:   Test     Answer:   PK     Order Specific Question:   Timepoint     Answer:   Post-Dose     Order Specific Question:   Post-Dose     Answer:   EOI     Order Specific Question:   Post-Dose Window     Answer:   +/- 15 minutes     Order Specific Question:   Temperature     Answer:   Ambient     Order Specific Question:   Invert     Answer:   5x     Order Specific Question:   Optional?     Answer:   No    Treatment Conditions     Hold treatment and notify provider if:   ANC LESS THAN 1 x 10*3/uL   AST/ALT GREATER THAN 3 x ULN   Total Bilirubin GREATER THAN 2 x ULN   LVEF LESS THAN 50%    Fasting or Non-fasting Glucose GREATER THAN 250 mg/dL   Peripheral Neuropathy GREATER THAN OR EQUAL TO Grade 2   Adverse Event GREATER THAN OR EQUAL TO Grade 3     Standing Status:   Standing     Number of Occurrences:   1    Provider Communication - [id number]      Starting Dose Level             Disitamab Vedotin 1.5 mg/kg   Dose Level -1             Disitamab Vedotin 1.1 mg/kg   Dose Level -2             Disitamab Vedotin 0.75 mg/kg     Standing Status:   Standing     Number of  Occurrences:   1    Research Triplicate ECG     Refer to study SmartXactiumrase for instructions and documentation of the research triplicate ECG.     Standing Status:   Standing     Number of Occurrences:   1    Research Triplicate ECG     Refer to study SmartPhrase for instructions and documentation of the research triplicate ECG.     Standing Status:   Standing     Number of Occurrences:   1    Nursing Communication - Hypersensitivity Management, Moderate     Flushing, rash, pruritus, dyspnea, chest discomfort, back pain, angioedema, SBP LESS THAN 90 mmHg, and/or change in mental status above or below patient's baseline. In the event of moderate or severe hypersensitivity reaction to any medication:   Stop infusion.  Assess vital signs, pulse oximetry, nursing assessment, and patient complaints.  Administer medications per Hypersensitivity Reaction Medication Protocol.   Notify physician.     Standing Status:   Standing     Number of Occurrences:   1    Nursing Communication - Hypersensitivity Management, Severe     Worsening of moderate reaction symptoms, SBP LESS THAN 80 mmHg, and/or respiratory distress (respirations GREATER THAN 40 breaths per minute, wheezing, life-threatening symptoms). In the event of moderate or severe hypersensitivity reaction to any medication:   Stop infusion.  Assess vital signs, pulse oximetry, nursing assessment, and patient complaints.  Administer medications per Hypersensitivity Reaction Medication Protocol.   Notify physician.     Standing Status:   Standing     Number of Occurrences:   1    Nursing Communication - Respiratory Management     Oxygen via nasal cannula at 4 L per minute for respiratory rate GREATER THAN OR EQUAL TO to 28 breaths/minute and/or wheezing. Pulse Oximetry continuous monitoring.     Standing Status:   Standing     Number of Occurrences:   1    Pulse oximetry, continuous     Continuous monitoring to maintain SPO2 GREATER THAN 90%     Standing Status:   Standing      Number of Occurrences:   1   [id number] - A Study of Disitamab Vedotin in Subjects With HER2 Expressing Urothelial Carcinoma    Patient has no adverse events documented in the Research Adverse Events activity.    Study Specific Instructions and Documentation   VITALS  LABS  VITALS   ECG (Cycles 2, 3, 5 and Every 3rd cycle thereafter)  CORRELATIVES (Cycles 2, 3, 5 and Every 3rd cycle thereafter)  PREMEDS  STUDY DRUG (DV)  CORRELATIVES (Cycles 2, 3, 5 and Every 3rd cycle thereafter)  DISCHARGE     PRE-DOSE Safety Labs  < 1 Day prior to Day 1     Refer to [place] Treatment Plan for orders     Weight-Based Dosing   Baseline (screening) weight (kg) _______       Date measured _____/_____/20___ Actual weight (kg) _________   (Weight on Day 1)   Date measured _____/_____/20___   Dosing should be based on the patient's actual weight    Adjust dose if patient's weight changes +/- 10% from baseline during the study.   Patients > 100 kg, total dose will be calculated using 100 kg (Maximum = 200 mg) once every 2 week cycle.   Vitals:    04/10/24 0803   Weight: 120 kg (263 lb 10.7 oz)        Safety Parameters and Special Instructions   Disitamab Vedotin (VV87-DHI) is an antibody drug conjugate composed of a recombinant humanized IgG1 anti-HER2 mAb linked to the cytotoxic agent MMAE (monomethyl auristatin E).  Potential Side Effects/Adverse Events: infusion-related reaction, neutropenia, thrombocytopenia, anemia, peripheral neuropathy, elevated liver enzymes, fever, n/v, constipation, diarrhea, pruritus, arthralgia, alopecia, hyperglycemia, elevated triglycerides, hypokalemia, proteinuria, hypoalbuminemia.     PRE-DOSE Vital Signs   Temp, Heart Rate, Respiration, Blood Pressure   @1113     PRE-DOSE ECG: Before Research Labs   Triplicate - Each approximately 2 minutes apart  MISTI 150c Study-specific ECG Machine   Rest at least 10 minutes prior.  Collect within 30 minutes prior to pre-dose research labs.   Contact MD/Provider &   if abnormal from baseline or if QTcF >500 msec  QTcF calculation Fridericia's formula: S:\DCRU_Staff\Nursing\Protocols\QTcF calculator    ECG #1: QTcF 382    ECG #2: QTcF 377    ECG #3: QTcF 393  Time Point Cycle Completion Time      Pre-dose   2, 3, 5 and  every 3rd cycle thereafter 1122     1124     1127        PRE-DOSE Research Correlatives: After ECGs  Deliver to DCRU/TRPC lab for processing   Access Type: see iv flowsheet Location: see iv flowsheet   Refer to [place] Treatment Plan for orders   Time point Cycle Specimen Test Volume Tube Handling Draw Time   Pre-dose  (within 1hour) 2,3,5 & every 3rd cycle after PK 2 x 6 mL Red Top Ambient, invert 5x 1130     ADA 4 mL Red Top Ambient, invert 5x 1130          Safety, Side and Adverse Effects with Special Instructions  Research Study Drugs   SEE  Safety Parameters and Special Instructions     Infusion-Related Reactions   [facility] SOC Hypersensivity Orders      Duplicate box.     Day 1 Disitamab Vedotin Investigational Product Administration (sponsor provided)   Refer to [place] Treatment Plan for orders   Document medication administration in MAR activity. Document Research specific instructions below.   Disitamab Vedotin   If Infusion-Related Reaction, notify MD/Provider &     Grade 1: Mild reaction (transient flushing, rash or fever 38° C): may continue at investigator's discretion; administer symptomatic treatment following DCRU management of hypersensitivity reaction; monitor vital signs.    Grade 2: Moderate reaction (rash, flushing, urticaria, dyspnea, fever 38° C, chest discomfort, or back pain): interrupt infusion & follow DCRU management of hypersensitivity reaction; monitor vital signs.   If promptly responds to symptomatic treatment & is medically stable in the opinion of the investigator, infusion may be resumed at a slower rate.   Grade 3: Prolonged recurrence of symptoms or Grade 4: Life-threatening: stop infusion & follow  DCRU management of hypersensitivity reaction. Remain at bedside and monitor until recovery from symptoms. Do not restart infusion.      POST-DOSE ECG: Before Research Labs   Triplicate - Each approximately 2 minutes apart  MISTI 150c Study-specific ECG Machine   Rest at least 10 minutes prior.  Collect within 30 minutes prior to pre-dose research labs.   Contact MD/Provider &  if abnormal from baseline or if QTcF >500 msec  QTcF calculation Fridericia's formula: S:\DCRU_Staff\Nursing\Protocols\QTcF calculator    ECG #1: QTcF 388    ECG #2: QTcF 402    ECG #3: QTcF 393  Time Point Cycle Completion Time      POST-dose   2, 3, 5 and  every 3rd cycle thereafter 1254     1256     1258      EOI 1253  Day 1 Post-Dose Disitamab Vedotin Research Correlatives  Deliver to DCRU/TR[facility] for Processing   Access Type: 22g bf Location: R ac   Refer to [place] Treatment Plan for orders   Time point Cycle Specimen Test Volume Tube Handling  Draw Time   End of Infusion  (+/-15mins ) 2,3,5 & every 3rd cycle after   PK  2 x 6 mL  Red Top  Ambient, invert 5x   1300        Discharge Instructions   Discharge patient to home after study requirements completed or PK sample obtained.    Remind patient to return: per   Discharge time: 1308   Esequiel Aguilar RN  04/10/24

## 2024-04-11 ENCOUNTER — EDUCATION (OUTPATIENT)
Dept: HEMATOLOGY/ONCOLOGY | Facility: HOSPITAL | Age: 69
End: 2024-04-11
Payer: COMMERCIAL

## 2024-04-11 NOTE — PROGRESS NOTES
Research Note Follow Up Visit       Spoke to Mr. Lujan today to follow up about how he is feeling after treatment yesterday.     Yesterday he appeared to be dehydrated based on symptomatic hypotension and G1 creatinine elevated.  He reports he has increased his fluid intake and will buy some gatorade today as his sodium was slightly low.

## 2024-04-11 NOTE — PROGRESS NOTES
Research Note Follow Up Visit       Toro Lujan is here today for consideration of C5D1 treatment and  follow up on SEGE 1822.  Follow up procedures completed per protocol. Patient is aware of continued follow up plan.    Questionnaire completed on ePRO.    After review of Aes, conmeds, vitals, labs and exam, patient received treatment at initial dose capped at 100 kg.    Patient stated he does not sleep well the night before treatments and he was very tired.  He sleeps well typically.      Patient stressed over the behavior of two of his children who have temper issues.  This deeply troubles and concerns the patient and he is established with a counselor.    Patient reports feeling well, otherwise.     Hypotension today (97/65) (asymptomatic) and creatinine a little elevated.  Most likely dehydration per Daniella Pineda.  Fluids ordered.      Unfortunately, fluid order was not seen by [facility] nurse and fluids were not administered.  This nurse encouraged patient to increase fluid intake at home.  Patient confirmed understanding.        Education Documentation  Stress Management, taught by Emma Mann RN at 4/10/2024  9:21 AM.  Learner: Patient  Readiness: Eager  Method: Explanation  Response: Verbalizes Understanding    Healthy Lifestyle, taught by Emma Mann RN at 4/10/2024  9:21 AM.  Learner: Patient  Readiness: Eager  Method: Explanation  Response: Verbalizes Understanding    Bleeding Precautions, taught by Emma Mann RN at 4/10/2024  9:21 AM.  Learner: Patient  Readiness: Eager  Method: Explanation  Response: Verbalizes Understanding    Edema Management, taught by Emma Mann RN at 4/10/2024  9:21 AM.  Learner: Patient  Readiness: Eager  Method: Explanation  Response: Verbalizes Understanding    Shortness of Breath, taught by Emma Mann RN at 4/10/2024  9:21 AM.  Learner: Patient  Readiness: Eager  Method: Explanation  Response: Verbalizes Understanding    Changes in Appetite, taught by Emma  PARKER Mann at 4/10/2024  9:21 AM.  Learner: Patient  Readiness: Eager  Method: Explanation  Response: Verbalizes Understanding    Memory Problems, taught by Emma Mann RN at 4/10/2024  9:21 AM.  Learner: Patient  Readiness: Eager  Method: Explanation  Response: Verbalizes Understanding    Skin and Nail Changes, taught by Emma Mann RN at 4/10/2024  9:21 AM.  Learner: Patient  Readiness: Eager  Method: Explanation  Response: Verbalizes Understanding    Changes in Urination, taught by Emma Mann RN at 4/10/2024  9:21 AM.  Learner: Patient  Readiness: Eager  Method: Explanation  Response: Verbalizes Understanding    Care of Neuropathy, taught by Emma Mann RN at 4/10/2024  9:21 AM.  Learner: Patient  Readiness: Eager  Method: Explanation  Response: Verbalizes Understanding    Infection Control, taught by Emma Mann RN at 4/10/2024  9:21 AM.  Learner: Patient  Readiness: Eager  Method: Explanation  Response: Verbalizes Understanding    Alopecia, taught by Emma Mann RN at 4/10/2024  9:21 AM.  Learner: Patient  Readiness: Eager  Method: Explanation  Response: Verbalizes Understanding    Diarrhea, taught by Emma Mann RN at 4/10/2024  9:21 AM.  Learner: Patient  Readiness: Eager  Method: Explanation  Response: Verbalizes Understanding    Constipation, taught by Emma Mann RN at 4/10/2024  9:21 AM.  Learner: Patient  Readiness: Eager  Method: Explanation  Response: Verbalizes Understanding    Mouth Sores, taught by Emma Mann RN at 4/10/2024  9:21 AM.  Learner: Patient  Readiness: Eager  Method: Explanation  Response: Verbalizes Understanding    Nausea Management, taught by Emma Mann RN at 4/10/2024  9:21 AM.  Learner: Patient  Readiness: Eager  Method: Explanation  Response: Verbalizes Understanding    Fatigue, taught by Emma Mann RN at 4/10/2024  9:21 AM.  Learner: Patient  Readiness: Eager  Method: Explanation  Response: Verbalizes Understanding    Supportive Medications,  taught by Emma Mann RN at 4/10/2024  9:21 AM.  Learner: Patient  Readiness: Eager  Method: Explanation  Response: Verbalizes Understanding    Post-Chemotherapy Education, taught by Emma Mann RN at 4/10/2024  9:21 AM.  Learner: Patient  Readiness: Eager  Method: Explanation  Response: Verbalizes Understanding    Chemotherapy Safety at Home, taught by Emma Mann RN at 4/10/2024  9:21 AM.  Learner: Patient  Readiness: Eager  Method: Explanation  Response: Verbalizes Understanding    Treatment Plan and Schedule, taught by Emma Mann RN at 4/10/2024  9:21 AM.  Learner: Patient  Readiness: Eager  Method: Explanation  Response: Verbalizes Understanding    General Medication Information, taught by Emma Mann RN at 4/10/2024  9:21 AM.  Learner: Patient  Readiness: Eager  Method: Explanation  Response: Verbalizes Understanding    Comprehensive Metabolic Panel (CMP), taught by Emma Mann RN at 4/10/2024  9:21 AM.  Learner: Patient  Readiness: Eager  Method: Explanation  Response: Verbalizes Understanding    Complete Blood Count with Differential (CBC w/ Diff), taught by Emma Mann RN at 4/10/2024  9:21 AM.  Learner: Patient  Readiness: Eager  Method: Explanation  Response: Verbalizes Understanding    Education Comments  No comments found.

## 2024-04-22 DIAGNOSIS — C67.8 MALIGNANT NEOPLASM OF OVERLAPPING SITES OF BLADDER (MULTI): ICD-10-CM

## 2024-04-22 NOTE — ADDENDUM NOTE
Encounter addended by: Tara Nielsen RN on: 4/22/2024 11:14 AM   Actions taken: Charge Capture section accepted

## 2024-04-22 NOTE — ADDENDUM NOTE
Encounter addended by: Tara Nielsen RN on: 4/22/2024 8:20 AM   Actions taken: Charge Capture section accepted

## 2024-04-24 ENCOUNTER — HOSPITAL ENCOUNTER (OUTPATIENT)
Dept: RESEARCH | Facility: HOSPITAL | Age: 69
Discharge: HOME | End: 2024-04-24
Payer: COMMERCIAL

## 2024-04-24 ENCOUNTER — EDUCATION (OUTPATIENT)
Dept: HEMATOLOGY/ONCOLOGY | Facility: HOSPITAL | Age: 69
End: 2024-04-24

## 2024-04-24 ENCOUNTER — OFFICE VISIT (OUTPATIENT)
Dept: HEMATOLOGY/ONCOLOGY | Facility: HOSPITAL | Age: 69
End: 2024-04-24
Payer: COMMERCIAL

## 2024-04-24 VITALS
HEART RATE: 82 BPM | DIASTOLIC BLOOD PRESSURE: 88 MMHG | TEMPERATURE: 97.2 F | BODY MASS INDEX: 37.48 KG/M2 | OXYGEN SATURATION: 99 % | SYSTOLIC BLOOD PRESSURE: 126 MMHG | RESPIRATION RATE: 18 BRPM | WEIGHT: 260.36 LBS

## 2024-04-24 VITALS
RESPIRATION RATE: 16 BRPM | OXYGEN SATURATION: 96 % | WEIGHT: 260.36 LBS | TEMPERATURE: 97.3 F | DIASTOLIC BLOOD PRESSURE: 76 MMHG | SYSTOLIC BLOOD PRESSURE: 109 MMHG | BODY MASS INDEX: 37.48 KG/M2 | HEART RATE: 80 BPM

## 2024-04-24 DIAGNOSIS — C67.8 MALIGNANT NEOPLASM OF OVERLAPPING SITES OF BLADDER (MULTI): ICD-10-CM

## 2024-04-24 DIAGNOSIS — C67.8 MALIGNANT NEOPLASM OF OVERLAPPING SITES OF BLADDER (MULTI): Primary | ICD-10-CM

## 2024-04-24 DIAGNOSIS — Z00.6 RESEARCH SUBJECT: Primary | ICD-10-CM

## 2024-04-24 LAB
ALBUMIN SERPL BCP-MCNC: 3.4 G/DL (ref 3.4–5)
ALP SERPL-CCNC: 97 U/L (ref 33–136)
ALT SERPL W P-5'-P-CCNC: 28 U/L (ref 10–52)
ANION GAP SERPL CALC-SCNC: 13 MMOL/L (ref 10–20)
AST SERPL W P-5'-P-CCNC: 21 U/L (ref 9–39)
BASOPHILS # BLD AUTO: 0.1 X10*3/UL (ref 0–0.1)
BASOPHILS NFR BLD AUTO: 0.9 %
BILIRUB DIRECT SERPL-MCNC: 0.1 MG/DL (ref 0–0.3)
BILIRUB SERPL-MCNC: 0.5 MG/DL (ref 0–1.2)
BUN SERPL-MCNC: 26 MG/DL (ref 6–23)
CALCIUM SERPL-MCNC: 7.4 MG/DL (ref 8.6–10.6)
CHLORIDE SERPL-SCNC: 106 MMOL/L (ref 98–107)
CHOLEST SERPL-MCNC: 195 MG/DL (ref 0–199)
CHOLESTEROL/HDL RATIO: 6.8
CO2 SERPL-SCNC: 20 MMOL/L (ref 21–32)
CREAT SERPL-MCNC: 1.14 MG/DL (ref 0.5–1.3)
EGFRCR SERPLBLD CKD-EPI 2021: 70 ML/MIN/1.73M*2
EOSINOPHIL # BLD AUTO: 0.33 X10*3/UL (ref 0–0.7)
EOSINOPHIL NFR BLD AUTO: 3.1 %
ERYTHROCYTE [DISTWIDTH] IN BLOOD BY AUTOMATED COUNT: 15 % (ref 11.5–14.5)
GLUCOSE P FAST SERPL-MCNC: 175 MG/DL (ref 74–99)
GLUCOSE SERPL-MCNC: 175 MG/DL (ref 74–99)
HCT VFR BLD AUTO: 42.8 % (ref 41–52)
HDLC SERPL-MCNC: 28.6 MG/DL
HGB BLD-MCNC: 14.9 G/DL (ref 13.5–17.5)
IMM GRANULOCYTES # BLD AUTO: 0.13 X10*3/UL (ref 0–0.7)
IMM GRANULOCYTES NFR BLD AUTO: 1.2 % (ref 0–0.9)
LDLC SERPL CALC-MCNC: 132 MG/DL
LYMPHOCYTES # BLD AUTO: 2.04 X10*3/UL (ref 1.2–4.8)
LYMPHOCYTES NFR BLD AUTO: 18.9 %
MAGNESIUM SERPL-MCNC: 1.52 MG/DL (ref 1.6–2.4)
MCH RBC QN AUTO: 30.2 PG (ref 26–34)
MCHC RBC AUTO-ENTMCNC: 34.8 G/DL (ref 32–36)
MCV RBC AUTO: 87 FL (ref 80–100)
MONOCYTES # BLD AUTO: 0.99 X10*3/UL (ref 0.1–1)
MONOCYTES NFR BLD AUTO: 9.2 %
NEUTROPHILS # BLD AUTO: 7.21 X10*3/UL (ref 1.2–7.7)
NEUTROPHILS NFR BLD AUTO: 66.7 %
NON HDL CHOLESTEROL: 166 MG/DL (ref 0–149)
NRBC BLD-RTO: 0 /100 WBCS (ref 0–0)
PHOSPHATE SERPL-MCNC: 3 MG/DL (ref 2.5–4.9)
PLATELET # BLD AUTO: 238 X10*3/UL (ref 150–450)
POTASSIUM SERPL-SCNC: 4.4 MMOL/L (ref 3.5–5.3)
PROT SERPL-MCNC: 5.9 G/DL (ref 6.4–8.2)
RBC # BLD AUTO: 4.93 X10*6/UL (ref 4.5–5.9)
SODIUM SERPL-SCNC: 135 MMOL/L (ref 136–145)
TRIGL SERPL-MCNC: 170 MG/DL (ref 0–149)
VLDL: 34 MG/DL (ref 0–40)
WBC # BLD AUTO: 10.8 X10*3/UL (ref 4.4–11.3)

## 2024-04-24 PROCEDURE — 2500000001 HC RX 250 WO HCPCS SELF ADMINISTERED DRUGS (ALT 637 FOR MEDICARE OP): Performed by: NURSE PRACTITIONER

## 2024-04-24 PROCEDURE — 85025 COMPLETE CBC W/AUTO DIFF WBC: CPT | Performed by: STUDENT IN AN ORGANIZED HEALTH CARE EDUCATION/TRAINING PROGRAM

## 2024-04-24 PROCEDURE — 83735 ASSAY OF MAGNESIUM: CPT | Performed by: STUDENT IN AN ORGANIZED HEALTH CARE EDUCATION/TRAINING PROGRAM

## 2024-04-24 PROCEDURE — 99215 OFFICE O/P EST HI 40 MIN: CPT | Performed by: NURSE PRACTITIONER

## 2024-04-24 PROCEDURE — 2500000005 HC RX 250 GENERAL PHARMACY W/O HCPCS: Performed by: INTERNAL MEDICINE

## 2024-04-24 PROCEDURE — 2500000004 HC RX 250 GENERAL PHARMACY W/ HCPCS (ALT 636 FOR OP/ED): Performed by: NURSE PRACTITIONER

## 2024-04-24 PROCEDURE — 96413 CHEMO IV INFUSION 1 HR: CPT

## 2024-04-24 PROCEDURE — 80053 COMPREHEN METABOLIC PANEL: CPT | Performed by: STUDENT IN AN ORGANIZED HEALTH CARE EDUCATION/TRAINING PROGRAM

## 2024-04-24 PROCEDURE — 80061 LIPID PANEL: CPT | Performed by: STUDENT IN AN ORGANIZED HEALTH CARE EDUCATION/TRAINING PROGRAM

## 2024-04-24 PROCEDURE — 1159F MED LIST DOCD IN RCRD: CPT | Performed by: NURSE PRACTITIONER

## 2024-04-24 PROCEDURE — 1126F AMNT PAIN NOTED NONE PRSNT: CPT | Performed by: NURSE PRACTITIONER

## 2024-04-24 PROCEDURE — 82248 BILIRUBIN DIRECT: CPT | Performed by: STUDENT IN AN ORGANIZED HEALTH CARE EDUCATION/TRAINING PROGRAM

## 2024-04-24 PROCEDURE — 84100 ASSAY OF PHOSPHORUS: CPT | Performed by: STUDENT IN AN ORGANIZED HEALTH CARE EDUCATION/TRAINING PROGRAM

## 2024-04-24 PROCEDURE — 82947 ASSAY GLUCOSE BLOOD QUANT: CPT | Performed by: STUDENT IN AN ORGANIZED HEALTH CARE EDUCATION/TRAINING PROGRAM

## 2024-04-24 PROCEDURE — C9399 UNCLASSIFIED DRUGS OR BIOLOG: HCPCS | Performed by: INTERNAL MEDICINE

## 2024-04-24 RX ORDER — CALCIUM CARBONATE 200(500)MG
1250 TABLET,CHEWABLE ORAL ONCE
Status: COMPLETED | OUTPATIENT
Start: 2024-04-24 | End: 2024-04-24

## 2024-04-24 RX ORDER — LANOLIN ALCOHOL/MO/W.PET/CERES
800 CREAM (GRAM) TOPICAL ONCE
Status: COMPLETED | OUTPATIENT
Start: 2024-04-24 | End: 2024-04-24

## 2024-04-24 RX ORDER — FAMOTIDINE 10 MG/ML
20 INJECTION INTRAVENOUS ONCE AS NEEDED
Status: DISCONTINUED | OUTPATIENT
Start: 2024-04-24 | End: 2024-04-25 | Stop reason: HOSPADM

## 2024-04-24 RX ORDER — PROCHLORPERAZINE EDISYLATE 5 MG/ML
10 INJECTION INTRAMUSCULAR; INTRAVENOUS EVERY 6 HOURS PRN
Status: CANCELLED | OUTPATIENT
Start: 2024-04-24

## 2024-04-24 RX ORDER — DIPHENHYDRAMINE HYDROCHLORIDE 50 MG/ML
50 INJECTION INTRAMUSCULAR; INTRAVENOUS AS NEEDED
Status: CANCELLED | OUTPATIENT
Start: 2024-04-24

## 2024-04-24 RX ORDER — PROCHLORPERAZINE EDISYLATE 5 MG/ML
10 INJECTION INTRAMUSCULAR; INTRAVENOUS EVERY 6 HOURS PRN
Status: DISCONTINUED | OUTPATIENT
Start: 2024-04-24 | End: 2024-04-25 | Stop reason: HOSPADM

## 2024-04-24 RX ORDER — ALBUTEROL SULFATE 0.83 MG/ML
3 SOLUTION RESPIRATORY (INHALATION) AS NEEDED
Status: CANCELLED | OUTPATIENT
Start: 2024-04-24

## 2024-04-24 RX ORDER — CALCIUM CARBONATE 200(500)MG
1250 TABLET,CHEWABLE ORAL ONCE
Status: CANCELLED | OUTPATIENT
Start: 2024-04-24 | End: 2024-04-24

## 2024-04-24 RX ORDER — HEPARIN 100 UNIT/ML
500 SYRINGE INTRAVENOUS AS NEEDED
Status: DISCONTINUED | OUTPATIENT
Start: 2024-04-24 | End: 2024-04-25 | Stop reason: HOSPADM

## 2024-04-24 RX ORDER — EPINEPHRINE 0.3 MG/.3ML
0.3 INJECTION SUBCUTANEOUS EVERY 5 MIN PRN
Status: DISCONTINUED | OUTPATIENT
Start: 2024-04-24 | End: 2024-04-25 | Stop reason: HOSPADM

## 2024-04-24 RX ORDER — HEPARIN 100 UNIT/ML
500 SYRINGE INTRAVENOUS AS NEEDED
OUTPATIENT
Start: 2024-04-24

## 2024-04-24 RX ORDER — HEPARIN SODIUM,PORCINE/PF 10 UNIT/ML
50 SYRINGE (ML) INTRAVENOUS AS NEEDED
OUTPATIENT
Start: 2024-04-24

## 2024-04-24 RX ORDER — HEPARIN SODIUM,PORCINE/PF 10 UNIT/ML
50 SYRINGE (ML) INTRAVENOUS AS NEEDED
Status: DISCONTINUED | OUTPATIENT
Start: 2024-04-24 | End: 2024-04-25 | Stop reason: HOSPADM

## 2024-04-24 RX ORDER — PROCHLORPERAZINE MALEATE 10 MG
10 TABLET ORAL EVERY 6 HOURS PRN
Status: CANCELLED | OUTPATIENT
Start: 2024-04-24

## 2024-04-24 RX ORDER — EPINEPHRINE 0.3 MG/.3ML
0.3 INJECTION SUBCUTANEOUS EVERY 5 MIN PRN
Status: CANCELLED | OUTPATIENT
Start: 2024-04-24

## 2024-04-24 RX ORDER — FAMOTIDINE 10 MG/ML
20 INJECTION INTRAVENOUS ONCE AS NEEDED
Status: CANCELLED | OUTPATIENT
Start: 2024-04-24

## 2024-04-24 RX ORDER — PROCHLORPERAZINE MALEATE 10 MG
10 TABLET ORAL EVERY 6 HOURS PRN
Status: DISCONTINUED | OUTPATIENT
Start: 2024-04-24 | End: 2024-04-25 | Stop reason: HOSPADM

## 2024-04-24 RX ORDER — DIPHENHYDRAMINE HYDROCHLORIDE 50 MG/ML
50 INJECTION INTRAMUSCULAR; INTRAVENOUS AS NEEDED
Status: DISCONTINUED | OUTPATIENT
Start: 2024-04-24 | End: 2024-04-25 | Stop reason: HOSPADM

## 2024-04-24 RX ORDER — ALBUTEROL SULFATE 0.83 MG/ML
3 SOLUTION RESPIRATORY (INHALATION) AS NEEDED
Status: DISCONTINUED | OUTPATIENT
Start: 2024-04-24 | End: 2024-04-25 | Stop reason: HOSPADM

## 2024-04-24 RX ORDER — LANOLIN ALCOHOL/MO/W.PET/CERES
800 CREAM (GRAM) TOPICAL ONCE
Status: CANCELLED | OUTPATIENT
Start: 2024-04-24 | End: 2024-04-24

## 2024-04-24 RX ADMIN — Medication 150 MG: at 14:25

## 2024-04-24 RX ADMIN — Medication 800 MG: at 10:48

## 2024-04-24 RX ADMIN — CALCIUM CARBONATE (ANTACID) CHEW TAB 500 MG 1250 MG: 500 CHEW TAB at 10:47

## 2024-04-24 ASSESSMENT — PAIN SCALES - GENERAL: PAINLEVEL: 0-NO PAIN

## 2024-04-24 NOTE — PROGRESS NOTES
Research Note Follow Up Visit       Toro Lujan is here today for consideration of C6D1 treatment and  follow up on SEGE 1822.  Follow up procedures completed per protocol. Patient is aware of continued follow up plan.    After review of vitals, labs, Aes, Conmeds and exam, patient approved for treatment and vial assignments sent to [facility].    Orders were signed, but with the wrong treatment weight.  [facility] discontinued the incorrect order and re-entered the correct order.  Unfortunately, it took several hours to get the treatment order re-signed. Patient was pleasant throughout the day and remained grateful for the team caring for him.  He was given food vouchers and much appreciated.    G1 magnesium decreased.  Given 800 mg magnesium oxide oral once  G1 hypocalcemia. 1,250 mg calcium carbonate given oral once. Also instructed patient to take TUMS daily, 1-2 tabs orally.    +transient abdominal pain now resolved  +dysgeusia    Otherwise all else stable.    Parking pass given.  Calendar emailed.  [facility] [facility].  This will delay the start of the 06JUN treatment.            Education Documentation  Healthy Lifestyle, taught by Emma Mann RN at 4/24/2024  9:50 AM.  Learner: Patient  Readiness: Eager  Method: Explanation  Response: Verbalizes Understanding    Bleeding Precautions, taught by Emma Mann RN at 4/24/2024  9:50 AM.  Learner: Patient  Readiness: Eager  Method: Explanation  Response: Verbalizes Understanding    Edema Management, taught by Emma Mann RN at 4/24/2024  9:50 AM.  Learner: Patient  Readiness: Eager  Method: Explanation  Response: Verbalizes Understanding    Shortness of Breath, taught by Emma Mann RN at 4/24/2024  9:50 AM.  Learner: Patient  Readiness: Eager  Method: Explanation  Response: Verbalizes Understanding    Changes in Appetite, taught by Emma Mann RN at 4/24/2024  9:50 AM.  Learner: Patient  Readiness: Eager  Method:  Explanation  Response: Verbalizes Understanding    Memory Problems, taught by Emma Mann RN at 4/24/2024  9:50 AM.  Learner: Patient  Readiness: Eager  Method: Explanation  Response: Verbalizes Understanding    Skin and Nail Changes, taught by Emma Mann RN at 4/24/2024  9:50 AM.  Learner: Patient  Readiness: Eager  Method: Explanation  Response: Verbalizes Understanding    Changes in Urination, taught by Emma Mann RN at 4/24/2024  9:50 AM.  Learner: Patient  Readiness: Eager  Method: Explanation  Response: Verbalizes Understanding    Care of Neuropathy, taught by Emma Mann RN at 4/24/2024  9:50 AM.  Learner: Patient  Readiness: Eager  Method: Explanation  Response: Verbalizes Understanding    Infection Control, taught by Emma Mann RN at 4/24/2024  9:50 AM.  Learner: Patient  Readiness: Eager  Method: Explanation  Response: Verbalizes Understanding    Alopecia, taught by Emma Mann RN at 4/24/2024  9:50 AM.  Learner: Patient  Readiness: Eager  Method: Explanation  Response: Verbalizes Understanding    Diarrhea, taught by Emma Mann RN at 4/24/2024  9:50 AM.  Learner: Patient  Readiness: Eager  Method: Explanation  Response: Verbalizes Understanding    Constipation, taught by Emma Mann RN at 4/24/2024  9:50 AM.  Learner: Patient  Readiness: Eager  Method: Explanation  Response: Verbalizes Understanding    Mouth Sores, taught by Emma Mann RN at 4/24/2024  9:50 AM.  Learner: Patient  Readiness: Eager  Method: Explanation  Response: Verbalizes Understanding    Nausea Management, taught by Emma Mann RN at 4/24/2024  9:50 AM.  Learner: Patient  Readiness: Eager  Method: Explanation  Response: Verbalizes Understanding    Fatigue, taught by Emma Mann RN at 4/24/2024  9:50 AM.  Learner: Patient  Readiness: Eager  Method: Explanation  Response: Verbalizes Understanding    Chemotherapy Safety at Home, taught by Emma Mann RN at 4/24/2024  9:50 AM.  Learner:  Patient  Readiness: Eager  Method: Explanation  Response: Verbalizes Understanding    Treatment Plan and Schedule, taught by Emma Mann RN at 4/24/2024  9:50 AM.  Learner: Patient  Readiness: Eager  Method: Explanation  Response: Verbalizes Understanding    General Medication Information, taught by Emma Mann RN at 4/24/2024  9:50 AM.  Learner: Patient  Readiness: Eager  Method: Explanation  Response: Verbalizes Understanding    Comprehensive Metabolic Panel (CMP), taught by Emma Mann RN at 4/24/2024  9:50 AM.  Learner: Patient  Readiness: Eager  Method: Explanation  Response: Verbalizes Understanding    Complete Blood Count with Differential (CBC w/ Diff), taught by Emma Mann RN at 4/24/2024  9:50 AM.  Learner: Patient  Readiness: Eager  Method: Explanation  Response: Verbalizes Understanding    Education Comments  No comments found.

## 2024-04-24 NOTE — SIGNIFICANT EVENT

## 2024-04-24 NOTE — PROGRESS NOTES
Patient ID: Toro Lujan is a 69 y.o. male.  Attending Physician: Dr. Topher Castanon  Cancer Diagnosis:  Cancer Staging   No matching staging information was found for the patient.       Current Therapy: SEGE 1822: Disitamab Vedotin, cohorts A and B in HER2-overexpressing IHC 1+, 2+, 3+ for locally advanced unresectable or metastatic UC who have received prior platinum-based chemotherapy  Genetics: NA    Subjective      Cancer History:  Oncology History   Malignant neoplasm of overlapping sites of bladder (Multi)   2/13/2024 Initial Diagnosis    Malignant neoplasm of overlapping sites of bladder (CMS/HCC)     2/14/2024 -  Research Study Participant    (RS) [id number] Intensive PK Cohort - Disitamab Vedotin, 14 Day Cycles  Plan Provider: Fan Rapp MD PhD  Treatment goal: Control  Line of treatment: Second Line  Associated studies: A Study of Disitamab Vedotin in Subjects With HER2 Expressing Urothelial Carcinoma       11/5/2020: CTU shows mildly asymmetric generalized urinary bladder wall thickening with mild mucosal hyperenhancement, large prostate  1/29/2021: TURBT. 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 Posterior tumor reveals papillary high-grade at least pTa urothelial carcinoma with no definite invasion, no muscle present  2/24/2021: Bladder tumor path shows high grade focally invasive urothelial carcinoma into the lamina propria, non-muscle invasive.   5/6/2021: Underwent radical cystectomy with ilial conduit due to multiple multiple disease recurrences without MIBC.  12/13/2021: CTU suspicious for new enlarged pelvic LN  2/17/2022: Started chemotherapy with split-dose gemcitabine and cisplatin  3/10/2022: C2 gem/cis  3/31/2022: C3 gem/cis  4/20/2022: Restaging scans revealed response  4/21/2022: C4 gem/cis  5/12/2022: C5 gem/cis  6/24/2022: Started maintenance avelumab. Treatment was placed on hold due to insurance coverage issues after  1 cycle  8/24/2022: CT scans stable  9/2/2022: Restarted avelumab    3/2/2023: Last dose avelumab, off after  12/28/2023: CT reveals increase in left para-aortic LN highly suspicious for metastatic LAD.  1/18/2024: Again increase in para-aortic LN.  1/31/2024: Screening for SEGE 1822: Disitamab vedotin  2/7/2024: Screening for SEGE 1822  2/14/2024: Cycle 1 day 1 disitamab vedotin as part of clinical trial    Interval History:  Mr. Lujan presents today in consideration of cycle 6 day 1 DV. He has been feeling well overall. He has had about a month of taste changes, which have manifested in him eating healthier food and giving up sugary drinks. Still doing all his normal activities. Had an episode of transient abdominal pain which resolved without changes to b/b. No other new or concerning symptoms .The remainder of her ROS is otherwise negative.  HPI    Objective    BSA: 2.41 meters squared  /88 (BP Location: Left arm, Patient Position: Lying)   Pulse 82   Temp 36.2 °C (97.2 °F)   Resp 18   Wt 118 kg (260 lb 5.8 oz)   SpO2 99%   BMI 37.48 kg/m²     Physical Exam  General: alert, well-dressed in NAD. Speech is fluent and coherent, words clear. Good insight. Oriented x4   Skin: warm, dry, and pink without cyanosis or nail clubbing. No rash, petechiae, or ecchymoses  HEENT: Normocephalic atraumatic. Sclera white, conjunctiva pink. EOMs intact. Hearing intact to spoken voice. Oral mucosa pink. No visible lesions  Respiratory: Chest expansion symmetric. Breath sounds vesicular in all lobes without crackles, wheezes, or rhonchi bilaterally.  CV: S1S2 audible and crisp without murmurs, rubs, or extra sounds. RRR. Radial pulses strong and regular. Extremities warm and pink. No edema bilaterally.  Lymph: no palpable cervical, submandibular, or supraclavicular lymphadenopathy.  GI: abdomen is round, soft, and non-tender. Active bowel sounds.   : IC bag present  Psych: engaged, polite, appropriate conversation  and eye contact.    Current Medications:    Current Outpatient Medications:     acetaminophen (Tylenol) 500 mg tablet, Take 2 tablets (1,000 mg) by mouth every 8 hours if needed., Disp: , Rfl:     Advair Diskus 100-50 mcg/dose diskus inhaler, INHALE 1 PUFF BY MOUTH TWICE DAILY AS DIRECTED. RINSE AND GARGLE MOUTH WITH WATER AFTER EACH USE, Disp: , Rfl:     ALPRAZolam (Xanax) 0.5 mg tablet, Take one tablet one hour before anxiety-provoking situation. Limits one tab/day, three tabs/week., Disp: , Rfl:     aspirin 81 mg EC tablet, Take 1 tablet (81 mg) by mouth once daily., Disp: , Rfl:     lisinopril 20 mg tablet, Take 1 tablet (20 mg) by mouth once daily., Disp: , Rfl:     metoprolol tartrate (Lopressor) 25 mg tablet, Take 1 tablet (25 mg) by mouth 2 times a day., Disp: , Rfl:     pantoprazole (ProtoNix) 40 mg EC tablet, Take 1 tablet (40 mg) by mouth once daily., Disp: , Rfl:     rosuvastatin (Crestor) 40 mg tablet, Take 1 tablet (40 mg) by mouth once daily., Disp: , Rfl:     sertraline (Zoloft) 50 mg tablet, Take 1 tablet (50 mg) by mouth once daily., Disp: , Rfl:      Most Recent Labs:  Results for orders placed or performed during the hospital encounter of 04/24/24   CBC and Auto Differential   Result Value Ref Range    WBC 10.8 4.4 - 11.3 x10*3/uL    nRBC 0.0 0.0 - 0.0 /100 WBCs    RBC 4.93 4.50 - 5.90 x10*6/uL    Hemoglobin 14.9 13.5 - 17.5 g/dL    Hematocrit 42.8 41.0 - 52.0 %    MCV 87 80 - 100 fL    MCH 30.2 26.0 - 34.0 pg    MCHC 34.8 32.0 - 36.0 g/dL    RDW 15.0 (H) 11.5 - 14.5 %    Platelets 238 150 - 450 x10*3/uL    Neutrophils % 66.7 40.0 - 80.0 %    Immature Granulocytes %, Automated 1.2 (H) 0.0 - 0.9 %    Lymphocytes % 18.9 13.0 - 44.0 %    Monocytes % 9.2 2.0 - 10.0 %    Eosinophils % 3.1 0.0 - 6.0 %    Basophils % 0.9 0.0 - 2.0 %    Neutrophils Absolute 7.21 1.20 - 7.70 x10*3/uL    Immature Granulocytes Absolute, Automated 0.13 0.00 - 0.70 x10*3/uL    Lymphocytes Absolute 2.04 1.20 - 4.80 x10*3/uL     "Monocytes Absolute 0.99 0.10 - 1.00 x10*3/uL    Eosinophils Absolute 0.33 0.00 - 0.70 x10*3/uL    Basophils Absolute 0.10 0.00 - 0.10 x10*3/uL   Comprehensive metabolic panel   Result Value Ref Range    Glucose 175 (H) 74 - 99 mg/dL    Sodium 135 (L) 136 - 145 mmol/L    Potassium 4.4 3.5 - 5.3 mmol/L    Chloride 106 98 - 107 mmol/L    Bicarbonate 20 (L) 21 - 32 mmol/L    Anion Gap 13 10 - 20 mmol/L    Urea Nitrogen 26 (H) 6 - 23 mg/dL    Creatinine 1.14 0.50 - 1.30 mg/dL    eGFR 70 >60 mL/min/1.73m*2    Calcium 7.4 (L) 8.6 - 10.6 mg/dL    Albumin 3.4 3.4 - 5.0 g/dL    Alkaline Phosphatase 97 33 - 136 U/L    Total Protein 5.9 (L) 6.4 - 8.2 g/dL    AST 21 9 - 39 U/L    Bilirubin, Total 0.5 0.0 - 1.2 mg/dL    ALT 28 10 - 52 U/L   Bilirubin, Direct   Result Value Ref Range    Bilirubin, Direct 0.1 0.0 - 0.3 mg/dL   Glucose, Fasting   Result Value Ref Range    Glucose, Fasting 175 (H) 74 - 99 mg/dL   Lipid panel   Result Value Ref Range    Cholesterol 195 0 - 199 mg/dL    HDL-Cholesterol 28.6 mg/dL    Cholesterol/HDL Ratio 6.8     LDL Calculated 132 (H) <=99 mg/dL    VLDL 34 0 - 40 mg/dL    Triglycerides 170 (H) 0 - 149 mg/dL    Non HDL Cholesterol 166 (H) 0 - 149 mg/dL   Magnesium   Result Value Ref Range    Magnesium 1.52 (L) 1.60 - 2.40 mg/dL   Phosphorus   Result Value Ref Range    Phosphorus 3.0 2.5 - 4.9 mg/dL      No results found for: \"PSA\"     Performance Status:  ECOG Score: 0- Fully active, able to carry on all pre-disease performance w/o restriction.  Karnofsky Score: 100 - Fully active, able to carry on all pre-disease performed without restriction      Assessment/Plan   Toro Lujan is a 69 y.o. male with mUC to LN who presents in follow up and consideration of cycle 3 day 1 SEGE 1822 on DV. He looks and feels well. Labs remarkable for hypocalcemia, for which we will order a dose of calcium in clinic, and instructed him to take Tums daily until next labs. Hypomagnesemia will also be replaced, and advised " increase in magnesium-rich foods until next lab draw as well.    # mUC  - Continue SEGE 1822 with DV  - Continue to monitor labs    # Hypocalcemia  - Tums daily x 2 weeks    # Hypomagnesemia  - Increase magnesium rich foods  - Magnesium today in clinic    # Triglycerides  - Continue with cardiology  - Monitor    # Health Maintenance  - Continue with PCP and other healthcare providers  - Advised exercise, heart-healthy diet    RTC per protocol    Total time spent on this encounter was 40 minutes, which included preparation, direct time with patient, documentation, and care coordination on the day of visit.    Daniella Pineda, MSN, APRN, AGNP-C, AOCNP  Associate Nurse Practitioner  [facility] [place], [facility]

## 2024-04-24 NOTE — RESEARCH NOTES
[facility]<[id number]><C2,3,5+D1><INTENSIVEPK>  DCRU NURSING VISIT NOTE  Study Name: [id number]  IRB#: [id number]  DCRU#: # [id number]  Protocol Version Dated: V9 A8 06.06.23   PI: Fan Rapp MD.    Time point: Cycle 2, 3, 5 AND BEYOND - Day 1 INTENSIVE PK  CYCLE 6    Encounter Date: 04/24/2024  Encounter Time:  8:00 AM EDT  Encounter Department: [facility] HEMATOLOGY AND ONCOLOGY     #1     Phone Pager   Emma Mackenzie [phone] [place]    #2 Phone Pager   Martita Helms [phone] [place]   Other Phone Pager          Study Regimen and Dosing   Intensive PK patients with HER2-overexpressing immunohistochemistry (IHC) 1+, 2+, 3+ locally advanced unresectable or metastatic urothelial carcinoma who have received prior platinum-based chemotherapy.  Cycle = 14 days  Disitamab Vedotin administered IV Day 1 every 2 weeks     Admission and Prior to Starting Study Activities   1. Notify  when patient arrives to unit.  2. Complete DCRU/[facility] intake form in [facility].  4. Study Questionnaires   Coordinator will obtain - Cycle 2, 3, then every other cycle.  5. Obtain weight in kilograms - with shoes off & heavy items removed.  6. Obtain vital signs. Record in EMR.  7. Insert one peripheral IV line for sample collection procedures (flush line with normal saline following each blood draw). Access mediport (if available) otherwise insert second peripheral line in opposite arm for Investigative drug administration (if peripheral line, flush line with normal saline before & after infusion)  8. Do Not draw research samples from the same line the investigational drug infused through.  9. Physical Exam < 1 day  10. Confirm patient fasting for clinical safety labs (every 3rd month, if ordered).     Criteria to Treat   DCRU RN reviewed and meets eligibility to proceed with treatment plan   Time team notified: 1337 pm  DCRU RN notifies study team to review eligibility and approval before dosing  procedures  Time team approves: 1337 pm     Dietary Guidelines   REGULAR     Subjective   Toro Lujan is a 69 y.o. male and is here for a Research clinical visit.    Visit Provider: [facility], [facility] ROOM 1 [facility]     Allergies: No Known Allergies    Objective     Vital Signs:    Patient Vitals for the past 24 hrs:   BP Temp Temp src Pulse Resp SpO2 Weight   04/24/24 1422 109/76 36.3 °C (97.3 °F) Temporal 80 16 96 % --   04/24/24 0758 126/88 36.2 °C (97.2 °F) Temporal 82 20 99 % 118 kg (260 lb 5.8 oz)        Physical Exam     ASSESSMENT and PLAN:  Problem List Items Addressed This Visit    None       Medications as of the completion of today's visit:    Orders placed during today's visit:  No orders of the defined types were placed in this encounter.       [id number] - A Study of Disitamab Vedotin in Subjects With HER2 Expressing Urothelial Carcinoma    Patient has no adverse events documented in the Research Adverse Events activity.       Study Specific Instructions and Documentation   VITALS  LABS  VITALS   ECG (Cycles 2, 3, 5 and Every 3rd cycle thereafter)  CORRELATIVES (Cycles 2, 3, 5 and Every 3rd cycle thereafter)  PREMEDS  STUDY DRUG (DV)  CORRELATIVES (Cycles 2, 3, 5 and Every 3rd cycle thereafter)  DISCHARGE     PRE-DOSE Safety Labs  < 1 Day prior to Day 1     Refer to [place] Treatment Plan for orders     Weight-Based Dosing   Baseline (screening) weight (kg) _______       Date measured _____/_____/20___ Actual weight (kg) _________   (Weight on Day 1)   Date measured _____/_____/20___   Dosing should be based on the patient's actual weight    Adjust dose if patient's weight changes +/- 10% from baseline during the study.   Patients > 100 kg, total dose will be calculated using 100 kg (Maximum = 200 mg) once every 2 week cycle.      Safety Parameters and Special Instructions   Disitamab Vedotin (JK30-HUN) is an antibody drug conjugate composed of a recombinant humanized IgG1 anti-HER2 mAb linked to the  cytotoxic agent MMAE (monomethyl auristatin E).  Potential Side Effects/Adverse Events: infusion-related reaction, neutropenia, thrombocytopenia, anemia, peripheral neuropathy, elevated liver enzymes, fever, n/v, constipation, diarrhea, pruritus, arthralgia, alopecia, hyperglycemia, elevated triglycerides, hypokalemia, proteinuria, hypoalbuminemia.     PRE-DOSE Vital Signs   Temp, Heart Rate, Respiration, Blood Pressure      PRE-DOSE ECG: Before Research Labs   Triplicate - Each approximately 2 minutes apart  MISTI 150c Study-specific ECG Machine   Rest at least 10 minutes prior.  Collect within 30 minutes prior to pre-dose research labs.   Contact MD/Provider &  if abnormal from baseline or if QTcF >500 msec  QTcF calculation Fridericia's formula: S:\DCRU_Staff\Nursing\Protocols\QTcF calculator    ECG #1: QTcF 376    ECG #2: QTcF 377    ECG #3: QTcF 393  Time Point Cycle Completion Time      Pre-dose   2, 3, 5 and  every 3rd cycle thereafter 1235     1237     1240        PRE-DOSE Research Correlatives: After ECGs  Deliver to DCRU/[facility] lab for processing   Access Type: NA Location: NA   Refer to [place] Treatment Plan for orders   Time point Cycle Specimen Test Volume Tube Handling Draw Time   Pre-dose  (within 1hour) 2,3,5 & every 3rd cycle after PK 2 x 6 mL Red Top Ambient, invert 5x NA     ADA 4 mL Red Top Ambient, invert 5x NA        PRE-DOSE Medications (if previous infusion reaction)   Refer to [place] Treatment Plan for orders   Premedication 30 minutes prior to Disitamab Vedotin      Safety, Side and Adverse Effects with Special Instructions  Research Study Drugs   SEE  Safety Parameters and Special Instructions     Infusion-Related Reactions   [facility] Hypersensivity Orders      Weight-Based Dosing   Baseline (screening) weight (kg) 125 KG  Date measured 2/7/24 Actual weight (kg) 118.1 KG (Weight on Day 1)   Date measured 4/24/24   Dosing should be based on the patient's actual weight    Adjust  dose if patient's weight changes +/- 10% from baseline during the study.   Patients > 100 kg, total dose will be calculated using 100 kg (Maximum = 200 mg) once every 2 week cycle.      Day 1 Disitamab Vedotin Investigational Product Administration (sponsor provided)   Refer to [place] Treatment Plan for orders   Document medication administration in MAR activity. Document Research specific instructions below.   Disitamab Vedotin   If Infusion-Related Reaction, notify MD/Provider &     Grade 1: Mild reaction (transient flushing, rash or fever 38° C): may continue at investigator's discretion; administer symptomatic treatment following DCRU management of hypersensitivity reaction; monitor vital signs.    Grade 2: Moderate reaction (rash, flushing, urticaria, dyspnea, fever 38° C, chest discomfort, or back pain): interrupt infusion & follow DCRU management of hypersensitivity reaction; monitor vital signs.   If promptly responds to symptomatic treatment & is medically stable in the opinion of the investigator, infusion may be resumed at a slower rate.   Grade 3: Prolonged recurrence of symptoms or Grade 4: Life-threatening: stop infusion & follow DCRU management of hypersensitivity reaction. Remain at bedside and monitor until recovery from symptoms. Do not restart infusion.      POST-DOSE ECG: Before Research Labs   Triplicate - Each approximately 2 minutes apart  MISTI 150c Study-specific ECG Machine   Rest at least 10 minutes prior.  Collect within 30 minutes prior to pre-dose research labs.   Contact MD/Provider &  if abnormal from baseline or if QTcF >500 msec  QTcF calculation Fridericia's formula: S:\DCRU_Staff\Nursing\Protocols\QTcF calculator    ECG #1: QTcF 402    ECG #2: QTcF 389    ECG #3: QTcF 391  Time Point Cycle Completion Time      POST-dose   2, 3, 5 and  every 3rd cycle thereafter 1528     1530     1532        Day 1 Post-Dose Disitamab Vedotin Research  Correlatives  Deliver to DCRU/[facility] for Processing   Access Type: NA Location: NA   Refer to [place] Treatment Plan for orders   Time point Cycle Specimen Test Volume Tube Handling  Draw Time   End of Infusion  (+/-15mins ) 2,3,5 & every 3rd cycle after   PK  2 x 6 mL  Red Top  Ambient, invert 5x   NA        Discharge Instructions   Discharge patient to home after study requirements completed or PK sample obtained.    Remind patient to return: per   Discharge time: 1540     Hayley Simons RN  04/24/24

## 2024-05-03 DIAGNOSIS — Z00.6 RESEARCH SUBJECT: Primary | ICD-10-CM

## 2024-05-08 ENCOUNTER — HOSPITAL ENCOUNTER (OUTPATIENT)
Dept: CARDIOLOGY | Facility: HOSPITAL | Age: 69
Discharge: HOME | End: 2024-05-08
Payer: COMMERCIAL

## 2024-05-08 ENCOUNTER — APPOINTMENT (OUTPATIENT)
Dept: RADIOLOGY | Facility: HOSPITAL | Age: 69
End: 2024-05-08
Payer: COMMERCIAL

## 2024-05-08 DIAGNOSIS — C67.8 MALIGNANT NEOPLASM OF OVERLAPPING SITES OF BLADDER (MULTI): Primary | ICD-10-CM

## 2024-05-08 DIAGNOSIS — Z00.6 RESEARCH SUBJECT: Primary | ICD-10-CM

## 2024-05-08 DIAGNOSIS — C61 PROSTATE CANCER (MULTI): ICD-10-CM

## 2024-05-08 DIAGNOSIS — Z01.89 ENCOUNTER FOR OTHER SPECIFIED SPECIAL EXAMINATIONS: ICD-10-CM

## 2024-05-08 LAB
AORTIC VALVE PEAK VELOCITY: 1.1 M/S
AV PEAK GRADIENT: 4.8 MMHG
AVA (PEAK VEL): 3.03 CM2
EJECTION FRACTION APICAL 4 CHAMBER: 74
LEFT ATRIUM VOLUME AREA LENGTH INDEX BSA: 17.5 ML/M2
LEFT VENTRICLE INTERNAL DIMENSION DIASTOLE: 4.6 CM (ref 3.5–6)
LEFT VENTRICULAR OUTFLOW TRACT DIAMETER: 2 CM
LV EJECTION FRACTION BIPLANE: 72 %
MITRAL VALVE E/A RATIO: 1.07
MITRAL VALVE E/E' RATIO: 10.83
RIGHT VENTRICLE FREE WALL PEAK S': 10.9 CM/S
TRICUSPID ANNULAR PLANE SYSTOLIC EXCURSION: 2.5 CM

## 2024-05-08 PROCEDURE — 93306 TTE W/DOPPLER COMPLETE: CPT

## 2024-05-08 PROCEDURE — 93306 TTE W/DOPPLER COMPLETE: CPT | Performed by: INTERNAL MEDICINE

## 2024-05-08 PROCEDURE — 2500000004 HC RX 250 GENERAL PHARMACY W/ HCPCS (ALT 636 FOR OP/ED): Performed by: STUDENT IN AN ORGANIZED HEALTH CARE EDUCATION/TRAINING PROGRAM

## 2024-05-08 RX ADMIN — PERFLUTREN 1 ML OF DILUTION: 6.52 INJECTION, SUSPENSION INTRAVENOUS at 11:50

## 2024-05-09 ENCOUNTER — HOSPITAL ENCOUNTER (OUTPATIENT)
Dept: RADIOLOGY | Facility: HOSPITAL | Age: 69
Discharge: HOME | End: 2024-05-09
Payer: MEDICARE

## 2024-05-09 ENCOUNTER — OFFICE VISIT (OUTPATIENT)
Dept: HEMATOLOGY/ONCOLOGY | Facility: HOSPITAL | Age: 69
End: 2024-05-09
Payer: MEDICARE

## 2024-05-09 ENCOUNTER — HOSPITAL ENCOUNTER (OUTPATIENT)
Dept: RESEARCH | Facility: HOSPITAL | Age: 69
Discharge: HOME | End: 2024-05-09
Payer: MEDICARE

## 2024-05-09 VITALS
HEART RATE: 77 BPM | WEIGHT: 260.58 LBS | OXYGEN SATURATION: 96 % | RESPIRATION RATE: 18 BRPM | DIASTOLIC BLOOD PRESSURE: 69 MMHG | SYSTOLIC BLOOD PRESSURE: 121 MMHG | TEMPERATURE: 98.1 F | BODY MASS INDEX: 37.52 KG/M2

## 2024-05-09 DIAGNOSIS — C67.8 MALIGNANT NEOPLASM OF OVERLAPPING SITES OF BLADDER (MULTI): ICD-10-CM

## 2024-05-09 DIAGNOSIS — Z00.6 RESEARCH SUBJECT: ICD-10-CM

## 2024-05-09 DIAGNOSIS — C67.8 MALIGNANT NEOPLASM OF OVERLAPPING SITES OF BLADDER (MULTI): Primary | ICD-10-CM

## 2024-05-09 LAB
ALBUMIN SERPL BCP-MCNC: 3.7 G/DL (ref 3.4–5)
ALP SERPL-CCNC: 107 U/L (ref 33–136)
ALT SERPL W P-5'-P-CCNC: 21 U/L (ref 10–52)
ANION GAP SERPL CALC-SCNC: 13 MMOL/L (ref 10–20)
AST SERPL W P-5'-P-CCNC: 13 U/L (ref 9–39)
BASOPHILS # BLD AUTO: 0.06 X10*3/UL (ref 0–0.1)
BASOPHILS NFR BLD AUTO: 0.6 %
BILIRUB DIRECT SERPL-MCNC: 0.1 MG/DL (ref 0–0.3)
BILIRUB SERPL-MCNC: 0.6 MG/DL (ref 0–1.2)
BUN SERPL-MCNC: 20 MG/DL (ref 6–23)
CALCIUM SERPL-MCNC: 8.6 MG/DL (ref 8.6–10.6)
CHLORIDE SERPL-SCNC: 101 MMOL/L (ref 98–107)
CHOLEST SERPL-MCNC: 124 MG/DL (ref 0–199)
CHOLESTEROL/HDL RATIO: 3.6
CO2 SERPL-SCNC: 23 MMOL/L (ref 21–32)
CREAT SERPL-MCNC: 1.07 MG/DL (ref 0.5–1.3)
EGFRCR SERPLBLD CKD-EPI 2021: 75 ML/MIN/1.73M*2
EOSINOPHIL # BLD AUTO: 0.32 X10*3/UL (ref 0–0.7)
EOSINOPHIL NFR BLD AUTO: 3.1 %
ERYTHROCYTE [DISTWIDTH] IN BLOOD BY AUTOMATED COUNT: 15.2 % (ref 11.5–14.5)
GLUCOSE P FAST SERPL-MCNC: 140 MG/DL (ref 74–99)
GLUCOSE SERPL-MCNC: 140 MG/DL (ref 74–99)
HCT VFR BLD AUTO: 40.7 % (ref 41–52)
HDLC SERPL-MCNC: 34.1 MG/DL
HGB BLD-MCNC: 14.2 G/DL (ref 13.5–17.5)
IMM GRANULOCYTES # BLD AUTO: 0.08 X10*3/UL (ref 0–0.7)
IMM GRANULOCYTES NFR BLD AUTO: 0.8 % (ref 0–0.9)
LDLC SERPL CALC-MCNC: 68 MG/DL
LYMPHOCYTES # BLD AUTO: 2.01 X10*3/UL (ref 1.2–4.8)
LYMPHOCYTES NFR BLD AUTO: 19.6 %
MAGNESIUM SERPL-MCNC: 1.78 MG/DL (ref 1.6–2.4)
MCH RBC QN AUTO: 30.7 PG (ref 26–34)
MCHC RBC AUTO-ENTMCNC: 34.9 G/DL (ref 32–36)
MCV RBC AUTO: 88 FL (ref 80–100)
MONOCYTES # BLD AUTO: 0.76 X10*3/UL (ref 0.1–1)
MONOCYTES NFR BLD AUTO: 7.4 %
NEUTROPHILS # BLD AUTO: 7.04 X10*3/UL (ref 1.2–7.7)
NEUTROPHILS NFR BLD AUTO: 68.5 %
NON HDL CHOLESTEROL: 90 MG/DL (ref 0–149)
NRBC BLD-RTO: 0 /100 WBCS (ref 0–0)
PHOSPHATE SERPL-MCNC: 3.6 MG/DL (ref 2.5–4.9)
PLATELET # BLD AUTO: 204 X10*3/UL (ref 150–450)
POTASSIUM SERPL-SCNC: 4.4 MMOL/L (ref 3.5–5.3)
PROT SERPL-MCNC: 6.2 G/DL (ref 6.4–8.2)
RBC # BLD AUTO: 4.63 X10*6/UL (ref 4.5–5.9)
SODIUM SERPL-SCNC: 133 MMOL/L (ref 136–145)
TRIGL SERPL-MCNC: 112 MG/DL (ref 0–149)
VLDL: 22 MG/DL (ref 0–40)
WBC # BLD AUTO: 10.3 X10*3/UL (ref 4.4–11.3)

## 2024-05-09 PROCEDURE — 2500000005 HC RX 250 GENERAL PHARMACY W/O HCPCS: Performed by: INTERNAL MEDICINE

## 2024-05-09 PROCEDURE — 82248 BILIRUBIN DIRECT: CPT | Performed by: INTERNAL MEDICINE

## 2024-05-09 PROCEDURE — 85025 COMPLETE CBC W/AUTO DIFF WBC: CPT | Performed by: INTERNAL MEDICINE

## 2024-05-09 PROCEDURE — 96413 CHEMO IV INFUSION 1 HR: CPT

## 2024-05-09 PROCEDURE — 84100 ASSAY OF PHOSPHORUS: CPT | Performed by: INTERNAL MEDICINE

## 2024-05-09 PROCEDURE — 80053 COMPREHEN METABOLIC PANEL: CPT | Performed by: INTERNAL MEDICINE

## 2024-05-09 PROCEDURE — 99213 OFFICE O/P EST LOW 20 MIN: CPT | Performed by: INTERNAL MEDICINE

## 2024-05-09 PROCEDURE — 71260 CT THORAX DX C+: CPT | Performed by: RADIOLOGY

## 2024-05-09 PROCEDURE — 1159F MED LIST DOCD IN RCRD: CPT | Performed by: INTERNAL MEDICINE

## 2024-05-09 PROCEDURE — 74177 CT ABD & PELVIS W/CONTRAST: CPT | Performed by: RADIOLOGY

## 2024-05-09 PROCEDURE — 83735 ASSAY OF MAGNESIUM: CPT | Performed by: INTERNAL MEDICINE

## 2024-05-09 PROCEDURE — 2550000001 HC RX 255 CONTRASTS: Performed by: INTERNAL MEDICINE

## 2024-05-09 PROCEDURE — 82947 ASSAY GLUCOSE BLOOD QUANT: CPT | Performed by: INTERNAL MEDICINE

## 2024-05-09 PROCEDURE — 74177 CT ABD & PELVIS W/CONTRAST: CPT

## 2024-05-09 PROCEDURE — 80061 LIPID PANEL: CPT | Performed by: INTERNAL MEDICINE

## 2024-05-09 PROCEDURE — C9399 UNCLASSIFIED DRUGS OR BIOLOG: HCPCS | Performed by: INTERNAL MEDICINE

## 2024-05-09 RX ORDER — FAMOTIDINE 10 MG/ML
20 INJECTION INTRAVENOUS ONCE AS NEEDED
Status: DISCONTINUED | OUTPATIENT
Start: 2024-05-09 | End: 2024-05-10 | Stop reason: HOSPADM

## 2024-05-09 RX ORDER — ALBUTEROL SULFATE 0.83 MG/ML
3 SOLUTION RESPIRATORY (INHALATION) AS NEEDED
Status: CANCELLED | OUTPATIENT
Start: 2024-05-09

## 2024-05-09 RX ORDER — DIPHENHYDRAMINE HYDROCHLORIDE 50 MG/ML
50 INJECTION INTRAMUSCULAR; INTRAVENOUS AS NEEDED
Status: DISCONTINUED | OUTPATIENT
Start: 2024-05-09 | End: 2024-05-10 | Stop reason: HOSPADM

## 2024-05-09 RX ORDER — ALBUTEROL SULFATE 0.83 MG/ML
3 SOLUTION RESPIRATORY (INHALATION) AS NEEDED
Status: DISCONTINUED | OUTPATIENT
Start: 2024-05-09 | End: 2024-05-10 | Stop reason: HOSPADM

## 2024-05-09 RX ORDER — DIPHENHYDRAMINE HYDROCHLORIDE 50 MG/ML
50 INJECTION INTRAMUSCULAR; INTRAVENOUS AS NEEDED
Status: CANCELLED | OUTPATIENT
Start: 2024-05-09

## 2024-05-09 RX ORDER — PROCHLORPERAZINE MALEATE 10 MG
10 TABLET ORAL EVERY 6 HOURS PRN
Status: CANCELLED | OUTPATIENT
Start: 2024-05-09

## 2024-05-09 RX ORDER — PROCHLORPERAZINE EDISYLATE 5 MG/ML
10 INJECTION INTRAMUSCULAR; INTRAVENOUS EVERY 6 HOURS PRN
Status: CANCELLED | OUTPATIENT
Start: 2024-05-09

## 2024-05-09 RX ORDER — EPINEPHRINE 0.3 MG/.3ML
0.3 INJECTION SUBCUTANEOUS EVERY 5 MIN PRN
Status: CANCELLED | OUTPATIENT
Start: 2024-05-09

## 2024-05-09 RX ORDER — EPINEPHRINE 1 MG/ML
0.3 INJECTION INTRAMUSCULAR; INTRAVENOUS; SUBCUTANEOUS EVERY 5 MIN PRN
Status: DISCONTINUED | OUTPATIENT
Start: 2024-05-09 | End: 2024-05-10 | Stop reason: HOSPADM

## 2024-05-09 RX ORDER — FAMOTIDINE 10 MG/ML
20 INJECTION INTRAVENOUS ONCE AS NEEDED
Status: CANCELLED | OUTPATIENT
Start: 2024-05-09

## 2024-05-09 RX ADMIN — Medication 150 MG: at 15:14

## 2024-05-09 RX ADMIN — IOHEXOL 75 ML: 350 INJECTION, SOLUTION INTRAVENOUS at 09:46

## 2024-05-09 NOTE — PROGRESS NOTES
" Medical Oncology Out Patient Clinic Note    Patient's Name: Toro Lujan  MRN: [id number]  Date of Service: 5/9/24  In- Person Visit: YES      Reason for Visit: FU    HPI: 68 yo male known to me with Met TCC s/p Chemo and maintenance IO  PD after coming off IO  Now on Disitamab Vedotin study  Doing well with MI  AEs listed in CRC notes- these were discussed with pt and reviewed with CRC; listed in their notes    Updated PMHx  None    Review of Systems  13 point review negative    Physical Exam:  There were no vitals taken for this visit.  ECOG Performance Status:   HEENT: Normal  ENT: Normal  CV: Normal  Pulmonary: Clear, no wheezing and no rales  GI: Stoma clean   : NA  Extremities: No Edema   Neurologic: Normal  Skin: Normal skin turgor  Psych: Appropriate mood      LABS:  No results found for: \"PSA\"  Lab Results   Component Value Date    WBC 10.3 05/09/2024    HGB 14.2 05/09/2024    HCT 40.7 (L) 05/09/2024    MCV 88 05/09/2024     05/09/2024     Lab Results   Component Value Date    GLUCOSE 140 (H) 05/09/2024    CALCIUM 8.6 05/09/2024     (L) 05/09/2024    K 4.4 05/09/2024    CO2 23 05/09/2024     05/09/2024    BUN 20 05/09/2024    CREATININE 1.07 05/09/2024     No results found for: \"TESTOSTERONE\"  Lab Results   Component Value Date    ALT 21 05/09/2024    AST 13 05/09/2024    ALKPHOS 107 05/09/2024    BILITOT 0.6 05/09/2024       IMAGING:  Narrative & Impression   Interpreted By:  Tiago Tang and Ritchie Brandon   STUDY:  CT CHEST ABDOMEN PELVIS W IV CONTRAST;  5/9/2024 9:45 am      INDICATION:  Signs/Symptoms:staging per clinical trial.      COMPARISON:  CT chest abdomen and pelvis with IV contrast 03/26/2024.      HISTORY:  Patient is a 69-year-old male past medical history of metastatic  transitional cell carcinoma status post cystoprostatectomy and  extended lymph node dissection with recurrent metastatic disease.      Most recent restaging scan from 03/26/2024 showed no " evidence of new  metastatic disease. Interval decreased size of periaortic. Otherwise  stable disease.      Current therapy includes SEGE 1822 - Disitamab Vedotin      ACCESSION NUMBER(S):  [id number]      ORDERING CLINICIAN:  YRN MANUEL      TECHNIQUE:  CT of the chest, abdomen, and pelvis was performed.  Contiguous axial  images were obtained at 3 mm slice thickness through the chest,  abdomen and pelvis. Coronal and sagittal reconstructions at 3 mm  slice thickness were performed.  75 ml of contrast Omnipaque 350 were administered intravenously  without immediate complication.      FINDINGS:  CHEST:      LUNG/PLEURA/LARGE AIRWAYS:  Stable 3 mm left upper lobe pulmonary nodule (series 3, image 116).  No new dorota masses, pulmonary nodules or consolidations are present.  There is no pleural effusion or pneumothorax.      VESSELS:  Aorta and pulmonary arteries are normal caliber.  Mild  atherosclerotic changes are noted of the aorta and branching vessels.  Mild coronary artery calcifications are present with postsurgical  changes of LAD stenting material..      HEART:  The heart is normal in size.  No pericardial effusion      MEDIASTINUM AND EBONY:  Stable right perihilar lymph node measuring up to 0.8 in short axis  (series 2, image 50). No new mediastinal hilar, or axillary  lymphadenopathy is present. The esophagus is normal.      CHEST WALL AND LOWER NECK:  The soft tissues of the chest wall demonstrate no gross abnormality.      ABDOMEN:      LIVER:  The liver is normal in size without evidence of new focal liver  lesions. There is a stable 1.2 cm hypodense cystic lesion in the  right hepatic lobe compared to prior examination (series 2, image 89).      BILE DUCTS:  The intrahepatic and extrahepatic ducts are not dilated.      GALLBLADDER:  The gallbladder is nondistended and without evidence of radiopaque  stones.      PANCREAS:  The pancreas appears unremarkable without evidence of ductal  dilatation or  masses.      SPLEEN:  The spleen is normal in size without focal lesions.      ADRENAL GLANDS:  Bilateral adrenal glands appear normal.      KIDNEYS AND URETERS:  The kidneys are normal in size and enhance symmetrically.  No  hydroureteronephrosis or nephroureterolithiasis is identified.  Low-density cystic lesion involving the inferior pole left kidney  overall stable in size compared to prior examination and favored to  represent benign renal cyst.      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 Otherwise, unremarkable in  appearance.      REPRODUCTIVE ORGANS:  No pelvic masses.      BOWEL:  The stomach is unremarkable.  Postsurgical changes of right lower  quadrant ileal conduit formation with unremarkable appearance of the  anastomosis site. Unchanged loops of bowel within a ventral hernia  without evidence of obstruction. The large bowel is normal in caliber  and demonstrate no wall thickening.  The appendix is not definitely  visualized and likely surgically absent.          VESSELS:  There is no aneurysmal dilatation of the abdominal aorta. The IVC  appears normal.      PERITONEUM/RETROPERITONEUM/LYMPH NODES:  There is interval decreased size of left para-aortic lymph node  previously measuring 1.8 x 1.4 cm in currently measuring 1.2 x 0.9 cm  (series 2, image 129).      BONE AND SOFT TISSUE:  No suspicious osseous lesions are identified. Degenerative discogenic  disease is noted in the lower thoracic and lumbar spine.  Within  normal limits.      IMPRESSION:  Transitional cell carcinoma restaging scan-comparison CT chest  abdomen and pelvis with IV contrast 03/26/2024.      1. Improved but persistent disease with decreased size of the  previously described left para-aortic lymph node as detailed above.  No evidence of new disease.      I personally reviewed the images/study and I agree with the findings  as stated by resident  Syd Sanchez. This study was interpreted  at [facility], [place], [place].      MACRO:  None      Signed by: Tiago Tang 5/9/2024 3:57 PM  Dictation workstation:   [id number]     GENOMICS:  NA    A/P: Met TCC  Doing great on trial with TX and no issues with AEs  Remain on study      Discussed importance of a healthier diet - offered to see Nutritional Services; Also discussed the importance of daily regular exercise (aerobic and resistance differences noted)  Offered to see Supportive Services if needed as well as integrative Oncology and Psychosocial Support    Topher Castanon MD, FACP  Chief, Solid Tumor Oncology Division   Medical Oncology  Professor of Medicine and Urology  /[facility]  [facility]

## 2024-05-09 NOTE — RESEARCH NOTES
[facility]<[id number]><C2,3,5+D1><INTENSIVEPK>  DCRU NURSING VISIT NOTE  Study Name: [id number]  IRB#: [id number]  DCRU#: # [id number]  Protocol Version Dated: V9 A8 06.06.23   PI: Fan Rapp MD.    Time point: Cycle 2, 3, 5 AND BEYOND - Day 1 INTENSIVE PK  CYCLE 7    Encounter Date: 05/09/2024  Encounter Time: 11:00 AM EDT  Encounter Department: [facility] HEMATOLOGY AND ONCOLOGY     #1     Phone Pager   Emma Mackenzie [phone] [place]    #2 Phone Pager   Martita Helms [phone] [place]   Other Phone Pager          Study Regimen and Dosing   Intensive PK patients with HER2-overexpressing immunohistochemistry (IHC) 1+, 2+, 3+ locally advanced unresectable or metastatic urothelial carcinoma who have received prior platinum-based chemotherapy.  Cycle = 14 days  Disitamab Vedotin administered IV Day 1 every 2 weeks     Admission and Prior to Starting Study Activities   1. Notify  when patient arrives to unit.  2. Complete DCRU/[facility] intake form in [facility].  4. Study Questionnaires   Coordinator will obtain - Cycle 2, 3, then every other cycle.  5. Obtain weight in kilograms - with shoes off & heavy items removed.  6. Obtain vital signs. Record in EMR.  7. Insert one peripheral IV line for sample collection procedures (flush line with normal saline following each blood draw). Access mediport (if available) otherwise insert second peripheral line in opposite arm for Investigative drug administration (if peripheral line, flush line with normal saline before & after infusion)  8. Do Not draw research samples from the same line the investigational drug infused through.  9. Physical Exam < 1 day  10. Confirm patient fasting for clinical safety labs (every 3rd month, if ordered).     Criteria to Treat   DCRU RN reviewed and meets eligibility to proceed with treatment plan   Time team notified: 1235 pm  DCRU RN notifies study team to review eligibility and approval before dosing  procedures  Time team approves: 1235 pm     Dietary Guidelines   REGULAR     Subjective   Toro Lujan is a 69 y.o. male and is here for a Research clinical visit.    Visit Provider: [facility]8-1, [facility] ROOM 1 [facility]     Allergies: No Known Allergies    Objective     Vital Signs:    Vitals:    05/09/24 1100   BP: 124/75   Pulse: 65   Resp: 18   Temp: 36.8 °C (98.2 °F)   TempSrc: Oral   SpO2: 98%   Weight: 118 kg (260 lb 9.3 oz)       Physical Exam     ASSESSMENT and PLAN:  Problem List Items Addressed This Visit          Hematology and Neoplasia    Malignant neoplasm of overlapping sites of bladder (Multi)    Relevant Orders    CBC and Auto Differential    Comprehensive metabolic panel    Bilirubin, Direct    Glucose, Fasting    Lipid panel    Magnesium    Phosphorus        Medications as of the completion of today's visit:  Current Outpatient Medications   Medication Sig Dispense Refill    acetaminophen (Tylenol) 500 mg tablet Take 2 tablets (1,000 mg) by mouth every 8 hours if needed.      Advair Diskus 100-50 mcg/dose diskus inhaler INHALE 1 PUFF BY MOUTH TWICE DAILY AS DIRECTED. RINSE AND GARGLE MOUTH WITH WATER AFTER EACH USE      ALPRAZolam (Xanax) 0.5 mg tablet Take one tablet one hour before anxiety-provoking situation. Limits one tab/day, three tabs/week.      aspirin 81 mg EC tablet Take 1 tablet (81 mg) by mouth once daily.      lisinopril 20 mg tablet Take 1 tablet (20 mg) by mouth once daily.      metoprolol tartrate (Lopressor) 25 mg tablet Take 1 tablet (25 mg) by mouth 2 times a day.      pantoprazole (ProtoNix) 40 mg EC tablet Take 1 tablet (40 mg) by mouth once daily.      rosuvastatin (Crestor) 40 mg tablet Take 1 tablet (40 mg) by mouth once daily.      sertraline (Zoloft) 50 mg tablet Take 1 tablet (50 mg) by mouth once daily.       No current facility-administered medications for this encounter.           Orders placed during today's visit:  Orders Placed This Encounter   Procedures    CBC and Auto  Differential     Standing Status:   Standing     Number of Occurrences:   1     Order Specific Question:   Release result to MyChart     Answer:   Immediate [1]    Comprehensive metabolic panel     Standing Status:   Standing     Number of Occurrences:   1     Order Specific Question:   Release result to MyChart     Answer:   Immediate [1]    Bilirubin, Direct     Standing Status:   Standing     Number of Occurrences:   1     Order Specific Question:   Release result to MyChart     Answer:   Immediate [1]    Glucose, Fasting     Standing Status:   Standing     Number of Occurrences:   1     Order Specific Question:   Release result to MyChart     Answer:   Immediate [1]    Lipid panel     Standing Status:   Standing     Number of Occurrences:   1     Order Specific Question:   Release result to MyChart     Answer:   Immediate [1]    Magnesium     Standing Status:   Standing     Number of Occurrences:   1     Order Specific Question:   Release result to MyChart     Answer:   Immediate [1]    Phosphorus     Standing Status:   Standing     Number of Occurrences:   1     Order Specific Question:   Release result to MyChart     Answer:   Immediate [1]        [id number] - A Study of Disitamab Vedotin in Subjects With HER2 Expressing Urothelial Carcinoma    Patient has no adverse events documented in the Research Adverse Events activity.       Study Specific Instructions and Documentation   VITALS  LABS  VITALS   PREMEDS  STUDY DRUG (DV)  DISCHARGE     PRE-DOSE Safety Labs  < 1 Day prior to Day 1     Refer to [place] Treatment Plan for orders     Weight-Based Dosing   Baseline (screening) weight (kg) _______       Date measured _____/_____/20___ Actual weight (kg) _________   (Weight on Day 1)   Date measured _____/_____/20___   Dosing should be based on the patient's actual weight    Adjust dose if patient's weight changes +/- 10% from baseline during the study.   Patients > 100 kg, total dose will be calculated using 100 kg  (Maximum = 200 mg) once every 2 week cycle.      Safety Parameters and Special Instructions   Disitamab Vedotin (UU53-TVW) is an antibody drug conjugate composed of a recombinant humanized IgG1 anti-HER2 mAb linked to the cytotoxic agent MMAE (monomethyl auristatin E).  Potential Side Effects/Adverse Events: infusion-related reaction, neutropenia, thrombocytopenia, anemia, peripheral neuropathy, elevated liver enzymes, fever, n/v, constipation, diarrhea, pruritus, arthralgia, alopecia, hyperglycemia, elevated triglycerides, hypokalemia, proteinuria, hypoalbuminemia.     PRE-DOSE Vital Signs   Temp, Heart Rate, Respiration, Blood Pressure          PRE-DOSE Medications (if previous infusion reaction)   Refer to [place] Treatment Plan for orders   Premedication 30 minutes prior to Disitamab Vedotin      Safety, Side and Adverse Effects with Special Instructions  Research Study Drugs   SEE  Safety Parameters and Special Instructions     Infusion-Related Reactions   [facility] Hypersensivity Orders      Weight-Based Dosing   Baseline (screening) weight (kg) _______       Date measured _____/_____/20___ Actual weight (kg) _________   (Weight on Day 1)   Date measured _____/_____/20___   Dosing should be based on the patient's actual weight    Adjust dose if patient's weight changes +/- 10% from baseline during the study.   Patients > 100 kg, total dose will be calculated using 100 kg (Maximum = 200 mg) once every 2 week cycle.      Day 1 Disitamab Vedotin Investigational Product Administration (sponsor provided)   Refer to [place] Treatment Plan for orders   Document medication administration in MAR activity. Document Research specific instructions below.   Disitamab Vedotin   If Infusion-Related Reaction, notify MD/Provider &     Grade 1: Mild reaction (transient flushing, rash or fever 38° C): may continue at investigator's discretion; administer symptomatic treatment following DCRU management of  hypersensitivity reaction; monitor vital signs.    Grade 2: Moderate reaction (rash, flushing, urticaria, dyspnea, fever 38° C, chest discomfort, or back pain): interrupt infusion & follow DCRU management of hypersensitivity reaction; monitor vital signs.   If promptly responds to symptomatic treatment & is medically stable in the opinion of the investigator, infusion may be resumed at a slower rate.   Grade 3: Prolonged recurrence of symptoms or Grade 4: Life-threatening: stop infusion & follow DCRU management of hypersensitivity reaction. Remain at bedside and monitor until recovery from symptoms. Do not restart infusion.          Discharge Instructions   Discharge patient to home after study requirements completed or PK sample obtained.    Remind patient to return: per   Discharge time: 1630   Esequiel Aguilar RN  05/09/24

## 2024-05-15 ENCOUNTER — APPOINTMENT (OUTPATIENT)
Dept: CARDIOLOGY | Facility: CLINIC | Age: 69
End: 2024-05-15
Payer: COMMERCIAL

## 2024-05-22 DIAGNOSIS — C67.8 MALIGNANT NEOPLASM OF OVERLAPPING SITES OF BLADDER (MULTI): ICD-10-CM

## 2024-05-23 ENCOUNTER — OFFICE VISIT (OUTPATIENT)
Dept: HEMATOLOGY/ONCOLOGY | Facility: HOSPITAL | Age: 69
End: 2024-05-23
Payer: COMMERCIAL

## 2024-05-23 ENCOUNTER — HOSPITAL ENCOUNTER (OUTPATIENT)
Dept: RESEARCH | Facility: HOSPITAL | Age: 69
Discharge: HOME | End: 2024-05-23
Payer: COMMERCIAL

## 2024-05-23 VITALS
DIASTOLIC BLOOD PRESSURE: 70 MMHG | WEIGHT: 255.3 LBS | TEMPERATURE: 96.4 F | HEART RATE: 98 BPM | OXYGEN SATURATION: 98 % | RESPIRATION RATE: 17 BRPM | SYSTOLIC BLOOD PRESSURE: 128 MMHG | BODY MASS INDEX: 36.76 KG/M2

## 2024-05-23 VITALS
SYSTOLIC BLOOD PRESSURE: 125 MMHG | RESPIRATION RATE: 18 BRPM | TEMPERATURE: 97.7 F | DIASTOLIC BLOOD PRESSURE: 83 MMHG | OXYGEN SATURATION: 95 % | HEART RATE: 75 BPM

## 2024-05-23 DIAGNOSIS — C67.8 MALIGNANT NEOPLASM OF OVERLAPPING SITES OF BLADDER (MULTI): ICD-10-CM

## 2024-05-23 DIAGNOSIS — T45.1X5A CHEMOTHERAPY INDUCED NAUSEA AND VOMITING: ICD-10-CM

## 2024-05-23 DIAGNOSIS — R11.2 CHEMOTHERAPY INDUCED NAUSEA AND VOMITING: ICD-10-CM

## 2024-05-23 DIAGNOSIS — C67.8 MALIGNANT NEOPLASM OF OVERLAPPING SITES OF BLADDER (MULTI): Primary | ICD-10-CM

## 2024-05-23 LAB
ALBUMIN SERPL BCP-MCNC: 4 G/DL (ref 3.4–5)
ALP SERPL-CCNC: 108 U/L (ref 33–136)
ALT SERPL W P-5'-P-CCNC: 19 U/L (ref 10–52)
ANION GAP SERPL CALC-SCNC: 15 MMOL/L (ref 10–20)
AST SERPL W P-5'-P-CCNC: 17 U/L (ref 9–39)
BASOPHILS # BLD AUTO: 0.05 X10*3/UL (ref 0–0.1)
BASOPHILS NFR BLD AUTO: 0.5 %
BILIRUB DIRECT SERPL-MCNC: 0.1 MG/DL (ref 0–0.3)
BILIRUB SERPL-MCNC: 0.6 MG/DL (ref 0–1.2)
BUN SERPL-MCNC: 21 MG/DL (ref 6–23)
CALCIUM SERPL-MCNC: 8.7 MG/DL (ref 8.6–10.6)
CHLORIDE SERPL-SCNC: 102 MMOL/L (ref 98–107)
CO2 SERPL-SCNC: 24 MMOL/L (ref 21–32)
CREAT SERPL-MCNC: 1.15 MG/DL (ref 0.5–1.3)
EGFRCR SERPLBLD CKD-EPI 2021: 69 ML/MIN/1.73M*2
EOSINOPHIL # BLD AUTO: 0.4 X10*3/UL (ref 0–0.7)
EOSINOPHIL NFR BLD AUTO: 3.9 %
ERYTHROCYTE [DISTWIDTH] IN BLOOD BY AUTOMATED COUNT: 15.5 % (ref 11.5–14.5)
GLUCOSE SERPL-MCNC: 135 MG/DL (ref 74–99)
HCT VFR BLD AUTO: 42.7 % (ref 41–52)
HGB BLD-MCNC: 14.2 G/DL (ref 13.5–17.5)
HOLD SPECIMEN: NORMAL
IMM GRANULOCYTES # BLD AUTO: 0.07 X10*3/UL (ref 0–0.7)
IMM GRANULOCYTES NFR BLD AUTO: 0.7 % (ref 0–0.9)
LYMPHOCYTES # BLD AUTO: 1.83 X10*3/UL (ref 1.2–4.8)
LYMPHOCYTES NFR BLD AUTO: 17.9 %
MAGNESIUM SERPL-MCNC: 1.78 MG/DL (ref 1.6–2.4)
MCH RBC QN AUTO: 30.3 PG (ref 26–34)
MCHC RBC AUTO-ENTMCNC: 33.3 G/DL (ref 32–36)
MCV RBC AUTO: 91 FL (ref 80–100)
MONOCYTES # BLD AUTO: 0.9 X10*3/UL (ref 0.1–1)
MONOCYTES NFR BLD AUTO: 8.8 %
NEUTROPHILS # BLD AUTO: 6.98 X10*3/UL (ref 1.2–7.7)
NEUTROPHILS NFR BLD AUTO: 68.2 %
NRBC BLD-RTO: 0 /100 WBCS (ref 0–0)
PHOSPHATE SERPL-MCNC: 3.7 MG/DL (ref 2.5–4.9)
PLATELET # BLD AUTO: 213 X10*3/UL (ref 150–450)
POTASSIUM SERPL-SCNC: 4.3 MMOL/L (ref 3.5–5.3)
PROT SERPL-MCNC: 6.7 G/DL (ref 6.4–8.2)
RBC # BLD AUTO: 4.69 X10*6/UL (ref 4.5–5.9)
SODIUM SERPL-SCNC: 137 MMOL/L (ref 136–145)
WBC # BLD AUTO: 10.2 X10*3/UL (ref 4.4–11.3)

## 2024-05-23 PROCEDURE — 99213 OFFICE O/P EST LOW 20 MIN: CPT | Mod: 25 | Performed by: INTERNAL MEDICINE

## 2024-05-23 PROCEDURE — 82310 ASSAY OF CALCIUM: CPT | Performed by: INTERNAL MEDICINE

## 2024-05-23 PROCEDURE — 84100 ASSAY OF PHOSPHORUS: CPT | Performed by: INTERNAL MEDICINE

## 2024-05-23 PROCEDURE — 96413 CHEMO IV INFUSION 1 HR: CPT

## 2024-05-23 PROCEDURE — C9399 UNCLASSIFIED DRUGS OR BIOLOG: HCPCS | Performed by: INTERNAL MEDICINE

## 2024-05-23 PROCEDURE — 85025 COMPLETE CBC W/AUTO DIFF WBC: CPT | Performed by: INTERNAL MEDICINE

## 2024-05-23 PROCEDURE — 82248 BILIRUBIN DIRECT: CPT | Performed by: INTERNAL MEDICINE

## 2024-05-23 PROCEDURE — 2500000005 HC RX 250 GENERAL PHARMACY W/O HCPCS: Performed by: INTERNAL MEDICINE

## 2024-05-23 PROCEDURE — 83735 ASSAY OF MAGNESIUM: CPT | Performed by: INTERNAL MEDICINE

## 2024-05-23 RX ORDER — DIPHENHYDRAMINE HYDROCHLORIDE 50 MG/ML
50 INJECTION INTRAMUSCULAR; INTRAVENOUS AS NEEDED
Status: CANCELLED | OUTPATIENT
Start: 2024-05-23

## 2024-05-23 RX ORDER — DIPHENHYDRAMINE HYDROCHLORIDE 50 MG/ML
50 INJECTION INTRAMUSCULAR; INTRAVENOUS AS NEEDED
Status: DISCONTINUED | OUTPATIENT
Start: 2024-05-23 | End: 2024-05-24 | Stop reason: HOSPADM

## 2024-05-23 RX ORDER — PROCHLORPERAZINE EDISYLATE 5 MG/ML
10 INJECTION INTRAMUSCULAR; INTRAVENOUS EVERY 6 HOURS PRN
Status: DISCONTINUED | OUTPATIENT
Start: 2024-05-23 | End: 2024-05-24 | Stop reason: HOSPADM

## 2024-05-23 RX ORDER — ALBUTEROL SULFATE 0.83 MG/ML
3 SOLUTION RESPIRATORY (INHALATION) AS NEEDED
Status: CANCELLED | OUTPATIENT
Start: 2024-05-23

## 2024-05-23 RX ORDER — PROCHLORPERAZINE MALEATE 10 MG
10 TABLET ORAL EVERY 6 HOURS PRN
Status: CANCELLED | OUTPATIENT
Start: 2024-05-23

## 2024-05-23 RX ORDER — ALBUTEROL SULFATE 0.83 MG/ML
3 SOLUTION RESPIRATORY (INHALATION) AS NEEDED
Status: DISCONTINUED | OUTPATIENT
Start: 2024-05-23 | End: 2024-05-24 | Stop reason: HOSPADM

## 2024-05-23 RX ORDER — FAMOTIDINE 10 MG/ML
20 INJECTION INTRAVENOUS ONCE AS NEEDED
Status: DISCONTINUED | OUTPATIENT
Start: 2024-05-23 | End: 2024-05-24 | Stop reason: HOSPADM

## 2024-05-23 RX ORDER — FAMOTIDINE 10 MG/ML
20 INJECTION INTRAVENOUS ONCE AS NEEDED
Status: CANCELLED | OUTPATIENT
Start: 2024-05-23

## 2024-05-23 RX ORDER — EPINEPHRINE 0.3 MG/.3ML
0.3 INJECTION SUBCUTANEOUS EVERY 5 MIN PRN
Status: CANCELLED | OUTPATIENT
Start: 2024-05-23

## 2024-05-23 RX ORDER — PROCHLORPERAZINE EDISYLATE 5 MG/ML
10 INJECTION INTRAMUSCULAR; INTRAVENOUS EVERY 6 HOURS PRN
Status: CANCELLED | OUTPATIENT
Start: 2024-05-23

## 2024-05-23 RX ORDER — ONDANSETRON HYDROCHLORIDE 8 MG/1
8 TABLET, FILM COATED ORAL EVERY 12 HOURS PRN
Qty: 9 TABLET | Refills: 1 | Status: SHIPPED | OUTPATIENT
Start: 2024-05-23 | End: 2024-06-13

## 2024-05-23 RX ORDER — PROCHLORPERAZINE MALEATE 10 MG
10 TABLET ORAL EVERY 6 HOURS PRN
Status: DISCONTINUED | OUTPATIENT
Start: 2024-05-23 | End: 2024-05-24 | Stop reason: HOSPADM

## 2024-05-23 RX ORDER — EPINEPHRINE 0.3 MG/.3ML
0.3 INJECTION SUBCUTANEOUS EVERY 5 MIN PRN
Status: DISCONTINUED | OUTPATIENT
Start: 2024-05-23 | End: 2024-05-24 | Stop reason: HOSPADM

## 2024-05-23 RX ADMIN — Medication 150 MG: at 12:50

## 2024-05-23 ASSESSMENT — PAIN SCALES - GENERAL: PAINLEVEL: 0-NO PAIN

## 2024-05-23 NOTE — PROGRESS NOTES
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- Bx showed met disease  Started Chemo followed by Avelumab with WA  After dc'ing IO he developed PD  Now on ESGE 1822: Disitamab Vedotin, cohorts A and B in HER2-overexpressing IHC 1+, 2+, 3+ for locally advanced unresectable or metastatic UC who have received prior platinum-based chemotherapy   C8D1  WA by scans  No major AEs  G1 nausea sec therapy      Updated PMHx  None    Review of Systems  13 point review negative    Physical Exam:  /70 (BP Location: Right arm, Patient Position: Sitting, BP Cuff Size: Large adult)   Pulse 98   Temp 35.8 °C (96.4 °F) (Temporal)   Resp 17   Wt 116 kg (255 lb 4.8 oz)   SpO2 98%   BMI 36.76 kg/m²   ECOG Performance Status: 0  HEENT: Normal  ENT: Normal  CV: RRR,   Pulmonary: CTA. Clear BL  GI: normal; stoma pink with clear urine  : NA  Extremities: No Edema   Neurologic: Normal  Skin: Normal skin turgor  Psych: Appropriate mood      LABS:  No results found for: \"PSA\"  Lab Results   Component Value Date    WBC 10.3 05/09/2024    HGB 14.2 05/09/2024    HCT 40.7 (L) 05/09/2024    MCV 88 05/09/2024     05/09/2024     Lab Results   Component Value Date    GLUCOSE 140 (H) 05/09/2024    CALCIUM 8.6 05/09/2024     (L) 05/09/2024    K 4.4 05/09/2024    CO2 23 05/09/2024     05/09/2024    BUN 20 05/09/2024    CREATININE 1.07 05/09/2024     No results found for: \"TESTOSTERONE\"  Lab Results   Component Value Date    ALT 21 05/09/2024    AST 13 05/09/2024    ALKPHOS 107 05/09/2024    BILITOT 0.6 05/09/2024       IMAGING:  NA  GENOMICS:  NA    A/P: Met TCC    Doing well on trial  No new AEs  Some nausea- will send rx for ondansetron  Continue on " study      Discussed importance of a healthier diet - offered to see Nutritional Services; Also discussed the importance of daily regular exercise (aerobic and resistance differences noted)  Offered to see Supportive Services if needed as well as integrative Oncology and Psychosocial Support    Topher Castanon MD, FACP  Chief, Solid Tumor Oncology Division   Medical Oncology  Professor of Medicine and Urology  /[facility]

## 2024-05-23 NOTE — RESEARCH NOTES
[facility]<[id number]><C2,3,5+D1><INTENSIVEPK>  DCRU NURSING VISIT NOTE  Study Name: [id number]  IRB#: [id number]  DCRU#: # [id number]  Protocol Version Dated: V9 A8 06.06.23   PI: Fan Rapp MD.    Time point: Cycle 2, 3, 5 AND BEYOND - Day 1 INTENSIVE PK  CYCLE 8    Encounter Date: 05/23/2024  Encounter Time:  8:00 AM EDT  Encounter Department: [facility] HEMATOLOGY AND ONCOLOGY     #1     Phone Pager   Emma Mackenzie [phone] [place]    #2 Phone Pager   Martita eHlms [phone] [place]   Other Phone Pager          Study Regimen and Dosing   Intensive PK patients with HER2-overexpressing immunohistochemistry (IHC) 1+, 2+, 3+ locally advanced unresectable or metastatic urothelial carcinoma who have received prior platinum-based chemotherapy.  Cycle = 14 days  Disitamab Vedotin administered IV Day 1 every 2 weeks     Admission and Prior to Starting Study Activities   1. Notify  when patient arrives to unit.  2. Complete DCRU/[facility] intake form in [facility].  4. Study Questionnaires   Coordinator will obtain - Cycle 2, 3, then every other cycle.  5. Obtain weight in kilograms - with shoes off & heavy items removed.  6. Obtain vital signs. Record in EMR.  7. Insert one peripheral IV line for sample collection procedures (flush line with normal saline following each blood draw). Access mediport (if available) otherwise insert second peripheral line in opposite arm for Investigative drug administration (if peripheral line, flush line with normal saline before & after infusion)  8. Do Not draw research samples from the same line the investigational drug infused through.  9. Physical Exam < 1 day  10. Confirm patient fasting for clinical safety labs (every 3rd month, if ordered).     Criteria to Treat   DCRU RN reviewed and meets eligibility to proceed with treatment plan   Time team notified: 1126   DCRU RN notifies study team to review eligibility and approval before dosing  procedures  Time team approves: 1137      Dietary Guidelines   REGULAR     Subjective   Toro Lujan is a 69 y.o. male and is here for a Research clinical visit.    Visit Provider: [id number], [facility] ROOM 1 [facility]     Allergies: No Known Allergies    Objective     Vital Signs:    There were no vitals filed for this visit.    Physical Exam     ASSESSMENT and PLAN:  Problem List Items Addressed This Visit       Malignant neoplasm of overlapping sites of bladder (Multi)    Relevant Orders    CBC and Auto Differential    Comprehensive metabolic panel    Bilirubin, Direct    Magnesium    Phosphorus        Medications as of the completion of today's visit:  Current Outpatient Medications   Medication Sig Dispense Refill    acetaminophen (Tylenol) 500 mg tablet Take 2 tablets (1,000 mg) by mouth every 8 hours if needed.      Advair Diskus 100-50 mcg/dose diskus inhaler INHALE 1 PUFF BY MOUTH TWICE DAILY AS DIRECTED. RINSE AND GARGLE MOUTH WITH WATER AFTER EACH USE      ALPRAZolam (Xanax) 0.5 mg tablet Take one tablet one hour before anxiety-provoking situation. Limits one tab/day, three tabs/week.      aspirin 81 mg EC tablet Take 1 tablet (81 mg) by mouth once daily.      lisinopril 20 mg tablet Take 1 tablet (20 mg) by mouth once daily.      metoprolol tartrate (Lopressor) 25 mg tablet Take 1 tablet (25 mg) by mouth 2 times a day.      ondansetron (Zofran) 8 mg tablet Take 1 tablet (8 mg) by mouth every 12 hours if needed for nausea for up to 21 days. 9 tablet 1    pantoprazole (ProtoNix) 40 mg EC tablet Take 1 tablet (40 mg) by mouth once daily.      rosuvastatin (Crestor) 40 mg tablet Take 1 tablet (40 mg) by mouth once daily.      sertraline (Zoloft) 50 mg tablet Take 1 tablet (50 mg) by mouth once daily.       No current facility-administered medications for this encounter.           Orders placed during today's visit:  Orders Placed This Encounter   Procedures    CBC and Auto Differential     Standing Status:   Standing      Number of Occurrences:   1     Order Specific Question:   Release result to MyChart     Answer:   Immediate [1]    Comprehensive metabolic panel     Standing Status:   Standing     Number of Occurrences:   1     Order Specific Question:   Release result to MyChart     Answer:   Immediate [1]    Bilirubin, Direct     Standing Status:   Standing     Number of Occurrences:   1     Order Specific Question:   Release result to MyChart     Answer:   Immediate [1]    Magnesium     Standing Status:   Standing     Number of Occurrences:   1     Order Specific Question:   Release result to MyChart     Answer:   Immediate [1]    Phosphorus     Standing Status:   Standing     Number of Occurrences:   1     Order Specific Question:   Release result to MyChart     Answer:   Immediate [1]        [id number] - A Study of Disitamab Vedotin in Subjects With HER2 Expressing Urothelial Carcinoma    Patient has no adverse events documented in the Research Adverse Events activity.       Study Specific Instructions and Documentation   VITALS  LABS  VITALS   ECG (Cycles 2, 3, 5 and Every 3rd cycle thereafter)  CORRELATIVES (Cycles 2, 3, 5 and Every 3rd cycle thereafter)  PREMEDS  STUDY DRUG (DV)  CORRELATIVES (Cycles 2, 3, 5 and Every 3rd cycle thereafter)  DISCHARGE     PRE-DOSE Safety Labs  < 1 Day prior to Day 1     Refer to [place] Treatment Plan for orders     Weight-Based Dosing   Baseline (screening) weight (kg) 125kg      Date measured 02/04/2024 Actual weight (kg) 115.7kg   (Weight on Day 1)   Date measured 05/23/2024   Dosing should be based on the patient's actual weight    Adjust dose if patient's weight changes +/- 10% from baseline during the study.   Patients > 100 kg, total dose will be calculated using 100 kg (Maximum = 200 mg) once every 2 week cycle.      Safety Parameters and Special Instructions   Disitamab Vedotin (IJ44-SNZ) is an antibody drug conjugate composed of a recombinant humanized IgG1 anti-HER2 mAb linked to  the cytotoxic agent MMAE (monomethyl auristatin E).  Potential Side Effects/Adverse Events: infusion-related reaction, neutropenia, thrombocytopenia, anemia, peripheral neuropathy, elevated liver enzymes, fever, n/v, constipation, diarrhea, pruritus, arthralgia, alopecia, hyperglycemia, elevated triglycerides, hypokalemia, proteinuria, hypoalbuminemia.     PRE-DOSE Vital Signs   Temp, Heart Rate, Respiration, Blood Pressure   Vitals:    05/23/24 1207   BP: 125/83   Pulse: 75   Resp: 18   Temp: 36.5 °C (97.7 °F)   SpO2: 95%        PRE-DOSE ECG: Before Research Labs   Triplicate - Each approximately 2 minutes apart  MISTI 150c Study-specific ECG Machine   Rest at least 10 minutes prior.  Collect within 30 minutes prior to pre-dose research labs.   Contact MD/Provider &  if abnormal from baseline or if QTcF >500 msec  QTcF calculation Fridericia's formula: S:\DCRU_Staff\Nursing\Protocols\QTcF calculator    ECG #1: QTcF 392    ECG #2: QTcF 390    ECG #3: QTcF 394  Time Point Cycle Completion Time      Pre-dose   2, 3, 5 and  every 3rd cycle thereafter 1217     1219     1221        PRE-DOSE Research Correlatives: After ECGs  Deliver to DCRU/TRPC lab for processing   Access Type: Peripheral 22g Location: Left AC   Refer to [place] Treatment Plan for orders   Time point Cycle Specimen Test Volume Tube Handling Draw Time   Pre-dose  (within 1hour) 2,3,5 & every 3rd cycle after PK 2 x 6 mL Red Top Ambient, invert 5x 1232     ADA 4 mL Red Top Ambient, invert 5x 1232        PRE-DOSE Medications (if previous infusion reaction)   Refer to [place] Treatment Plan for orders   Premedication 30 minutes prior to Disitamab Vedotin      Safety, Side and Adverse Effects with Special Instructions  Research Study Drugs   SEE  Safety Parameters and Special Instructions     Infusion-Related Reactions   [facility] Hypersensivity Orders      Weight-Based Dosing   Baseline (screening) weight (kg) 125kg       Date measured 02/07/2024  Actual weight (kg) 115.7kg   (Weight on Day 1)   Date measured 05/23/2024   Dosing should be based on the patient's actual weight    Adjust dose if patient's weight changes +/- 10% from baseline during the study.   Patients > 100 kg, total dose will be calculated using 100 kg (Maximum = 200 mg) once every 2 week cycle.      Day 1 Disitamab Vedotin Investigational Product Administration (sponsor provided)   Refer to [place] Treatment Plan for orders   Document medication administration in MAR activity. Document Research specific instructions below.   Disitamab Vedotin   If Infusion-Related Reaction, notify MD/Provider &     Grade 1: Mild reaction (transient flushing, rash or fever 38° C): may continue at investigator's discretion; administer symptomatic treatment following DCRU management of hypersensitivity reaction; monitor vital signs.    Grade 2: Moderate reaction (rash, flushing, urticaria, dyspnea, fever 38° C, chest discomfort, or back pain): interrupt infusion & follow DCRU management of hypersensitivity reaction; monitor vital signs.   If promptly responds to symptomatic treatment & is medically stable in the opinion of the investigator, infusion may be resumed at a slower rate.   Grade 3: Prolonged recurrence of symptoms or Grade 4: Life-threatening: stop infusion & follow DCRU management of hypersensitivity reaction. Remain at bedside and monitor until recovery from symptoms. Do not restart infusion.      POST-DOSE ECG: Before Research Labs   Triplicate - Each approximately 2 minutes apart  MISTI 150c Study-specific ECG Machine   Rest at least 10 minutes prior.  Collect within 30 minutes prior to pre-dose research labs.   Contact MD/Provider &  if abnormal from baseline or if QTcF >500 msec  QTcF calculation Fridericia's formula: S:\DCRU_Staff\Nursing\Protocols\QTcF calculator    ECG #1: QTcF 417    ECG #2: QTcF 394    ECG #3: QTcF 396  Time Point Cycle Completion Time       POST-dose   2, 3, 5 and  every 3rd cycle thereafter 1351     1353     1355        Day 1 Post-Dose Disitamab Vedotin Research Correlatives  Deliver to DCRU/TRPC for Processing   Access Type: butterfly 23g Location: Left Hand   Refer to [place] Treatment Plan for orders   Time point Cycle Specimen Test Volume Tube Handling  Draw Time   End of Infusion  (+/-15mins ) 2,3,5 & every 3rd cycle after   PK  2 x 6 mL  Red Top  Ambient, invert 5x   1402        Discharge Instructions   Discharge patient to home after study requirements completed or PK sample obtained.    Remind patient to return: per   Discharge time: 1413     Frankie Nation RN  05/23/24

## 2024-05-24 NOTE — ADDENDUM NOTE
Encounter addended by: Frankie Nation RN on: 5/24/2024 3:48 PM   Actions taken: MAR administration accepted

## 2024-06-11 DIAGNOSIS — Z00.6 RESEARCH SUBJECT: ICD-10-CM

## 2024-06-11 DIAGNOSIS — C67.9 MALIGNANT NEOPLASM OF URINARY BLADDER, UNSPECIFIED SITE (MULTI): ICD-10-CM

## 2024-06-11 DIAGNOSIS — Z00.6 EXAMINATION FOR NORMAL COMPARISON OR CONTROL IN CLINICAL RESEARCH: ICD-10-CM

## 2024-06-12 ENCOUNTER — EDUCATION (OUTPATIENT)
Dept: HEMATOLOGY/ONCOLOGY | Facility: HOSPITAL | Age: 69
End: 2024-06-12

## 2024-06-12 ENCOUNTER — OFFICE VISIT (OUTPATIENT)
Dept: HEMATOLOGY/ONCOLOGY | Facility: HOSPITAL | Age: 69
End: 2024-06-12
Payer: COMMERCIAL

## 2024-06-12 ENCOUNTER — HOSPITAL ENCOUNTER (OUTPATIENT)
Dept: RESEARCH | Facility: HOSPITAL | Age: 69
Discharge: HOME | End: 2024-06-12
Payer: COMMERCIAL

## 2024-06-12 VITALS
SYSTOLIC BLOOD PRESSURE: 132 MMHG | BODY MASS INDEX: 36.98 KG/M2 | HEART RATE: 77 BPM | OXYGEN SATURATION: 97 % | TEMPERATURE: 98.4 F | RESPIRATION RATE: 16 BRPM | DIASTOLIC BLOOD PRESSURE: 77 MMHG | WEIGHT: 256.84 LBS

## 2024-06-12 DIAGNOSIS — C67.8 MALIGNANT NEOPLASM OF OVERLAPPING SITES OF BLADDER (MULTI): Primary | ICD-10-CM

## 2024-06-12 DIAGNOSIS — C67.8 MALIGNANT NEOPLASM OF OVERLAPPING SITES OF BLADDER (MULTI): ICD-10-CM

## 2024-06-12 LAB
ALBUMIN SERPL BCP-MCNC: 4.1 G/DL (ref 3.4–5)
ALP SERPL-CCNC: 119 U/L (ref 33–136)
ALT SERPL W P-5'-P-CCNC: 18 U/L (ref 10–52)
ANION GAP SERPL CALC-SCNC: 13 MMOL/L (ref 10–20)
AST SERPL W P-5'-P-CCNC: 17 U/L (ref 9–39)
BASOPHILS # BLD AUTO: 0.05 X10*3/UL (ref 0–0.1)
BASOPHILS NFR BLD AUTO: 0.5 %
BILIRUB DIRECT SERPL-MCNC: 0.1 MG/DL (ref 0–0.3)
BILIRUB SERPL-MCNC: 0.5 MG/DL (ref 0–1.2)
BUN SERPL-MCNC: 23 MG/DL (ref 6–23)
CALCIUM SERPL-MCNC: 8.8 MG/DL (ref 8.6–10.6)
CHLORIDE SERPL-SCNC: 104 MMOL/L (ref 98–107)
CHOLEST SERPL-MCNC: 111 MG/DL (ref 0–199)
CHOLESTEROL/HDL RATIO: 3.1
CO2 SERPL-SCNC: 24 MMOL/L (ref 21–32)
CREAT SERPL-MCNC: 1.12 MG/DL (ref 0.5–1.3)
EGFRCR SERPLBLD CKD-EPI 2021: 71 ML/MIN/1.73M*2
EOSINOPHIL # BLD AUTO: 0.24 X10*3/UL (ref 0–0.7)
EOSINOPHIL NFR BLD AUTO: 2.5 %
ERYTHROCYTE [DISTWIDTH] IN BLOOD BY AUTOMATED COUNT: 15.4 % (ref 11.5–14.5)
GLUCOSE P FAST SERPL-MCNC: 134 MG/DL (ref 74–99)
GLUCOSE SERPL-MCNC: 134 MG/DL (ref 74–99)
HCT VFR BLD AUTO: 41.2 % (ref 41–52)
HDLC SERPL-MCNC: 35.6 MG/DL
HGB BLD-MCNC: 14.2 G/DL (ref 13.5–17.5)
IMM GRANULOCYTES # BLD AUTO: 0.05 X10*3/UL (ref 0–0.7)
IMM GRANULOCYTES NFR BLD AUTO: 0.5 % (ref 0–0.9)
LDLC SERPL CALC-MCNC: 60 MG/DL
LYMPHOCYTES # BLD AUTO: 2.43 X10*3/UL (ref 1.2–4.8)
LYMPHOCYTES NFR BLD AUTO: 25.4 %
MAGNESIUM SERPL-MCNC: 1.89 MG/DL (ref 1.6–2.4)
MCH RBC QN AUTO: 31.3 PG (ref 26–34)
MCHC RBC AUTO-ENTMCNC: 34.5 G/DL (ref 32–36)
MCV RBC AUTO: 91 FL (ref 80–100)
MONOCYTES # BLD AUTO: 0.77 X10*3/UL (ref 0.1–1)
MONOCYTES NFR BLD AUTO: 8.1 %
NEUTROPHILS # BLD AUTO: 6.02 X10*3/UL (ref 1.2–7.7)
NEUTROPHILS NFR BLD AUTO: 63 %
NON HDL CHOLESTEROL: 75 MG/DL (ref 0–149)
NRBC BLD-RTO: 0 /100 WBCS (ref 0–0)
PHOSPHATE SERPL-MCNC: 3.4 MG/DL (ref 2.5–4.9)
PLATELET # BLD AUTO: 228 X10*3/UL (ref 150–450)
POTASSIUM SERPL-SCNC: 4.3 MMOL/L (ref 3.5–5.3)
PROT SERPL-MCNC: 6.5 G/DL (ref 6.4–8.2)
RBC # BLD AUTO: 4.53 X10*6/UL (ref 4.5–5.9)
SODIUM SERPL-SCNC: 137 MMOL/L (ref 136–145)
TRIGL SERPL-MCNC: 75 MG/DL (ref 0–149)
VLDL: 15 MG/DL (ref 0–40)
WBC # BLD AUTO: 9.6 X10*3/UL (ref 4.4–11.3)

## 2024-06-12 PROCEDURE — 96413 CHEMO IV INFUSION 1 HR: CPT

## 2024-06-12 PROCEDURE — 2500000005 HC RX 250 GENERAL PHARMACY W/O HCPCS: Performed by: NURSE PRACTITIONER

## 2024-06-12 PROCEDURE — 85025 COMPLETE CBC W/AUTO DIFF WBC: CPT

## 2024-06-12 PROCEDURE — C9399 UNCLASSIFIED DRUGS OR BIOLOG: HCPCS | Performed by: NURSE PRACTITIONER

## 2024-06-12 PROCEDURE — 82248 BILIRUBIN DIRECT: CPT

## 2024-06-12 PROCEDURE — 84100 ASSAY OF PHOSPHORUS: CPT

## 2024-06-12 PROCEDURE — 99215 OFFICE O/P EST HI 40 MIN: CPT | Mod: 25 | Performed by: NURSE PRACTITIONER

## 2024-06-12 PROCEDURE — 82947 ASSAY GLUCOSE BLOOD QUANT: CPT

## 2024-06-12 PROCEDURE — 80053 COMPREHEN METABOLIC PANEL: CPT

## 2024-06-12 PROCEDURE — 83718 ASSAY OF LIPOPROTEIN: CPT

## 2024-06-12 PROCEDURE — 83735 ASSAY OF MAGNESIUM: CPT

## 2024-06-12 RX ORDER — HEPARIN SODIUM,PORCINE/PF 10 UNIT/ML
50 SYRINGE (ML) INTRAVENOUS AS NEEDED
Status: DISCONTINUED | OUTPATIENT
Start: 2024-06-12 | End: 2024-06-13 | Stop reason: HOSPADM

## 2024-06-12 RX ORDER — EPINEPHRINE 1 MG/ML
0.3 INJECTION INTRAMUSCULAR; INTRAVENOUS; SUBCUTANEOUS EVERY 5 MIN PRN
Status: DISCONTINUED | OUTPATIENT
Start: 2024-06-12 | End: 2024-06-13 | Stop reason: HOSPADM

## 2024-06-12 RX ORDER — DIPHENHYDRAMINE HYDROCHLORIDE 50 MG/ML
50 INJECTION INTRAMUSCULAR; INTRAVENOUS AS NEEDED
Status: DISCONTINUED | OUTPATIENT
Start: 2024-06-12 | End: 2024-06-13 | Stop reason: HOSPADM

## 2024-06-12 RX ORDER — PROCHLORPERAZINE EDISYLATE 5 MG/ML
10 INJECTION INTRAMUSCULAR; INTRAVENOUS EVERY 6 HOURS PRN
Status: DISCONTINUED | OUTPATIENT
Start: 2024-06-12 | End: 2024-06-13 | Stop reason: HOSPADM

## 2024-06-12 RX ORDER — PROCHLORPERAZINE MALEATE 10 MG
10 TABLET ORAL EVERY 6 HOURS PRN
Status: DISCONTINUED | OUTPATIENT
Start: 2024-06-12 | End: 2024-06-13 | Stop reason: HOSPADM

## 2024-06-12 RX ORDER — HEPARIN SODIUM,PORCINE/PF 10 UNIT/ML
50 SYRINGE (ML) INTRAVENOUS AS NEEDED
OUTPATIENT
Start: 2024-06-12

## 2024-06-12 RX ORDER — HEPARIN 100 UNIT/ML
500 SYRINGE INTRAVENOUS AS NEEDED
OUTPATIENT
Start: 2024-06-12

## 2024-06-12 RX ORDER — HEPARIN 100 UNIT/ML
500 SYRINGE INTRAVENOUS AS NEEDED
Status: DISCONTINUED | OUTPATIENT
Start: 2024-06-12 | End: 2024-06-13 | Stop reason: HOSPADM

## 2024-06-12 RX ORDER — ALBUTEROL SULFATE 0.83 MG/ML
3 SOLUTION RESPIRATORY (INHALATION) AS NEEDED
Status: DISCONTINUED | OUTPATIENT
Start: 2024-06-12 | End: 2024-06-13 | Stop reason: HOSPADM

## 2024-06-12 RX ORDER — FAMOTIDINE 10 MG/ML
20 INJECTION INTRAVENOUS ONCE AS NEEDED
Status: DISCONTINUED | OUTPATIENT
Start: 2024-06-12 | End: 2024-06-13 | Stop reason: HOSPADM

## 2024-06-12 NOTE — RESEARCH NOTES
[facility]<[id number]><C2,3,5+D1><INTENSIVEPK>  DCRU NURSING VISIT NOTE  Study Name: [id number]  IRB#: [id number]  DCRU#: # [id number]  Protocol Version Dated: V9 A8 06.06.23   PI: Fan Rapp MD.    Time point: Cycle 2, 3, 5 AND BEYOND - Day 1 INTENSIVE PK  CYCLE 9    Encounter Date: 06/12/2024  Encounter Time: 10:00 AM EDT  Encounter Department: [facility] HEMATOLOGY AND ONCOLOGY     #1     Phone Pager   [facility] [phone] [facility]    #2 Phone Pager   Martita Helms [phone] [place]   Other Phone Pager          Study Regimen and Dosing   Intensive PK patients with HER2-overexpressing immunohistochemistry (IHC) 1+, 2+, 3+ locally advanced unresectable or metastatic urothelial carcinoma who have received prior platinum-based chemotherapy.  Cycle = 14 days  Disitamab Vedotin administered IV Day 1 every 2 weeks     Admission and Prior to Starting Study Activities   1. Notify  when patient arrives to unit.  2. Complete DCRU/[facility] intake form in [facility].  4. Study Questionnaires   Coordinator will obtain - Cycle 2, 3, then every other cycle.  5. Obtain weight in kilograms - with shoes off & heavy items removed.  6. Obtain vital signs. Record in EMR.  7. Insert one peripheral IV line for sample collection procedures (flush line with normal saline following each blood draw). Access mediport (if available) otherwise insert second peripheral line in opposite arm for Investigative drug administration (if peripheral line, flush line with normal saline before & after infusion)  8. Do Not draw research samples from the same line the investigational drug infused through.  9. Physical Exam < 1 day  10. Confirm patient fasting for clinical safety labs (every 3rd month, if ordered).     Criteria to Treat   DCRU RN reviewed and meets eligibility to proceed with treatment plan   Time team notified: 1130 am  DCRU RN notifies study team to review eligibility and approval before dosing  procedures  Time team approves: 1130 am     Dietary Guidelines   REGULAR     Subjective   Toro Lujan is a 69 y.o. male and is here for a Research clinical visit.    Visit Provider: 650-1[facility] ROOM 1 [facility]     Allergies: No Known Allergies    Objective     Vital Signs:    Patient Vitals for the past 24 hrs:   BP Temp Temp src Pulse Resp SpO2 Weight   06/12/24 1430 132/77 36.9 °C (98.4 °F) Oral 77 16 97 % --   06/12/24 1325 99/64 36.7 °C (98.1 °F) Oral 74 18 97 % --   06/12/24 1005 115/83 36.7 °C (98.1 °F) Oral 75 20 100 % 116 kg (256 lb 13.4 oz)        Physical Exam     ASSESSMENT and PLAN:  Problem List Items Addressed This Visit       Malignant neoplasm of overlapping sites of bladder (Multi) - Primary    Relevant Orders    Clinic Appointment Request    Infusion Appointment Request    CBC and Auto Differential    Comprehensive metabolic panel    Bilirubin, Direct    Glucose, Fasting    Lipid panel    Magnesium    Phosphorus        Medications as of the completion of today's visit:        Orders placed during today's visit:  Orders Placed This Encounter   Procedures    CBC and Auto Differential     Standing Status:   Future     Standing Expiration Date:   6/7/2025     Order Specific Question:   Release result to MyChart     Answer:   Immediate [1]    Comprehensive metabolic panel     Standing Status:   Future     Standing Expiration Date:   6/7/2025     Order Specific Question:   Release result to MyChart     Answer:   Immediate [1]    Bilirubin, Direct     Standing Status:   Future     Standing Expiration Date:   6/7/2025     Order Specific Question:   Release result to MyChart     Answer:   Immediate [1]    Glucose, Fasting     Standing Status:   Future     Standing Expiration Date:   6/7/2025     Order Specific Question:   Release result to MyChart     Answer:   Immediate [1]    Lipid panel     Standing Status:   Future     Standing Expiration Date:   6/7/2025     Order Specific Question:   Release result  to MyChart     Answer:   Immediate [1]    Magnesium     Standing Status:   Future     Standing Expiration Date:   6/7/2025     Order Specific Question:   Release result to MyChart     Answer:   Immediate [1]    Phosphorus     Standing Status:   Future     Standing Expiration Date:   6/7/2025     Order Specific Question:   Release result to MyChart     Answer:   Immediate [1]        [id number] - A Study of Disitamab Vedotin in Subjects With HER2 Expressing Urothelial Carcinoma    Patient has no adverse events documented in the Research Adverse Events activity.       Study Specific Instructions and Documentation   VITALS  LABS  VITALS   ECG (Cycles 2, 3, 5 and Every 3rd cycle thereafter)  CORRELATIVES (Cycles 2, 3, 5 and Every 3rd cycle thereafter)  PREMEDS  STUDY DRUG (DV)  CORRELATIVES (Cycles 2, 3, 5 and Every 3rd cycle thereafter)  DISCHARGE     PRE-DOSE Safety Labs  < 1 Day prior to Day 1     Refer to [place] Treatment Plan for orders     Weight-Based Dosing   Baseline (screening) weight (kg) _______       Date measured _____/_____/20___ Actual weight (kg) _________   (Weight on Day 1)   Date measured _____/_____/20___   Dosing should be based on the patient's actual weight    Adjust dose if patient's weight changes +/- 10% from baseline during the study.   Patients > 100 kg, total dose will be calculated using 100 kg (Maximum = 200 mg) once every 2 week cycle.      Safety Parameters and Special Instructions   Disitamab Vedotin (RQ26-SZB) is an antibody drug conjugate composed of a recombinant humanized IgG1 anti-HER2 mAb linked to the cytotoxic agent MMAE (monomethyl auristatin E).  Potential Side Effects/Adverse Events: infusion-related reaction, neutropenia, thrombocytopenia, anemia, peripheral neuropathy, elevated liver enzymes, fever, n/v, constipation, diarrhea, pruritus, arthralgia, alopecia, hyperglycemia, elevated triglycerides, hypokalemia, proteinuria, hypoalbuminemia.     PRE-DOSE Vital Signs   Temp,  Heart Rate, Respiration, Blood Pressure      PRE-DOSE ECG: Before Research Labs   Triplicate - Each approximately 2 minutes apart  MISTI 150c Study-specific ECG Machine   Rest at least 10 minutes prior.  Collect within 30 minutes prior to pre-dose research labs.   Contact MD/Provider &  if abnormal from baseline or if QTcF >500 msec  QTcF calculation Fridericia's formula: S:\DCRU_Staff\Nursing\Protocols\QTcF calculator    ECG #1: QTcF NA    ECG #2: QTcF NA    ECG #3: QTcF NA  Time Point Cycle Completion Time      Pre-dose   2, 3, 5 and  every 3rd cycle thereafter NA     NA     NA        PRE-DOSE Research Correlatives: After ECGs  Deliver to DCRU/TRPC lab for processing   Access Type: NA Location: NA   Refer to "[employer] Treatment Plan for orders   Time point Cycle Specimen Test Volume Tube Handling Draw Time   Pre-dose  (within 1hour) 2,3,5 & every 3rd cycle after PK 2 x 6 mL Red Top Ambient, invert 5x NA     ADA 4 mL Red Top Ambient, invert 5x NA        PRE-DOSE Medications (if previous infusion reaction)   Refer to "[employer] Treatment Plan for orders   Premedication 30 minutes prior to Disitamab Vedotin      Safety, Side and Adverse Effects with Special Instructions  Research Study Drugs   SEE  Safety Parameters and Special Instructions     Infusion-Related Reactions   [facility] Hypersensivity Orders      Weight-Based Dosing   Baseline (screening) weight (kg) _______       Date measured _____/_____/20___ Actual weight (kg) _________   (Weight on Day 1)   Date measured _____/_____/20___   Dosing should be based on the patient's actual weight    Adjust dose if patient's weight changes +/- 10% from baseline during the study.   Patients > 100 kg, total dose will be calculated using 100 kg (Maximum = 200 mg) once every 2 week cycle.      Day 1 Disitamab Vedotin Investigational Product Administration (sponsor provided)   Refer to [place] Treatment Plan for orders   Document medication administration in MAR  activity. Document Research specific instructions below.   Disitamab Vedotin   If Infusion-Related Reaction, notify MD/Provider &     Grade 1: Mild reaction (transient flushing, rash or fever 38° C): may continue at investigator's discretion; administer symptomatic treatment following DCRU management of hypersensitivity reaction; monitor vital signs.    Grade 2: Moderate reaction (rash, flushing, urticaria, dyspnea, fever 38° C, chest discomfort, or back pain): interrupt infusion & follow DCRU management of hypersensitivity reaction; monitor vital signs.   If promptly responds to symptomatic treatment & is medically stable in the opinion of the investigator, infusion may be resumed at a slower rate.   Grade 3: Prolonged recurrence of symptoms or Grade 4: Life-threatening: stop infusion & follow DCRU management of hypersensitivity reaction. Remain at bedside and monitor until recovery from symptoms. Do not restart infusion.      POST-DOSE ECG: Before Research Labs   Triplicate - Each approximately 2 minutes apart  MISTI 150c Study-specific ECG Machine   Rest at least 10 minutes prior.  Collect within 30 minutes prior to pre-dose research labs.   Contact MD/Provider &  if abnormal from baseline or if QTcF >500 msec  QTcF calculation Fridericia's formula: S:\DCRU_Staff\Nursing\Protocols\QTcF calculator    ECG #1: QTcF NA    ECG #2: QTcF NA    ECG #3: QTcF NA  Time Point Cycle Completion Time      POST-dose   2, 3, 5 and  every 3rd cycle thereafter NA     NA     NA        Day 1 Post-Dose Disitamab Vedotin Research Correlatives  [facility]   Access Type: NA Location: NA   Refer to [place] Treatment Plan for orders   Time point Cycle Specimen Test Volume Tube Handling  Draw Time   End of Infusion  (+/-15mins ) 2,3,5 & every 3rd cycle after   PK  2 x 6 mL  Red Top  Ambient, invert 5x   NA        Discharge Instructions   Discharge patient to home after study  requirements completed or PK sample obtained.    Remind patient to return: per   Discharge time: 1436     Hayley Simons RN  06/12/24

## 2024-06-12 NOTE — PROGRESS NOTES
Patient ID: Toro Lujan is a 69 y.o. male.  Attending Physician: Dr. Topher Castanon  Cancer Diagnosis:  Cancer Staging   No matching staging information was found for the patient.       Current Therapy: SEGE 1822: Disitamab Vedotin, cohorts A and B in HER2-overexpressing IHC 1+, 2+, 3+ for locally advanced unresectable or metastatic UC who have received prior platinum-based chemotherapy  Genetics: NA    Subjective      Cancer History:  Oncology History   Malignant neoplasm of overlapping sites of bladder (Multi)   2/13/2024 Initial Diagnosis    Malignant neoplasm of overlapping sites of bladder (CMS/HCC)     2/14/2024 -  Research Study Participant    (RS) [id number] Intensive PK Cohort - Disitamab Vedotin, 14 Day Cycles  Plan Provider: Fan Rapp MD PhD  Treatment goal: Control  Line of treatment: Second Line  Associated studies: A Study of Disitamab Vedotin in Subjects With HER2 Expressing Urothelial Carcinoma       11/5/2020: CTU shows mildly asymmetric generalized urinary bladder wall thickening with mild mucosal hyperenhancement, large prostate  1/29/2021: TURBT. 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 Posterior tumor reveals papillary high-grade at least pTa urothelial carcinoma with no definite invasion, no muscle present  2/24/2021: Bladder tumor path shows high grade focally invasive urothelial carcinoma into the lamina propria, non-muscle invasive.   5/6/2021: Underwent radical cystectomy with ilial conduit due to multiple multiple disease recurrences without MIBC.  12/13/2021: CTU suspicious for new enlarged pelvic LN  2/17/2022: Started chemotherapy with split-dose gemcitabine and cisplatin  3/10/2022: C2 gem/cis  3/31/2022: C3 gem/cis  4/20/2022: Restaging scans revealed response  4/21/2022: C4 gem/cis  5/12/2022: C5 gem/cis  6/24/2022: Started maintenance avelumab. Treatment was placed on hold due to insurance coverage issues after  1 cycle  8/24/2022: CT scans stable  9/2/2022: Restarted avelumab    3/2/2023: Last dose avelumab, off after  12/28/2023: CT reveals increase in left para-aortic LN highly suspicious for metastatic LAD.  1/18/2024: Again increase in para-aortic LN.  1/31/2024: Screening for SEGE 1822: Disitamab vedotin  2/7/2024: Screening for SEGE 1822  2/14/2024: Cycle 1 day 1 disitamab vedotin as part of clinical trial    Interval History:  Mr. Lujan presents today in consideration of cycle 9 day 1 DV. He has been feeling well overall. Has been eating very well, though today not as much of an appetite. Otherwise, he has no new or concerning symptoms. The remainder of his ROS is otherwise negative.  HPI    Objective    BSA: There is no height or weight on file to calculate BSA.  There were no vitals taken for this visit.    Physical Exam  General: alert, well-dressed in NAD. Speech is fluent and coherent, words clear. Good insight. Oriented x4   Skin: warm, dry, and pink without cyanosis or nail clubbing. No rash, petechiae, or ecchymoses  HEENT: Normocephalic atraumatic. Sclera white, conjunctiva pink. EOMs intact. Hearing intact to spoken voice. Oral mucosa pink. No visible lesions  Respiratory: Chest expansion symmetric. Breath sounds vesicular in all lobes without crackles, wheezes, or rhonchi bilaterally.  CV: S1S2 audible and crisp without murmurs, rubs, or extra sounds. RRR. Radial pulses strong and regular. Extremities warm and pink. No edema bilaterally.  Lymph: no palpable cervical, submandibular, or supraclavicular lymphadenopathy.  GI: abdomen is round, soft, and non-tender. Active bowel sounds.   : IC bag present  Psych: engaged, polite, appropriate conversation and eye contact.    Current Medications:    Current Outpatient Medications:     acetaminophen (Tylenol) 500 mg tablet, Take 2 tablets (1,000 mg) by mouth every 8 hours if needed., Disp: , Rfl:     Advair Diskus 100-50 mcg/dose diskus inhaler, INHALE 1 PUFF  BY MOUTH TWICE DAILY AS DIRECTED. RINSE AND GARGLE MOUTH WITH WATER AFTER EACH USE, Disp: , Rfl:     ALPRAZolam (Xanax) 0.5 mg tablet, Take one tablet one hour before anxiety-provoking situation. Limits one tab/day, three tabs/week., Disp: , Rfl:     aspirin 81 mg EC tablet, Take 1 tablet (81 mg) by mouth once daily., Disp: , Rfl:     lisinopril 20 mg tablet, Take 1 tablet (20 mg) by mouth once daily., Disp: , Rfl:     metoprolol tartrate (Lopressor) 25 mg tablet, Take 1 tablet (25 mg) by mouth 2 times a day., Disp: , Rfl:     ondansetron (Zofran) 8 mg tablet, Take 1 tablet (8 mg) by mouth every 12 hours if needed for nausea for up to 21 days., Disp: 9 tablet, Rfl: 1    pantoprazole (ProtoNix) 40 mg EC tablet, Take 1 tablet (40 mg) by mouth once daily., Disp: , Rfl:     rosuvastatin (Crestor) 40 mg tablet, Take 1 tablet (40 mg) by mouth once daily., Disp: , Rfl:     sertraline (Zoloft) 50 mg tablet, Take 1 tablet (50 mg) by mouth once daily., Disp: , Rfl:   No current facility-administered medications for this visit.    Facility-Administered Medications Ordered in Other Visits:     albuterol 2.5 mg /3 mL (0.083 %) nebulizer solution 3 mL, 3 mL, nebulization, PRN, MERLIN Alvarenga-CNP    alteplase (Cathflo Activase) injection 2 mg, 2 mg, intra-catheter, PRN, MERLIN Salgado-CNP    dextrose 5 % in water (D5W) bolus, 500 mL, intravenous, PRN, MERLIN Alvarenga-CNP    diphenhydrAMINE (BENADryl) injection 50 mg, 50 mg, intravenous, PRN, MERLIN Alvarenga-CNP    EPINEPHrine (Adrenalin) injection 0.3 mg, 0.3 mg, intramuscular, q5 min PRN, MERLIN Alvarenga-CNP    famotidine PF (Pepcid) injection 20 mg, 20 mg, intravenous, Once PRN, MERLIN Alvarenga-CNP    heparin flush 10 unit/mL syringe 50 Units, 50 Units, intra-catheter, PRN, BRIAN Salgado    heparin flush 100 unit/mL syringe 500 Units, 500 Units, intra-catheter, PRN, BRIAN Salgado    methylPREDNISolone sod succinate (SOLU-Medrol)  40 mg/mL injection 40 mg, 40 mg, intravenous, PRN, Taurus Blevins, APRN-CNP    sodium chloride 0.9 % bolus 500 mL, 500 mL, intravenous, PRN, Taurus Blevins, APRN-CNP     Most Recent Labs:  Results for orders placed or performed during the hospital encounter of 06/12/24   Phosphorus   Result Value Ref Range    Phosphorus 3.4 2.5 - 4.9 mg/dL   CBC and Auto Differential   Result Value Ref Range    WBC 9.6 4.4 - 11.3 x10*3/uL    nRBC 0.0 0.0 - 0.0 /100 WBCs    RBC 4.53 4.50 - 5.90 x10*6/uL    Hemoglobin 14.2 13.5 - 17.5 g/dL    Hematocrit 41.2 41.0 - 52.0 %    MCV 91 80 - 100 fL    MCH 31.3 26.0 - 34.0 pg    MCHC 34.5 32.0 - 36.0 g/dL    RDW 15.4 (H) 11.5 - 14.5 %    Platelets 228 150 - 450 x10*3/uL    Neutrophils % 63.0 40.0 - 80.0 %    Immature Granulocytes %, Automated 0.5 0.0 - 0.9 %    Lymphocytes % 25.4 13.0 - 44.0 %    Monocytes % 8.1 2.0 - 10.0 %    Eosinophils % 2.5 0.0 - 6.0 %    Basophils % 0.5 0.0 - 2.0 %    Neutrophils Absolute 6.02 1.20 - 7.70 x10*3/uL    Immature Granulocytes Absolute, Automated 0.05 0.00 - 0.70 x10*3/uL    Lymphocytes Absolute 2.43 1.20 - 4.80 x10*3/uL    Monocytes Absolute 0.77 0.10 - 1.00 x10*3/uL    Eosinophils Absolute 0.24 0.00 - 0.70 x10*3/uL    Basophils Absolute 0.05 0.00 - 0.10 x10*3/uL   Comprehensive metabolic panel   Result Value Ref Range    Glucose 134 (H) 74 - 99 mg/dL    Sodium 137 136 - 145 mmol/L    Potassium 4.3 3.5 - 5.3 mmol/L    Chloride 104 98 - 107 mmol/L    Bicarbonate 24 21 - 32 mmol/L    Anion Gap 13 10 - 20 mmol/L    Urea Nitrogen 23 6 - 23 mg/dL    Creatinine 1.12 0.50 - 1.30 mg/dL    eGFR 71 >60 mL/min/1.73m*2    Calcium 8.8 8.6 - 10.6 mg/dL    Albumin 4.1 3.4 - 5.0 g/dL    Alkaline Phosphatase 119 33 - 136 U/L    Total Protein 6.5 6.4 - 8.2 g/dL    AST      Bilirubin, Total 0.5 0.0 - 1.2 mg/dL    ALT 18 10 - 52 U/L   Bilirubin, Direct   Result Value Ref Range    Bilirubin, Direct 0.1 0.0 - 0.3 mg/dL   Glucose, Fasting   Result Value Ref Range    Glucose,  "Fasting 134 (H) 74 - 99 mg/dL   Lipid panel   Result Value Ref Range    Cholesterol 111 0 - 199 mg/dL    HDL-Cholesterol 35.6 mg/dL    Cholesterol/HDL Ratio 3.1     LDL Calculated 60 <=99 mg/dL    VLDL 15 0 - 40 mg/dL    Triglycerides 75 0 - 149 mg/dL    Non HDL Cholesterol 75 0 - 149 mg/dL   Magnesium   Result Value Ref Range    Magnesium 1.89 1.60 - 2.40 mg/dL      No results found for: \"PSA\"     Performance Status:  ECOG Score: 0- Fully active, able to carry on all pre-disease performance w/o restriction.  Karnofsky Score: 100 - Fully active, able to carry on all pre-disease performed without restriction      Assessment/Plan   Toro Lujan is a 69 y.o. male with mUC to LN who presents in follow up and consideration of cycle 3 day 1 SEGE 1822 on DV. He looks and feels well. Labs relatively unremarkable. Will continue on therapy    # mUC  - Continue SEGE 1822 with DV  - Continue to monitor labs    # Hypocalcemia  -  Resolved    # Hypomagnesemia  - Resolved    # Triglycerides  - Continue with cardiology  - Monitor    # Health Maintenance  - Continue with PCP and other healthcare providers  - Advised exercise, heart-healthy diet    RTC per protocol    Total time spent on this encounter was 50 minutes, which included preparation, direct time with patient, documentation, and care coordination on the day of visit.    Daniella Pineda, MSN, APRN, AGNP-C, AOCNP  Associate Nurse Practitioner  [facility]  "

## 2024-06-12 NOTE — SIGNIFICANT EVENT

## 2024-06-12 NOTE — RESEARCH NOTES
Research Note Treatment Day    Toro Lujan is here today for treatment on [id number] . Today is C9D1. Procedures completed per protocol. AE's and con-meds reviewed with patient. Patient is aware of treatment plan.  No changes to conmeds or AES.      [x]   Received treatment as planned     Education Documentation  Treatment Plan and Schedule, taught by Monique Krueger RN at 6/12/2024  1:21 PM.  Learner: Patient  Readiness: Acceptance  Method: Explanation  Response: Verbalizes Understanding    Comprehensive Metabolic Panel (CMP), taught by Monique Krueger RN at 6/12/2024  1:21 PM.  Learner: Patient  Readiness: Acceptance  Method: Explanation  Response: Verbalizes Understanding    Complete Blood Count with Differential (CBC w/ Diff), taught by Monique Krueger RN at 6/12/2024  1:21 PM.  Learner: Patient  Readiness: Acceptance  Method: Explanation  Response: Verbalizes Understanding    Education Comments  No comments found.

## 2024-06-19 ENCOUNTER — HOSPITAL ENCOUNTER (OUTPATIENT)
Dept: RADIOLOGY | Facility: HOSPITAL | Age: 69
Discharge: HOME | End: 2024-06-19
Payer: MEDICARE

## 2024-06-19 DIAGNOSIS — Z00.6 RESEARCH SUBJECT: ICD-10-CM

## 2024-06-19 PROCEDURE — 2550000001 HC RX 255 CONTRASTS: Performed by: INTERNAL MEDICINE

## 2024-06-19 PROCEDURE — 74177 CT ABD & PELVIS W/CONTRAST: CPT

## 2024-06-20 ENCOUNTER — TELEMEDICINE (OUTPATIENT)
Dept: HEMATOLOGY/ONCOLOGY | Facility: HOSPITAL | Age: 69
End: 2024-06-20
Payer: COMMERCIAL

## 2024-06-20 DIAGNOSIS — C67.8 MALIGNANT NEOPLASM OF OVERLAPPING SITES OF BLADDER (MULTI): Primary | ICD-10-CM

## 2024-06-20 RX ORDER — FAMOTIDINE 10 MG/ML
20 INJECTION INTRAVENOUS ONCE AS NEEDED
OUTPATIENT
Start: 2024-06-20

## 2024-06-20 RX ORDER — PROCHLORPERAZINE EDISYLATE 5 MG/ML
10 INJECTION INTRAMUSCULAR; INTRAVENOUS EVERY 6 HOURS PRN
OUTPATIENT
Start: 2024-06-20

## 2024-06-20 RX ORDER — EPINEPHRINE 0.3 MG/.3ML
0.3 INJECTION SUBCUTANEOUS EVERY 5 MIN PRN
OUTPATIENT
Start: 2024-06-20

## 2024-06-20 RX ORDER — ALBUTEROL SULFATE 0.83 MG/ML
3 SOLUTION RESPIRATORY (INHALATION) AS NEEDED
OUTPATIENT
Start: 2024-06-20

## 2024-06-20 RX ORDER — PROCHLORPERAZINE MALEATE 10 MG
10 TABLET ORAL EVERY 6 HOURS PRN
OUTPATIENT
Start: 2024-06-20

## 2024-06-20 RX ORDER — DIPHENHYDRAMINE HYDROCHLORIDE 50 MG/ML
50 INJECTION INTRAMUSCULAR; INTRAVENOUS AS NEEDED
OUTPATIENT
Start: 2024-06-20

## 2024-06-20 NOTE — PROGRESS NOTES
Med Onc Phone    Toro Lujan  [phone]  6/20/2024      70 yo male known to me with Met TCC- LN disease  S/p surgery followed by PD in LN - bx proven  Chemo and Avelumab maintenance followed by break  PD in LN and now on  Her 2 ADC study  Doing great with minimal AEs  Radiograph  RECIST ND  Will continue on study    Topher Castanon MD, FACP  Chief, Solid Tumor Oncology Division   Medical Oncology  Professor of Medicine and Urology  /[facility]

## 2024-06-26 ENCOUNTER — TELEPHONE (OUTPATIENT)
Dept: OPHTHALMOLOGY | Facility: CLINIC | Age: 69
End: 2024-06-26
Payer: COMMERCIAL

## 2024-06-27 ENCOUNTER — EDUCATION (OUTPATIENT)
Dept: HEMATOLOGY/ONCOLOGY | Facility: HOSPITAL | Age: 69
End: 2024-06-27

## 2024-06-27 ENCOUNTER — HOSPITAL ENCOUNTER (OUTPATIENT)
Dept: RESEARCH | Facility: HOSPITAL | Age: 69
Discharge: HOME | End: 2024-06-27
Payer: COMMERCIAL

## 2024-06-27 ENCOUNTER — OFFICE VISIT (OUTPATIENT)
Dept: HEMATOLOGY/ONCOLOGY | Facility: HOSPITAL | Age: 69
End: 2024-06-27
Payer: COMMERCIAL

## 2024-06-27 VITALS
OXYGEN SATURATION: 96 % | RESPIRATION RATE: 16 BRPM | WEIGHT: 252.21 LBS | TEMPERATURE: 98.1 F | HEART RATE: 73 BPM | DIASTOLIC BLOOD PRESSURE: 77 MMHG | BODY MASS INDEX: 36.31 KG/M2 | SYSTOLIC BLOOD PRESSURE: 115 MMHG

## 2024-06-27 DIAGNOSIS — C67.8 MALIGNANT NEOPLASM OF OVERLAPPING SITES OF BLADDER (MULTI): ICD-10-CM

## 2024-06-27 DIAGNOSIS — C77.9 REGIONAL LYMPH NODE METASTASIS PRESENT (MULTI): Primary | ICD-10-CM

## 2024-06-27 LAB
ALBUMIN SERPL BCP-MCNC: 4.1 G/DL (ref 3.4–5)
ALP SERPL-CCNC: 106 U/L (ref 33–136)
ALT SERPL W P-5'-P-CCNC: 18 U/L (ref 10–52)
ANION GAP SERPL CALC-SCNC: 14 MMOL/L (ref 10–20)
AST SERPL W P-5'-P-CCNC: 16 U/L (ref 9–39)
BASOPHILS # BLD AUTO: 0.07 X10*3/UL (ref 0–0.1)
BASOPHILS NFR BLD AUTO: 0.7 %
BILIRUB DIRECT SERPL-MCNC: 0.1 MG/DL (ref 0–0.3)
BILIRUB SERPL-MCNC: 0.6 MG/DL (ref 0–1.2)
BUN SERPL-MCNC: 21 MG/DL (ref 6–23)
CALCIUM SERPL-MCNC: 9.4 MG/DL (ref 8.6–10.6)
CHLORIDE SERPL-SCNC: 99 MMOL/L (ref 98–107)
CO2 SERPL-SCNC: 28 MMOL/L (ref 21–32)
CREAT SERPL-MCNC: 1.12 MG/DL (ref 0.5–1.3)
EGFRCR SERPLBLD CKD-EPI 2021: 71 ML/MIN/1.73M*2
EOSINOPHIL # BLD AUTO: 0.57 X10*3/UL (ref 0–0.7)
EOSINOPHIL NFR BLD AUTO: 5.8 %
ERYTHROCYTE [DISTWIDTH] IN BLOOD BY AUTOMATED COUNT: 15 % (ref 11.5–14.5)
GLUCOSE SERPL-MCNC: 133 MG/DL (ref 74–99)
HCT VFR BLD AUTO: 43.2 % (ref 41–52)
HGB BLD-MCNC: 14.5 G/DL (ref 13.5–17.5)
IMM GRANULOCYTES # BLD AUTO: 0.07 X10*3/UL (ref 0–0.7)
IMM GRANULOCYTES NFR BLD AUTO: 0.7 % (ref 0–0.9)
LYMPHOCYTES # BLD AUTO: 1.98 X10*3/UL (ref 1.2–4.8)
LYMPHOCYTES NFR BLD AUTO: 20.1 %
MAGNESIUM SERPL-MCNC: 1.74 MG/DL (ref 1.6–2.4)
MCH RBC QN AUTO: 30.8 PG (ref 26–34)
MCHC RBC AUTO-ENTMCNC: 33.6 G/DL (ref 32–36)
MCV RBC AUTO: 92 FL (ref 80–100)
MONOCYTES # BLD AUTO: 0.85 X10*3/UL (ref 0.1–1)
MONOCYTES NFR BLD AUTO: 8.6 %
NEUTROPHILS # BLD AUTO: 6.31 X10*3/UL (ref 1.2–7.7)
NEUTROPHILS NFR BLD AUTO: 64.1 %
NRBC BLD-RTO: 0 /100 WBCS (ref 0–0)
PHOSPHATE SERPL-MCNC: 4.3 MG/DL (ref 2.5–4.9)
PLATELET # BLD AUTO: 210 X10*3/UL (ref 150–450)
POTASSIUM SERPL-SCNC: 4.7 MMOL/L (ref 3.5–5.3)
PROT SERPL-MCNC: 6.9 G/DL (ref 6.4–8.2)
RBC # BLD AUTO: 4.71 X10*6/UL (ref 4.5–5.9)
SODIUM SERPL-SCNC: 136 MMOL/L (ref 136–145)
WBC # BLD AUTO: 9.9 X10*3/UL (ref 4.4–11.3)

## 2024-06-27 PROCEDURE — C9399 UNCLASSIFIED DRUGS OR BIOLOG: HCPCS | Performed by: INTERNAL MEDICINE

## 2024-06-27 PROCEDURE — 83735 ASSAY OF MAGNESIUM: CPT | Performed by: INTERNAL MEDICINE

## 2024-06-27 PROCEDURE — 82248 BILIRUBIN DIRECT: CPT | Performed by: INTERNAL MEDICINE

## 2024-06-27 PROCEDURE — 2560000001 HC RX 256 EXPERIMENTAL DRUGS: Performed by: INTERNAL MEDICINE

## 2024-06-27 PROCEDURE — 99213 OFFICE O/P EST LOW 20 MIN: CPT | Performed by: INTERNAL MEDICINE

## 2024-06-27 PROCEDURE — 84100 ASSAY OF PHOSPHORUS: CPT | Performed by: INTERNAL MEDICINE

## 2024-06-27 PROCEDURE — 85025 COMPLETE CBC W/AUTO DIFF WBC: CPT | Performed by: INTERNAL MEDICINE

## 2024-06-27 PROCEDURE — 80053 COMPREHEN METABOLIC PANEL: CPT | Performed by: INTERNAL MEDICINE

## 2024-06-27 PROCEDURE — 96413 CHEMO IV INFUSION 1 HR: CPT

## 2024-06-27 RX ORDER — EPINEPHRINE 0.3 MG/.3ML
0.3 INJECTION SUBCUTANEOUS EVERY 5 MIN PRN
Status: DISCONTINUED | OUTPATIENT
Start: 2024-06-27 | End: 2024-06-28 | Stop reason: HOSPADM

## 2024-06-27 RX ORDER — HEPARIN SODIUM,PORCINE/PF 10 UNIT/ML
50 SYRINGE (ML) INTRAVENOUS AS NEEDED
OUTPATIENT
Start: 2024-06-27

## 2024-06-27 RX ORDER — FAMOTIDINE 10 MG/ML
20 INJECTION INTRAVENOUS ONCE AS NEEDED
Status: DISCONTINUED | OUTPATIENT
Start: 2024-06-27 | End: 2024-06-28 | Stop reason: HOSPADM

## 2024-06-27 RX ORDER — PROCHLORPERAZINE EDISYLATE 5 MG/ML
10 INJECTION INTRAMUSCULAR; INTRAVENOUS EVERY 6 HOURS PRN
Status: DISCONTINUED | OUTPATIENT
Start: 2024-06-27 | End: 2024-06-28 | Stop reason: HOSPADM

## 2024-06-27 RX ORDER — HEPARIN 100 UNIT/ML
500 SYRINGE INTRAVENOUS AS NEEDED
OUTPATIENT
Start: 2024-06-27

## 2024-06-27 RX ORDER — DIPHENHYDRAMINE HYDROCHLORIDE 50 MG/ML
50 INJECTION INTRAMUSCULAR; INTRAVENOUS AS NEEDED
Status: DISCONTINUED | OUTPATIENT
Start: 2024-06-27 | End: 2024-06-28 | Stop reason: HOSPADM

## 2024-06-27 RX ORDER — PROCHLORPERAZINE MALEATE 10 MG
10 TABLET ORAL EVERY 6 HOURS PRN
Status: DISCONTINUED | OUTPATIENT
Start: 2024-06-27 | End: 2024-06-28 | Stop reason: HOSPADM

## 2024-06-27 RX ORDER — ALBUTEROL SULFATE 0.83 MG/ML
3 SOLUTION RESPIRATORY (INHALATION) AS NEEDED
Status: DISCONTINUED | OUTPATIENT
Start: 2024-06-27 | End: 2024-06-28 | Stop reason: HOSPADM

## 2024-06-27 ASSESSMENT — PAIN SCALES - GENERAL: PAINLEVEL: 0-NO PAIN

## 2024-06-27 NOTE — RESEARCH NOTES
Research Note Follow Up Visit       Toro Lujan is here today for follow up and consideration of C10D1 treatment on SEGE 1822. Follow up procedures completed per protocol. Patient is aware of continued follow up plan.    Patient TREATED today.  He reports feeling well.    He just returned from a trip visiting high school friends.        Education Documentation  Healthy Lifestyle, taught by Emma Mann RN at 6/27/2024 12:15 PM.  Learner: Patient  Readiness: Eager  Method: Explanation  Response: Verbalizes Understanding    Bleeding Precautions, taught by Emma Mann RN at 6/27/2024 12:15 PM.  Learner: Patient  Readiness: Eager  Method: Explanation  Response: Verbalizes Understanding    Edema Management, taught by Emma Mann RN at 6/27/2024 12:15 PM.  Learner: Patient  Readiness: Eager  Method: Explanation  Response: Verbalizes Understanding    Shortness of Breath, taught by Emma Mann RN at 6/27/2024 12:15 PM.  Learner: Patient  Readiness: Eager  Method: Explanation  Response: Verbalizes Understanding    Changes in Appetite, taught by Emma Mann RN at 6/27/2024 12:15 PM.  Learner: Patient  Readiness: Eager  Method: Explanation  Response: Verbalizes Understanding    Memory Problems, taught by Emma Mann RN at 6/27/2024 12:15 PM.  Learner: Patient  Readiness: Eager  Method: Explanation  Response: Verbalizes Understanding    Skin and Nail Changes, taught by Emma Mann RN at 6/27/2024 12:15 PM.  Learner: Patient  Readiness: Eager  Method: Explanation  Response: Verbalizes Understanding    Changes in Urination, taught by Emma Mann RN at 6/27/2024 12:15 PM.  Learner: Patient  Readiness: Eager  Method: Explanation  Response: Verbalizes Understanding    Care of Neuropathy, taught by Emma Mann RN at 6/27/2024 12:15 PM.  Learner: Patient  Readiness: Eager  Method: Explanation  Response: Verbalizes Understanding    Infection Control, taught by Emma Mann RN at 6/27/2024 12:15  PM.  Learner: Patient  Readiness: Eager  Method: Explanation  Response: Verbalizes Understanding    Alopecia, taught by Emma Mann RN at 6/27/2024 12:15 PM.  Learner: Patient  Readiness: Eager  Method: Explanation  Response: Verbalizes Understanding    Diarrhea, taught by Emma Mann RN at 6/27/2024 12:15 PM.  Learner: Patient  Readiness: Eager  Method: Explanation  Response: Verbalizes Understanding    Constipation, taught by Emma Mann RN at 6/27/2024 12:15 PM.  Learner: Patient  Readiness: Eager  Method: Explanation  Response: Verbalizes Understanding    Mouth Sores, taught by Emma Mann RN at 6/27/2024 12:15 PM.  Learner: Patient  Readiness: Eager  Method: Explanation  Response: Verbalizes Understanding    Nausea Management, taught by Emma Mann RN at 6/27/2024 12:15 PM.  Learner: Patient  Readiness: Eager  Method: Explanation  Response: Verbalizes Understanding    Fatigue, taught by Emma Mann RN at 6/27/2024 12:15 PM.  Learner: Patient  Readiness: Eager  Method: Explanation  Response: Verbalizes Understanding    Treatment Plan and Schedule, taught by Emma Mann RN at 6/27/2024 12:15 PM.  Learner: Patient  Readiness: Eager  Method: Explanation  Response: Verbalizes Understanding    Diagnostic Studies, taught by Emma Mann RN at 6/27/2024 12:15 PM.  Learner: Patient  Readiness: Eager  Method: Explanation  Response: Verbalizes Understanding    Tumor Markers, taught by Emma Mann RN at 6/27/2024 12:15 PM.  Learner: Patient  Readiness: Eager  Method: Explanation  Response: Verbalizes Understanding    Comprehensive Metabolic Panel (CMP), taught by Emma Mann RN at 6/27/2024 12:15 PM.  Learner: Patient  Readiness: Eager  Method: Explanation  Response: Verbalizes Understanding    Complete Blood Count with Differential (CBC w/ Diff), taught by Emma Mann RN at 6/27/2024 12:15 PM.  Learner: Patient  Readiness: Eager  Method: Explanation  Response: Verbalizes  Understanding    Education Comments  No comments found.

## 2024-06-27 NOTE — PROGRESS NOTES
" Medical Oncology Out Patient Clinic Note    Patient's Name: Toro Lujan  MRN: [id number]  Date of Service: 6/27/2024  In- Person Visit: YES    Reason for Visit: FU    HPI: 70 yo male known to me with Met TCC  Chemo/Avelumab as front-line regimen  Now on SEGE 1822: Disitamab Vedotin, cohorts A and B in HER2-overexpressing IHC 1+, 2+, 3+ for locally advanced unresectable or metastatic UC who have received prior platinum-based chemotherapy   Nera CR on therapy with minimal AEs  As on therapy listed in CRC notes and these were reviewed and discussed with pt and medial team    Updated PMHx  None    Review of Systems  13 point review negative    Physical Exam:  There were no vitals taken for this visit.  ECOG Performance Status: 0  HEENT: Normal  ENT: Normal  CV: RRR no murmurs  Pulmonary: Clear b/l  GI: Soft NT/NA; clean stoma  : NA  Extremities: No Edema   Neurologic: Normal  Skin: Normal skin turgor  Psych: Appropriate mood      LABS:    Lab Results   Component Value Date    WBC 9.6 06/12/2024    HGB 14.2 06/12/2024    HCT 41.2 06/12/2024    MCV 91 06/12/2024     06/12/2024     Lab Results   Component Value Date    GLUCOSE 134 (H) 06/12/2024    CALCIUM 8.8 06/12/2024     06/12/2024    K 4.3 06/12/2024    CO2 24 06/12/2024     06/12/2024    BUN 23 06/12/2024    CREATININE 1.12 06/12/2024     No results found for: \"TESTOSTERONE\"  Lab Results   Component Value Date    ALT 18 06/12/2024    AST 17 06/12/2024    ALKPHOS 119 06/12/2024    BILITOT 0.5 06/12/2024       IMAGING:  Interpreted By:  Thai Orta and Jiang Sirui  STUDY:  CT CHEST ABDOMEN PELVIS W IV CONTRAST;  6/19/2024 11:32 am      INDICATION:  Signs/Symptoms:staging per clinical trial.      Per clinical notes: 69-year-old male with history of transitional  cell carcinoma with metastatic involvement of the lymph nodes  clinical trial.      COMPARISON:  Multiple prior CTs most recently dated 05/09/2024      ACCESSION " NUMBER(S):  [id number]      ORDERING CLINICIAN:  YRN MANUEL      TECHNIQUE:  CT of the chest, abdomen, and pelvis was performed.  Contiguous axial  images were obtained at 3 mm slice thickness through the chest,  abdomen and pelvis. Coronal and sagittal reconstructions at 3 mm  slice thickness were performed.  75 ml of contrast Omnipaque 350 were administered intravenously  without immediate complication.      FINDINGS:  CHEST:      LUNG/PLEURA/LARGE AIRWAYS:  The trachea and central airways are patent. No endobronchial lesion.  No consolidation, pleural effusion, or pneumothorax. There is an  unchanged 0.3 cm pulmonary nodule of the left upper lobe (series 3,  image 102). There are no new suspicious or enlarging pulmonary  nodules.      VESSELS:  Aorta and pulmonary arteries are normal caliber.  Mild  atherosclerotic changes are noted of the aorta and branching vessels.  Mild coronary artery calcifications are present.      HEART:  The heart is normal in size.  No pericardial effusion      MEDIASTINUM AND EBONY:  No mediastinal, hilar or axillary lymphadenopathy is present.  The  esophagus is normal.      CHEST WALL AND LOWER NECK:  The soft tissues of the chest wall demonstrate no gross abnormality.  The visualized thyroid gland appears within normal limits.      ABDOMEN:      LIVER:  The liver is normal in size. There are multiple subcentimeter  hypodense lesions scattered throughout the hepatic parenchyma. The  largest of these lesions measures 1.2 cm of the hepatic dome (series  2, image 85) which is unchanged compared to the prior examination.  There are no new worrisome hepatic lesions.      BILE DUCTS:  The intrahepatic and extrahepatic ducts are not dilated.      GALLBLADDER:  No calcified stones. No wall thickening.      PANCREAS:  The pancreas appears unremarkable without evidence of ductal  dilatation or masses.      SPLEEN:  The spleen is normal in size without focal lesions.      ADRENAL  GLANDS:  There is thickening of the left adrenal gland likely representing  hyperplasia. The right adrenal gland is unremarkable.      KIDNEYS AND URETERS:  The kidneys are normal in size and enhance symmetrically.  No  hydroureteronephrosis or nephroureterolithiasis is identified. Stable  subcentimeter hypodense lesion of the left kidney which is too small  to be characterized favored to represent a simple renal cyst.      PELVIS:      BLADDER:  The urinary bladder is surgically absent. There is no evidence of  recurrence in the postsurgical bed.      REPRODUCTIVE ORGANS:  Prostate is surgically absent.      BOWEL:  The stomach is unremarkable.  The small and large bowel are normal in  caliber and demonstrate no wall thickening.  The appendix appears  normal.          VESSELS:  Mild atherosclerotic calcification of the abdominal aorta without  AAA. The IVC is unremarkable. The portal venous vasculature is patent.      PERITONEUM/RETROPERITONEUM/LYMPH NODES:  No ascites or free air, no fluid collection.  There is mild continued  interval decrease in size of the left para-aortic lymph node which  measures 1.0 x 0.8 cm (series 2, image 124) previously measuring 1.2  x 0.9 cm. There is no new abdominopelvic lymphadenopathy.      BONE AND SOFT TISSUE:  No suspicious osseous lesions are identified. Degenerative discogenic  disease is noted in the lower thoracic and lumbar spine.  Anterolisthesis of L5 on S1 with chronic bilateral L5 pars defects.  Right lower quadrant ileal conduit without evidence of parastomal  complications. There is a ventral abdominal wall hernia through a  diastasis measuring 4.2 cm containing loops of small bowel without  evidence of obstruction or strangulation. There has been interval  increase in density of the subcutaneous fat when compared to multiple  prior examinations.      Impression  Transitional cell carcinoma restaging scan. When compared to the  prior examination dated 05/09/2024,  there has been continued interval  decrease in size of the para-aortic lymphadenopathy and without  evidence of new malignancy. Interval increase in density of the  subcutaneous fat when compared to multiple prior examinations without  evidence of ascites or pleural effusions to be correlate with a  hypoproteinemic state. Additional stable chronic incidental findings  described above.      I personally reviewed the image(s) / study and I agree with the  findings as stated by Savannah Springer MD. This study was interpreted at  [facility], [place], [place].      MACRO:  None      Signed by: Thai Orta 6/19/2024 4:45 PM  Dictation workstation:   [id number]  GENOMICS:  NA    A/P: Met TCC    Doing well on therapy  RECIST defined response  No major AEs  Will continue on therapy per study    Discussed importance of a healthier diet - offered to see Nutritional Services; Also discussed the importance of daily regular exercise (aerobic and resistance differences noted)  Offered to see Supportive Services if needed as well as integrative Oncology and Psychosocial Support    Topher Castanon MD, FACP  Chief, Solid Tumor Oncology Division   Medical Oncology  Professor of Medicine and Urology  /[facility]

## 2024-06-27 NOTE — RESEARCH NOTES
[facility]<[id number]><C2,3,5+D1><INTENSIVEPK>  DCRU NURSING VISIT NOTE  Study Name: [id number]  IRB#: [id number]  DCRU#: # [id number]  Protocol Version Dated: V9 A8 06.06.23   PI: Fan Rapp MD.    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  Cycle = 14 days  Disitamab Vedotin administered IV Day 1 every 2 weeks     Admission and Prior to Starting Study Activities   1. Notify  when patient arrives to unit.  2. Complete [facility]  4. Study Questionnaires   Coordinator will obtain - Cycle 2, 3, then every other cycle.  5. Obtain weight in kilograms - with shoes off & heavy items removed.  6. Obtain vital signs. Record in EMR.  7. Insert one peripheral IV line for sample collection procedures (flush line with normal saline following each blood draw). Access mediport (if available) otherwise insert second peripheral line in opposite arm for Investigative drug administration (if peripheral line, flush line with normal saline before & after infusion)  8. Do Not draw research samples from the same line the investigational drug infused through.  9. Physical Exam < 1 day  10. Confirm patient fasting for clinical safety labs (every 3rd month, if ordered).     Criteria to Treat   DCRU RN reviewed and meets eligibility to proceed with treatment plan   Time team notified: 1227   DCRU RN notifies study team to review eligibility and approval before dosing  procedures  Time team approves: 1229      Dietary Guidelines   REGULAR     Subjective   Toro Lujan is a 69 y.o. male and is here for a Research clinical visit.    Visit Provider: [id number], [facility] ROOM 1 [facility]     Allergies: No Known Allergies    Objective     Vital Signs:    Vitals:    06/27/24 1021 06/27/24 1317   BP: 123/87 115/77   Pulse: 79 73   Resp: 16 16   Temp: 36.5 °C (97.7 °F) 36.7 °C (98.1 °F)   TempSrc: Oral Oral   SpO2: 97% 96%   Weight: 114 kg (252 lb 3.3 oz)    PainSc: 0-No pain        Physical Exam     ASSESSMENT and PLAN:  Problem List Items Addressed This Visit          Hematology and Neoplasia    Malignant neoplasm of overlapping sites of bladder (Multi)    Relevant Medications    prochlorperazine (Compazine) tablet 10 mg    prochlorperazine (Compazine) injection 10 mg    Study [id number] disitamab vedotin 150 mg in sodium chloride 0.9% 250 mL IV    sodium chloride 0.9 % bolus 500 mL    dextrose 5 % in water (D5W) bolus    diphenhydrAMINE (BENADryl) injection 50 mg    methylPREDNISolone sod succinate (SOLU-Medrol) 40 mg/mL injection 40 mg    famotidine PF (Pepcid) injection 20 mg    EPINEPHrine (Epipen) injection syringe 0.3 mg    albuterol 2.5 mg /3 mL (0.083 %) nebulizer solution 3 mL    Other Relevant Orders    Infusion Appointment Request (Completed)    CBC and Auto Differential (Completed)    Comprehensive metabolic panel (Completed)    Bilirubin, Direct (Completed)    Magnesium (Completed)    Phosphorus (Completed)    Adult diet Regular    Provider Communication - [id number] (Completed)    Nursing Communication - Hypersensitivity Management, Moderate (Completed)    Nursing Communication - Hypersensitivity Management, Severe (Completed)    Nursing Communication - Respiratory Management (Completed)    Pulse oximetry, continuous (Completed)        Medications as of the completion of today's visit:  Current Outpatient Medications   Medication Sig Dispense Refill    acetaminophen (Tylenol) 500 mg  tablet Take 2 tablets (1,000 mg) by mouth every 8 hours if needed.      Advair Diskus 100-50 mcg/dose diskus inhaler INHALE 1 PUFF BY MOUTH TWICE DAILY AS DIRECTED. RINSE AND GARGLE MOUTH WITH WATER AFTER EACH USE      ALPRAZolam (Xanax) 0.5 mg tablet Take one tablet one hour before anxiety-provoking situation. Limits one tab/day, three tabs/week.      aspirin 81 mg EC tablet Take 1 tablet (81 mg) by mouth once daily.      lisinopril 20 mg tablet Take 1 tablet (20 mg) by mouth once daily.      metoprolol tartrate (Lopressor) 25 mg tablet Take 1 tablet (25 mg) by mouth 2 times a day.      pantoprazole (ProtoNix) 40 mg EC tablet Take 1 tablet (40 mg) by mouth once daily.      rosuvastatin (Crestor) 40 mg tablet Take 1 tablet (40 mg) by mouth once daily.      sertraline (Zoloft) 50 mg tablet Take 1 tablet (50 mg) by mouth once daily.       Current Facility-Administered Medications   Medication Dose Route Frequency Provider Last Rate Last Admin    albuterol 2.5 mg /3 mL (0.083 %) nebulizer solution 3 mL  3 mL nebulization PRN Topher Castanon MD        dextrose 5 % in water (D5W) bolus  500 mL intravenous PRN Topher Castanon MD        diphenhydrAMINE (BENADryl) injection 50 mg  50 mg intravenous PRN Topher Castanon MD        EPINEPHrine (Epipen) injection syringe 0.3 mg  0.3 mg intramuscular q5 min PRN Topher Castanon MD        famotidine PF (Pepcid) injection 20 mg  20 mg intravenous Once PRN Topher Castanon MD        methylPREDNISolone sod succinate (SOLU-Medrol) 40 mg/mL injection 40 mg  40 mg intravenous PRN Topher Castanon MD        prochlorperazine (Compazine) injection 10 mg  10 mg intravenous q6h PRN Topher Castanon MD        prochlorperazine (Compazine) tablet 10 mg  10 mg oral q6h PRN Topher Castanon MD        sodium chloride 0.9 % bolus 500 mL  500 mL intravenous PRN Topher Castanon MD        Study [id number] disitamab vedotin 150 mg in sodium chloride 0.9% 250 mL IV  1.5 mg/kg (Order-Specific) intravenous Once  Topher Castanon  mL/hr at 06/27/24 1329 150 mg at 06/27/24 1329       Administrations This Visit       Study [id number] disitamab vedotin 150 mg in sodium chloride 0.9% 250 mL IV       Admin Date  06/27/2024 Action  New Bag Dose  150 mg Rate  250 mL/hr Route  intravenous Documented By  Yesica Odell RN                    Orders placed during today's visit:  Orders Placed This Encounter   Procedures    CBC and Auto Differential     Standing Status:   Standing     Number of Occurrences:   1     Order Specific Question:   Release result to MyChart     Answer:   Immediate [1]    Comprehensive metabolic panel     Standing Status:   Standing     Number of Occurrences:   1     Order Specific Question:   Release result to MyChart     Answer:   Immediate [1]    Bilirubin, Direct     Standing Status:   Standing     Number of Occurrences:   1     Order Specific Question:   Release result to MyChart     Answer:   Immediate [1]    Magnesium     Standing Status:   Standing     Number of Occurrences:   1     Order Specific Question:   Release result to MyChart     Answer:   Immediate [1]    Phosphorus     Standing Status:   Standing     Number of Occurrences:   1     Order Specific Question:   Release result to MyChart     Answer:   Immediate [1]    Adult diet Regular     Standing Status:   Standing     Number of Occurrences:   1     Order Specific Question:   Diet type     Answer:   Regular    Provider Communication - [id number]      Starting Dose Level             Disitamab Vedotin 1.5 mg/kg   Dose Level -1             Disitamab Vedotin 1.1 mg/kg   Dose Level -2             Disitamab Vedotin 0.75 mg/kg     Standing Status:   Standing     Number of Occurrences:   1    Nursing Communication - Hypersensitivity Management, Moderate     Flushing, rash, pruritus, dyspnea, chest discomfort, back pain, angioedema, SBP LESS THAN 90 mmHg, and/or change in mental status above or below patient's baseline. In the event of moderate or  severe hypersensitivity reaction to any medication:   Stop infusion.  Assess vital signs, pulse oximetry, nursing assessment, and patient complaints.  Administer medications per Hypersensitivity Reaction Medication Protocol.   Notify physician.     Standing Status:   Standing     Number of Occurrences:   1    Nursing Communication - Hypersensitivity Management, Severe     Worsening of moderate reaction symptoms, SBP LESS THAN 80 mmHg, and/or respiratory distress (respirations GREATER THAN 40 breaths per minute, wheezing, life-threatening symptoms). In the event of moderate or severe hypersensitivity reaction to any medication:   Stop infusion.  Assess vital signs, pulse oximetry, nursing assessment, and patient complaints.  Administer medications per Hypersensitivity Reaction Medication Protocol.   Notify physician.     Standing Status:   Standing     Number of Occurrences:   1    Nursing Communication - Respiratory Management     Oxygen via nasal cannula at 4 L per minute for respiratory rate GREATER THAN OR EQUAL TO to 28 breaths/minute and/or wheezing. Pulse Oximetry continuous monitoring.     Standing Status:   Standing     Number of Occurrences:   1    Pulse oximetry, continuous     Continuous monitoring to maintain SPO2 GREATER THAN 90%     Standing Status:   Standing     Number of Occurrences:   1        [id number] - A Study of Disitamab Vedotin in Subjects With HER2 Expressing Urothelial Carcinoma    Patient has no adverse events documented in the Research Adverse Events activity.       Study Specific Instructions and Documentation   VITALS  LABS  VITALS   ECG (Cycles 2, 3, 5 and Every 3rd cycle thereafter)  CORRELATIVES (Cycles 2, 3, 5 and Every 3rd cycle thereafter)  PREMEDS  STUDY DRUG (DV)  CORRELATIVES (Cycles 2, 3, 5 and Every 3rd cycle thereafter)  DISCHARGE     PRE-DOSE Safety Labs  < 1 Day prior to Day 1     Refer to [place] Treatment Plan for orders   Safety labs drawn at 1109 via #22g PIV in left  antecubital    Weight-Based Dosing   Baseline (screening) weight (kg) _______       Date measured _____/_____/20___ Actual weight (kg) _________   (Weight on Day 1)   Date measured _____/_____/20___   Dosing should be based on the patient's actual weight    Adjust dose if patient's weight changes +/- 10% from baseline during the study.   Patients > 100 kg, total dose will be calculated using 100 kg (Maximum = 200 mg) once every 2 week cycle.      Safety Parameters and Special Instructions   Disitamab Vedotin (BW15-VHI) is an antibody drug conjugate composed of a recombinant humanized IgG1 anti-HER2 mAb linked to the cytotoxic agent MMAE (monomethyl auristatin E).  Potential Side Effects/Adverse Events: infusion-related reaction, neutropenia, thrombocytopenia, anemia, peripheral neuropathy, elevated liver enzymes, fever, n/v, constipation, diarrhea, pruritus, arthralgia, alopecia, hyperglycemia, elevated triglycerides, hypokalemia, proteinuria, hypoalbuminemia.     PRE-DOSE Vital Signs   Temp, Heart Rate, Respiration, Blood Pressure    Pre-dose vital signs obtained at 1317; see chart above and/or flowsheet    PRE-DOSE ECG: Before Research Labs   Triplicate - Each approximately 2 minutes apart  MISTI 150c Study-specific ECG Machine   Rest at least 10 minutes prior.  Collect within 30 minutes prior to pre-dose research labs.   Contact MD/Provider &  if abnormal from baseline or if QTcF >500 msec  QTcF calculation Fridericia's formula: S:\DCRU_Staff\Nursing\Protocols\QTcF calculator    ECG #1: QTcF NA    ECG #2: QTcF NA    ECG #3: QTcF NA  Time Point Cycle Completion Time      Pre-dose   2, 3, 5 and  every 3rd cycle thereafter NA     NA     NA        PRE-DOSE Research Correlatives: After ECGs  Deliver to [facility]CR[facility] lab for processing   Access Type: NA Location: NA   Refer to [place] Treatment Plan for orders   Time point Cycle Specimen Test Volume Tube Handling Draw Time   Pre-dose  (within 1hour) 2,3,5 &  every 3rd cycle after PK 2 x 6 mL Red Top Ambient, invert 5x NA     ADA 4 mL Red Top Ambient, invert 5x NA        PRE-DOSE Medications (if previous infusion reaction)   Refer to [place] Treatment Plan for orders   Premedication 30 minutes prior to Disitamab Vedotin      Safety, Side and Adverse Effects with Special Instructions  Research Study Drugs   SEE  Safety Parameters and Special Instructions     Infusion-Related Reactions   [facility] Hypersensivity Orders      Weight-Based Dosing   Baseline (screening) weight (kg) _______       Date measured _____/_____/20___ Actual weight (kg) _________   (Weight on Day 1)   Date measured _____/_____/20___   Dosing should be based on the patient's actual weight    Adjust dose if patient's weight changes +/- 10% from baseline during the study.   Patients > 100 kg, total dose will be calculated using 100 kg (Maximum = 200 mg) once every 2 week cycle.      Day 1 Disitamab Vedotin Investigational Product Administration (sponsor provided)   Refer to [place] Treatment Plan for orders   Document medication administration in MAR activity. Document Research specific instructions below.   Disitamab Vedotin   If Infusion-Related Reaction, notify MD/Provider &     Grade 1: Mild reaction (transient flushing, rash or fever 38° C): may continue at investigator's discretion; administer symptomatic treatment following DCRU management of hypersensitivity reaction; monitor vital signs.    Grade 2: Moderate reaction (rash, flushing, urticaria, dyspnea, fever 38° C, chest discomfort, or back pain): interrupt infusion & follow DCRU management of hypersensitivity reaction; monitor vital signs.   If promptly responds to symptomatic treatment & is medically stable in the opinion of the investigator, infusion may be resumed at a slower rate.   Grade 3: Prolonged recurrence of symptoms or Grade 4: Life-threatening: stop infusion & follow DCRU management of hypersensitivity reaction. Remain  at bedside and monitor until recovery from symptoms. Do not restart infusion.    Disitamab Vedotin start time: 1329  end time: 1433    POST-DOSE ECG: Before Research Labs   Triplicate - Each approximately 2 minutes apart  MISTI 150c Study-specific ECG Machine   Rest at least 10 minutes prior.  Collect within 30 minutes prior to pre-dose research labs.   Contact MD/Provider &  if abnormal from baseline or if QTcF >500 msec  QTcF calculation Fridericia's formula: S:\DCRU_Staff\Nursing\Protocols\QTcF calculator    ECG #1: QTcF NA    ECG #2: QTcF NA    ECG #3: QTcF NA  Time Point Cycle Completion Time      POST-dose   2, 3, 5 and  every 3rd cycle thereafter NA     NA     NA        Day 1 Post-Dose Disitamab Vedotin Research Correlatives  Deliver to [facility] for Processing   Access Type: NA Location: NA   Refer to [place] Treatment Plan for orders   Time point Cycle Specimen Test Volume Tube Handling  Draw Time   End of Infusion  (+/-15mins ) 2,3,5 & every 3rd cycle after   PK  2 x 6 mL  Red Top  Ambient, invert 5x   NA        Discharge Instructions   Discharge patient to home after study requirements completed or PK sample obtained.    Remind patient to return: per   Discharge time: 1442     Yesica Wright RN  06/27/24

## 2024-06-28 DIAGNOSIS — Z00.6 RESEARCH SUBJECT: Primary | ICD-10-CM

## 2024-07-05 DIAGNOSIS — Z00.6 EXAM FOR CLINICAL RESEARCH: Primary | ICD-10-CM

## 2024-07-10 ENCOUNTER — APPOINTMENT (OUTPATIENT)
Dept: RESEARCH | Facility: HOSPITAL | Age: 69
End: 2024-07-10
Payer: COMMERCIAL

## 2024-07-10 ENCOUNTER — APPOINTMENT (OUTPATIENT)
Dept: HEMATOLOGY/ONCOLOGY | Facility: HOSPITAL | Age: 69
End: 2024-07-10
Payer: COMMERCIAL

## 2024-07-10 NOTE — PROGRESS NOTES
"Patient ID: Toro Lujan is a 69 y.o. male.    Cancer History:   Treatment Synopsis:    2/17/22 commenced chemo with split dose cis/gem  3/10/22 cycle 2 cis/gem  3/31/22 cycle 3 cis/gem   4/20/22 restaging CT scans showed response    Two more cycles cis/gem on 4/21 and 5/12/22 6/24/22 commenced maintenance Avelumab  Treatment on hold due to patient working on insurance coverage issues  8/24/22 CT scans overall stable   9/2/22 restarted avelumab every 2 weeks  10/22 stable CT scans  12/2022 no new disease seen on CT scans   Continued avelumab until Feb/March 2023  Scans TAYLER and was on a treatment break.     1/2024 scans with PD  Jan or Feb 2024 commenced treatment on SEGE 1822:  Disitamab Vedotin, cohorts A and B in HER2-overexpressing IHC 1+, 2+, 3+ for locally advanced unresectable or metastatic UC who have received prior platinum-based chemotherapy     Subjective    HPI  Seen in follow up for his met UC and for continuing treatment on trial.   Here for cycle 11.     2 weeks ago had a flatbread pizza while at  and he had slight nausea afterwards. No longer having nausea.  Denies vomiting. Denies fever/chills. Denies diarrhea.   Since then, hasn't found food as appealing. He is losing weight. Still eating okay. Eating more vegetables.     Energy is \"comfortable, fine\". Had trouble sleeping 3 nights ago. Still doing normal activities.     Denies cough/sob.     Denies pain.     Very intermittently having tingling in hands R>L. Does not occur daily. Can last 3-4 min. Can still use his hands fine. No slurred speech with this. Denies this in his feet. Started once month ago. Neuropathy is common on this trial.     Has some stress with his daughters not getting along with each other. One of his daughters has been staying with him more lately and sounds like she can yell a lot. He said that she does not hurt him.     Objective    BSA: There is no height or weight on file to calculate BSA.  There were no vitals " taken for this visit.  Vitals done in DCRU  Wt 113 kg, temp 36.4, pulse 76, /85     Physical Exam  Vitals reviewed.   Constitutional:       General: He is not in acute distress.  Eyes:      General: No scleral icterus.  Cardiovascular:      Rate and Rhythm: Normal rate and regular rhythm.   Pulmonary:      Effort: Pulmonary effort is normal.      Breath sounds: Normal breath sounds.   Abdominal:      General: Bowel sounds are normal. There is no distension.      Palpations: Abdomen is soft.      Tenderness: There is no abdominal tenderness.      Comments: Ostomy draining clear yellow urine   Musculoskeletal:      Right lower leg: No edema.      Left lower leg: No edema.   Skin:     General: Skin is warm and dry.   Neurological:      Mental Status: He is alert and oriented to person, place, and time.   Psychiatric:         Mood and Affect: Mood normal.         Behavior: Behavior normal.         Performance Status:  Symptomatic; fully ambulatory      Lab Results   Component Value Date    WBC 9.0 07/11/2024    HGB 14.2 07/11/2024    HCT 41.4 07/11/2024    MCV 92 07/11/2024     07/11/2024     Lab Results   Component Value Date    GLUCOSE 141 (H) 07/11/2024    CALCIUM 8.4 (L) 07/11/2024     (L) 07/11/2024    K 4.3 07/11/2024    CO2 26 07/11/2024     07/11/2024    BUN 20 07/11/2024    CREATININE 1.18 07/11/2024       Lab Results   Component Value Date    ALT 19 07/11/2024    AST 18 07/11/2024    ALKPHOS 109 07/11/2024    BILITOT 0.6 07/11/2024        Assessment/Plan   Cycle 11 today.   Proceed and follow up per protocol.     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     Appetite/wt down a bit x 2 weeks. Will monitor.     Getting restaged later this month.     He continues to see a counselor which I think is good given the family stressors he noted.         Oncology History   Malignant neoplasm of overlapping sites of bladder (Multi)   2/13/2024 Initial Diagnosis    Malignant neoplasm of  overlapping sites of bladder (CMS/HCC)     2/14/2024 -  Research Study Participant    ([facility]) [id number] Intensive PK Cohort - Disitamab Vedotin, 14 Day Cycles  Plan Provider: Fan Rapp MD PhD  Treatment goal: Control  Line of treatment: Second Line  Associated studies: A Study of Disitamab Vedotin in Subjects With HER2 Expressing Urothelial Carcinoma          There are no diagnoses linked to this encounter.        Melania Schilling, MERLIN-CNP

## 2024-07-11 ENCOUNTER — HOSPITAL ENCOUNTER (OUTPATIENT)
Dept: RESEARCH | Facility: HOSPITAL | Age: 69
Discharge: HOME | End: 2024-07-11
Payer: MEDICARE

## 2024-07-11 ENCOUNTER — OFFICE VISIT (OUTPATIENT)
Dept: HEMATOLOGY/ONCOLOGY | Facility: HOSPITAL | Age: 69
End: 2024-07-11
Payer: MEDICARE

## 2024-07-11 ENCOUNTER — EDUCATION (OUTPATIENT)
Dept: HEMATOLOGY/ONCOLOGY | Facility: HOSPITAL | Age: 69
End: 2024-07-11

## 2024-07-11 VITALS
SYSTOLIC BLOOD PRESSURE: 145 MMHG | HEART RATE: 86 BPM | TEMPERATURE: 98.6 F | WEIGHT: 250.22 LBS | RESPIRATION RATE: 19 BRPM | OXYGEN SATURATION: 98 % | BODY MASS INDEX: 36.02 KG/M2 | DIASTOLIC BLOOD PRESSURE: 88 MMHG

## 2024-07-11 DIAGNOSIS — C67.8 MALIGNANT NEOPLASM OF OVERLAPPING SITES OF BLADDER (MULTI): Primary | ICD-10-CM

## 2024-07-11 LAB
ALBUMIN SERPL BCP-MCNC: 4 G/DL (ref 3.4–5)
ALP SERPL-CCNC: 109 U/L (ref 33–136)
ALT SERPL W P-5'-P-CCNC: 19 U/L (ref 10–52)
ANION GAP SERPL CALC-SCNC: 12 MMOL/L (ref 10–20)
AST SERPL W P-5'-P-CCNC: 18 U/L (ref 9–39)
BASOPHILS # BLD AUTO: 0.06 X10*3/UL (ref 0–0.1)
BASOPHILS NFR BLD AUTO: 0.7 %
BILIRUB DIRECT SERPL-MCNC: 0.1 MG/DL (ref 0–0.3)
BILIRUB SERPL-MCNC: 0.6 MG/DL (ref 0–1.2)
BUN SERPL-MCNC: 20 MG/DL (ref 6–23)
CALCIUM SERPL-MCNC: 8.4 MG/DL (ref 8.6–10.6)
CHLORIDE SERPL-SCNC: 100 MMOL/L (ref 98–107)
CHOLEST SERPL-MCNC: 127 MG/DL (ref 0–199)
CHOLESTEROL/HDL RATIO: 4.4
CO2 SERPL-SCNC: 26 MMOL/L (ref 21–32)
CREAT SERPL-MCNC: 1.18 MG/DL (ref 0.5–1.3)
EGFRCR SERPLBLD CKD-EPI 2021: 67 ML/MIN/1.73M*2
EOSINOPHIL # BLD AUTO: 0.42 X10*3/UL (ref 0–0.7)
EOSINOPHIL NFR BLD AUTO: 4.7 %
ERYTHROCYTE [DISTWIDTH] IN BLOOD BY AUTOMATED COUNT: 14.7 % (ref 11.5–14.5)
GLUCOSE P FAST SERPL-MCNC: 141 MG/DL (ref 74–99)
GLUCOSE SERPL-MCNC: 141 MG/DL (ref 74–99)
HCT VFR BLD AUTO: 41.4 % (ref 41–52)
HDLC SERPL-MCNC: 29.1 MG/DL
HGB BLD-MCNC: 14.2 G/DL (ref 13.5–17.5)
HOLD SPECIMEN: NORMAL
IMM GRANULOCYTES # BLD AUTO: 0.03 X10*3/UL (ref 0–0.7)
IMM GRANULOCYTES NFR BLD AUTO: 0.3 % (ref 0–0.9)
LDLC SERPL CALC-MCNC: 70 MG/DL
LYMPHOCYTES # BLD AUTO: 1.97 X10*3/UL (ref 1.2–4.8)
LYMPHOCYTES NFR BLD AUTO: 21.8 %
MAGNESIUM SERPL-MCNC: 1.91 MG/DL (ref 1.6–2.4)
MCH RBC QN AUTO: 31.5 PG (ref 26–34)
MCHC RBC AUTO-ENTMCNC: 34.3 G/DL (ref 32–36)
MCV RBC AUTO: 92 FL (ref 80–100)
MONOCYTES # BLD AUTO: 0.82 X10*3/UL (ref 0.1–1)
MONOCYTES NFR BLD AUTO: 9.1 %
NEUTROPHILS # BLD AUTO: 5.73 X10*3/UL (ref 1.2–7.7)
NEUTROPHILS NFR BLD AUTO: 63.4 %
NON HDL CHOLESTEROL: 98 MG/DL (ref 0–149)
NRBC BLD-RTO: 0 /100 WBCS (ref 0–0)
PHOSPHATE SERPL-MCNC: 3.8 MG/DL (ref 2.5–4.9)
PLATELET # BLD AUTO: 187 X10*3/UL (ref 150–450)
POTASSIUM SERPL-SCNC: 4.3 MMOL/L (ref 3.5–5.3)
PROT SERPL-MCNC: 6.6 G/DL (ref 6.4–8.2)
RBC # BLD AUTO: 4.51 X10*6/UL (ref 4.5–5.9)
SODIUM SERPL-SCNC: 134 MMOL/L (ref 136–145)
TRIGL SERPL-MCNC: 139 MG/DL (ref 0–149)
VLDL: 28 MG/DL (ref 0–40)
WBC # BLD AUTO: 9 X10*3/UL (ref 4.4–11.3)

## 2024-07-11 PROCEDURE — 83735 ASSAY OF MAGNESIUM: CPT

## 2024-07-11 PROCEDURE — 84100 ASSAY OF PHOSPHORUS: CPT

## 2024-07-11 PROCEDURE — 96413 CHEMO IV INFUSION 1 HR: CPT

## 2024-07-11 PROCEDURE — 85025 COMPLETE CBC W/AUTO DIFF WBC: CPT

## 2024-07-11 PROCEDURE — 82248 BILIRUBIN DIRECT: CPT

## 2024-07-11 PROCEDURE — 80053 COMPREHEN METABOLIC PANEL: CPT

## 2024-07-11 PROCEDURE — 80061 LIPID PANEL: CPT

## 2024-07-11 PROCEDURE — C9399 UNCLASSIFIED DRUGS OR BIOLOG: HCPCS | Performed by: STUDENT IN AN ORGANIZED HEALTH CARE EDUCATION/TRAINING PROGRAM

## 2024-07-11 PROCEDURE — 2560000001 HC RX 256 EXPERIMENTAL DRUGS: Performed by: STUDENT IN AN ORGANIZED HEALTH CARE EDUCATION/TRAINING PROGRAM

## 2024-07-11 PROCEDURE — 99214 OFFICE O/P EST MOD 30 MIN: CPT | Performed by: NURSE PRACTITIONER

## 2024-07-11 PROCEDURE — 82947 ASSAY GLUCOSE BLOOD QUANT: CPT

## 2024-07-11 RX ORDER — DIPHENHYDRAMINE HYDROCHLORIDE 50 MG/ML
50 INJECTION INTRAMUSCULAR; INTRAVENOUS AS NEEDED
Status: DISCONTINUED | OUTPATIENT
Start: 2024-07-11 | End: 2024-07-12 | Stop reason: HOSPADM

## 2024-07-11 RX ORDER — FAMOTIDINE 10 MG/ML
20 INJECTION INTRAVENOUS ONCE AS NEEDED
Status: DISCONTINUED | OUTPATIENT
Start: 2024-07-11 | End: 2024-07-12 | Stop reason: HOSPADM

## 2024-07-11 RX ORDER — HEPARIN SODIUM,PORCINE/PF 10 UNIT/ML
50 SYRINGE (ML) INTRAVENOUS AS NEEDED
OUTPATIENT
Start: 2024-07-11

## 2024-07-11 RX ORDER — HEPARIN 100 UNIT/ML
500 SYRINGE INTRAVENOUS AS NEEDED
OUTPATIENT
Start: 2024-07-11

## 2024-07-11 RX ORDER — EPINEPHRINE 0.3 MG/.3ML
0.3 INJECTION SUBCUTANEOUS EVERY 5 MIN PRN
Status: DISCONTINUED | OUTPATIENT
Start: 2024-07-11 | End: 2024-07-12 | Stop reason: HOSPADM

## 2024-07-11 RX ORDER — ALBUTEROL SULFATE 0.83 MG/ML
3 SOLUTION RESPIRATORY (INHALATION) AS NEEDED
Status: DISCONTINUED | OUTPATIENT
Start: 2024-07-11 | End: 2024-07-12 | Stop reason: HOSPADM

## 2024-07-11 RX ORDER — PROCHLORPERAZINE EDISYLATE 5 MG/ML
10 INJECTION INTRAMUSCULAR; INTRAVENOUS EVERY 6 HOURS PRN
Status: DISCONTINUED | OUTPATIENT
Start: 2024-07-11 | End: 2024-07-12 | Stop reason: HOSPADM

## 2024-07-11 RX ORDER — PROCHLORPERAZINE MALEATE 10 MG
10 TABLET ORAL EVERY 6 HOURS PRN
Status: DISCONTINUED | OUTPATIENT
Start: 2024-07-11 | End: 2024-07-12 | Stop reason: HOSPADM

## 2024-07-11 NOTE — RESEARCH NOTES
[facility]<[id number]><C2,3,5+D1><INTENSIVEPK>  DCRU NURSING VISIT NOTE  Study Name: [id number]  IRB#: [id number]  DCRU#: # [id number]  Protocol Version Dated: V9 A8 06.06.23   PI: Fan Rapp MD.  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  Cycle = 14 days  Disitamab Vedotin administered IV Day 1 every 2 weeks     Admission and Prior to Starting Study Activities   1. Notify  when patient arrives to unit.  2. Complete DCRU/Jenkins intake form in [facility].  4. Study Questionnaires   Coordinator will obtain - Cycle 2, 3, then every other cycle.  5. Obtain weight in kilograms - with shoes off & heavy items removed.  6. Obtain vital signs. Record in EMR.  7. Insert one peripheral IV line for sample collection procedures (flush line with normal saline following each blood draw). Access mediport (if available) otherwise insert second peripheral line in opposite arm for Investigative drug administration (if peripheral line, flush line with normal saline before & after infusion)  8. Do Not draw research samples from the same line the investigational drug infused through.  9. Physical Exam < 1 day  10. Confirm patient fasting for clinical safety labs (every 3rd month, if ordered).     Criteria to Treat   DCRU RN reviewed and meets eligibility to proceed with treatment plan   Time team notified: 1035   DCRU RN notifies study team to review eligibility and approval before dosing  procedures  Time team approves: 1035      Dietary Guidelines   REGULAR   Subjective   Toro Lujan is a 69 y.o. male and is here for a Research clinical visit.  Visit Provider: [id number], [facility] ROOM 4 [facility]   Allergies: No Known Allergies  Objective   Vital Signs:    Vitals:    07/11/24 0917 07/11/24 1119 07/11/24 1428   BP: 126/85 126/80 145/88   Pulse: 76 76 86   Resp: 18 17 19   Temp: 36.4 °C (97.6 °F) 36.5 °C (97.7 °F) 37 °C (98.6 °F)   TempSrc: Oral Oral Oral   SpO2: 98% 97% 98%   Weight: 113 kg (250 lb 3.6 oz)     Physical Exam   ASSESSMENT and PLAN:  Problem List Items Addressed This Visit          Hematology and Neoplasia    Malignant neoplasm of overlapping sites of bladder (Multi) - Primary    Relevant Medications    prochlorperazine (Compazine) tablet 10 mg    prochlorperazine (Compazine) injection 10 mg    sodium chloride 0.9 % bolus 500 mL    dextrose 5 % in water (D5W) bolus    diphenhydrAMINE (BENADryl) injection 50 mg    methylPREDNISolone sod succinate (SOLU-Medrol) 40 mg/mL injection 40 mg    famotidine PF (Pepcid) injection 20 mg    EPINEPHrine (Epipen) injection syringe 0.3 mg    albuterol 2.5 mg /3 mL (0.083 %) nebulizer solution 3 mL    Study [id number] disitamab vedotin 150 mg in sodium chloride 0.9% 250 mL IV (Completed)    Other Relevant Orders    Clinic Appointment Request    Infusion Appointment Request    CBC and Auto Differential (Completed)    Comprehensive metabolic panel (Completed)    Bilirubin, Direct (Completed)    Glucose, Fasting (Completed)    Lipid panel (Completed)    Magnesium (Completed)    Phosphorus (Completed)    Research collection: 6mL Red Top - Research Collect PK; Pre-Dose; Within 1 hour; Ambient; 5x (Completed)    Research collection: 6mL Red Top - Research Collect PK; Pre-Dose; Within 1 hour; Ambient; 5x (Completed)    Research collection: 4mL Red Top No Additive - Research Collect ADA; Pre-Dose; Within 1 hour; Ambient; 5x (Completed)    Research collection: 6mL Red Top  - Research Collect PK; Post-Dose; EOI; +/- 15 minutes; Ambient; 5x    Research collection: 6mL Red Top - Research Collect PK; Post-Dose; EOI; +/- 15 minutes; Ambient; 5x    Adult diet Regular    Treatment Conditions (Completed)    Provider Communication - [id number] (Completed)    Research Triplicate ECG (Completed)    Research Triplicate ECG (Completed)    Nursing Communication - Hypersensitivity Management, Moderate (Completed)    Nursing Communication - Hypersensitivity Management, Severe (Completed)    Nursing Communication - Respiratory Management (Completed)    Pulse oximetry, continuous    CBC and Auto Differential (Completed)    Comprehensive metabolic panel (Completed)    Bilirubin, Direct (Completed)    Glucose, Fasting (Completed)    Lipid panel (Completed)    Magnesium (Completed)    Phosphorus (Completed)    Nursing Communication - Vascular Access (Completed)    Venous Access, CVAD    Insert peripheral IV    Convert IV to saline lock    Research collection: 6mL Red Top - Research Collect PK; Pre-Dose; Within 1 hour; Ambient; 5x (Completed)    Research collection: 6mL Red Top - Research Collect PK; Pre-Dose; Within 1 hour; Ambient; 5x (Completed)    Research collection: 4mL Red Top No Additive - Research Collect ADA; Pre-Dose; Within 1 hour; Ambient; 5x (Completed)    Research collection: 6mL Red Top - Research Collect PK; Post-Dose; EOI; +/- 15 minutes; Ambient; 5x    Research collection: 6mL Red Top - Research Collect PK; Post-Dose; EOI; +/- 15 minutes; Ambient; 5x    Medications as of the completion of today's visit:  Current Outpatient Medications   Medication Sig Dispense Refill    acetaminophen (Tylenol) 500 mg tablet Take 2 tablets (1,000 mg) by mouth every 8 hours if needed.      Advair Diskus 100-50 mcg/dose diskus inhaler INHALE 1 PUFF BY MOUTH TWICE DAILY AS DIRECTED. RINSE AND GARGLE MOUTH WITH WATER AFTER EACH USE      ALPRAZolam (Xanax) 0.5 mg tablet Take one tablet one hour before  anxiety-provoking situation. Limits one tab/day, three tabs/week.      aspirin 81 mg EC tablet Take 1 tablet (81 mg) by mouth once daily.      lisinopril 20 mg tablet Take 1 tablet (20 mg) by mouth once daily.      metoprolol tartrate (Lopressor) 25 mg tablet Take 1 tablet (25 mg) by mouth 2 times a day.      pantoprazole (ProtoNix) 40 mg EC tablet Take 1 tablet (40 mg) by mouth once daily.      rosuvastatin (Crestor) 40 mg tablet Take 1 tablet (40 mg) by mouth once daily.      sertraline (Zoloft) 50 mg tablet Take 1 tablet (50 mg) by mouth once daily.       Current Facility-Administered Medications   Medication Dose Route Frequency Provider Last Rate Last Admin    albuterol 2.5 mg /3 mL (0.083 %) nebulizer solution 3 mL  3 mL nebulization PRN Taurus Blevins, APRN-CNP        dextrose 5 % in water (D5W) bolus  500 mL intravenous PRN Taurus Blevins, APRN-CNP        diphenhydrAMINE (BENADryl) injection 50 mg  50 mg intravenous PRN Taurus Blevins, APRN-CNP        EPINEPHrine (Epipen) injection syringe 0.3 mg  0.3 mg intramuscular q5 min PRN Taurus Blevins APRN-CNP        famotidine PF (Pepcid) injection 20 mg  20 mg intravenous Once PRN Taurus Blevins, APRN-CNP        methylPREDNISolone sod succinate (SOLU-Medrol) 40 mg/mL injection 40 mg  40 mg intravenous PRN Taurus Blevins, APRN-CNP        prochlorperazine (Compazine) injection 10 mg  10 mg intravenous q6h PRN Taurus Blevins, APRN-CNP        prochlorperazine (Compazine) tablet 10 mg  10 mg oral q6h PRN Taurus Blevins, APRN-CNP        sodium chloride 0.9 % bolus 500 mL  500 mL intravenous PRN Taurus Blevins APRN-CNP         Administrations This Visit       Study [id number] disitamab vedotin 150 mg in sodium chloride 0.9% 250 mL IV       Admin Date  07/11/2024 Action  New Bag Dose  150 mg Rate  250 mL/hr Route  intravenous Documented By  Esequiel Callahan RN                Orders placed during today's visit:  Orders Placed This Encounter   Procedures    CBC  and Auto Differential     Standing Status:   Future     Number of Occurrences:   1     Standing Expiration Date:   7/10/2025     Order Specific Question:   Release result to MyChart     Answer:   Immediate [1]    Comprehensive metabolic panel     Standing Status:   Future     Number of Occurrences:   1     Standing Expiration Date:   7/10/2025     Order Specific Question:   Release result to MyChart     Answer:   Immediate [1]    Bilirubin, Direct     Standing Status:   Future     Number of Occurrences:   1     Standing Expiration Date:   7/10/2025     Order Specific Question:   Release result to MyChart     Answer:   Immediate [1]    Glucose, Fasting     Standing Status:   Future     Number of Occurrences:   1     Standing Expiration Date:   7/10/2025     Order Specific Question:   Release result to MyChart     Answer:   Immediate [1]    Lipid panel     Standing Status:   Future     Number of Occurrences:   1     Standing Expiration Date:   7/10/2025     Order Specific Question:   Release result to MyChart     Answer:   Immediate [1]    Magnesium     Standing Status:   Future     Number of Occurrences:   1     Standing Expiration Date:   7/10/2025     Order Specific Question:   Release result to MyChart     Answer:   Immediate [1]    Phosphorus     Standing Status:   Future     Number of Occurrences:   1     Standing Expiration Date:   7/10/2025     Order Specific Question:   Release result to MyChart     Answer:   Immediate [1]    Research collection: 6mL Red Top - Research Collect PK; Pre-Dose; Within 1 hour; Ambient; 5x     Standing Status:   Future     Number of Occurrences:   1     Standing Expiration Date:   7/10/2025     Order Specific Question:   Test     Answer:   PK     Order Specific Question:   Timepoint     Answer:   Pre-Dose     Order Specific Question:   Pre-Dose     Answer:   Within 1 hour     Order Specific Question:   Temperature     Answer:   Ambient     Order Specific Question:   Cindy      Answer:   5x     Order Specific Question:   Optional?     Answer:   No    Research collection: 6mL Red Top - Research Collect PK; Pre-Dose; Within 1 hour; Ambient; 5x     Standing Status:   Future     Number of Occurrences:   1     Standing Expiration Date:   7/10/2025     Order Specific Question:   Test     Answer:   PK     Order Specific Question:   Timepoint     Answer:   Pre-Dose     Order Specific Question:   Pre-Dose     Answer:   Within 1 hour     Order Specific Question:   Temperature     Answer:   Ambient     Order Specific Question:   Invert     Answer:   5x     Order Specific Question:   Optional?     Answer:   No    Research collection: 4mL Red Top No Additive - Research Collect ADA; Pre-Dose; Within 1 hour; Ambient; 5x     Standing Status:   Future     Number of Occurrences:   1     Standing Expiration Date:   7/10/2025     Order Specific Question:   Test     Answer:   ADA     Order Specific Question:   Timepoint     Answer:   Pre-Dose     Order Specific Question:   Pre-Dose     Answer:   Within 1 hour     Order Specific Question:   Temperature     Answer:   Ambient     Order Specific Question:   Invert     Answer:   5x     Order Specific Question:   Optional?     Answer:   No    Research collection: 6mL Red Top - Research Collect PK; Post-Dose; EOI; +/- 15 minutes; Ambient; 5x     Standing Status:   Future     Number of Occurrences:   1     Standing Expiration Date:   7/10/2025     Order Specific Question:   Test     Answer:   PK     Order Specific Question:   Timepoint     Answer:   Post-Dose     Order Specific Question:   Post-Dose     Answer:   EOI     Order Specific Question:   Post-Dose Window     Answer:   +/- 15 minutes     Order Specific Question:   Temperature     Answer:   Ambient     Order Specific Question:   Invert     Answer:   5x     Order Specific Question:   Optional?     Answer:   No    Research collection: 6mL Red Top - Research Collect PK; Post-Dose; EOI; +/- 15 minutes; Ambient; 5x      Standing Status:   Future     Number of Occurrences:   1     Standing Expiration Date:   7/10/2025     Order Specific Question:   Test     Answer:   PK     Order Specific Question:   Timepoint     Answer:   Post-Dose     Order Specific Question:   Post-Dose     Answer:   EOI     Order Specific Question:   Post-Dose Window     Answer:   +/- 15 minutes     Order Specific Question:   Temperature     Answer:   Ambient     Order Specific Question:   Invert     Answer:   5x     Order Specific Question:   Optional?     Answer:   No    CBC and Auto Differential     Standing Status:   Standing     Number of Occurrences:   1     Order Specific Question:   Release result to MyChart     Answer:   Immediate [1]    Comprehensive metabolic panel     Standing Status:   Standing     Number of Occurrences:   1     Order Specific Question:   Release result to MyChart     Answer:   Immediate [1]    Bilirubin, Direct     Standing Status:   Standing     Number of Occurrences:   1     Order Specific Question:   Release result to MyChart     Answer:   Immediate [1]    Glucose, Fasting     Standing Status:   Standing     Number of Occurrences:   1     Order Specific Question:   Release result to MyChart     Answer:   Immediate [1]    Lipid panel     Standing Status:   Standing     Number of Occurrences:   1     Order Specific Question:   Release result to MyChart     Answer:   Immediate [1]    Magnesium     Standing Status:   Standing     Number of Occurrences:   1     Order Specific Question:   Release result to MyChart     Answer:   Immediate [1]    Phosphorus     Standing Status:   Standing     Number of Occurrences:   1     Order Specific Question:   Release result to MyChart     Answer:   Immediate [1]    Research collection: 6mL Red Top - Research Collect PK; Pre-Dose; Within 1 hour; Ambient; 5x     Standing Status:   Standing     Number of Occurrences:   1     Order Specific Question:   Test     Answer:   PK     Order Specific  Question:   Timepoint     Answer:   Pre-Dose     Order Specific Question:   Pre-Dose     Answer:   Within 1 hour     Order Specific Question:   Temperature     Answer:   Ambient     Order Specific Question:   Invert     Answer:   5x     Order Specific Question:   Optional?     Answer:   No    Research collection: 6mL Red Top - Research Collect PK; Pre-Dose; Within 1 hour; Ambient; 5x     Standing Status:   Standing     Number of Occurrences:   1     Order Specific Question:   Test     Answer:   PK     Order Specific Question:   Timepoint     Answer:   Pre-Dose     Order Specific Question:   Pre-Dose     Answer:   Within 1 hour     Order Specific Question:   Temperature     Answer:   Ambient     Order Specific Question:   Invert     Answer:   5x     Order Specific Question:   Optional?     Answer:   No    Research collection: 4mL Red Top No Additive - Research Collect ADA; Pre-Dose; Within 1 hour; Ambient; 5x     Standing Status:   Standing     Number of Occurrences:   1     Order Specific Question:   Test     Answer:   ADA     Order Specific Question:   Timepoint     Answer:   Pre-Dose     Order Specific Question:   Pre-Dose     Answer:   Within 1 hour     Order Specific Question:   Temperature     Answer:   Ambient     Order Specific Question:   Invert     Answer:   5x     Order Specific Question:   Optional?     Answer:   No    Research collection: 6mL Red Top - Research Collect PK; Post-Dose; EOI; +/- 15 minutes; Ambient; 5x     Standing Status:   Standing     Number of Occurrences:   1     Order Specific Question:   Test     Answer:   PK     Order Specific Question:   Timepoint     Answer:   Post-Dose     Order Specific Question:   Post-Dose     Answer:   EOI     Order Specific Question:   Post-Dose Window     Answer:   +/- 15 minutes     Order Specific Question:   Temperature     Answer:   Ambient     Order Specific Question:   Invert     Answer:   5x     Order Specific Question:   Optional?     Answer:   No     Research collection: 6mL Red Top - Research Collect PK; Post-Dose; EOI; +/- 15 minutes; Ambient; 5x     Standing Status:   Standing     Number of Occurrences:   1     Order Specific Question:   Test     Answer:   PK     Order Specific Question:   Timepoint     Answer:   Post-Dose     Order Specific Question:   Post-Dose     Answer:   EOI     Order Specific Question:   Post-Dose Window     Answer:   +/- 15 minutes     Order Specific Question:   Temperature     Answer:   Ambient     Order Specific Question:   Invert     Answer:   5x     Order Specific Question:   Optional?     Answer:   No    Adult diet Regular     Standing Status:   Standing     Number of Occurrences:   1     Order Specific Question:   Diet type     Answer:   Regular    Treatment Conditions     Hold treatment and notify provider if:   ANC LESS THAN 1 x 10*3/uL   AST/ALT GREATER THAN 3 x ULN   Total Bilirubin GREATER THAN 2 x ULN   LVEF LESS THAN 50%    Fasting or Non-fasting Glucose GREATER THAN 250 mg/dL   Peripheral Neuropathy GREATER THAN OR EQUAL TO Grade 2   Adverse Event GREATER THAN OR EQUAL TO Grade 3     Standing Status:   Standing     Number of Occurrences:   1    Provider Communication - [id number]      Starting Dose Level             Disitamab Vedotin 1.5 mg/kg   Dose Level -1             Disitamab Vedotin 1.1 mg/kg   Dose Level -2             Disitamab Vedotin 0.75 mg/kg     Standing Status:   Standing     Number of Occurrences:   1    Research Triplicate ECG     Refer to study SmartPhrase for instructions and documentation of the research triplicate ECG.     Standing Status:   Standing     Number of Occurrences:   1    Research Triplicate ECG     Refer to study SmartPhrase for instructions and documentation of the research triplicate ECG.     Standing Status:   Standing     Number of Occurrences:   1    Nursing Communication - Hypersensitivity Management, Moderate     Flushing, rash, pruritus, dyspnea, chest discomfort, back pain,  angioedema, SBP LESS THAN 90 mmHg, and/or change in mental status above or below patient's baseline. In the event of moderate or severe hypersensitivity reaction to any medication:   Stop infusion.  Assess vital signs, pulse oximetry, nursing assessment, and patient complaints.  Administer medications per Hypersensitivity Reaction Medication Protocol.   Notify physician.     Standing Status:   Standing     Number of Occurrences:   1    Nursing Communication - Hypersensitivity Management, Severe     Worsening of moderate reaction symptoms, SBP LESS THAN 80 mmHg, and/or respiratory distress (respirations GREATER THAN 40 breaths per minute, wheezing, life-threatening symptoms). In the event of moderate or severe hypersensitivity reaction to any medication:   Stop infusion.  Assess vital signs, pulse oximetry, nursing assessment, and patient complaints.  Administer medications per Hypersensitivity Reaction Medication Protocol.   Notify physician.     Standing Status:   Standing     Number of Occurrences:   1    Nursing Communication - Respiratory Management     Oxygen via nasal cannula at 4 L per minute for respiratory rate GREATER THAN OR EQUAL TO to 28 breaths/minute and/or wheezing. Pulse Oximetry continuous monitoring.     Standing Status:   Standing     Number of Occurrences:   1    Nursing Communication - Vascular Access     Refer to the vascular access device resource page and the following policies for additional information on line insertion, maintenance and removal.    CP-148 - Central Vascular Access Device Insertion, Maintenance, and Removal, Adult  NP-28 - Midline Cathter Maintenance & Removal, Adult  NP-34 - High-flow Catheters, (e.g. Hemodialysis, Apheresis, and Continuous Renal Replacement Therapy [CRRT]), Care and Utilization, Adult  NP-39 - Peripheral Intravenous Catheter (PIV) Insertion, Maintenance, Blood Collection & Removal - Adult     Standing Status:   Standing     Number of Occurrences:   1     Pulse oximetry, continuous     Continuous monitoring to maintain SPO2 GREATER THAN 90%     Standing Status:   Standing     Number of Occurrences:   1    Venous Access, CVAD     Standing Status:   Standing     Number of Occurrences:   1    Insert peripheral IV     Obtain venous access for treatment via PIV if no CVAD access or if unable to obtain blood return from CVAD.     Standing Status:   Standing     Number of Occurrences:   1    Convert IV to saline lock     Only if venous access is required over consecutive days. Peripheral catheter can remain in place until completion of last consecutive day infusion, unless IV-related complications are encountered.     Standing Status:   Standing     Number of Occurrences:   1    [id number] - A Study of Disitamab Vedotin in Subjects With HER2 Expressing Urothelial Carcinoma    Patient has no adverse events documented in the Research Adverse Events activity.    Study Specific Instructions and Documentation   VITALS[x]  LABS[x]  VITALS [x]  ECG (Cycles 2, 3, 5 and Every 3rd cycle thereafter)  CORRELATIVES (Cycles 2, 3, 5 and Every 3rd cycle thereafter)  STUDY DRUG (DV)  CORRELATIVES (Cycles 2, 3, 5 and Every 3rd cycle thereafter)  Post dose ECG not listed here but ordered and needed per protocol GERHARD Aguilar RN.   DISCHARGE     PRE-DOSE Safety Labs  < 1 Day prior to Day 1     Refer to [place] Treatment Plan for orders     Weight-Based Dosing   Baseline (screening) weight (kg) _125kg______       Date measured _2/7/24 Actual weight (kg) see below   Dosing should be based on the patient's actual weight    Adjust dose if patient's weight changes +/- 10% from baseline during the study.   Patients > 100 kg, total dose will be calculated using 100 kg (Maximum = 200 mg) once every 2 week cycle.   Vitals:    07/11/24 0917   Weight: 113 kg (250 lb 3.6 oz)         Safety Parameters and Special Instructions   Disitamab Vedotin (FG00-OBS) is an antibody drug conjugate composed of a recombinant  humanized IgG1 anti-HER2 mAb linked to the cytotoxic agent MMAE (monomethyl auristatin E).  Potential Side Effects/Adverse Events: infusion-related reaction, neutropenia, thrombocytopenia, anemia, peripheral neuropathy, elevated liver enzymes, fever, n/v, constipation, diarrhea, pruritus, arthralgia, alopecia, hyperglycemia, elevated triglycerides, hypokalemia, proteinuria, hypoalbuminemia.     PRE-DOSE Vital Signs   Temp, Heart Rate, Respiration, Blood Pressure   @1119     PRE-DOSE ECG: Before Research Labs   Triplicate - Each approximately 2 minutes apart  MISTI 150c Study-specific ECG Machine   Rest at least 10 minutes prior.  Collect within 30 minutes prior to pre-dose research labs.   Contact MD/Provider &  if abnormal from baseline or if QTcF >500 msec  QTcF calculation Fridericia's formula: S:\[email or web address]\Nursing\Protocols\QTcF calculator  ECG #1: QTcF 401    ECG #2: QTcF 405    ECG #3: QTcF 405  Time Point Cycle Completion Time      Pre-dose   2, 3, 5 and  every 3rd cycle thereafter 1226     1228     1230        PRE-DOSE Research Correlatives: After ECGs  Deliver to DCRU/TRPC lab for processing   Access Type: 22G piv Location: R ac   Refer to [place] Treatment Plan for orders   Time point Cycle Specimen Test Volume Tube Handling Draw Time   Pre-dose  (within 1hour) 2,3,5 & every 3rd cycle after PK 2 x 6 mL Red Top Ambient, invert 5x 1233     ADA 4 mL Red Top Ambient, invert 5x 1233          Safety, Side and Adverse Effects with Special Instructions  Research Study Drugs   SEE  Safety Parameters and Special Instructions     Infusion-Related Reactions   [facility] SOC Hypersensivity Orders    SOI: @1246  EOI: @1350  Day 1 Disitamab Vedotin Investigational Product Administration (sponsor provided)   Refer to [place] Treatment Plan for orders   Document medication administration in MAR activity. Document Research specific instructions below.   Disitamab Vedotin   If Infusion-Related Reaction, notify  MD/Provider &     Grade 1: Mild reaction (transient flushing, rash or fever 38° C): may continue at investigator's discretion; administer symptomatic treatment following DCRU management of hypersensitivity reaction; monitor vital signs.    Grade 2: Moderate reaction (rash, flushing, urticaria, dyspnea, fever 38° C, chest discomfort, or back pain): interrupt infusion & follow DCRU management of hypersensitivity reaction; monitor vital signs.   If promptly responds to symptomatic treatment & is medically stable in the opinion of the investigator, infusion may be resumed at a slower rate.   Grade 3: Prolonged recurrence of symptoms or Grade 4: Life-threatening: stop infusion & follow [facility] management of hypersensitivity reaction. Remain at bedside and monitor until recovery from symptoms. Do not restart infusion.      POST-DOSE ECG: Before Research Labs   Triplicate - Each approximately 2 minutes apart  MISTI 150c Study-specific ECG Machine   Rest at least 10 minutes prior.  Collect within 30 minutes prior to pre-dose research labs.   Contact MD/Provider &  if abnormal from baseline or if QTcF >500 msec  QTcF calculation Fridericia's formula: S:\[facility]_Staff\Nursing\Protocols\QTcF calculator  ECG #1: QTcF 401    ECG #2: QTcF 405    ECG #3: QTcF 405  Time Point Cycle Completion Time      POST-dose   2, 3, 5 and  every 3rd cycle thereafter 1352     1354     1357        Day 1 Post-Dose Disitamab Vedotin Research Correlatives  Do Not draw research samples from the same line the investigational drug infused through.  [facility]   Access Type: 23g Location: R ac   Refer to [place] Treatment Plan for orders   Time point Cycle Specimen Test Volume Tube Handling  Draw Time   End of Infusion  (+/-15mins ) 2,3,5 & every 3rd cycle after   PK  2 x 6 mL  Red Top  Ambient, invert 5x   1405        Discharge Instructions   Discharge patient to home after study requirements completed  or PK sample obtained.    Remind patient to return: per   Discharge time: 1429   Esequiel Aguilar RN  07/11/24

## 2024-07-11 NOTE — RESEARCH NOTES
"    Research Note Follow Up Visit       Toro Lujan is here today for consideration of C11D1 treatment and  follow up on SEGE 1822 cohort A and B.  Follow up procedures completed per protocol. Patient is aware of continued follow up plan.    After review of vitals, labs and exam, patient approved for treatment. Patient remains on initial dose and weight is capped at 100 kg, per study.    Patient's weight down 9% from BL.  Still G1 and per Dr. Castanon, not related to drug.    Patient reported some intermittent (\"a handful of times in a week\" numbness/tingling in his fingers bilaterally (greater in right hand than left\" started over the last month.  He said it lasts a few minutes and self resolves.  No numbness/tingling in feet. No slurred speech when numbness occurs.  Has full function of hands.    Patient mentioned that two weeks ago he ordered pizza in [facility] and it was so unappealing that he attributes his low appetite the last few weeks to a lingering effect.  He did not vomit or have diarrhea.  Bowels have been great per patient.    Patient has ongoing concerns about family dynamics.  He mentioned his daughter, Michelle, has been living with him.  He reported that she can lose her temper easily and behave badly which is a source of anxiety for him.  Things have been stable, which has pleased him.  Patient encouraged to continue with his therapist to discuss and manage.      Education Documentation  Stress Management, taught by Emma Mann RN at 7/11/2024 10:00 AM.  Learner: Patient  Readiness: Eager  Method: Explanation  Response: Verbalizes Understanding    Monitoring Weight, taught by Emma Mann RN at 7/11/2024 10:00 AM.  Learner: Patient  Readiness: Eager  Method: Explanation  Response: Verbalizes Understanding    Nutrition/Diet, taught by Emma Mann RN at 7/11/2024 10:00 AM.  Learner: Patient  Readiness: Eager  Method: Explanation  Response: Verbalizes Understanding    Nutrition, taught by Emma" PARKER Mann at 7/11/2024 10:00 AM.  Learner: Patient  Readiness: Eager  Method: Explanation  Response: Verbalizes Understanding    Healthy Lifestyle, taught by Emma Mann RN at 7/11/2024 10:00 AM.  Learner: Patient  Readiness: Eager  Method: Explanation  Response: Verbalizes Understanding    Bleeding Precautions, taught by Emma Mann RN at 7/11/2024 10:00 AM.  Learner: Patient  Readiness: Eager  Method: Explanation  Response: Verbalizes Understanding    Edema Management, taught by Emma Mann RN at 7/11/2024 10:00 AM.  Learner: Patient  Readiness: Eager  Method: Explanation  Response: Verbalizes Understanding    Shortness of Breath, taught by Emma Mann RN at 7/11/2024 10:00 AM.  Learner: Patient  Readiness: Eager  Method: Explanation  Response: Verbalizes Understanding    Changes in Appetite, taught by Emma Mann RN at 7/11/2024 10:00 AM.  Learner: Patient  Readiness: Eager  Method: Explanation  Response: Verbalizes Understanding    Memory Problems, taught by Emma Mann RN at 7/11/2024 10:00 AM.  Learner: Patient  Readiness: Eager  Method: Explanation  Response: Verbalizes Understanding    Skin and Nail Changes, taught by Emma Mann RN at 7/11/2024 10:00 AM.  Learner: Patient  Readiness: Eager  Method: Explanation  Response: Verbalizes Understanding    Changes in Urination, taught by Emma Mann RN at 7/11/2024 10:00 AM.  Learner: Patient  Readiness: Eager  Method: Explanation  Response: Verbalizes Understanding    Care of Neuropathy, taught by Emma Mann RN at 7/11/2024 10:00 AM.  Learner: Patient  Readiness: Eager  Method: Explanation  Response: Verbalizes Understanding    Infection Control, taught by Emma Mann RN at 7/11/2024 10:00 AM.  Learner: Patient  Readiness: Eager  Method: Explanation  Response: Verbalizes Understanding    Alopecia, taught by Emma Mann RN at 7/11/2024 10:00 AM.  Learner: Patient  Readiness: Eager  Method: Explanation  Response: Verbalizes  Understanding    Diarrhea, taught by Emma Mann RN at 7/11/2024 10:00 AM.  Learner: Patient  Readiness: Eager  Method: Explanation  Response: Verbalizes Understanding    Constipation, taught by Emma Mann RN at 7/11/2024 10:00 AM.  Learner: Patient  Readiness: Eager  Method: Explanation  Response: Verbalizes Understanding    Mouth Sores, taught by Emma Mann RN at 7/11/2024 10:00 AM.  Learner: Patient  Readiness: Eager  Method: Explanation  Response: Verbalizes Understanding    Nausea Management, taught by Emma Mann RN at 7/11/2024 10:00 AM.  Learner: Patient  Readiness: Eager  Method: Explanation  Response: Verbalizes Understanding    Fatigue, taught by Emma Mann RN at 7/11/2024 10:00 AM.  Learner: Patient  Readiness: Eager  Method: Explanation  Response: Verbalizes Understanding    Treatment Plan and Schedule, taught by Emma Mann RN at 7/11/2024 10:00 AM.  Learner: Patient  Readiness: Eager  Method: Explanation  Response: Verbalizes Understanding    Comprehensive Metabolic Panel (CMP), taught by Emma Mann RN at 7/11/2024 10:00 AM.  Learner: Patient  Readiness: Eager  Method: Explanation  Response: Verbalizes Understanding    Complete Blood Count with Differential (CBC w/ Diff), taught by Emma Mann RN at 7/11/2024 10:00 AM.  Learner: Patient  Readiness: Eager  Method: Explanation  Response: Verbalizes Understanding    Education Comments  No comments found.

## 2024-07-19 ENCOUNTER — APPOINTMENT (OUTPATIENT)
Dept: OPHTHALMOLOGY | Facility: CLINIC | Age: 69
End: 2024-07-19
Payer: COMMERCIAL

## 2024-07-19 DIAGNOSIS — C67.8 MALIGNANT NEOPLASM OF OVERLAPPING SITES OF BLADDER (MULTI): ICD-10-CM

## 2024-07-19 DIAGNOSIS — H53.9 VISUAL DISTURBANCE: ICD-10-CM

## 2024-07-19 DIAGNOSIS — H25.813 COMBINED FORMS OF AGE-RELATED CATARACT OF BOTH EYES: Primary | ICD-10-CM

## 2024-07-19 DIAGNOSIS — H53.15 VISUAL DISTORTIONS OF SHAPE AND SIZE: ICD-10-CM

## 2024-07-19 ASSESSMENT — CONF VISUAL FIELD
OD_INFERIOR_NASAL_RESTRICTION: 0
OS_NORMAL: 1
OS_INFERIOR_NASAL_RESTRICTION: 0
OD_SUPERIOR_TEMPORAL_RESTRICTION: 0
OD_NORMAL: 1
OD_INFERIOR_TEMPORAL_RESTRICTION: 0
OS_SUPERIOR_TEMPORAL_RESTRICTION: 0
OD_SUPERIOR_NASAL_RESTRICTION: 0
OS_INFERIOR_TEMPORAL_RESTRICTION: 0
OS_SUPERIOR_NASAL_RESTRICTION: 0

## 2024-07-19 ASSESSMENT — REFRACTION_WEARINGRX
OS_CYLINDER: -1.00
OD_CYLINDER: -0.50
OS_ADD: +2.50
OS_AXIS: 042
OS_SPHERE: -3.50
OD_ADD: +2.50
OD_AXIS: 062
OD_SPHERE: -3.50

## 2024-07-19 ASSESSMENT — REFRACTION_MANIFEST
OS_CYLINDER: -1.00
OS_AXIS: 045
OD_SPHERE: UNABLE
OS_SPHERE: -4.50
OD_CYLINDER: -0.50
OD_ADD: +2.75
METHOD_AUTOREFRACTION: 1
OD_SPHERE: -3.50
OS_ADD: +2.75
OD_AXIS: 060
OS_SPHERE: UNABLE

## 2024-07-19 ASSESSMENT — TONOMETRY
OD_IOP_MMHG: 10
OS_IOP_MMHG: 11
IOP_METHOD: GOLDMANN APPLANATION

## 2024-07-19 ASSESSMENT — SLIT LAMP EXAM - LIDS
COMMENTS: NORMAL
COMMENTS: NORMAL

## 2024-07-19 ASSESSMENT — VISUAL ACUITY
OD_CC: HM
OS_CC: 20/400
METHOD: SNELLEN - LINEAR
CORRECTION_TYPE: GLASSES
OS_PH_CC: 20/70
OS_PH_CC+: -2

## 2024-07-19 ASSESSMENT — CUP TO DISC RATIO: OS_RATIO: 0.3

## 2024-07-19 ASSESSMENT — ENCOUNTER SYMPTOMS: EYES NEGATIVE: 1

## 2024-07-19 ASSESSMENT — EXTERNAL EXAM - RIGHT EYE: OD_EXAM: NORMAL

## 2024-07-19 ASSESSMENT — EXTERNAL EXAM - LEFT EYE: OS_EXAM: NORMAL

## 2024-07-19 NOTE — PROGRESS NOTES
Reduced VA HM OD and 20/100 OS due to dense cataracts OD>>OS. No view OD and good view OS and posterior segment normal OS.   Treated for bladder cancer. Had chemotherapy and in clinical trial currently.  Optical coherence tomography of the macula revealed:   OD: No view  OS:  Poor view. Grossly normal foveal contour, photoreceptor, retinal pigment epithelium, IS/OS junction, central field 125 micron. Findings are normal.    Referred for cataract pre-op testing. Patient advised may need ultrasound to rule out posterior segment disease.      Alert and oriented to person, place and time

## 2024-07-23 RX ORDER — EPINEPHRINE 0.3 MG/.3ML
0.3 INJECTION SUBCUTANEOUS EVERY 5 MIN PRN
Status: CANCELLED | OUTPATIENT
Start: 2024-07-24

## 2024-07-23 RX ORDER — DIPHENHYDRAMINE HYDROCHLORIDE 50 MG/ML
50 INJECTION INTRAMUSCULAR; INTRAVENOUS AS NEEDED
Status: CANCELLED | OUTPATIENT
Start: 2024-07-24

## 2024-07-23 RX ORDER — FAMOTIDINE 10 MG/ML
20 INJECTION INTRAVENOUS ONCE AS NEEDED
Status: CANCELLED | OUTPATIENT
Start: 2024-07-24

## 2024-07-23 RX ORDER — PROCHLORPERAZINE EDISYLATE 5 MG/ML
10 INJECTION INTRAMUSCULAR; INTRAVENOUS EVERY 6 HOURS PRN
Status: CANCELLED | OUTPATIENT
Start: 2024-07-24

## 2024-07-23 RX ORDER — ALBUTEROL SULFATE 0.83 MG/ML
3 SOLUTION RESPIRATORY (INHALATION) AS NEEDED
Status: CANCELLED | OUTPATIENT
Start: 2024-07-24

## 2024-07-23 RX ORDER — PROCHLORPERAZINE MALEATE 10 MG
10 TABLET ORAL EVERY 6 HOURS PRN
Status: CANCELLED | OUTPATIENT
Start: 2024-07-24

## 2024-07-24 ENCOUNTER — HOSPITAL ENCOUNTER (OUTPATIENT)
Dept: RESEARCH | Facility: HOSPITAL | Age: 69
Discharge: HOME | End: 2024-07-24
Payer: MEDICARE

## 2024-07-24 ENCOUNTER — OFFICE VISIT (OUTPATIENT)
Dept: HEMATOLOGY/ONCOLOGY | Facility: HOSPITAL | Age: 69
End: 2024-07-24
Payer: MEDICARE

## 2024-07-24 ENCOUNTER — EDUCATION (OUTPATIENT)
Dept: HEMATOLOGY/ONCOLOGY | Facility: HOSPITAL | Age: 69
End: 2024-07-24

## 2024-07-24 VITALS
DIASTOLIC BLOOD PRESSURE: 54 MMHG | HEART RATE: 72 BPM | BODY MASS INDEX: 36.02 KG/M2 | SYSTOLIC BLOOD PRESSURE: 96 MMHG | OXYGEN SATURATION: 96 % | TEMPERATURE: 97.9 F | RESPIRATION RATE: 18 BRPM | WEIGHT: 250.22 LBS

## 2024-07-24 DIAGNOSIS — C67.8 MALIGNANT NEOPLASM OF OVERLAPPING SITES OF BLADDER (MULTI): Primary | ICD-10-CM

## 2024-07-24 LAB
ALBUMIN SERPL BCP-MCNC: 4.1 G/DL (ref 3.4–5)
ALP SERPL-CCNC: 102 U/L (ref 33–136)
ALT SERPL W P-5'-P-CCNC: 24 U/L (ref 10–52)
ANION GAP SERPL CALC-SCNC: 12 MMOL/L (ref 10–20)
AST SERPL W P-5'-P-CCNC: 19 U/L (ref 9–39)
BASOPHILS # BLD AUTO: 0.08 X10*3/UL (ref 0–0.1)
BASOPHILS NFR BLD AUTO: 0.9 %
BILIRUB DIRECT SERPL-MCNC: 0.1 MG/DL (ref 0–0.3)
BILIRUB SERPL-MCNC: 0.6 MG/DL (ref 0–1.2)
BUN SERPL-MCNC: 22 MG/DL (ref 6–23)
CALCIUM SERPL-MCNC: 8.4 MG/DL (ref 8.6–10.6)
CHLORIDE SERPL-SCNC: 98 MMOL/L (ref 98–107)
CHOLEST SERPL-MCNC: 146 MG/DL (ref 0–199)
CHOLESTEROL/HDL RATIO: 4.5
CO2 SERPL-SCNC: 26 MMOL/L (ref 21–32)
CREAT SERPL-MCNC: 1.17 MG/DL (ref 0.5–1.3)
EGFRCR SERPLBLD CKD-EPI 2021: 67 ML/MIN/1.73M*2
EOSINOPHIL # BLD AUTO: 0.36 X10*3/UL (ref 0–0.7)
EOSINOPHIL NFR BLD AUTO: 3.9 %
ERYTHROCYTE [DISTWIDTH] IN BLOOD BY AUTOMATED COUNT: 14.6 % (ref 11.5–14.5)
GLUCOSE P FAST SERPL-MCNC: 137 MG/DL (ref 74–99)
GLUCOSE SERPL-MCNC: 137 MG/DL (ref 74–99)
HCT VFR BLD AUTO: 41.3 % (ref 41–52)
HDLC SERPL-MCNC: 32.4 MG/DL
HGB BLD-MCNC: 14.2 G/DL (ref 13.5–17.5)
IMM GRANULOCYTES # BLD AUTO: 0.03 X10*3/UL (ref 0–0.7)
IMM GRANULOCYTES NFR BLD AUTO: 0.3 % (ref 0–0.9)
LDLC SERPL CALC-MCNC: 71 MG/DL
LYMPHOCYTES # BLD AUTO: 2.3 X10*3/UL (ref 1.2–4.8)
LYMPHOCYTES NFR BLD AUTO: 25.1 %
MAGNESIUM SERPL-MCNC: 1.85 MG/DL (ref 1.6–2.4)
MCH RBC QN AUTO: 30.7 PG (ref 26–34)
MCHC RBC AUTO-ENTMCNC: 34.4 G/DL (ref 32–36)
MCV RBC AUTO: 89 FL (ref 80–100)
MONOCYTES # BLD AUTO: 0.79 X10*3/UL (ref 0.1–1)
MONOCYTES NFR BLD AUTO: 8.6 %
NEUTROPHILS # BLD AUTO: 5.62 X10*3/UL (ref 1.2–7.7)
NEUTROPHILS NFR BLD AUTO: 61.2 %
NON HDL CHOLESTEROL: 114 MG/DL (ref 0–149)
NRBC BLD-RTO: 0 /100 WBCS (ref 0–0)
PHOSPHATE SERPL-MCNC: 3.7 MG/DL (ref 2.5–4.9)
PLATELET # BLD AUTO: 236 X10*3/UL (ref 150–450)
POTASSIUM SERPL-SCNC: 4.2 MMOL/L (ref 3.5–5.3)
PROT SERPL-MCNC: 6.6 G/DL (ref 6.4–8.2)
RBC # BLD AUTO: 4.63 X10*6/UL (ref 4.5–5.9)
SODIUM SERPL-SCNC: 132 MMOL/L (ref 136–145)
TRIGL SERPL-MCNC: 212 MG/DL (ref 0–149)
VLDL: 42 MG/DL (ref 0–40)
WBC # BLD AUTO: 9.2 X10*3/UL (ref 4.4–11.3)

## 2024-07-24 PROCEDURE — 80053 COMPREHEN METABOLIC PANEL: CPT

## 2024-07-24 PROCEDURE — 83735 ASSAY OF MAGNESIUM: CPT

## 2024-07-24 PROCEDURE — 2560000001 HC RX 256 EXPERIMENTAL DRUGS: Performed by: NURSE PRACTITIONER

## 2024-07-24 PROCEDURE — 82248 BILIRUBIN DIRECT: CPT

## 2024-07-24 PROCEDURE — 85025 COMPLETE CBC W/AUTO DIFF WBC: CPT

## 2024-07-24 PROCEDURE — 99215 OFFICE O/P EST HI 40 MIN: CPT | Mod: 25 | Performed by: NURSE PRACTITIONER

## 2024-07-24 PROCEDURE — 84100 ASSAY OF PHOSPHORUS: CPT

## 2024-07-24 PROCEDURE — 80061 LIPID PANEL: CPT

## 2024-07-24 PROCEDURE — C9399 UNCLASSIFIED DRUGS OR BIOLOG: HCPCS | Performed by: NURSE PRACTITIONER

## 2024-07-24 PROCEDURE — 82947 ASSAY GLUCOSE BLOOD QUANT: CPT

## 2024-07-24 PROCEDURE — 96413 CHEMO IV INFUSION 1 HR: CPT

## 2024-07-24 RX ORDER — HEPARIN 100 UNIT/ML
500 SYRINGE INTRAVENOUS AS NEEDED
OUTPATIENT
Start: 2024-07-24

## 2024-07-24 RX ORDER — HEPARIN SODIUM,PORCINE/PF 10 UNIT/ML
50 SYRINGE (ML) INTRAVENOUS AS NEEDED
OUTPATIENT
Start: 2024-07-24

## 2024-07-24 NOTE — PROGRESS NOTES
Patient ID: Toro Lujan is a 69 y.o. male.  Attending Physician: Dr. Topher Castanon  Cancer Diagnosis:  Cancer Staging   No matching staging information was found for the patient.       Current Therapy: SEGE 1822: Disitamab Vedotin, cohorts A and B in HER2-overexpressing IHC 1+, 2+, 3+ for locally advanced unresectable or metastatic UC who have received prior platinum-based chemotherapy  Dose reduced to 1.1 mg/kg IV every 2 weeks  Genetics: NA    Subjective      Cancer History:  Oncology History   Malignant neoplasm of overlapping sites of bladder (Multi)   2/13/2024 Initial Diagnosis    Malignant neoplasm of overlapping sites of bladder (CMS/HCC)     2/14/2024 -  Research Study Participant    ([facility]) [id number] Intensive PK Cohort - Disitamab Vedotin, 14 Day Cycles  Plan Provider: Fan Rapp MD PhD  Treatment goal: Control  Line of treatment: Second Line  Associated studies: A Study of Disitamab Vedotin in Subjects With HER2 Expressing Urothelial Carcinoma       11/5/2020: CTU shows mildly asymmetric generalized urinary bladder wall thickening with mild mucosal hyperenhancement, large prostate  1/29/2021: TURBT. 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 Posterior tumor reveals papillary high-grade at least pTa urothelial carcinoma with no definite invasion, no muscle present  2/24/2021: Bladder tumor path shows high grade focally invasive urothelial carcinoma into the lamina propria, non-muscle invasive.   5/6/2021: Underwent radical cystectomy with ilial conduit due to multiple multiple disease recurrences without MIBC.  12/13/2021: CTU suspicious for new enlarged pelvic LN  2/17/2022: Started chemotherapy with split-dose gemcitabine and cisplatin  3/10/2022: C2 gem/cis  3/31/2022: C3 gem/cis  4/20/2022: Restaging scans revealed response  4/21/2022: C4 gem/cis  5/12/2022: C5 gem/cis  6/24/2022: Started maintenance avelumab. Treatment was placed on  hold due to insurance coverage issues after 1 cycle  8/24/2022: CT scans stable  9/2/2022: Restarted avelumab    3/2/2023: Last dose avelumab, off after  12/28/2023: CT reveals increase in left para-aortic LN highly suspicious for metastatic LAD.  1/18/2024: Again increase in para-aortic LN.  1/31/2024: Screening for SEGE 1822: Disitamab vedotin  2/7/2024: Screening for SEGE 1822  2/14/2024: Cycle 1 day 1 disitamab vedotin as part of clinical trial  7/24/2024: Cycle 12 day 1 DV    Interval History:  Mr. Lujan presents today in consideration of cycle 12 day 1 DV. He has been feeling well overall. Continues to have decent energy, slightly decreased from pre-treatment, but still able to function well. Right hand, up to MCP joints have intermittent paresthesias. This is more often not there than there, and does not inhibit any motor function. He says in the last week he noticed this, but did not notice right away. Otherwise, he has no new or concerning symptoms. The remainder of his ROS is otherwise negative.  HPI    Objective    BSA: There is no height or weight on file to calculate BSA.  There were no vitals taken for this visit.    Physical Exam  General: alert, well-dressed in NAD. Speech is fluent and coherent, words clear. Good insight. Oriented x4   Skin: warm, dry, and pink without cyanosis or nail clubbing. No rash, petechiae, or ecchymoses  HEENT: Normocephalic atraumatic. Sclera white, conjunctiva pink. EOMs intact. Hearing intact to spoken voice. Oral mucosa pink. No visible lesions  Respiratory: Chest expansion symmetric. Breath sounds vesicular in all lobes without crackles, wheezes, or rhonchi bilaterally.  CV: S1S2 audible and crisp without murmurs, rubs, or extra sounds. RRR. Radial pulses strong and regular. Extremities warm and pink. No edema bilaterally.  Lymph: no palpable cervical, submandibular, or supraclavicular lymphadenopathy.  GI: abdomen is round, soft, and non-tender. Active bowel sounds.    Psych: engaged, polite, appropriate conversation and eye contact.    Current Medications:    Current Outpatient Medications:     acetaminophen (Tylenol) 500 mg tablet, Take 2 tablets (1,000 mg) by mouth every 8 hours if needed., Disp: , Rfl:     Advair Diskus 100-50 mcg/dose diskus inhaler, INHALE 1 PUFF BY MOUTH TWICE DAILY AS DIRECTED. RINSE AND GARGLE MOUTH WITH WATER AFTER EACH USE, Disp: , Rfl:     aspirin 81 mg EC tablet, Take 1 tablet (81 mg) by mouth once daily., Disp: , Rfl:     lisinopril 20 mg tablet, Take 1 tablet (20 mg) by mouth once daily., Disp: , Rfl:     metoprolol tartrate (Lopressor) 25 mg tablet, Take 1 tablet (25 mg) by mouth 2 times a day., Disp: , Rfl:     pantoprazole (ProtoNix) 40 mg EC tablet, Take 1 tablet (40 mg) by mouth once daily., Disp: , Rfl:     rosuvastatin (Crestor) 40 mg tablet, Take 1 tablet (40 mg) by mouth once daily., Disp: , Rfl:     sertraline (Zoloft) 50 mg tablet, Take 1 tablet (50 mg) by mouth once daily., Disp: , Rfl:     ALPRAZolam (Xanax) 0.5 mg tablet, Take one tablet one hour before anxiety-provoking situation. Limits one tab/day, three tabs/week., Disp: , Rfl:      Most Recent Labs:  Results for orders placed or performed during the hospital encounter of 07/24/24   CBC and Auto Differential   Result Value Ref Range    WBC 9.2 4.4 - 11.3 x10*3/uL    nRBC 0.0 0.0 - 0.0 /100 WBCs    RBC 4.63 4.50 - 5.90 x10*6/uL    Hemoglobin 14.2 13.5 - 17.5 g/dL    Hematocrit 41.3 41.0 - 52.0 %    MCV 89 80 - 100 fL    MCH 30.7 26.0 - 34.0 pg    MCHC 34.4 32.0 - 36.0 g/dL    RDW 14.6 (H) 11.5 - 14.5 %    Platelets 236 150 - 450 x10*3/uL    Neutrophils % 61.2 40.0 - 80.0 %    Immature Granulocytes %, Automated 0.3 0.0 - 0.9 %    Lymphocytes % 25.1 13.0 - 44.0 %    Monocytes % 8.6 2.0 - 10.0 %    Eosinophils % 3.9 0.0 - 6.0 %    Basophils % 0.9 0.0 - 2.0 %    Neutrophils Absolute 5.62 1.20 - 7.70 x10*3/uL    Immature Granulocytes Absolute, Automated 0.03 0.00 - 0.70 x10*3/uL     "Lymphocytes Absolute 2.30 1.20 - 4.80 x10*3/uL    Monocytes Absolute 0.79 0.10 - 1.00 x10*3/uL    Eosinophils Absolute 0.36 0.00 - 0.70 x10*3/uL    Basophils Absolute 0.08 0.00 - 0.10 x10*3/uL      No results found for: \"PSA\"     Performance Status:  ECOG Score: 0- Fully active, able to carry on all pre-disease performance w/o restriction.  Karnofsky Score: 100 - Fully active, able to carry on all pre-disease performed without restriction    Assessment/Plan   Toro Lujan is a 69 y.o. male with mUC to LN who presents in follow up and consideration of cycle 12 day 1 SEGE 1822 on DV. He looks and feels well. Labs relatively unremarkable. Will continue on therapy. Discussed his neuropathy with PI Dr. Rapp (unable to reach Dr. Castanon), who advised dose reduction 1 level to 1.1 mg/kg.     # mUC  - Continue SEGE 1822 with DV, dose-reduce for neuropathy to 1.1  - Continue to monitor labs    # Hypocalcemia  -  Slightly low today, advised to continue tums    # Hyponatremia  - Had drank plain water quite a bit just before labs, OK to salt foods.    # Hypomagnesemia  - Resolved    # Triglycerides  - Continue with cardiology  - Monitor    # Health Maintenance  - Continue with PCP and other healthcare providers  - Advised exercise, heart-healthy diet    RTC per protocol    Total time spent on this encounter was 60 minutes, which included preparation, direct time with patient, documentation, and care coordination on the day of visit.    Daniella Pineda, MSN, APRN, AGNP-C, AOCNP  Associate Nurse Practitioner  [facility]  "

## 2024-07-24 NOTE — RESEARCH NOTES
[facility]<[id number]><C2,3,5+D1><INTENSIVEPK>  DCRU NURSING VISIT NOTE  Study Name: [id number]  IRB#: [id number]  DCRU#: # [id number]  Protocol Version Dated: V9 A8 06.06.23   PI: Fan Rapp MD.    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  Cycle = 14 days  Disitamab Vedotin administered IV Day 1 every 2 weeks     Admission and Prior to Starting Study Activities   1. Notify  when patient arrives to unit.  2. Complete DCRU/[facility] intake form in [facility].  4. Study Questionnaires   Coordinator will obtain - Cycle 2, 3, then every other cycle.  5. Obtain weight in kilograms - with shoes off & heavy items removed.  6. Obtain vital signs. Record in EMR.  7. Insert one peripheral IV line for sample collection procedures (flush line with normal saline following each blood draw). Access mediport (if available) otherwise insert second peripheral line in opposite arm for Investigative drug administration (if peripheral line, flush line with normal saline before & after infusion)  8. Do Not draw research samples from the same line the investigational drug infused through.  9. Physical Exam < 1 day  10. Confirm patient fasting for clinical safety labs (every 3rd month, if ordered).     Criteria to Treat   DCRU RN reviewed and meets eligibility to proceed with treatment plan   Time team notified: 1000 am  DCRU RN notifies study team to review eligibility and approval before dosing  procedures  Time team approves: 1030 am     Dietary Guidelines   REGULAR     Subjective   Toro Lujan is a 69 y.o. male and is here for a Research clinical visit.    Visit Provider: [facility] ROOM 2 [facility]     Allergies: No Known Allergies    Objective     Vital Signs:    Vitals:    07/24/24 0810 07/24/24 1152   BP: 111/74 96/54   Pulse: 72 72   Resp: 16 18   Temp: 36.3 °C (97.3 °F) 36.6 °C (97.9 °F)   TempSrc: Oral Oral   SpO2: 99% 96%   Weight: 113 kg (250 lb 3.6 oz)        Physical Exam     ASSESSMENT and PLAN:  Problem List Items Addressed This Visit       Malignant neoplasm of overlapping sites of bladder (Multi) - Primary    Relevant Medications    Study [id number] disitamab vedotin 110.3 mg in sodium chloride 0.9% 250 mL IV (Completed)    Other Relevant Orders    Clinic Appointment Request (Completed)    Infusion Appointment Request (Completed)    CBC and Auto Differential (Completed)    Comprehensive metabolic panel (Completed)    Bilirubin, Direct (Completed)    Glucose, Fasting (Completed)    Lipid panel (Completed)    Magnesium (Completed)    Phosphorus (Completed)    Infusion Appointment Request (Completed)    CBC and Auto Differential (Completed)    Comprehensive metabolic panel (Completed)    Bilirubin, Direct (Completed)    Glucose, Fasting (Completed)    Lipid panel (Completed)    Magnesium (Completed)    Phosphorus (Completed)    Adult diet Regular    Treatment Conditions (Completed)    Provider Communication - [id number] (Completed)    Nursing Communication - Vascular Access (Completed)    Insert peripheral IV    Convert IV to saline lock        Medications as of the completion of today's visit:  Current Outpatient Medications   Medication Sig Dispense Refill    acetaminophen (Tylenol) 500 mg tablet Take 2 tablets (1,000 mg) by mouth every 8 hours if needed.      Advair Diskus 100-50 mcg/dose diskus inhaler INHALE 1 PUFF BY MOUTH TWICE DAILY AS DIRECTED. RINSE AND GARGLE MOUTH WITH WATER AFTER  EACH USE      ALPRAZolam (Xanax) 0.5 mg tablet Take one tablet one hour before anxiety-provoking situation. Limits one tab/day, three tabs/week.      aspirin 81 mg EC tablet Take 1 tablet (81 mg) by mouth once daily.      lisinopril 20 mg tablet Take 1 tablet (20 mg) by mouth once daily.      metoprolol tartrate (Lopressor) 25 mg tablet Take 1 tablet (25 mg) by mouth 2 times a day.      pantoprazole (ProtoNix) 40 mg EC tablet Take 1 tablet (40 mg) by mouth once daily.      rosuvastatin (Crestor) 40 mg tablet Take 1 tablet (40 mg) by mouth once daily.      sertraline (Zoloft) 50 mg tablet Take 1 tablet (50 mg) by mouth once daily.       No current facility-administered medications for this encounter.       Administrations This Visit       Study [id number] disitamab vedotin 110.3 mg in sodium chloride 0.9% 250 mL IV       Admin Date  07/24/2024 Action  New Bag Dose  110.3 mg Rate  250 mL/hr Route  intravenous Documented By  Levi Echevarria RN                    Orders placed during today's visit:  Orders Placed This Encounter   Procedures    CBC and Auto Differential     Standing Status:   Future     Number of Occurrences:   1     Standing Expiration Date:   7/23/2025     Order Specific Question:   Release result to MyChart     Answer:   Immediate [1]    Comprehensive metabolic panel     Standing Status:   Future     Number of Occurrences:   1     Standing Expiration Date:   7/23/2025     Order Specific Question:   Release result to MyChart     Answer:   Immediate [1]    Bilirubin, Direct     Standing Status:   Future     Number of Occurrences:   1     Standing Expiration Date:   7/23/2025     Order Specific Question:   Release result to MyChart     Answer:   Immediate [1]    Glucose, Fasting     Standing Status:   Future     Number of Occurrences:   1     Standing Expiration Date:   7/23/2025     Order Specific Question:   Release result to MyChart     Answer:   Immediate [1]    Lipid panel     Standing Status:    Future     Number of Occurrences:   1     Standing Expiration Date:   7/23/2025     Order Specific Question:   Release result to MyChart     Answer:   Immediate [1]    Magnesium     Standing Status:   Future     Number of Occurrences:   1     Standing Expiration Date:   7/23/2025     Order Specific Question:   Release result to MyChart     Answer:   Immediate [1]    Phosphorus     Standing Status:   Future     Number of Occurrences:   1     Standing Expiration Date:   7/23/2025     Order Specific Question:   Release result to MyChart     Answer:   Immediate [1]    CBC and Auto Differential     Standing Status:   Standing     Number of Occurrences:   1     Order Specific Question:   Release result to MyChart     Answer:   Immediate [1]    Comprehensive metabolic panel     Standing Status:   Standing     Number of Occurrences:   1     Order Specific Question:   Release result to MyChart     Answer:   Immediate [1]    Bilirubin, Direct     Standing Status:   Standing     Number of Occurrences:   1     Order Specific Question:   Release result to MyChart     Answer:   Immediate [1]    Glucose, Fasting     Standing Status:   Standing     Number of Occurrences:   1     Order Specific Question:   Release result to MyChart     Answer:   Immediate [1]    Lipid panel     Standing Status:   Standing     Number of Occurrences:   1     Order Specific Question:   Release result to MyChart     Answer:   Immediate [1]    Magnesium     Standing Status:   Standing     Number of Occurrences:   1     Order Specific Question:   Release result to MyChart     Answer:   Immediate [1]    Phosphorus     Standing Status:   Standing     Number of Occurrences:   1     Order Specific Question:   Release result to MyChart     Answer:   Immediate [1]    Adult diet Regular     Standing Status:   Standing     Number of Occurrences:   1     Order Specific Question:   Diet type     Answer:   Regular    Treatment Conditions     Hold treatment and  notify provider if:   ANC LESS THAN 1 x 10*3/uL   AST/ALT GREATER THAN 3 x ULN   Total Bilirubin GREATER THAN 2 x ULN   LVEF LESS THAN 50%    Fasting or Non-fasting Glucose GREATER THAN 250 mg/dL   Peripheral Neuropathy GREATER THAN OR EQUAL TO Grade 2   Adverse Event GREATER THAN OR EQUAL TO Grade 3     Standing Status:   Standing     Number of Occurrences:   1    Provider Communication - [id number]      Starting Dose Level             Disitamab Vedotin 1.5 mg/kg   Dose Level -1             Disitamab Vedotin 1.1 mg/kg   Dose Level -2             Disitamab Vedotin 0.75 mg/kg     Standing Status:   Standing     Number of Occurrences:   1    Nursing Communication - Vascular Access     Refer to the vascular access device resource page and the following policies for additional information on line insertion, maintenance and removal.    CP-148 - Central Vascular Access Device Insertion, Maintenance, and Removal, Adult  NP-28 - Midline Cathter Maintenance & Removal, Adult  NP-34 - High-flow Catheters, (e.g. Hemodialysis, Apheresis, and Continuous Renal Replacement Therapy [CRRT]), Care and Utilization, Adult  NP-39 - Peripheral Intravenous Catheter (PIV) Insertion, Maintenance, Blood Collection & Removal - Adult     Standing Status:   Standing     Number of Occurrences:   1    Insert peripheral IV     Obtain venous access for treatment via PIV if no CVAD access or if unable to obtain blood return from CVAD.     Standing Status:   Standing     Number of Occurrences:   1    Convert IV to saline lock     Only if venous access is required over consecutive days. Peripheral catheter can remain in place until completion of last consecutive day infusion, unless IV-related complications are encountered.     Standing Status:   Standing     Number of Occurrences:   1        [id number] - A Study of Disitamab Vedotin in Subjects With HER2 Expressing Urothelial Carcinoma    Patient has no adverse events documented in the Research Adverse  Events activity.       Study Specific Instructions and Documentation   VITALS  LABS  VITALS   ECG (Cycles 2, 3, 5 and Every 3rd cycle thereafter)  CORRELATIVES (Cycles 2, 3, 5 and Every 3rd cycle thereafter)  PREMEDS  STUDY DRUG (DV)  CORRELATIVES (Cycles 2, 3, 5 and Every 3rd cycle thereafter)  DISCHARGE     PRE-DOSE Safety Labs  < 1 Day prior to Day 1     Refer to [place] Treatment Plan for orders   @ 0829 - 22g peripheral IV / R antecubital     Safety Parameters and Special Instructions   Disitamab Vedotin (NS45-HVA) is an antibody drug conjugate composed of a recombinant humanized IgG1 anti-HER2 mAb linked to the cytotoxic agent MMAE (monomethyl auristatin E).  Potential Side Effects/Adverse Events: infusion-related reaction, neutropenia, thrombocytopenia, anemia, peripheral neuropathy, elevated liver enzymes, fever, n/v, constipation, diarrhea, pruritus, arthralgia, alopecia, hyperglycemia, elevated triglycerides, hypokalemia, proteinuria, hypoalbuminemia.     PRE-DOSE Vital Signs   Temp, Heart Rate, Respiration, Blood Pressure    @ 1152 - VS table above     PRE-DOSE Medications (if previous infusion reaction)   Refer to [place] Treatment Plan for orders   Premedication 30 minutes prior to Disitamab Vedotin    N/A    Safety, Side and Adverse Effects with Special Instructions  Research Study Drugs   SEE  Safety Parameters and Special Instructions     Infusion-Related Reactions   [facility] Hypersensivity Orders      Weight-Based Dosing   Baseline (screening) weight (kg) 125 kg     Date measured 2/7/24 Actual weight (kg) 113.5 kg   (Weight on Day 1)   Date measured 7/24/24   Dosing should be based on the patient's actual weight    Adjust dose if patient's weight changes +/- 10% from baseline during the study.   Patients > 100 kg, total dose will be calculated using 100 kg (Maximum = 200 mg) once every 2 week cycle.      Day 1 Disitamab Vedotin Investigational Product Administration (sponsor provided)   Refer to [place]  Treatment Plan for orders   Document medication administration in MAR activity. Document Research specific instructions below.   Disitamab Vedotin   If Infusion-Related Reaction, notify MD/Provider &     Grade 1: Mild reaction (transient flushing, rash or fever 38° C): may continue at investigator's discretion; administer symptomatic treatment following DCRU management of hypersensitivity reaction; monitor vital signs.    Grade 2: Moderate reaction (rash, flushing, urticaria, dyspnea, fever 38° C, chest discomfort, or back pain): interrupt infusion & follow DCRU management of hypersensitivity reaction; monitor vital signs.   If promptly responds to symptomatic treatment & is medically stable in the opinion of the investigator, infusion may be resumed at a slower rate.   Grade 3: Prolonged recurrence of symptoms or Grade 4: Life-threatening: stop infusion & follow DCRU management of hypersensitivity reaction. Remain at bedside and monitor until recovery from symptoms. Do not restart infusion.    Start Disitimab Vedotin: 1202 - 22g peripheral IV / R antecubital   EOI: 1303  Dose was reduced to 1.1mg/kg due to Grade 1 neuropathy.    Discharge Instructions   Discharge patient to home after study requirements completed or PK sample obtained.    Remind patient to return: per   Discharge time: 1308     Levi Echevarria RN  07/24/24

## 2024-07-24 NOTE — RESEARCH NOTES
Research Note Follow Up Visit       Toro Lujan is here today for consideration of C12D1 treatment and follow up on SEGE 1822.  Follow up procedures completed per protocol. Patient is aware of continued follow up plan.    After review of exam, labs, vitals, Aes and conmeds, treatment GIVEN.  Dose REDUCED for 1.1. mg/kg after discussion with both Dr. Castanon and Dr. Rapp, due to G1 intermittent sensory peripheral neuropathy in the patient's right hand.      Sponsor confirmed that even though Amendment 9 is IRB approved, we are still following Amendment 8 pending new orders.    Patient had no other complaints.          Education Documentation  Healthy Lifestyle, taught by Emma Mann RN at 7/24/2024 10:15 AM.  Learner: Patient  Readiness: Eager  Method: Explanation  Response: Verbalizes Understanding    Changes in Appetite, taught by Emma Mann RN at 7/24/2024 10:15 AM.  Learner: Patient  Readiness: Eager  Method: Explanation  Response: Verbalizes Understanding    Care of Neuropathy, taught by Emma Mann RN at 7/24/2024 10:15 AM.  Learner: Patient  Readiness: Eager  Method: Explanation  Response: Verbalizes Understanding    Diarrhea, taught by Emma Mann RN at 7/24/2024 10:15 AM.  Learner: Patient  Readiness: Eager  Method: Explanation  Response: Verbalizes Understanding    Fatigue, taught by Emma Mann RN at 7/24/2024 10:15 AM.  Learner: Patient  Readiness: Eager  Method: Explanation  Response: Verbalizes Understanding    Treatment Plan and Schedule, taught by Emma Mann RN at 7/24/2024 10:15 AM.  Learner: Patient  Readiness: Eager  Method: Explanation  Response: Verbalizes Understanding    Comprehensive Metabolic Panel (CMP), taught by Emma Mann RN at 7/24/2024 10:15 AM.  Learner: Patient  Readiness: Eager  Method: Explanation  Response: Verbalizes Understanding    Complete Blood Count with Differential (CBC w/ Diff), taught by Emma Mann RN at 7/24/2024 10:15 AM.  Learner:  Patient  Readiness: Eager  Method: Explanation  Response: Verbalizes Understanding    Education Comments  No comments found.

## 2024-07-31 ENCOUNTER — HOSPITAL ENCOUNTER (OUTPATIENT)
Dept: RADIOLOGY | Facility: HOSPITAL | Age: 69
Discharge: HOME | End: 2024-07-31
Payer: MEDICARE

## 2024-07-31 ENCOUNTER — HOSPITAL ENCOUNTER (OUTPATIENT)
Dept: CARDIOLOGY | Facility: HOSPITAL | Age: 69
Discharge: HOME | End: 2024-07-31
Payer: MEDICARE

## 2024-07-31 DIAGNOSIS — Z00.6 RESEARCH SUBJECT: ICD-10-CM

## 2024-07-31 DIAGNOSIS — Z00.6 EXAMINATION FOR NORMAL COMPARISON OR CONTROL IN CLINICAL RESEARCH: ICD-10-CM

## 2024-07-31 DIAGNOSIS — C67.9 MALIGNANT NEOPLASM OF URINARY BLADDER, UNSPECIFIED SITE (MULTI): ICD-10-CM

## 2024-07-31 PROBLEM — M48.9 CERVICAL SPINE DISEASE: Status: ACTIVE | Noted: 2024-07-31

## 2024-07-31 PROBLEM — E66.9 OBESITY, CLASS II, BMI 35-39.9: Status: ACTIVE | Noted: 2020-07-10

## 2024-07-31 PROBLEM — E78.5 DYSLIPIDEMIA: Status: ACTIVE | Noted: 2024-07-31

## 2024-07-31 PROBLEM — E66.812 OBESITY, CLASS II, BMI 35-39.9: Status: ACTIVE | Noted: 2020-07-10

## 2024-07-31 PROBLEM — N20.1 LEFT URETERAL STONE: Status: ACTIVE | Noted: 2024-07-31

## 2024-07-31 PROBLEM — Z90.79 S/P RADICAL CYSTOPROSTATECTOMY: Status: ACTIVE | Noted: 2024-07-31

## 2024-07-31 PROBLEM — I10 ESSENTIAL HYPERTENSION: Status: ACTIVE | Noted: 2024-07-31

## 2024-07-31 PROBLEM — F41.1 GAD (GENERALIZED ANXIETY DISORDER): Status: ACTIVE | Noted: 2024-07-31

## 2024-07-31 PROBLEM — Z93.6 S/P ILEAL CONDUIT (MULTI): Status: ACTIVE | Noted: 2024-07-31

## 2024-07-31 PROBLEM — Z90.6 S/P RADICAL CYSTOPROSTATECTOMY: Status: ACTIVE | Noted: 2024-07-31

## 2024-07-31 PROBLEM — I25.10 CORONARY ARTERY DISEASE INVOLVING NATIVE CORONARY ARTERY OF NATIVE HEART WITHOUT ANGINA PECTORIS: Status: ACTIVE | Noted: 2017-09-20

## 2024-07-31 PROBLEM — J44.9 CHRONIC OBSTRUCTIVE PULMONARY DISEASE (MULTI): Status: ACTIVE | Noted: 2021-12-11

## 2024-07-31 PROBLEM — R59.0 PELVIC LYMPHADENOPATHY: Status: ACTIVE | Noted: 2024-07-31

## 2024-07-31 LAB
AORTIC VALVE PEAK VELOCITY: 1.41 M/S
AV PEAK GRADIENT: 8 MMHG
AVA (PEAK VEL): 3.71 CM2
EJECTION FRACTION APICAL 4 CHAMBER: 68.9
EJECTION FRACTION: 68 %
LEFT ATRIUM VOLUME AREA LENGTH INDEX BSA: 17.1 ML/M2
LEFT VENTRICULAR OUTFLOW TRACT DIAMETER: 2.3 CM
MITRAL VALVE E/A RATIO: 0.68
MITRAL VALVE E/E' RATIO: 9.72
RIGHT VENTRICLE FREE WALL PEAK S': 12.6 CM/S
TRICUSPID ANNULAR PLANE SYSTOLIC EXCURSION: 2.3 CM

## 2024-07-31 PROCEDURE — 74177 CT ABD & PELVIS W/CONTRAST: CPT

## 2024-07-31 PROCEDURE — 2550000001 HC RX 255 CONTRASTS: Performed by: STUDENT IN AN ORGANIZED HEALTH CARE EDUCATION/TRAINING PROGRAM

## 2024-07-31 PROCEDURE — 2500000004 HC RX 250 GENERAL PHARMACY W/ HCPCS (ALT 636 FOR OP/ED): Performed by: STUDENT IN AN ORGANIZED HEALTH CARE EDUCATION/TRAINING PROGRAM

## 2024-07-31 PROCEDURE — 93306 TTE W/DOPPLER COMPLETE: CPT

## 2024-08-01 ENCOUNTER — TELEMEDICINE (OUTPATIENT)
Dept: HEMATOLOGY/ONCOLOGY | Facility: HOSPITAL | Age: 69
End: 2024-08-01
Payer: COMMERCIAL

## 2024-08-01 DIAGNOSIS — Z00.6 RESEARCH SUBJECT: ICD-10-CM

## 2024-08-01 DIAGNOSIS — C67.8 MALIGNANT NEOPLASM OF OVERLAPPING SITES OF BLADDER (MULTI): Primary | ICD-10-CM

## 2024-08-01 DIAGNOSIS — R11.2 CHEMOTHERAPY INDUCED NAUSEA AND VOMITING: ICD-10-CM

## 2024-08-01 DIAGNOSIS — T45.1X5A CHEMOTHERAPY INDUCED NAUSEA AND VOMITING: ICD-10-CM

## 2024-08-01 DIAGNOSIS — C77.9 REGIONAL LYMPH NODE METASTASIS PRESENT (MULTI): ICD-10-CM

## 2024-08-01 DIAGNOSIS — G62.0 DRUG-INDUCED POLYNEUROPATHY (MULTI): ICD-10-CM

## 2024-08-01 NOTE — PROGRESS NOTES
" Med Onc    Toro Lujan  [id number]  8/1/2024    70 yo male known to me with Met TCC  S/p Chemo and maintenance Avelumab- CR achieved  PD after coming off IO  Started SEGE 1822: Disitamab Vedotin, cohorts A and B in HER2-overexpressing IHC 1+, 2+, 3+ for locally advanced unresectable or metastatic UC who have received prior platinum-based chemotherapy  RI on therapy but sec to AEs- neuropathy last cycle he was Dose reduced to 1.1 mg/kg IV every 2 weeks  Scans yesterday continue to show TB reduction  He feels well and denies new symptoms; peripheral neuropathy unchanged  Will continue on study    Lab Results   Component Value Date    WBC 9.2 07/24/2024    HGB 14.2 07/24/2024    HCT 41.3 07/24/2024    MCV 89 07/24/2024     07/24/2024     Lab Results   Component Value Date    GLUCOSE 137 (H) 07/24/2024    CALCIUM 8.4 (L) 07/24/2024     (L) 07/24/2024    K 4.2 07/24/2024    CO2 26 07/24/2024    CL 98 07/24/2024    BUN 22 07/24/2024    CREATININE 1.17 07/24/2024     No results found for: \"TESTOSTERONE\"  Lab Results   Component Value Date    ALT 24 07/24/2024    AST 19 07/24/2024    ALKPHOS 102 07/24/2024    BILITOT 0.6 07/24/2024       Topher Castanon MD, FACP  Chief, Solid Tumor Oncology Division   Medical Oncology  Professor of Medicine and Urology  /[facility]    "

## 2024-08-07 DIAGNOSIS — C67.8 MALIGNANT NEOPLASM OF OVERLAPPING SITES OF BLADDER (MULTI): ICD-10-CM

## 2024-08-08 ENCOUNTER — OFFICE VISIT (OUTPATIENT)
Dept: HEMATOLOGY/ONCOLOGY | Facility: HOSPITAL | Age: 69
End: 2024-08-08
Payer: MEDICARE

## 2024-08-08 ENCOUNTER — HOSPITAL ENCOUNTER (OUTPATIENT)
Dept: RESEARCH | Facility: HOSPITAL | Age: 69
Discharge: HOME | End: 2024-08-08
Payer: MEDICARE

## 2024-08-08 ENCOUNTER — EDUCATION (OUTPATIENT)
Dept: HEMATOLOGY/ONCOLOGY | Facility: HOSPITAL | Age: 69
End: 2024-08-08
Payer: MEDICARE

## 2024-08-08 VITALS
DIASTOLIC BLOOD PRESSURE: 80 MMHG | HEART RATE: 76 BPM | TEMPERATURE: 97.3 F | SYSTOLIC BLOOD PRESSURE: 131 MMHG | BODY MASS INDEX: 35.14 KG/M2 | WEIGHT: 244.05 LBS | RESPIRATION RATE: 16 BRPM | OXYGEN SATURATION: 96 %

## 2024-08-08 VITALS — HEIGHT: 70 IN | BODY MASS INDEX: 35.49 KG/M2

## 2024-08-08 DIAGNOSIS — C67.9 MALIGNANT NEOPLASM OF URINARY BLADDER, UNSPECIFIED SITE (MULTI): Primary | ICD-10-CM

## 2024-08-08 DIAGNOSIS — C67.8 MALIGNANT NEOPLASM OF OVERLAPPING SITES OF BLADDER (MULTI): ICD-10-CM

## 2024-08-08 LAB
ALBUMIN SERPL BCP-MCNC: 4.1 G/DL (ref 3.4–5)
ALP SERPL-CCNC: 124 U/L (ref 33–136)
ALT SERPL W P-5'-P-CCNC: 21 U/L (ref 10–52)
ANION GAP SERPL CALC-SCNC: 12 MMOL/L (ref 10–20)
AST SERPL W P-5'-P-CCNC: 20 U/L (ref 9–39)
BASOPHILS # BLD AUTO: 0.05 X10*3/UL (ref 0–0.1)
BASOPHILS NFR BLD AUTO: 0.5 %
BILIRUB DIRECT SERPL-MCNC: 0.1 MG/DL (ref 0–0.3)
BILIRUB SERPL-MCNC: 0.5 MG/DL (ref 0–1.2)
BUN SERPL-MCNC: 18 MG/DL (ref 6–23)
CALCIUM SERPL-MCNC: 9.8 MG/DL (ref 8.6–10.6)
CHLORIDE SERPL-SCNC: 99 MMOL/L (ref 98–107)
CHOLEST SERPL-MCNC: 153 MG/DL (ref 0–199)
CHOLESTEROL/HDL RATIO: 4.4
CO2 SERPL-SCNC: 26 MMOL/L (ref 21–32)
CREAT SERPL-MCNC: 1.04 MG/DL (ref 0.5–1.3)
EGFRCR SERPLBLD CKD-EPI 2021: 78 ML/MIN/1.73M*2
EOSINOPHIL # BLD AUTO: 0.45 X10*3/UL (ref 0–0.7)
EOSINOPHIL NFR BLD AUTO: 4.4 %
ERYTHROCYTE [DISTWIDTH] IN BLOOD BY AUTOMATED COUNT: 14.6 % (ref 11.5–14.5)
GLUCOSE P FAST SERPL-MCNC: 144 MG/DL (ref 74–99)
GLUCOSE SERPL-MCNC: 144 MG/DL (ref 74–99)
HCT VFR BLD AUTO: 42.1 % (ref 41–52)
HDLC SERPL-MCNC: 34.4 MG/DL
HGB BLD-MCNC: 14.6 G/DL (ref 13.5–17.5)
IMM GRANULOCYTES # BLD AUTO: 0.06 X10*3/UL (ref 0–0.7)
IMM GRANULOCYTES NFR BLD AUTO: 0.6 % (ref 0–0.9)
LDLC SERPL CALC-MCNC: 91 MG/DL
LYMPHOCYTES # BLD AUTO: 1.91 X10*3/UL (ref 1.2–4.8)
LYMPHOCYTES NFR BLD AUTO: 18.9 %
MAGNESIUM SERPL-MCNC: 1.77 MG/DL (ref 1.6–2.4)
MCH RBC QN AUTO: 30.6 PG (ref 26–34)
MCHC RBC AUTO-ENTMCNC: 34.7 G/DL (ref 32–36)
MCV RBC AUTO: 88 FL (ref 80–100)
MONOCYTES # BLD AUTO: 0.73 X10*3/UL (ref 0.1–1)
MONOCYTES NFR BLD AUTO: 7.2 %
NEUTROPHILS # BLD AUTO: 6.93 X10*3/UL (ref 1.2–7.7)
NEUTROPHILS NFR BLD AUTO: 68.4 %
NON HDL CHOLESTEROL: 119 MG/DL (ref 0–149)
NRBC BLD-RTO: 0 /100 WBCS (ref 0–0)
PHOSPHATE SERPL-MCNC: 4.7 MG/DL (ref 2.5–4.9)
PLATELET # BLD AUTO: 189 X10*3/UL (ref 150–450)
POTASSIUM SERPL-SCNC: 4.4 MMOL/L (ref 3.5–5.3)
PROT SERPL-MCNC: 6.8 G/DL (ref 6.4–8.2)
RBC # BLD AUTO: 4.77 X10*6/UL (ref 4.5–5.9)
SODIUM SERPL-SCNC: 133 MMOL/L (ref 136–145)
TRIGL SERPL-MCNC: 137 MG/DL (ref 0–149)
VLDL: 27 MG/DL (ref 0–40)
WBC # BLD AUTO: 10.1 X10*3/UL (ref 4.4–11.3)

## 2024-08-08 PROCEDURE — C9399 UNCLASSIFIED DRUGS OR BIOLOG: HCPCS | Performed by: INTERNAL MEDICINE

## 2024-08-08 PROCEDURE — 84100 ASSAY OF PHOSPHORUS: CPT | Performed by: INTERNAL MEDICINE

## 2024-08-08 PROCEDURE — 82248 BILIRUBIN DIRECT: CPT | Performed by: INTERNAL MEDICINE

## 2024-08-08 PROCEDURE — 80061 LIPID PANEL: CPT | Performed by: INTERNAL MEDICINE

## 2024-08-08 PROCEDURE — 83735 ASSAY OF MAGNESIUM: CPT | Performed by: INTERNAL MEDICINE

## 2024-08-08 PROCEDURE — 99213 OFFICE O/P EST LOW 20 MIN: CPT | Mod: 25 | Performed by: INTERNAL MEDICINE

## 2024-08-08 PROCEDURE — 85025 COMPLETE CBC W/AUTO DIFF WBC: CPT | Performed by: INTERNAL MEDICINE

## 2024-08-08 PROCEDURE — 84075 ASSAY ALKALINE PHOSPHATASE: CPT | Performed by: INTERNAL MEDICINE

## 2024-08-08 PROCEDURE — 2560000001 HC RX 256 EXPERIMENTAL DRUGS: Performed by: INTERNAL MEDICINE

## 2024-08-08 PROCEDURE — 82947 ASSAY GLUCOSE BLOOD QUANT: CPT | Performed by: INTERNAL MEDICINE

## 2024-08-08 PROCEDURE — 96413 CHEMO IV INFUSION 1 HR: CPT

## 2024-08-08 RX ORDER — PROCHLORPERAZINE EDISYLATE 5 MG/ML
10 INJECTION INTRAMUSCULAR; INTRAVENOUS EVERY 6 HOURS PRN
Status: CANCELLED | OUTPATIENT
Start: 2024-08-14

## 2024-08-08 RX ORDER — ALBUTEROL SULFATE 0.83 MG/ML
3 SOLUTION RESPIRATORY (INHALATION) AS NEEDED
Status: DISCONTINUED | OUTPATIENT
Start: 2024-08-08 | End: 2024-08-09 | Stop reason: HOSPADM

## 2024-08-08 RX ORDER — ALBUTEROL SULFATE 0.83 MG/ML
3 SOLUTION RESPIRATORY (INHALATION) AS NEEDED
Status: CANCELLED | OUTPATIENT
Start: 2024-08-14

## 2024-08-08 RX ORDER — EPINEPHRINE 0.3 MG/.3ML
0.3 INJECTION SUBCUTANEOUS EVERY 5 MIN PRN
Status: DISCONTINUED | OUTPATIENT
Start: 2024-08-08 | End: 2024-08-09 | Stop reason: HOSPADM

## 2024-08-08 RX ORDER — PROCHLORPERAZINE MALEATE 10 MG
10 TABLET ORAL EVERY 6 HOURS PRN
Status: DISCONTINUED | OUTPATIENT
Start: 2024-08-08 | End: 2024-08-09 | Stop reason: HOSPADM

## 2024-08-08 RX ORDER — HEPARIN SODIUM,PORCINE/PF 10 UNIT/ML
50 SYRINGE (ML) INTRAVENOUS AS NEEDED
OUTPATIENT
Start: 2024-08-08

## 2024-08-08 RX ORDER — DIPHENHYDRAMINE HYDROCHLORIDE 50 MG/ML
50 INJECTION INTRAMUSCULAR; INTRAVENOUS AS NEEDED
Status: DISCONTINUED | OUTPATIENT
Start: 2024-08-08 | End: 2024-08-09 | Stop reason: HOSPADM

## 2024-08-08 RX ORDER — DIPHENHYDRAMINE HYDROCHLORIDE 50 MG/ML
50 INJECTION INTRAMUSCULAR; INTRAVENOUS AS NEEDED
Status: CANCELLED | OUTPATIENT
Start: 2024-08-14

## 2024-08-08 RX ORDER — PROCHLORPERAZINE MALEATE 10 MG
10 TABLET ORAL EVERY 6 HOURS PRN
Status: CANCELLED | OUTPATIENT
Start: 2024-08-14

## 2024-08-08 RX ORDER — EPINEPHRINE 0.3 MG/.3ML
0.3 INJECTION SUBCUTANEOUS EVERY 5 MIN PRN
Status: CANCELLED | OUTPATIENT
Start: 2024-08-14

## 2024-08-08 RX ORDER — FAMOTIDINE 10 MG/ML
20 INJECTION INTRAVENOUS ONCE AS NEEDED
Status: CANCELLED | OUTPATIENT
Start: 2024-08-14

## 2024-08-08 RX ORDER — PROCHLORPERAZINE EDISYLATE 5 MG/ML
10 INJECTION INTRAMUSCULAR; INTRAVENOUS EVERY 6 HOURS PRN
Status: DISCONTINUED | OUTPATIENT
Start: 2024-08-08 | End: 2024-08-09 | Stop reason: HOSPADM

## 2024-08-08 RX ORDER — FAMOTIDINE 10 MG/ML
20 INJECTION INTRAVENOUS ONCE AS NEEDED
Status: DISCONTINUED | OUTPATIENT
Start: 2024-08-08 | End: 2024-08-09 | Stop reason: HOSPADM

## 2024-08-08 RX ORDER — HEPARIN 100 UNIT/ML
500 SYRINGE INTRAVENOUS AS NEEDED
OUTPATIENT
Start: 2024-08-08

## 2024-08-08 NOTE — RESEARCH NOTES
"    Research Note Follow Up Visit       Toro Lujan is here today for consideration of C13D1 treatment and follow up on SEGE 1822.  Follow up procedures completed per protocol. Patient is aware of continued follow up plan.    Dr. Castanon previously had reviewed [id number] scans and discussed the reduction in tumor burden of 68%.    Recall last visit patient complained of sensory peripheral neuropathy: intermittent (few times a week); self resolves after 3-5 minutes; bilateral hands; right greater than left; none in feet. Treated last visit at dose reduction of 1.1. mg/kg per Protocol Amendment 8.    Today, patient reports neuropathy to be improved.  \"I noticed a difference in the last two weeks.  Significantly.\"  Patient attributes to having received the lower dose of DV.    After review of labs, vitals, exam, Aes and conmeds TREATEMENT GIVEN at current dose, 1.1 mg/kg.  Once we start operating under Amendment 9, this dose will be changed to 1.0 mg/kg. Based on scans, weight loss unlikely to be related to cancer.    +G1 Dysgeusia and +G1 anorexia with 11.4% weight loss. In the last month, he has been eating less meat and more vegetables because his daughter is cooking for him. Treatment capped at 100 kg and today he is 110.7kg.     +G1 fatigue.    Questionnaires completed on ePRO.  Calendar reviewed.            Education Documentation  Nutrition, taught by Emma Mann RN at 8/8/2024  9:12 AM.  Learner: Patient  Readiness: Eager  Method: Explanation  Response: Verbalizes Understanding    Healthy Lifestyle, taught by Emma Mann RN at 8/8/2024  9:12 AM.  Learner: Patient  Readiness: Eager  Method: Explanation  Response: Verbalizes Understanding    Care of Neuropathy, taught by Emma Mann RN at 8/8/2024  9:12 AM.  Learner: Patient  Readiness: Eager  Method: Explanation  Response: Verbalizes Understanding    Fatigue, taught by Emma Mann RN at 8/8/2024  9:12 AM.  Learner: Patient  Readiness: Eager  Method: " Explanation  Response: Verbalizes Understanding    Treatment Plan and Schedule, taught by Emma Mann RN at 8/8/2024  9:12 AM.  Learner: Patient  Readiness: Eager  Method: Explanation  Response: Verbalizes Understanding    Comprehensive Metabolic Panel (CMP), taught by Emma Mann RN at 8/8/2024  9:12 AM.  Learner: Patient  Readiness: Eager  Method: Explanation  Response: Verbalizes Understanding    Complete Blood Count with Differential (CBC w/ Diff), taught by Emma Mann RN at 8/8/2024  9:12 AM.  Learner: Patient  Readiness: Eager  Method: Explanation  Response: Verbalizes Understanding    Education Comments  No comments found.

## 2024-08-08 NOTE — PROGRESS NOTES
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 Lns  PD in pelvic Lns- bx proven met disease  Chemo nad maintenance Avelumab with near CR  PD in RPLNs and now on SEGE 1822: Disitamab Vedotin, cohorts A and B in HER2-overexpressing IHC 1+, 2+, 3+ for locally advanced unresectable or metastatic UC who have received prior platinum-based chemotherapy  Dose reduced to 1.1 mg/kg IV every 2 weeks  Feels well  Eating healthier as his daughter is now living with him  G1 peripheral neuropathy sec therapy  No new AEs and no new symptoms    Updated PMHx  None    Review of Systems  13 point review negative    Physical Exam:  Ht (S) 1.766 m (5' 9.53\")   BMI 35.49 kg/m²   ECOG Performance Status: 0  HEENT: Normal  ENT: Normal  CV: NA  Pulmonary: NA  GI: NA  : NA  Extremities: No Edema   Neurologic: Normal  Skin: Normal skin turgor  Psych: Appropriate mood      LABS:  No results found for: \"PSA\"  Lab Results   Component Value Date    WBC 10.1 08/08/2024    HGB 14.6 08/08/2024    HCT 42.1 08/08/2024    MCV 88 08/08/2024     08/08/2024     Lab Results   Component Value Date    GLUCOSE 137 (H) 07/24/2024    CALCIUM 8.4 (L) 07/24/2024     (L) 07/24/2024    K 4.2 07/24/2024    CO2 26 07/24/2024    CL 98 07/24/2024    BUN 22 07/24/2024    CREATININE 1.17 07/24/2024     No results found for: \"TESTOSTERONE\"  Lab Results   Component Value Date    ALT 24 07/24/2024    AST 19 07/24/2024    ALKPHOS 102 07/24/2024    BILITOT 0.6 07/24/2024       IMAGING:  Interpreted By:  Cristal Diaz  and Delvis Holloway   STUDY:  CT CHEST ABDOMEN PELVIS W IV CONTRAST;  7/31/2024 1:43 pm      INDICATION:  Signs/Symptoms:cancer sureveillance.  Per EMR: History of metastatic urothelial " carcinoma to lymph node  status post TURBT on 01/29/2021 with pathology revealing high-grade  focally invasive urothelial carcinoma into the lamina propria, non  muscle invasive. Patient underwent radical cystectomy with ileal  conduit on 05/06/2021. Patient started chemotherapy with gemcitabine  and cisplatin 02/2022. Started clinical trial for progressive disease  in 02/2024.      COMPARISON:  06/19/2024 CT chest abdomen pelvis with IV contrast, CT chest abdomen  pelvis with IV contrast 12/28/2023      ACCESSION NUMBER(S):  [id number]      ORDERING CLINICIAN:  CHELO CARRANZA      TECHNIQUE:  CT of the chest, abdomen, and pelvis was performed.  Contiguous axial  images were obtained at 3 mm slice thickness through the chest,  abdomen and pelvis. Coronal and sagittal reconstructions at 3 mm  slice thickness were performed.  75 ml of contrast Omnipaque 350 were administered intravenously  without immediate complication.      FINDINGS:  CHEST:      LUNG/PLEURA/LARGE AIRWAYS:  Secretion within the anterior trachea. No focal consolidation, no  pleural effusion, no pneumothorax. Stable pulmonary nodule in the  left upper lobe now measures 0.4 cm (series 3, image 116) previously  measured 0.3 cm. Otherwise there are no new suspicious enlarging  pulmonary nodule.      VESSELS:  Aorta and pulmonary arteries are normal caliber.  Mild  atherosclerotic changes are noted of the aorta and branching vessels.  Mild coronary artery calcifications are present with stents in the  left coronary system.      HEART:  Normal size.  No pericardial effusion      MEDIASTINUM AND EBONY:  No mediastinal, hilar or axillary lymphadenopathy is present.  Esophagus: Within normal limits.      CHEST WALL AND LOWER NECK:  Tissues of the chest wall demonstrate no significant abnormality and  are stable. The visualized thyroid gland appears within normal limits.      ABDOMEN:      LIVER:  The liver is normal in size. Stable number and size of  scattered  small hypodense lesions scattered throughout the hepatic parenchyma,  which are incompletely characterized. The largest measures 1.2 cm in  segment IV a (series 2, image 89)      BILE DUCTS:  The intrahepatic and extrahepatic ducts are not dilated.      GALLBLADDER:  No calcified stones. No wall thickening.      PANCREAS:  The pancreas appears unremarkable without evidence of ductal  dilatation or masses.      SPLEEN:  The spleen measures 12.6 cm in craniocaudal dimension without focal  lesions.      ADRENAL GLANDS:  Stable thickening of the left adrenal gland. The right adrenal gland  is unremarkable..      KIDNEYS AND URETERS:  The kidneys are normal in size and enhance symmetrically.  No  hydroureteronephrosis or nephroureterolithiasis is identified. Stable  subcentimeter left renal simple cyst.      PELVIS:      BLADDER:  Postoperative changes of cystoprostatectomy. There is no evidence of  recurrence within the postsurgical bed.      REPRODUCTIVE ORGANS:  Prostate is surgically absent.      BOWEL:  The stomach, small and large bowel are normal in appearance without  wall thickening or obstruction. The appendix is not definitely  visualized. There is however no pericecal stranding or fluid.          VESSELS:  There is no aneurysmal dilatation of the abdominal aorta. The IVC  appears normal. Moderate atherosclerosis of the abdominal aorta and  its branches.      PERITONEUM/RETROPERITONEUM/LYMPH NODES:  No ascites or free air, no fluid collection.  Stable retroperitoneal  lymph nodes including a left para-aortic lymph node measuring 1.0 x  1.0 cm (series 2, image 128). There are stable bilateral small lymph  nodes measuring up to 1.4 x 0.8 cm in the distal right external iliac  region (series 2, image 194). There is no new abdominopelvic  lymphadenopathy.      BONE AND SOFT TISSUE:  No suspicious osseous lesions are identified.  Degenerative  discogenic disease in lower thoracic and lumbar spine.  Stable  anterolisthesis of L5 on S1 with chronic bilateral L5 pars defects.  Right lower quadrant ileal conduit without evidence of parastomal  complication. There is a ventral abdominal hernia with a wide neck  measuring 4.2 cm containing a loop of small bowel with a staple line.  No upstream bowel obstruction      IMPRESSION:  Restaging scan urothelial carcinoma compared to 06/19/2024 CT.      1. No evidence of new or progressive metastatic disease within the  chest, abdomen, or pelvis.  2. Stable postoperative changes of cystoprostatectomy with ileal  conduit formation. Slightly prominent right extrarenal pelvis and  ureter with no hydronephrosis bilaterally.  3. Stable additional chronic findings as described above, including  small retroperitoneal lymph nodes, as described.      MACRO:  None      Signed by: Cristal Diaz 8/7/2024 12:25 AM    GENOMICS:  Today will collect    A/P: Met TCC  OK remains  Clinically well  Continue on therapy per trial      Discussed importance of a healthier diet - offered to see Nutritional Services; Also discussed the importance of daily regular exercise (aerobic and resistance differences noted)  Offered to see Supportive Services if needed as well as integrative Oncology and Psychosocial Support    Topher Castanon MD, FACP  Chief, Solid Tumor Oncology Division   Medical Oncology  Professor of Medicine and Urology  /[facility]  [facility]

## 2024-08-08 NOTE — RESEARCH NOTES
[facility]<[id number]><C2,3,5+D1><INTENSIVEPK>  DCRU NURSING VISIT NOTE  Study Name: [id number]  IRB#: [id number]  DCRU#: # [id number]  Protocol Version Dated: V9 A8 06.06.23   PI: Fan Rapp MD.    Time point: Cycle 2, 3, 5 AND BEYOND - Day 1 INTENSIVE PK  CYCLE 13    Encounter Date: 08/08/2024  Encounter Time:  8:30 AM EDT Arrived at 0825  Encounter Department: [facility] HEMATOLOGY AND ONCOLOGY     #1     Phone Pager   Emma Mackenzie [phone] HAIKU    #2 Phone Pager   Martita Helms [phone] HAIKU   Other Phone Pager          Study Regimen and Dosing   Intensive PK patients with HER2-overexpressing immunohistochemistry (IHC) 1+, 2+, 3+ locally advanced unresectable or metastatic urothelial carcinoma who have received prior platinum-based chemotherapy.  Cycle = 14 days  Disitamab Vedotin administered IV Day 1 every 2 weeks     Admission and Prior to Starting Study Activities   1. Notify  when patient arrives to unit.  2. Complete DCRU/Jenkins intake form in EPIC.  4. Study Questionnaires   Coordinator will obtain - Cycle 2, 3, then every other cycle.  5. Obtain weight in kilograms - with shoes off & heavy items removed.  6. Obtain vital signs. Record in EMR.  7. Insert one peripheral IV line for sample collection procedures (flush line with normal saline following each blood draw). Access mediport (if available) otherwise insert second peripheral line in opposite arm for Investigative drug administration (if peripheral line, flush line with normal saline before & after infusion)  8. Do Not draw research samples from the same line the investigational drug infused through.  9. Physical Exam < 1 day  10. Confirm patient fasting for clinical safety labs (every 3rd month, if ordered).     Criteria to Treat   DCRU RN reviewed and meets eligibility to proceed with treatment plan   Time team notified: 1005   DCRU RN notifies study team to review eligibility and approval  before dosing procedures  Time team approves: 1006      Dietary Guidelines   REGULAR     Subjective   Toro Lujan is a 69 y.o. male and is here for a Research clinical visit.    Visit Provider: Aby Lorenz RN     Allergies: No Known Allergies    Objective     Vital Signs:    Vitals:    08/08/24 0909 08/08/24 1115   BP: 108/75 131/80   Pulse: 71 76   Resp: 16 16   Temp: 36.6 °C (97.9 °F) 36.3 °C (97.3 °F)   TempSrc: Oral Oral   SpO2: 97% 96%   Weight: 111 kg (244 lb 0.8 oz)        Physical Exam     ASSESSMENT and PLAN:  Problem List Items Addressed This Visit       Malignant neoplasm of overlapping sites of bladder (Multi)    Relevant Medications    prochlorperazine (Compazine) tablet 10 mg    prochlorperazine (Compazine) injection 10 mg    Study [id number] disitamab vedotin 110.3 mg in sodium chloride 0.9% 250 mL IV    sodium chloride 0.9 % bolus 500 mL    dextrose 5 % in water (D5W) bolus    diphenhydrAMINE (BENADryl) injection 50 mg    methylPREDNISolone sod succinate (SOLU-Medrol) 40 mg/mL injection 40 mg    famotidine PF (Pepcid) injection 20 mg    EPINEPHrine (Epipen) injection syringe 0.3 mg    albuterol 2.5 mg /3 mL (0.083 %) nebulizer solution 3 mL    Other Relevant Orders    CBC and Auto Differential (Completed)    Comprehensive metabolic panel (Completed)    Bilirubin, Direct (Completed)    Glucose, Fasting (Completed)    Lipid panel (Completed)    Magnesium (Completed)    Phosphorus (Completed)    Adult diet Regular    Treatment Conditions (Completed)    Provider Communication - [id number] (Completed)    Nursing Communication - Hypersensitivity Management, Moderate (Completed)    Nursing Communication - Hypersensitivity Management, Severe (Completed)    Nursing Communication - Respiratory Management (Completed)    Pulse oximetry, continuous    Insert peripheral IV    Convert IV to saline lock        Medications as of the completion of today's visit:  Current Outpatient Medications   Medication Sig  Dispense Refill    acetaminophen (Tylenol) 500 mg tablet Take 2 tablets (1,000 mg) by mouth every 8 hours if needed.      Advair Diskus 100-50 mcg/dose diskus inhaler INHALE 1 PUFF BY MOUTH TWICE DAILY AS DIRECTED. RINSE AND GARGLE MOUTH WITH WATER AFTER EACH USE      ALPRAZolam (Xanax) 0.5 mg tablet Take one tablet one hour before anxiety-provoking situation. Limits one tab/day, three tabs/week.      aspirin 81 mg EC tablet Take 1 tablet (81 mg) by mouth once daily.      lisinopril 20 mg tablet Take 1 tablet (20 mg) by mouth once daily.      metoprolol tartrate (Lopressor) 25 mg tablet Take 1 tablet (25 mg) by mouth 2 times a day.      pantoprazole (ProtoNix) 40 mg EC tablet Take 1 tablet (40 mg) by mouth once daily.      rosuvastatin (Crestor) 40 mg tablet Take 1 tablet (40 mg) by mouth once daily.      sertraline (Zoloft) 50 mg tablet Take 1 tablet (50 mg) by mouth once daily.       Current Facility-Administered Medications   Medication Dose Route Frequency Provider Last Rate Last Admin    albuterol 2.5 mg /3 mL (0.083 %) nebulizer solution 3 mL  3 mL nebulization PRN Topher Castanon MD        dextrose 5 % in water (D5W) bolus  500 mL intravenous PRN Topher Castanon MD        diphenhydrAMINE (BENADryl) injection 50 mg  50 mg intravenous PRN Topher Castanon MD        EPINEPHrine (Epipen) injection syringe 0.3 mg  0.3 mg intramuscular q5 min PRN Topher Castanon MD        famotidine PF (Pepcid) injection 20 mg  20 mg intravenous Once PRN Topher Castanon MD        methylPREDNISolone sod succinate (SOLU-Medrol) 40 mg/mL injection 40 mg  40 mg intravenous PRN Topher Castanon MD        prochlorperazine (Compazine) injection 10 mg  10 mg intravenous q6h PRN Topher Castanon MD        prochlorperazine (Compazine) tablet 10 mg  10 mg oral q6h PRN Topher Castanon MD        sodium chloride 0.9 % bolus 500 mL  500 mL intravenous PRN Topher Castanon MD        Study [id number] disitamab vedotin 110.3 mg in sodium chloride 0.9% 250  mL IV  1.1 mg/kg (Order-Specific) intravenous Once Topher Castanon  mL/hr at 08/08/24 1124 110.3 mg at 08/08/24 1124       Administrations This Visit       Study [id number] disitamab vedotin 110.3 mg in sodium chloride 0.9% 250 mL IV       Admin Date  08/08/2024 Action  New Bag Dose  110.3 mg Rate  250 mL/hr Route  intravenous Documented By  Aby Lorenz RN                    Orders placed during today's visit:  Orders Placed This Encounter   Procedures    CBC and Auto Differential     Standing Status:   Standing     Number of Occurrences:   1     Order Specific Question:   Release result to MyChart     Answer:   Immediate [1]    Comprehensive metabolic panel     Standing Status:   Standing     Number of Occurrences:   1     Order Specific Question:   Release result to MyChart     Answer:   Immediate [1]    Bilirubin, Direct     Standing Status:   Standing     Number of Occurrences:   1     Order Specific Question:   Release result to MyChart     Answer:   Immediate [1]    Glucose, Fasting     Standing Status:   Standing     Number of Occurrences:   1     Order Specific Question:   Release result to MyChart     Answer:   Immediate [1]    Lipid panel     Standing Status:   Standing     Number of Occurrences:   1     Order Specific Question:   Release result to MyChart     Answer:   Immediate [1]    Magnesium     Standing Status:   Standing     Number of Occurrences:   1     Order Specific Question:   Release result to MyChart     Answer:   Immediate [1]    Phosphorus     Standing Status:   Standing     Number of Occurrences:   1     Order Specific Question:   Release result to MyChart     Answer:   Immediate [1]    Adult diet Regular     Standing Status:   Standing     Number of Occurrences:   1     Order Specific Question:   Diet type     Answer:   Regular    Treatment Conditions     Hold treatment and notify provider if:   ANC LESS THAN 1 x 10*3/uL   AST/ALT GREATER THAN 3 x ULN   Total Bilirubin GREATER THAN 2  x ULN   LVEF LESS THAN 50%    Fasting or Non-fasting Glucose GREATER THAN 250 mg/dL   Peripheral Neuropathy GREATER THAN OR EQUAL TO Grade 2   Adverse Event GREATER THAN OR EQUAL TO Grade 3     Standing Status:   Standing     Number of Occurrences:   1    Provider Communication - [id number]      Starting Dose Level             Disitamab Vedotin 1.5 mg/kg   Dose Level -1             Disitamab Vedotin 1.1 mg/kg   Dose Level -2             Disitamab Vedotin 0.75 mg/kg     Standing Status:   Standing     Number of Occurrences:   1    Nursing Communication - Hypersensitivity Management, Moderate     Flushing, rash, pruritus, dyspnea, chest discomfort, back pain, angioedema, SBP LESS THAN 90 mmHg, and/or change in mental status above or below patient's baseline. In the event of moderate or severe hypersensitivity reaction to any medication:   Stop infusion.  Assess vital signs, pulse oximetry, nursing assessment, and patient complaints.  Administer medications per Hypersensitivity Reaction Medication Protocol.   Notify physician.     Standing Status:   Standing     Number of Occurrences:   1    Nursing Communication - Hypersensitivity Management, Severe     Worsening of moderate reaction symptoms, SBP LESS THAN 80 mmHg, and/or respiratory distress (respirations GREATER THAN 40 breaths per minute, wheezing, life-threatening symptoms). In the event of moderate or severe hypersensitivity reaction to any medication:   Stop infusion.  Assess vital signs, pulse oximetry, nursing assessment, and patient complaints.  Administer medications per Hypersensitivity Reaction Medication Protocol.   Notify physician.     Standing Status:   Standing     Number of Occurrences:   1    Nursing Communication - Respiratory Management     Oxygen via nasal cannula at 4 L per minute for respiratory rate GREATER THAN OR EQUAL TO to 28 breaths/minute and/or wheezing. Pulse Oximetry continuous monitoring.     Standing Status:   Standing     Number of  Occurrences:   1    Pulse oximetry, continuous     Continuous monitoring to maintain SPO2 GREATER THAN 90%     Standing Status:   Standing     Number of Occurrences:   1    Insert peripheral IV     Obtain venous access for treatment via PIV if no CVAD access or if unable to obtain blood return from CVAD.     Standing Status:   Standing     Number of Occurrences:   1    Convert IV to saline lock     Only if venous access is required over consecutive days. Peripheral catheter can remain in place until completion of last consecutive day infusion, unless IV-related complications are encountered.     Standing Status:   Standing     Number of Occurrences:   1        [id number] - A Study of Disitamab Vedotin in Subjects With HER2 Expressing Urothelial Carcinoma    Patient has no adverse events documented in the Research Adverse Events activity.       Study Specific Instructions and Documentation   VITALS  LABS  VITALS   ECG (Cycles 2, 3, 5 and Every 3rd cycle thereafter)  CORRELATIVES (Cycles 2, 3, 5 and Every 3rd cycle thereafter)  PREMEDS  STUDY DRUG (DV)  CORRELATIVES (Cycles 2, 3, 5 and Every 3rd cycle thereafter)  DISCHARGE     PRE-DOSE Safety Labs  < 1 Day prior to Day 1     Refer to [place] Treatment Plan for orders  Drawn at 0901; 22g peripheral IV, Left Wrist     Weight-Based Dosing   Baseline (screening) weight (kg) 125 kg       Date measured 02/07/2024 Actual weight (kg) 110.7 kg  (Weight on Day 1)   Date measured 08/08/2024   Dosing should be based on the patient's actual weight    Adjust dose if patient's weight changes +/- 10% from baseline during the study.   Patients > 100 kg, total dose will be calculated using 100 kg (Maximum = 200 mg) once every 2 week cycle.      Safety Parameters and Special Instructions   Disitamab Vedotin (WN15-GRM) is an antibody drug conjugate composed of a recombinant humanized IgG1 anti-HER2 mAb linked to the cytotoxic agent MMAE (monomethyl auristatin E).  Potential Side  Effects/Adverse Events: infusion-related reaction, neutropenia, thrombocytopenia, anemia, peripheral neuropathy, elevated liver enzymes, fever, n/v, constipation, diarrhea, pruritus, arthralgia, alopecia, hyperglycemia, elevated triglycerides, hypokalemia, proteinuria, hypoalbuminemia.     PRE-DOSE Vital Signs   Temp, Heart Rate, Respiration, Blood Pressure   1115     PRE-DOSE ECG: Before Research Labs   Triplicate - Each approximately 2 minutes apart  MISTI 150c Study-specific ECG Machine   Rest at least 10 minutes prior.  Collect within 30 minutes prior to pre-dose research labs.   Contact MD/Provider &  if abnormal from baseline or if QTcF >500 msec  QTcF calculation Fridericia's formula: S:\DCRU_Staff\Nursing\Protocols\QTcF calculator    ECG #1: QTcF: N/A    ECG #2: QTcF: N/A    ECG #3: QTcF: N/A  Time Point Cycle Completion Time      Pre-dose   2, 3, 5 and  every 3rd cycle thereafter N/A     N/A     N/A        PRE-DOSE Research Correlatives: After ECGs  [facility] processing   Access Type: N/A Location: N/A   Refer to [place] Treatment Plan for orders   Time point Cycle Specimen Test Volume Tube Handling Draw Time   Pre-dose  (within 1hour) 2,3,5 & every 3rd cycle after PK 2 x 6 mL Red Top Ambient, invert 5x N/A     ADA 4 mL Red Top Ambient, invert 5x N/A        PRE-DOSE Medications (if previous infusion reaction)   Refer to [place] Treatment Plan for orders   Premedication 30 minutes prior to Disitamab Vedotin   No premedications ordered.     Safety, Side and Adverse Effects with Special Instructions  Research Study Drugs   SEE  Safety Parameters and Special Instructions     Infusion-Related Reactions   [facility] SOC Hypersensivity Orders      Day 1 Disitamab Vedotin Investigational Product Administration (sponsor provided)   Refer to [place] Treatment Plan for orders   Document medication administration in MAR activity. Document Research specific instructions below.   Disitamab Vedotin    If Infusion-Related Reaction, notify MD/Provider &     Grade 1: Mild reaction (transient flushing, rash or fever 38° C): may continue at investigator's discretion; administer symptomatic treatment following DCRU management of hypersensitivity reaction; monitor vital signs.    Grade 2: Moderate reaction (rash, flushing, urticaria, dyspnea, fever 38° C, chest discomfort, or back pain): interrupt infusion & follow DCRU management of hypersensitivity reaction; monitor vital signs.   If promptly responds to symptomatic treatment & is medically stable in the opinion of the investigator, infusion may be resumed at a slower rate.   Grade 3: Prolonged recurrence of symptoms or Grade 4: Life-threatening: stop infusion & follow DCRU management of hypersensitivity reaction. Remain at bedside and monitor until recovery from symptoms. Do not restart infusion.   Infusion started at 1124; See MAR     POST-DOSE ECG: Before Research Labs   Triplicate - Each approximately 2 minutes apart  MISTI 150c Study-specific ECG Machine   Rest at least 10 minutes prior.  Collect within 30 minutes prior to pre-dose research labs.   Contact MD/Provider &  if abnormal from baseline or if QTcF >500 msec  QTcF calculation Fridericia's formula: S:\DCRU_Staff\Nursing\Protocols\QTcF calculator    ECG #1: QTcF: N/A    ECG #2: QTcF: N/A    ECG #3: QTcF: N/A  Time Point Cycle Completion Time      POST-dose   2, 3, 5 and  every 3rd cycle thereafter N/A     N/A     N/A        Day 1 Post-Dose Disitamab Vedotin Research Correlatives  Deliver to [facility] for Processing   Access Type: N/A Location: N/A   Refer to [place] Treatment Plan for orders   Time point Cycle Specimen Test Volume Tube Handling  Draw Time   End of Infusion  (+/-15mins ) 2,3,5 & every 3rd cycle after   PK  2 x 6 mL  Red Top  Ambient, invert 5x   N/A        Discharge Instructions   Discharge patient to home after study requirements completed or PK sample  obtained.    Remind patient to return: per   Discharge time: 1231     Aby Lorenz RN  08/08/24

## 2024-08-20 DIAGNOSIS — C67.8 MALIGNANT NEOPLASM OF OVERLAPPING SITES OF BLADDER (MULTI): ICD-10-CM

## 2024-08-20 DIAGNOSIS — C67.8 MALIGNANT NEOPLASM OF OVERLAPPING SITES OF BLADDER (MULTI): Primary | ICD-10-CM

## 2024-08-20 RX ORDER — FAMOTIDINE 10 MG/ML
20 INJECTION INTRAVENOUS ONCE AS NEEDED
Status: CANCELLED | OUTPATIENT
Start: 2024-08-21

## 2024-08-20 RX ORDER — PROCHLORPERAZINE MALEATE 10 MG
10 TABLET ORAL EVERY 6 HOURS PRN
Status: CANCELLED | OUTPATIENT
Start: 2024-08-21

## 2024-08-20 RX ORDER — EPINEPHRINE 0.3 MG/.3ML
0.3 INJECTION SUBCUTANEOUS EVERY 5 MIN PRN
Status: CANCELLED | OUTPATIENT
Start: 2024-08-21

## 2024-08-20 RX ORDER — PROCHLORPERAZINE EDISYLATE 5 MG/ML
10 INJECTION INTRAMUSCULAR; INTRAVENOUS EVERY 6 HOURS PRN
Status: CANCELLED | OUTPATIENT
Start: 2024-08-21

## 2024-08-20 RX ORDER — DIPHENHYDRAMINE HYDROCHLORIDE 50 MG/ML
50 INJECTION INTRAMUSCULAR; INTRAVENOUS AS NEEDED
Status: CANCELLED | OUTPATIENT
Start: 2024-08-21

## 2024-08-20 RX ORDER — ALBUTEROL SULFATE 0.83 MG/ML
3 SOLUTION RESPIRATORY (INHALATION) AS NEEDED
Status: CANCELLED | OUTPATIENT
Start: 2024-08-21

## 2024-08-21 ENCOUNTER — HOSPITAL ENCOUNTER (OUTPATIENT)
Dept: RESEARCH | Facility: HOSPITAL | Age: 69
Discharge: HOME | End: 2024-08-21
Payer: MEDICARE

## 2024-08-21 ENCOUNTER — OFFICE VISIT (OUTPATIENT)
Dept: HEMATOLOGY/ONCOLOGY | Facility: HOSPITAL | Age: 69
End: 2024-08-21
Payer: MEDICARE

## 2024-08-21 ENCOUNTER — EDUCATION (OUTPATIENT)
Dept: HEMATOLOGY/ONCOLOGY | Facility: HOSPITAL | Age: 69
End: 2024-08-21

## 2024-08-21 VITALS
DIASTOLIC BLOOD PRESSURE: 75 MMHG | TEMPERATURE: 97.3 F | SYSTOLIC BLOOD PRESSURE: 108 MMHG | RESPIRATION RATE: 16 BRPM | OXYGEN SATURATION: 96 % | HEART RATE: 73 BPM | WEIGHT: 245.37 LBS | BODY MASS INDEX: 35.69 KG/M2

## 2024-08-21 DIAGNOSIS — Z00.6 EXAM FOR CLINICAL RESEARCH: Primary | ICD-10-CM

## 2024-08-21 DIAGNOSIS — C67.8 MALIGNANT NEOPLASM OF OVERLAPPING SITES OF BLADDER (MULTI): Primary | ICD-10-CM

## 2024-08-21 DIAGNOSIS — C67.8 MALIGNANT NEOPLASM OF OVERLAPPING SITES OF BLADDER (MULTI): ICD-10-CM

## 2024-08-21 DIAGNOSIS — Z00.6 EXAM FOR CLINICAL RESEARCH: ICD-10-CM

## 2024-08-21 LAB
ALBUMIN SERPL BCP-MCNC: 3.7 G/DL (ref 3.4–5)
ALBUMIN SERPL BCP-MCNC: 4.2 G/DL (ref 3.4–5)
ALP SERPL-CCNC: 106 U/L (ref 33–136)
ALP SERPL-CCNC: 122 U/L (ref 33–136)
ALT SERPL W P-5'-P-CCNC: 20 U/L (ref 10–52)
ALT SERPL W P-5'-P-CCNC: 24 U/L (ref 10–52)
ANION GAP SERPL CALC-SCNC: 13 MMOL/L (ref 10–20)
ANION GAP SERPL CALC-SCNC: 13 MMOL/L (ref 10–20)
AST SERPL W P-5'-P-CCNC: 24 U/L (ref 9–39)
AST SERPL W P-5'-P-CCNC: 30 U/L (ref 9–39)
BASOPHILS # BLD AUTO: 0.08 X10*3/UL (ref 0–0.1)
BASOPHILS NFR BLD AUTO: 0.8 %
BILIRUB DIRECT SERPL-MCNC: 0.1 MG/DL (ref 0–0.3)
BILIRUB SERPL-MCNC: 0.4 MG/DL (ref 0–1.2)
BILIRUB SERPL-MCNC: 0.5 MG/DL (ref 0–1.2)
BUN SERPL-MCNC: 33 MG/DL (ref 6–23)
BUN SERPL-MCNC: 34 MG/DL (ref 6–23)
CALCIUM SERPL-MCNC: 8 MG/DL (ref 8.6–10.6)
CALCIUM SERPL-MCNC: 8.8 MG/DL (ref 8.6–10.6)
CHLORIDE SERPL-SCNC: 101 MMOL/L (ref 98–107)
CHLORIDE SERPL-SCNC: 98 MMOL/L (ref 98–107)
CHOLEST SERPL-MCNC: 171 MG/DL (ref 0–199)
CHOLESTEROL/HDL RATIO: 4.5
CO2 SERPL-SCNC: 24 MMOL/L (ref 21–32)
CO2 SERPL-SCNC: 25 MMOL/L (ref 21–32)
CREAT SERPL-MCNC: 1.11 MG/DL (ref 0.5–1.3)
CREAT SERPL-MCNC: 1.29 MG/DL (ref 0.5–1.3)
EGFRCR SERPLBLD CKD-EPI 2021: 60 ML/MIN/1.73M*2
EGFRCR SERPLBLD CKD-EPI 2021: 72 ML/MIN/1.73M*2
EOSINOPHIL # BLD AUTO: 0.47 X10*3/UL (ref 0–0.7)
EOSINOPHIL NFR BLD AUTO: 4.9 %
ERYTHROCYTE [DISTWIDTH] IN BLOOD BY AUTOMATED COUNT: 15 % (ref 11.5–14.5)
GLUCOSE P FAST SERPL-MCNC: 134 MG/DL (ref 74–99)
GLUCOSE SERPL-MCNC: 120 MG/DL (ref 74–99)
GLUCOSE SERPL-MCNC: 134 MG/DL (ref 74–99)
HCT VFR BLD AUTO: 44.6 % (ref 41–52)
HDLC SERPL-MCNC: 37.8 MG/DL
HGB BLD-MCNC: 15 G/DL (ref 13.5–17.5)
HOLD SPECIMEN: NORMAL
IMM GRANULOCYTES # BLD AUTO: 0.07 X10*3/UL (ref 0–0.7)
IMM GRANULOCYTES NFR BLD AUTO: 0.7 % (ref 0–0.9)
LDLC SERPL CALC-MCNC: 108 MG/DL
LYMPHOCYTES # BLD AUTO: 2.48 X10*3/UL (ref 1.2–4.8)
LYMPHOCYTES NFR BLD AUTO: 25.6 %
MAGNESIUM SERPL-MCNC: 2.03 MG/DL (ref 1.6–2.4)
MCH RBC QN AUTO: 30.6 PG (ref 26–34)
MCHC RBC AUTO-ENTMCNC: 33.6 G/DL (ref 32–36)
MCV RBC AUTO: 91 FL (ref 80–100)
MONOCYTES # BLD AUTO: 0.83 X10*3/UL (ref 0.1–1)
MONOCYTES NFR BLD AUTO: 8.6 %
NEUTROPHILS # BLD AUTO: 5.74 X10*3/UL (ref 1.2–7.7)
NEUTROPHILS NFR BLD AUTO: 59.4 %
NON HDL CHOLESTEROL: 133 MG/DL (ref 0–149)
NRBC BLD-RTO: 0 /100 WBCS (ref 0–0)
PHOSPHATE SERPL-MCNC: 4.1 MG/DL (ref 2.5–4.9)
PLATELET # BLD AUTO: 208 X10*3/UL (ref 150–450)
POTASSIUM SERPL-SCNC: 5 MMOL/L (ref 3.5–5.3)
POTASSIUM SERPL-SCNC: 5.4 MMOL/L (ref 3.5–5.3)
PROT SERPL-MCNC: 6.6 G/DL (ref 6.4–8.2)
PROT SERPL-MCNC: 7.5 G/DL (ref 6.4–8.2)
RBC # BLD AUTO: 4.9 X10*6/UL (ref 4.5–5.9)
SODIUM SERPL-SCNC: 131 MMOL/L (ref 136–145)
SODIUM SERPL-SCNC: 133 MMOL/L (ref 136–145)
TRIGL SERPL-MCNC: 125 MG/DL (ref 0–149)
VLDL: 25 MG/DL (ref 0–40)
WBC # BLD AUTO: 9.7 X10*3/UL (ref 4.4–11.3)

## 2024-08-21 PROCEDURE — 96413 CHEMO IV INFUSION 1 HR: CPT

## 2024-08-21 PROCEDURE — 84075 ASSAY ALKALINE PHOSPHATASE: CPT | Performed by: INTERNAL MEDICINE

## 2024-08-21 PROCEDURE — 93005 ELECTROCARDIOGRAM TRACING: CPT | Mod: DCRU

## 2024-08-21 PROCEDURE — 82248 BILIRUBIN DIRECT: CPT | Performed by: INTERNAL MEDICINE

## 2024-08-21 PROCEDURE — 85025 COMPLETE CBC W/AUTO DIFF WBC: CPT | Performed by: INTERNAL MEDICINE

## 2024-08-21 PROCEDURE — 80053 COMPREHEN METABOLIC PANEL: CPT | Performed by: NURSE PRACTITIONER

## 2024-08-21 PROCEDURE — 96361 HYDRATE IV INFUSION ADD-ON: CPT

## 2024-08-21 PROCEDURE — 2500000004 HC RX 250 GENERAL PHARMACY W/ HCPCS (ALT 636 FOR OP/ED): Performed by: NURSE PRACTITIONER

## 2024-08-21 PROCEDURE — 82947 ASSAY GLUCOSE BLOOD QUANT: CPT | Performed by: INTERNAL MEDICINE

## 2024-08-21 PROCEDURE — 99215 OFFICE O/P EST HI 40 MIN: CPT | Mod: 25 | Performed by: NURSE PRACTITIONER

## 2024-08-21 PROCEDURE — C9399 UNCLASSIFIED DRUGS OR BIOLOG: HCPCS | Performed by: STUDENT IN AN ORGANIZED HEALTH CARE EDUCATION/TRAINING PROGRAM

## 2024-08-21 PROCEDURE — 83735 ASSAY OF MAGNESIUM: CPT | Performed by: INTERNAL MEDICINE

## 2024-08-21 PROCEDURE — 80061 LIPID PANEL: CPT | Performed by: INTERNAL MEDICINE

## 2024-08-21 PROCEDURE — 2560000001 HC RX 256 EXPERIMENTAL DRUGS: Performed by: STUDENT IN AN ORGANIZED HEALTH CARE EDUCATION/TRAINING PROGRAM

## 2024-08-21 PROCEDURE — 84100 ASSAY OF PHOSPHORUS: CPT | Performed by: INTERNAL MEDICINE

## 2024-08-21 RX ORDER — DIPHENHYDRAMINE HYDROCHLORIDE 50 MG/ML
50 INJECTION INTRAMUSCULAR; INTRAVENOUS AS NEEDED
Status: DISCONTINUED | OUTPATIENT
Start: 2024-08-21 | End: 2024-08-22 | Stop reason: HOSPADM

## 2024-08-21 RX ORDER — PROCHLORPERAZINE EDISYLATE 5 MG/ML
10 INJECTION INTRAMUSCULAR; INTRAVENOUS EVERY 6 HOURS PRN
Status: DISCONTINUED | OUTPATIENT
Start: 2024-08-21 | End: 2024-08-22 | Stop reason: HOSPADM

## 2024-08-21 RX ORDER — HEPARIN 100 UNIT/ML
500 SYRINGE INTRAVENOUS AS NEEDED
OUTPATIENT
Start: 2024-08-21

## 2024-08-21 RX ORDER — EPINEPHRINE 1 MG/ML
0.3 INJECTION INTRAMUSCULAR; INTRAVENOUS; SUBCUTANEOUS EVERY 5 MIN PRN
Status: DISCONTINUED | OUTPATIENT
Start: 2024-08-21 | End: 2024-08-22 | Stop reason: HOSPADM

## 2024-08-21 RX ORDER — PROCHLORPERAZINE MALEATE 10 MG
10 TABLET ORAL EVERY 6 HOURS PRN
Status: DISCONTINUED | OUTPATIENT
Start: 2024-08-21 | End: 2024-08-22 | Stop reason: HOSPADM

## 2024-08-21 RX ORDER — FAMOTIDINE 10 MG/ML
20 INJECTION INTRAVENOUS ONCE AS NEEDED
Status: DISCONTINUED | OUTPATIENT
Start: 2024-08-21 | End: 2024-08-22 | Stop reason: HOSPADM

## 2024-08-21 RX ORDER — ALBUTEROL SULFATE 0.83 MG/ML
3 SOLUTION RESPIRATORY (INHALATION) AS NEEDED
Status: DISCONTINUED | OUTPATIENT
Start: 2024-08-21 | End: 2024-08-22 | Stop reason: HOSPADM

## 2024-08-21 RX ORDER — HEPARIN SODIUM,PORCINE/PF 10 UNIT/ML
50 SYRINGE (ML) INTRAVENOUS AS NEEDED
OUTPATIENT
Start: 2024-08-21

## 2024-08-21 NOTE — RESEARCH NOTES
[facility]<[id number]><C2,3,5+D1><INTENSIVEPK>  DCRU NURSING VISIT NOTE  Study Name: [id number]  IRB#: [id number]  DCRU#: # [id number]  Protocol Version Dated: V9 A8 06.06.23   PI: Fan Rapp MD.    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  Cycle = 14 days  Disitamab Vedotin administered IV Day 1 every 2 weeks     Admission and Prior to Starting Study Activities   1. Notify  when patient arrives to unit.  2. Complete DCRU/[facility] intake form in [facility].  4. Study Questionnaires   Coordinator will obtain - Cycle 2, 3, then every other cycle.  5. Obtain weight in kilograms - with shoes off & heavy items removed.  6. Obtain vital signs. Record in EMR.  7. Insert one peripheral IV line for sample collection procedures (flush line with normal saline following each blood draw). Access mediport (if available) otherwise insert second peripheral line in opposite arm for Investigative drug administration (if peripheral line, flush line with normal saline before & after infusion)  8. Do Not draw research samples from the same line the investigational drug infused through.  9. Physical Exam < 1 day  10. Confirm patient fasting for clinical safety labs (every 3rd month, if ordered).  FASTING: LAST MEAL AROUND 2300 8/20/24     Criteria to Treat   DCRU RN reviewed and meets eligibility to proceed with treatment plan   Time team notified: 1059   DCRU RN notifies study team to review  eligibility and approval before dosing procedures  Time team approves: 1059      Dietary Guidelines   REGULAR     Subjective   Toro Lujan is a 69 y.o. male and is here for a Research clinical visit.    Visit Provider: [id number], [facility] ROOM 5 [facility]     Allergies: No Known Allergies    Objective     Vital Signs:    Vitals:    08/21/24 0826 08/21/24 1235   BP: 126/84 108/75   Pulse: 77 73   Resp: 18 16   Temp: 36.5 °C (97.7 °F) 36.3 °C (97.3 °F)   TempSrc: Oral Oral   SpO2: 99% 96%   Weight: 111 kg (245 lb 6 oz)        Physical Exam     ASSESSMENT and PLAN:  Problem List Items Addressed This Visit          Hematology and Neoplasia    Malignant neoplasm of overlapping sites of bladder (Multi)    Relevant Medications    prochlorperazine (Compazine) tablet 10 mg    prochlorperazine (Compazine) injection 10 mg    Study [id number] disitamab vedotin 99.8 mg in sodium chloride 0.9% 250 mL IV    sodium chloride 0.9 % bolus 500 mL    dextrose 5 % in water (D5W) bolus    diphenhydrAMINE (BENADryl) injection 50 mg    methylPREDNISolone sod succinate (SOLU-Medrol) 40 mg/mL injection 40 mg    famotidine PF (Pepcid) injection 20 mg    EPINEPHrine (Adrenalin) injection 0.3 mg    albuterol 2.5 mg /3 mL (0.083 %) nebulizer solution 3 mL    Other Relevant Orders    CBC and Auto Differential (Completed)    Comprehensive metabolic panel (Completed)    Bilirubin, Direct (Completed)    Glucose, Fasting (Completed)    Lipid panel (Completed)    Magnesium (Completed)    Phosphorus (Completed)    Adult diet Regular    Treatment Conditions (Completed)    Nursing Communication - Vascular Access (Completed)    Insert peripheral IV    Convert IV to saline lock    Comprehensive Metabolic Panel (Completed)    Provider Communication - [id number] (Completed)    Nursing Communication - Hypersensitivity Management, Moderate (Completed)    Nursing Communication - Hypersensitivity Management, Severe (Completed)    Nursing Communication - Respiratory  Management (Completed)    Pulse oximetry, continuous     Other Visit Diagnoses       Exam for clinical research        Relevant Orders    Research collection: 6mL Red Top - Research Collect pk predose    Research collection: 6mL Red Top - Research Collect pk predose    Research collection: 4mL Red Top - Research Collect ada predose             Medications as of the completion of today's visit:  Current Outpatient Medications   Medication Sig Dispense Refill    acetaminophen (Tylenol) 500 mg tablet Take 2 tablets (1,000 mg) by mouth every 8 hours if needed.      Advair Diskus 100-50 mcg/dose diskus inhaler INHALE 1 PUFF BY MOUTH TWICE DAILY AS DIRECTED. RINSE AND GARGLE MOUTH WITH WATER AFTER EACH USE      ALPRAZolam (Xanax) 0.5 mg tablet Take one tablet one hour before anxiety-provoking situation. Limits one tab/day, three tabs/week.      aspirin 81 mg EC tablet Take 1 tablet (81 mg) by mouth once daily.      lisinopril 20 mg tablet Take 1 tablet (20 mg) by mouth once daily.      metoprolol tartrate (Lopressor) 25 mg tablet Take 1 tablet (25 mg) by mouth 2 times a day.      pantoprazole (ProtoNix) 40 mg EC tablet Take 1 tablet (40 mg) by mouth once daily.      rosuvastatin (Crestor) 40 mg tablet Take 1 tablet (40 mg) by mouth once daily.      sertraline (Zoloft) 50 mg tablet Take 1 tablet (50 mg) by mouth once daily.       Current Facility-Administered Medications   Medication Dose Route Frequency Provider Last Rate Last Admin    albuterol 2.5 mg /3 mL (0.083 %) nebulizer solution 3 mL  3 mL nebulization PRN Jeison More MD        dextrose 5 % in water (D5W) bolus  500 mL intravenous PRN Jeison More MD        diphenhydrAMINE (BENADryl) injection 50 mg  50 mg intravenous PRN Jeison More MD        EPINEPHrine (Adrenalin) injection 0.3 mg  0.3 mg intramuscular q5 min PRKRISTEN More MD        famotidine PF (Pepcid) injection 20 mg  20 mg intravenous Once PRN Jeison More MD        methylPREDNISolone  sod succinate (SOLU-Medrol) 40 mg/mL injection 40 mg  40 mg intravenous PRN Jeison More MD        prochlorperazine (Compazine) injection 10 mg  10 mg intravenous q6h PRN Jeison More MD        prochlorperazine (Compazine) tablet 10 mg  10 mg oral q6h PRN Jeison More MD        sodium chloride 0.9 % bolus 500 mL  500 mL intravenous PRN Jeison More MD        Study [id number] disitamab vedotin 99.8 mg in sodium chloride 0.9% 250 mL IV  1 mg/kg (Order-Specific) intravenous Once Jeison More  mL/hr at 08/21/24 1308 99.8 mg at 08/21/24 1308       Administrations This Visit       sodium chloride 0.9 % bolus 1,000 mL       Admin Date  08/21/2024 Action  New Bag Dose  1,000 mL Rate  999 mL/hr Route  intravenous Documented By  Tara Nielsen RN              Study [id number] disitamab vedotin 99.8 mg in sodium chloride 0.9% 250 mL IV       Admin Date  08/21/2024 Action  New Bag Dose  99.8 mg Rate  250 mL/hr Route  intravenous Documented By  Tara Nielsen RN                    Orders placed during today's visit:  Orders Placed This Encounter   Procedures    CBC and Auto Differential     Standing Status:   Standing     Number of Occurrences:   1     Order Specific Question:   Release result to MyChart     Answer:   Immediate [1]    Comprehensive metabolic panel     Standing Status:   Standing     Number of Occurrences:   1     Order Specific Question:   Release result to MyChart     Answer:   Immediate [1]    Bilirubin, Direct     Standing Status:   Standing     Number of Occurrences:   1     Order Specific Question:   Release result to MyChart     Answer:   Immediate [1]    Glucose, Fasting     Standing Status:   Standing     Number of Occurrences:   1     Order Specific Question:   Release result to MyChart     Answer:   Immediate [1]    Lipid panel     Standing Status:   Standing     Number of Occurrences:   1     Order Specific Question:   Release result to MyChart     Answer:   Immediate [1]     Magnesium     Standing Status:   Standing     Number of Occurrences:   1     Order Specific Question:   Release result to MyChart     Answer:   Immediate [1]    Phosphorus     Standing Status:   Standing     Number of Occurrences:   1     Order Specific Question:   Release result to MyChart     Answer:   Immediate [1]    Comprehensive Metabolic Panel     Standing Status:   Standing     Number of Occurrences:   1     Order Specific Question:   Release result to MyChart     Answer:   Immediate [1]    Research collection: 6mL Red Top - Research Collect pk predose     2 mL whole blood/4mL stabilizing buffer  [facility]ESS[facility] labs     Standing Status:   Standing     Number of Occurrences:   1     Order Specific Question:   Test     Answer:   pk predose    Research collection: 6mL Red Top - Research Collect pk predose     2 mL whole blood/4mL stabilizing buffer  [facility] labs     Standing Status:   Standing     Number of Occurrences:   1     Order Specific Question:   Test     Answer:   pk predose    Research collection: 4mL Red Top - Research Collect ada predose     ZIM PK  Draw order red, green, lavender/black or tan/black     Standing Status:   Standing     Number of Occurrences:   1     Order Specific Question:   Test     Answer:   ada predose    Adult diet Regular     Standing Status:   Standing     Number of Occurrences:   1     Order Specific Question:   Diet type     Answer:   Regular    Treatment Conditions     Hold treatment and notify provider if:   ANC LESS THAN 1 x 10*3/uL   AST/ALT GREATER THAN 3 x ULN   Total Bilirubin GREATER THAN 2 x ULN   LVEF LESS THAN 50%    Fasting or Non-fasting Glucose GREATER THAN 250 mg/dL   Peripheral Neuropathy GREATER THAN OR EQUAL TO Grade 2   Adverse Event GREATER THAN OR EQUAL TO Grade 3     Standing Status:   Standing     Number of Occurrences:   1    Nursing Communication - Vascular Access     Refer to the vascular access device resource page and the following policies for additional  information on line insertion, maintenance and removal.    CP-148 - Central Vascular Access Device Insertion, Maintenance, and Removal, Adult  NP-28 - Midline Cathter Maintenance & Removal, Adult  NP-34 - High-flow Catheters, (e.g. Hemodialysis, Apheresis, and Continuous Renal Replacement Therapy [CRRT]), Care and Utilization, Adult  NP-39 - Peripheral Intravenous Catheter (PIV) Insertion, Maintenance, Blood Collection & Removal - Adult     Standing Status:   Standing     Number of Occurrences:   1    Provider Communication - [id number]      Starting Dose Level             Disitamab Vedotin 1.5 mg/kg   Dose Level -1             Disitamab Vedotin 1.1 mg/kg   Dose Level -2             Disitamab Vedotin 0.75 mg/kg     Standing Status:   Standing     Number of Occurrences:   1    Nursing Communication - Hypersensitivity Management, Moderate     Flushing, rash, pruritus, dyspnea, chest discomfort, back pain, angioedema, SBP LESS THAN 90 mmHg, and/or change in mental status above or below patient's baseline. In the event of moderate or severe hypersensitivity reaction to any medication:   Stop infusion.  Assess vital signs, pulse oximetry, nursing assessment, and patient complaints.  Administer medications per Hypersensitivity Reaction Medication Protocol.   Notify physician.     Standing Status:   Standing     Number of Occurrences:   1    Nursing Communication - Hypersensitivity Management, Severe     Worsening of moderate reaction symptoms, SBP LESS THAN 80 mmHg, and/or respiratory distress (respirations GREATER THAN 40 breaths per minute, wheezing, life-threatening symptoms). In the event of moderate or severe hypersensitivity reaction to any medication:   Stop infusion.  Assess vital signs, pulse oximetry, nursing assessment, and patient complaints.  Administer medications per Hypersensitivity Reaction Medication Protocol.   Notify physician.     Standing Status:   Standing     Number of Occurrences:   1    Nursing  Communication - Respiratory Management     Oxygen via nasal cannula at 4 L per minute for respiratory rate GREATER THAN OR EQUAL TO to 28 breaths/minute and/or wheezing. Pulse Oximetry continuous monitoring.     Standing Status:   Standing     Number of Occurrences:   1    Pulse oximetry, continuous     Continuous monitoring to maintain SPO2 GREATER THAN 90%     Standing Status:   Standing     Number of Occurrences:   1    Insert peripheral IV     Obtain venous access for treatment via PIV if no CVAD access or if unable to obtain blood return from CVAD.     Standing Status:   Standing     Number of Occurrences:   1    Convert IV to saline lock     Only if venous access is required over consecutive days. Peripheral catheter can remain in place until completion of last consecutive day infusion, unless IV-related complications are encountered.     Standing Status:   Standing     Number of Occurrences:   1        [id number] - A Study of Disitamab Vedotin in Subjects With HER2 Expressing Urothelial Carcinoma    Patient has no adverse events documented in the Research Adverse Events activity.       Study Specific Instructions and Documentation   VITALS  LABS  VITALS   ECG (Cycles 2, 3, 5 and Every 3rd cycle thereafter)  CORRELATIVES (Cycles 2, 3, 5 and Every 3rd cycle thereafter)  PREMEDS  STUDY DRUG (DV)  CORRELATIVES (Cycles 2, 3, 5 and Every 3rd cycle thereafter)  DISCHARGE     PRE-DOSE Safety Labs  < 1 Day prior to Day 1     Refer to [place] Treatment Plan for orders     Safety Parameters and Special Instructions   Disitamab Vedotin (EN42-IML) is an antibody drug conjugate composed of a recombinant humanized IgG1 anti-HER2 mAb linked to the cytotoxic agent MMAE (monomethyl auristatin E).  Potential Side Effects/Adverse Events: infusion-related reaction, neutropenia, thrombocytopenia, anemia, peripheral neuropathy, elevated liver enzymes, fever, n/v, constipation, diarrhea, pruritus, arthralgia, alopecia, hyperglycemia,  elevated triglycerides, hypokalemia, proteinuria, hypoalbuminemia.     PRE-DOSE Vital Signs   Temp, Heart Rate, Respiration, Blood Pressure     COMPLETED AT 1235     PRE-DOSE ECG: Before Research Labs   Triplicate - Each approximately 2 minutes apart  MISTI 150c Study-specific ECG Machine   Rest at least 10 minutes prior.  Collect within 30 minutes prior to pre-dose research labs.   Contact MD/Provider &  if abnormal from baseline or if QTcF >500 msec  QTcF calculation Fridericia's formula: S:\DCRU_Staff\Nursing\Protocols\QTcF calculator    ECG #1: QTcF 419    ECG #2: QTcF 394    ECG #3: QTcF 397  Time Point Cycle Completion Time      Pre-dose   2, 3, 5 and  every 3rd cycle thereafter  TODAY IS CYCLE 14 12:38:05     12:40:05     12:42:05        PRE-DOSE Research Correlatives: After ECGs  [facility]   Access Type: 22G PIV Location: Left AC   Refer to [place] Treatment Plan for orders   Time point Cycle Specimen Test Volume Tube Handling Draw Time   Pre-dose  (within 1hour) 2,3,5 & every 3rd cycle after  TODAY IS CYCLE 14 PK 2 x 6 mL Red Top Ambient, invert 5x 1248     ADA 4 mL Red Top Ambient, invert 5x 1248        PRE-DOSE Medications (if previous infusion reaction)   Refer to [place] Treatment Plan for orders   Premedication 30 minutes prior to Disitamab Vedotin      Safety, Side and Adverse Effects with Special Instructions  Research Study Drugs   SEE  Safety Parameters and Special Instructions     Infusion-Related Reactions   [facility] Hypersensivity Orders      Weight-Based Dosing   Baseline (screening) weight (kg) 125kg      Date measured 2/7/24 Actual weight (kg) 111.3kg  (Weight on Day 1)   Date measured 8/21/24   Dosing should be based on the patient's actual weight    Adjust dose if patient's weight changes +/- 10% from baseline during the study.   Patients > 100 kg, total dose will be calculated using 100 kg (Maximum = 200 mg) once every 2 week cycle.      Day 1  Disitamab Vedotin Investigational Product Administration (sponsor provided)   Refer to [place] Treatment Plan for orders   Document medication administration in MAR activity. Document Research specific instructions below.   Disitamab Vedotin   If Infusion-Related Reaction, notify MD/Provider &     Grade 1: Mild reaction (transient flushing, rash or fever 38° C): may continue at investigator's discretion; administer symptomatic treatment following DCRU management of hypersensitivity reaction; monitor vital signs.    Grade 2: Moderate reaction (rash, flushing, urticaria, dyspnea, fever 38° C, chest discomfort, or back pain): interrupt infusion & follow DCRU management of hypersensitivity reaction; monitor vital signs.   If promptly responds to symptomatic treatment & is medically stable in the opinion of the investigator, infusion may be resumed at a slower rate.   Grade 3: Prolonged recurrence of symptoms or Grade 4: Life-threatening: stop infusion & follow DCRU management of hypersensitivity reaction. Remain at bedside and monitor until recovery from symptoms. Do not restart infusion.      POST-DOSE ECG: Before Research Labs   Triplicate - Each approximately 2 minutes apart  MISTI 150c Study-specific ECG Machine   Rest at least 10 minutes prior.  Collect within 30 minutes prior to pre-dose research labs.   Contact MD/Provider &  if abnormal from baseline or if QTcF >500 msec  QTcF calculation Fridericia's formula: S:\DCRU_Staff\Nursing\Protocols\QTcF calculator    ECG #1: QTcF 392    ECG #2: QTcF 415    ECG #3: QTcF 395  Time Point Cycle Completion Time      POST-dose   2, 3, 5 and  every 3rd cycle thereafter  TODAY IS CYCLE 14 13:57:37     13:59:37     14:01:37        Day 1 Post-Dose Disitamab Vedotin Research Correlatives  Deliver to [facility] for Processing   Access Type: 21G Butterfly Location: Right forearm   Refer to [place] Treatment Plan for orders   Time point Cycle Specimen  Test Volume Tube Handling  Draw Time   End of Infusion  (+/-15mins ) 2,3,5 & every 3rd cycle after  TODAY IS CYCLE 14   PK  2 x 6 mL  Red Top  Ambient, invert 5x   1404        Discharge Instructions   Discharge patient to home after study requirements completed or PK sample obtained.    Remind patient to return: per   Discharge time: 1425     Tara Nielsen RN  08/21/24

## 2024-08-21 NOTE — SIGNIFICANT EVENT

## 2024-08-21 NOTE — PROGRESS NOTES
Patient ID: Toro Lujan is a 69 y.o. male.  Attending Physician: Dr. Topher Castanon  Cancer Diagnosis:  Cancer Staging   No matching staging information was found for the patient.       Current Therapy: SEGE 1822: Disitamab Vedotin, cohorts A and B in HER2-overexpressing IHC 1+, 2+, 3+ for locally advanced unresectable or metastatic UC who have received prior platinum-based chemotherapy  Dose reduced to 1.1 mg/kg IV every 2 weeks  Genetics: NA    Subjective      Cancer History:  Oncology History   Malignant neoplasm of overlapping sites of bladder (Multi)   2/13/2024 Initial Diagnosis    Malignant neoplasm of overlapping sites of bladder (CMS/HCC)     2/14/2024 -  Research Study Participant    ([facility]) [id number] Intensive PK Cohort - Disitamab Vedotin, 14 Day Cycles  Plan Provider: Fan Rapp MD PhD  Treatment goal: Control  Line of treatment: Second Line  Associated studies: A Study of Disitamab Vedotin in Subjects With HER2 Expressing Urothelial Carcinoma     8/8/2024 - 8/8/2024 Research Study Participant    ([facility]) [id number] Intensive PK Cohort - Disitamab Vedotin, 14 Day Cycles  Plan Provider: MERLIN Delong-CNP  Treatment goal: Control  Line of treatment: Second Line  Associated studies: A Study of Disitamab Vedotin in Subjects With HER2 Expressing Urothelial Carcinoma       11/5/2020: CTU shows mildly asymmetric generalized urinary bladder wall thickening with mild mucosal hyperenhancement, large prostate  1/29/2021: TURBT. 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 Posterior tumor reveals papillary high-grade at least pTa urothelial carcinoma with no definite invasion, no muscle present  2/24/2021: Bladder tumor path shows high grade focally invasive urothelial carcinoma into the lamina propria, non-muscle invasive.   5/6/2021: Underwent radical cystectomy with ilial conduit due to multiple multiple disease recurrences without  MIBC.  12/13/2021: CTU suspicious for new enlarged pelvic LN  2/17/2022: Started chemotherapy with split-dose gemcitabine and cisplatin  3/10/2022: C2 gem/cis  3/31/2022: C3 gem/cis  4/20/2022: Restaging scans revealed response  4/21/2022: C4 gem/cis  5/12/2022: C5 gem/cis  6/24/2022: Started maintenance avelumab. Treatment was placed on hold due to insurance coverage issues after 1 cycle  8/24/2022: CT scans stable  9/2/2022: Restarted avelumab    3/2/2023: Last dose avelumab, off after  12/28/2023: CT reveals increase in left para-aortic LN highly suspicious for metastatic LAD.  1/18/2024: Again increase in para-aortic LN.  1/31/2024: Screening for SEGE 1822: Disitamab vedotin  2/7/2024: Screening for SEGE 1822  2/14/2024: Cycle 1 day 1 disitamab vedotin as part of clinical trial  7/24/2024: Cycle 12 day 1 DV    Interval History:  Mr. Lujan presents today in consideration of cycle 14 day 1 DV. He continues to feel well. He has been eating well. Paresthesias are intermittent in right hand. Had gotten better after dose reduction. Otherwise, He otherwise has been feeling well without new or concerning symptoms. The remainder of his ROS is otherwise negative.  HPI    Objective    BSA: There is no height or weight on file to calculate BSA.  There were no vitals taken for this visit.    Physical Exam  General: alert, well-dressed in NAD. Speech is fluent and coherent, words clear. Good insight. Oriented x4   Skin: warm, dry, and pink without cyanosis or nail clubbing. No rash, petechiae, or ecchymoses  HEENT: Normocephalic atraumatic. Sclera white, conjunctiva pink. EOMs intact. Hearing intact to spoken voice. Oral mucosa pink. No visible lesions  Respiratory: Chest expansion symmetric. Breath sounds vesicular in all lobes without crackles, wheezes, or rhonchi bilaterally.  CV: S1S2 audible and crisp without murmurs, rubs, or extra sounds. RRR. Radial pulses strong and regular. Extremities warm and pink. No edema  bilaterally.  GI: abdomen is round, soft, and non-tender. Active bowel sounds.   Psych: engaged, polite, appropriate conversation and eye contact.    Current Medications:    Current Outpatient Medications:     acetaminophen (Tylenol) 500 mg tablet, Take 2 tablets (1,000 mg) by mouth every 8 hours if needed., Disp: , Rfl:     Advair Diskus 100-50 mcg/dose diskus inhaler, INHALE 1 PUFF BY MOUTH TWICE DAILY AS DIRECTED. RINSE AND GARGLE MOUTH WITH WATER AFTER EACH USE, Disp: , Rfl:     ALPRAZolam (Xanax) 0.5 mg tablet, Take one tablet one hour before anxiety-provoking situation. Limits one tab/day, three tabs/week., Disp: , Rfl:     aspirin 81 mg EC tablet, Take 1 tablet (81 mg) by mouth once daily., Disp: , Rfl:     lisinopril 20 mg tablet, Take 1 tablet (20 mg) by mouth once daily., Disp: , Rfl:     metoprolol tartrate (Lopressor) 25 mg tablet, Take 1 tablet (25 mg) by mouth 2 times a day., Disp: , Rfl:     pantoprazole (ProtoNix) 40 mg EC tablet, Take 1 tablet (40 mg) by mouth once daily., Disp: , Rfl:     rosuvastatin (Crestor) 40 mg tablet, Take 1 tablet (40 mg) by mouth once daily., Disp: , Rfl:     sertraline (Zoloft) 50 mg tablet, Take 1 tablet (50 mg) by mouth once daily., Disp: , Rfl:      Most Recent Labs:  Results for orders placed or performed during the hospital encounter of 08/21/24   CBC and Auto Differential   Result Value Ref Range    WBC 9.7 4.4 - 11.3 x10*3/uL    nRBC 0.0 0.0 - 0.0 /100 WBCs    RBC 4.90 4.50 - 5.90 x10*6/uL    Hemoglobin 15.0 13.5 - 17.5 g/dL    Hematocrit 44.6 41.0 - 52.0 %    MCV 91 80 - 100 fL    MCH 30.6 26.0 - 34.0 pg    MCHC 33.6 32.0 - 36.0 g/dL    RDW 15.0 (H) 11.5 - 14.5 %    Platelets 208 150 - 450 x10*3/uL    Neutrophils % 59.4 40.0 - 80.0 %    Immature Granulocytes %, Automated 0.7 0.0 - 0.9 %    Lymphocytes % 25.6 13.0 - 44.0 %    Monocytes % 8.6 2.0 - 10.0 %    Eosinophils % 4.9 0.0 - 6.0 %    Basophils % 0.8 0.0 - 2.0 %    Neutrophils Absolute 5.74 1.20 - 7.70  "x10*3/uL    Immature Granulocytes Absolute, Automated 0.07 0.00 - 0.70 x10*3/uL    Lymphocytes Absolute 2.48 1.20 - 4.80 x10*3/uL    Monocytes Absolute 0.83 0.10 - 1.00 x10*3/uL    Eosinophils Absolute 0.47 0.00 - 0.70 x10*3/uL    Basophils Absolute 0.08 0.00 - 0.10 x10*3/uL      No results found for: \"PSA\"     Performance Status:  ECOG Score: 0- Fully active, able to carry on all pre-disease performance w/o restriction.  Karnofsky Score: 100 - Fully active, able to carry on all pre-disease performed without restriction    Assessment/Plan   Toro Lujan is a 69 y.o. male with mUC to LN who presents in follow up and consideration of cycle 12 day 1 SEGE 1822 on DV. He looks and feels well. Will continue on therapy pending labs, dose reduced early for neuropathy. CMP suspect hemolysis so will redraw.    # mUC  - Continue SEGE 1822 with DV, dose-reduce for neuropathy to 1, pending labs  - Continue to monitor labs    # Hypocalcemia  -  Monitor on CMP redraw.    # Hyponatremia  - Monitor on redraw    # Hypomagnesemia  - Resolved    # Triglycerides  - Continue with cardiology  - Monitor    # Health Maintenance  - Continue with PCP and other healthcare providers  - Advised exercise, heart-healthy diet    RTC per protocol    Total time spent on this encounter was 40 minutes, which included preparation, direct time with patient, documentation, and care coordination on the day of visit.    Daniella Pineda, MSN, APRN, AGNP-C, AOCNP  Associate Nurse Practitioner  [facility]  "

## 2024-08-22 NOTE — RESEARCH NOTES
"    Research Note Follow Up Visit       Toro Lujan is here today for consideration of C14D1 treatment and  follow up on SEGE 1822, cohort A and B.  Follow up procedures completed per protocol. Patient is aware of continued follow up plan.    This visit operating under Amendment 9 as our site received the \"green light\" from sponsor last week.    Recall, patient was dose reduced to 1.1 mg/kg under Amendment 8 on [id number] proactively for G1 sensory peripheral neuropathy at the discretion of both Juancarlos Rapp and Lg.    After review of labs, vitals, exam, Aes and conmeds, patient received treatment at the reduced dose of 1.0 mg/kg per Amendment 9.  This nurse experienced a hard stop when trying to obtain vial assignment.  The 4G system still showed the patient was on Amendment 8 and gave a hard stop with a message that patient had not been re-consented to Amendment 9. The sponsor gave permission to treat at 1.0 mg/kg despite not being able to register the visit in 4G.  The sponsor provided the correct lot number for pharmacy. This communication is filed in the shadow chart and saved on the T drive.     Dr. Rapp did not require re-consent for active patients and the IRB approved.  The sponsor asked us to re-consent all active patients.  It was confirmed with regulatory that this is OK with the IRB.   This nurse agreed to re-consent, but the patient had already left.  He will be re-consented at his next visit.    This nurse reached out to the 4G team to submit a data change request in order to update 4G to show that treatment was given for C14. This is still in process as of this writing.    The comp needed to be repeated because hemolysis was detected and potassium was high.  On repeat, potassium normalized.      Education Documentation  Healthy Lifestyle, taught by Emma Mann RN at 8/21/2024  9:45 AM.  Learner: Patient  Readiness: Eager  Method: Explanation  Response: Verbalizes Understanding    Changes in " Appetite, taught by Emma Mann RN at 8/21/2024  9:45 AM.  Learner: Patient  Readiness: Eager  Method: Explanation  Response: Verbalizes Understanding    Skin and Nail Changes, taught by Emma Mann RN at 8/21/2024  9:45 AM.  Learner: Patient  Readiness: Eager  Method: Explanation  Response: Verbalizes Understanding    Care of Neuropathy, taught by Emma Mann RN at 8/21/2024  9:45 AM.  Learner: Patient  Readiness: Eager  Method: Explanation  Response: Verbalizes Understanding    Diarrhea, taught by Emma Mann RN at 8/21/2024  9:45 AM.  Learner: Patient  Readiness: Eager  Method: Explanation  Response: Verbalizes Understanding    Constipation, taught by Emma Mann RN at 8/21/2024  9:45 AM.  Learner: Patient  Readiness: Eager  Method: Explanation  Response: Verbalizes Understanding    Fatigue, taught by Emma Mann RN at 8/21/2024  9:45 AM.  Learner: Patient  Readiness: Eager  Method: Explanation  Response: Verbalizes Understanding    Treatment Plan and Schedule, taught by Emma Mann RN at 8/21/2024  9:45 AM.  Learner: Patient  Readiness: Eager  Method: Explanation  Response: Verbalizes Understanding    Comprehensive Metabolic Panel (CMP), taught by Emma Mann RN at 8/21/2024  9:45 AM.  Learner: Patient  Readiness: Eager  Method: Explanation  Response: Verbalizes Understanding    Complete Blood Count with Differential (CBC w/ Diff), taught by Emma Mann RN at 8/21/2024  9:45 AM.  Learner: Patient  Readiness: Eager  Method: Explanation  Response: Verbalizes Understanding    Education Comments  No comments found.

## 2024-09-03 LAB
DNA RANGE(S) EXAMINED NAR: NORMAL
GENE DIS ANL INTERP-IMP: POSITIVE
GENE DIS ASSESSED: NORMAL
REASON FOR STUDY: NORMAL
TEMPUS BLOOD TUMOR MUTATIONAL BURDEN: 6.1 M/MB
TEMPUS LCA: NORMAL
TEMPUS MSI NOTE: NORMAL
TEMPUS PORTAL: NORMAL
TEMPUS TREATMENT IMPLICATIONS NOTE: NORMAL
TEMPUS TRIALCOUNT: 2
TEMPUS TRIALMATCHES1: NORMAL
TEMPUS TRIALMATCHES2: NORMAL

## 2024-09-04 ENCOUNTER — OFFICE VISIT (OUTPATIENT)
Dept: HEMATOLOGY/ONCOLOGY | Facility: HOSPITAL | Age: 69
End: 2024-09-04
Payer: COMMERCIAL

## 2024-09-04 ENCOUNTER — EDUCATION (OUTPATIENT)
Dept: HEMATOLOGY/ONCOLOGY | Facility: HOSPITAL | Age: 69
End: 2024-09-04

## 2024-09-04 ENCOUNTER — HOSPITAL ENCOUNTER (OUTPATIENT)
Dept: RESEARCH | Facility: HOSPITAL | Age: 69
Discharge: HOME | End: 2024-09-04
Payer: COMMERCIAL

## 2024-09-04 VITALS
HEART RATE: 68 BPM | WEIGHT: 244.49 LBS | BODY MASS INDEX: 35.56 KG/M2 | OXYGEN SATURATION: 98 % | DIASTOLIC BLOOD PRESSURE: 85 MMHG | RESPIRATION RATE: 16 BRPM | SYSTOLIC BLOOD PRESSURE: 128 MMHG | TEMPERATURE: 97.5 F

## 2024-09-04 DIAGNOSIS — C67.8 MALIGNANT NEOPLASM OF OVERLAPPING SITES OF BLADDER (MULTI): Primary | ICD-10-CM

## 2024-09-04 LAB
ALBUMIN SERPL BCP-MCNC: 4.2 G/DL (ref 3.4–5)
ALP SERPL-CCNC: 110 U/L (ref 33–136)
ALT SERPL W P-5'-P-CCNC: 24 U/L (ref 10–52)
ANION GAP SERPL CALC-SCNC: 12 MMOL/L (ref 10–20)
AST SERPL W P-5'-P-CCNC: 19 U/L (ref 9–39)
BASOPHILS # BLD AUTO: 0.06 X10*3/UL (ref 0–0.1)
BASOPHILS NFR BLD AUTO: 0.7 %
BILIRUB DIRECT SERPL-MCNC: 0.2 MG/DL (ref 0–0.3)
BILIRUB SERPL-MCNC: 0.6 MG/DL (ref 0–1.2)
BUN SERPL-MCNC: 21 MG/DL (ref 6–23)
CALCIUM SERPL-MCNC: 9.1 MG/DL (ref 8.6–10.6)
CHLORIDE SERPL-SCNC: 102 MMOL/L (ref 98–107)
CO2 SERPL-SCNC: 27 MMOL/L (ref 21–32)
CREAT SERPL-MCNC: 1.02 MG/DL (ref 0.5–1.3)
EGFRCR SERPLBLD CKD-EPI 2021: 80 ML/MIN/1.73M*2
EOSINOPHIL # BLD AUTO: 0.37 X10*3/UL (ref 0–0.7)
EOSINOPHIL NFR BLD AUTO: 4.2 %
ERYTHROCYTE [DISTWIDTH] IN BLOOD BY AUTOMATED COUNT: 15.2 % (ref 11.5–14.5)
GLUCOSE SERPL-MCNC: 126 MG/DL (ref 74–99)
HCT VFR BLD AUTO: 44.8 % (ref 41–52)
HGB BLD-MCNC: 14.9 G/DL (ref 13.5–17.5)
IMM GRANULOCYTES # BLD AUTO: 0.05 X10*3/UL (ref 0–0.7)
IMM GRANULOCYTES NFR BLD AUTO: 0.6 % (ref 0–0.9)
LYMPHOCYTES # BLD AUTO: 2.11 X10*3/UL (ref 1.2–4.8)
LYMPHOCYTES NFR BLD AUTO: 23.8 %
MAGNESIUM SERPL-MCNC: 1.84 MG/DL (ref 1.6–2.4)
MCH RBC QN AUTO: 30.6 PG (ref 26–34)
MCHC RBC AUTO-ENTMCNC: 33.3 G/DL (ref 32–36)
MCV RBC AUTO: 92 FL (ref 80–100)
MONOCYTES # BLD AUTO: 0.73 X10*3/UL (ref 0.1–1)
MONOCYTES NFR BLD AUTO: 8.2 %
NEUTROPHILS # BLD AUTO: 5.54 X10*3/UL (ref 1.2–7.7)
NEUTROPHILS NFR BLD AUTO: 62.5 %
NRBC BLD-RTO: 0 /100 WBCS (ref 0–0)
PHOSPHATE SERPL-MCNC: 3.7 MG/DL (ref 2.5–4.9)
PLATELET # BLD AUTO: 208 X10*3/UL (ref 150–450)
POTASSIUM SERPL-SCNC: 4.5 MMOL/L (ref 3.5–5.3)
PROT SERPL-MCNC: 6.9 G/DL (ref 6.4–8.2)
RBC # BLD AUTO: 4.87 X10*6/UL (ref 4.5–5.9)
SODIUM SERPL-SCNC: 136 MMOL/L (ref 136–145)
WBC # BLD AUTO: 8.9 X10*3/UL (ref 4.4–11.3)

## 2024-09-04 PROCEDURE — 2560000001 HC RX 256 EXPERIMENTAL DRUGS

## 2024-09-04 PROCEDURE — 84100 ASSAY OF PHOSPHORUS: CPT

## 2024-09-04 PROCEDURE — 82248 BILIRUBIN DIRECT: CPT

## 2024-09-04 PROCEDURE — 99215 OFFICE O/P EST HI 40 MIN: CPT | Performed by: NURSE PRACTITIONER

## 2024-09-04 PROCEDURE — 85025 COMPLETE CBC W/AUTO DIFF WBC: CPT

## 2024-09-04 PROCEDURE — C9399 UNCLASSIFIED DRUGS OR BIOLOG: HCPCS

## 2024-09-04 PROCEDURE — 83735 ASSAY OF MAGNESIUM: CPT

## 2024-09-04 PROCEDURE — 96413 CHEMO IV INFUSION 1 HR: CPT

## 2024-09-04 PROCEDURE — 80053 COMPREHEN METABOLIC PANEL: CPT

## 2024-09-04 RX ORDER — DIPHENHYDRAMINE HYDROCHLORIDE 50 MG/ML
50 INJECTION INTRAMUSCULAR; INTRAVENOUS AS NEEDED
Status: CANCELLED | OUTPATIENT
Start: 2024-09-04

## 2024-09-04 RX ORDER — FAMOTIDINE 10 MG/ML
20 INJECTION INTRAVENOUS ONCE AS NEEDED
Status: CANCELLED | OUTPATIENT
Start: 2024-09-04

## 2024-09-04 RX ORDER — EPINEPHRINE 1 MG/ML
0.3 INJECTION, SOLUTION, CONCENTRATE INTRAVENOUS EVERY 5 MIN PRN
Status: CANCELLED | OUTPATIENT
Start: 2024-09-04

## 2024-09-04 RX ORDER — ALBUTEROL SULFATE 0.83 MG/ML
3 SOLUTION RESPIRATORY (INHALATION) AS NEEDED
Status: CANCELLED | OUTPATIENT
Start: 2024-09-04

## 2024-09-04 NOTE — RESEARCH NOTES
[facility]<[id number]><C2,3,5+D1><INTENSIVEPK>  DCRU NURSING VISIT NOTE  Study Name: [id number]  IRB#: [id number]  DCRU#: # [id number]  Protocol Version Dated: V9 A8 06.06.23   PI: Fan Rapp MD.    Time point: Cycle 2, 3, 5 AND BEYOND - Day 1 INTENSIVE PK  CYCLE 15    Encounter Date: 09/04/2024  Encounter Time: 10:00 AM EDT  Encounter Department: [facility] HEMATOLOGY AND ONCOLOGY     #1     Phone Pager   Emma Mackenzie [phone] [place]    #2 Phone Pager   Martita Helms [phone] [place]   Other Phone Pager          Study Regimen and Dosing   Intensive PK patients with HER2-overexpressing immunohistochemistry (IHC) 1+, 2+, 3+ locally advanced unresectable or metastatic urothelial carcinoma who have received prior platinum-based chemotherapy.  Cycle = 14 days  Disitamab Vedotin administered IV Day 1 every 2 weeks     Admission and Prior to Starting Study Activities   1. Notify  when patient arrives to unit.  2. Complete [facility]  4. Study Questionnaires   Coordinator will obtain - Cycle 2, 3, then every other cycle.  5. Obtain weight in kilograms - with shoes off & heavy items removed.  6. Obtain vital signs. Record in EMR.  7. Insert one peripheral IV line for sample collection procedures (flush line with normal saline following each blood draw). Access mediport (if available) otherwise insert second peripheral line in opposite arm for Investigative drug administration (if peripheral line, flush line with normal saline before & after infusion)  8. Do Not draw research samples from the same line the investigational drug infused through.  9. Physical Exam < 1 day  10. Confirm patient fasting for clinical safety labs (every 3rd month, if ordered).     Criteria to Treat   DCRU RN reviewed and meets eligibility to proceed with treatment plan   Time team notified: 1237 pm  DCRU RN notifies study team to review eligibility and approval before dosing  procedures  Time team approves: 1240 pm     Dietary Guidelines   REGULAR     Subjective   Toro Lujan is a 69 y.o. male and is here for a Research clinical visit.    Visit Provider: Levi Echevarria RN     Allergies: No Known Allergies    Objective     Vital Signs:    Vitals:    09/04/24 1003 09/04/24 1334   BP: 118/78 128/85   Pulse: 73 68   Resp: 16 16   Temp: 35.8 °C (96.4 °F) 36.4 °C (97.5 °F)   TempSrc: Temporal Temporal   SpO2: 97% 98%   Weight: 111 kg (244 lb 7.8 oz)        Physical Exam     ASSESSMENT and PLAN:  Problem List Items Addressed This Visit       Malignant neoplasm of overlapping sites of bladder (Multi) - Primary    Relevant Medications    Study [id number] disitamab vedotin 99.8 mg in sodium chloride 0.9% 250 mL IV (Completed)    Other Relevant Orders    Clinic Appointment Request    Infusion Appointment Request    CBC and Auto Differential (Completed)    Comprehensive metabolic panel (Completed)    Bilirubin, Direct (Completed)    Glucose, Fasting    Lipid Panel    Magnesium (Completed)    Phosphorus (Completed)    Adult diet Regular    Treatment Conditions (Completed)    CBC and Auto Differential (Completed)    Comprehensive metabolic panel (Completed)    Bilirubin, Direct (Completed)    Magnesium (Completed)    Phosphorus (Completed)        Medications as of the completion of today's visit:  Current Outpatient Medications   Medication Sig Dispense Refill    acetaminophen (Tylenol) 500 mg tablet Take 2 tablets (1,000 mg) by mouth every 8 hours if needed.      Advair Diskus 100-50 mcg/dose diskus inhaler INHALE 1 PUFF BY MOUTH TWICE DAILY AS DIRECTED. RINSE AND GARGLE MOUTH WITH WATER AFTER EACH USE      ALPRAZolam (Xanax) 0.5 mg tablet Take one tablet one hour before anxiety-provoking situation. Limits one tab/day, three tabs/week.      aspirin 81 mg EC tablet Take 1 tablet (81 mg) by mouth once daily.      lisinopril 20 mg tablet Take 1 tablet (20 mg) by mouth once daily.      metoprolol  tartrate (Lopressor) 25 mg tablet Take 1 tablet (25 mg) by mouth 2 times a day.      pantoprazole (ProtoNix) 40 mg EC tablet Take 1 tablet (40 mg) by mouth once daily.      rosuvastatin (Crestor) 40 mg tablet Take 1 tablet (40 mg) by mouth once daily.      sertraline (Zoloft) 50 mg tablet Take 1 tablet (50 mg) by mouth once daily.       No current facility-administered medications for this encounter.       Administrations This Visit       Study [id number] disitamab vedotin 99.8 mg in sodium chloride 0.9% 250 mL IV       Admin Date  09/04/2024 Action  New Bag Dose  99.8 mg Rate  250 mL/hr Route  intravenous Documented By  Levi Echevarria RN                    Orders placed during today's visit:  Orders Placed This Encounter   Procedures    CBC and Auto Differential     Standing Status:   Future     Number of Occurrences:   1     Standing Expiration Date:   9/4/2025     Order Specific Question:   Release result to MyChart     Answer:   Immediate [1]    Comprehensive metabolic panel     Standing Status:   Future     Number of Occurrences:   1     Standing Expiration Date:   9/4/2025     Order Specific Question:   Release result to MyChart     Answer:   Immediate [1]    Bilirubin, Direct     Standing Status:   Future     Number of Occurrences:   1     Standing Expiration Date:   9/4/2025     Order Specific Question:   Release result to MyChart     Answer:   Immediate [1]    Glucose, Fasting     Standing Status:   Future     Standing Expiration Date:   9/4/2025     Order Specific Question:   Release result to MyChart     Answer:   Immediate [1]    Lipid Panel     Standing Status:   Future     Standing Expiration Date:   9/4/2025     Order Specific Question:   Release result to MyChart     Answer:   Immediate [1]    Magnesium     Standing Status:   Future     Number of Occurrences:   1     Standing Expiration Date:   9/4/2025     Order Specific Question:   Release result to MyChart     Answer:   Immediate [1]    Phosphorus      Standing Status:   Future     Number of Occurrences:   1     Standing Expiration Date:   9/4/2025     Order Specific Question:   Release result to MyChart     Answer:   Immediate [1]    CBC and Auto Differential     Standing Status:   Standing     Number of Occurrences:   1     Order Specific Question:   Release result to MyChart     Answer:   Immediate [1]    Comprehensive metabolic panel     Standing Status:   Standing     Number of Occurrences:   1     Order Specific Question:   Release result to MyChart     Answer:   Immediate [1]    Bilirubin, Direct     Standing Status:   Standing     Number of Occurrences:   1     Order Specific Question:   Release result to MyChart     Answer:   Immediate [1]    Magnesium     Standing Status:   Standing     Number of Occurrences:   1     Order Specific Question:   Release result to MyChart     Answer:   Immediate [1]    Phosphorus     Standing Status:   Standing     Number of Occurrences:   1     Order Specific Question:   Release result to MyChart     Answer:   Immediate [1]    Adult diet Regular     Standing Status:   Standing     Number of Occurrences:   1     Order Specific Question:   Diet type     Answer:   Regular    Treatment Conditions     Hold treatment and notify provider if:  · ANC LESS THAN 1 x 10*3/uL  · Platelets LESS THAN 50 x 10*3/uL  · AST/ALT GREATER THAN 5 x ULN  · Total Bilirubin GREATER THAN 3 x ULN  · Blood Glucose GREATER THAN 250 mg/dL  · Hemoglobin LESS THAN 8 g/dL  · LVEF LESS THAN 45%   · Peripheral Neuropathy GREATER THAN OR EQUAL TO Grade 2  · Cardiac Toxicity GREATER THAN OR EQUAL TO Grade 1  · Adverse Event GREATER THAN OR EQUAL TO Grade 3     Standing Status:   Standing     Number of Occurrences:   1        [id number] - A Study of Disitamab Vedotin in Subjects With HER2 Expressing Urothelial Carcinoma    Patient has no adverse events documented in the Research Adverse Events activity.       Study Specific Instructions and Documentation    VITALS  LABS  VITALS   ECG (Cycles 2, 3, 5 and Every 3rd cycle thereafter)  CORRELATIVES (Cycles 2, 3, 5 and Every 3rd cycle thereafter)  PREMEDS  STUDY DRUG (DV)  CORRELATIVES (Cycles 2, 3, 5 and Every 3rd cycle thereafter)  DISCHARGE     PRE-DOSE Safety Labs  < 1 Day prior to Day 1     Refer to [facility] Treatment Plan for orders     Weight-Based Dosing   Baseline (screening) weight (kg) 124.9  Date measured 2/14/24 Actual weight (kg) 110.9   (Weight on Day 1)   Date measured 9/4/24   Dosing should be based on the patient's actual weight    Adjust dose if patient's weight changes +/- 10% from baseline during the study.   Patients > 100 kg, total dose will be calculated using 100 kg (Maximum = 200 mg) once every 2 week cycle.      Safety Parameters and Special Instructions   Disitamab Vedotin (PT98-ITG) is an antibody drug conjugate composed of a recombinant humanized IgG1 anti-HER2 mAb linked to the cytotoxic agent MMAE (monomethyl auristatin E).  Potential Side Effects/Adverse Events: infusion-related reaction, neutropenia, thrombocytopenia, anemia, peripheral neuropathy, elevated liver enzymes, fever, n/v, constipation, diarrhea, pruritus, arthralgia, alopecia, hyperglycemia, elevated triglycerides, hypokalemia, proteinuria, hypoalbuminemia.     PRE-DOSE Vital Signs   Temp, Heart Rate, Respiration, Blood Pressure    @ 1334 - VS table above     PRE-DOSE Medications (if previous infusion reaction)   Refer to [facility] Treatment Plan for orders   Premedication 30 minutes prior to Disitamab Vedotin    No premeds needed.     Safety, Side and Adverse Effects with Special Instructions  Research Study Drugs   SEE  Safety Parameters and Special Instructions     Infusion-Related Reactions   [facility] Hypersensivity Orders      Day 1 Disitamab Vedotin Investigational Product Administration (sponsor provided)   Refer to [facility] Treatment Plan for orders   Document medication administration in MAR activity. Document Research  specific instructions below.   Disitamab Vedotin   If Infusion-Related Reaction, notify MD/Provider &     Grade 1: Mild reaction (transient flushing, rash or fever 38° C): may continue at investigator's discretion; administer symptomatic treatment following DCRU management of hypersensitivity reaction; monitor vital signs.    Grade 2: Moderate reaction (rash, flushing, urticaria, dyspnea, fever 38° C, chest discomfort, or back pain): interrupt infusion & follow DCRU management of hypersensitivity reaction; monitor vital signs.   If promptly responds to symptomatic treatment & is medically stable in the opinion of the investigator, infusion may be resumed at a slower rate.   Grade 3: Prolonged recurrence of symptoms or Grade 4: Life-threatening: stop infusion & follow DCRU management of hypersensitivity reaction. Remain at bedside and monitor until recovery from symptoms. Do not restart infusion.    Start Disitimab Vedotin: 1342 - 22g peripheral IV / R antecubital   EOI: 1444    Discharge Instructions   Discharge patient to home after study requirements completed or PK sample obtained.    Remind patient to return: per   Discharge time: 1450     Levi Echevarria RN  09/04/24

## 2024-09-04 NOTE — PROGRESS NOTES
Patient ID: Toro Lujan is a 69 y.o. male.  Attending Physician: Dr. Topher Castanon  Cancer Diagnosis:  Cancer Staging   No matching staging information was found for the patient.    Current Therapy: SEGE 1822: Disitamab Vedotin, cohorts A and B in HER2-overexpressing IHC 1+, 2+, 3+ for locally advanced unresectable or metastatic UC who have received prior platinum-based chemotherapy  Dose reduced to 1 mg/kg IV every 2 weeks  Genetics: NA    Subjective      Cancer History:  Oncology History   Malignant neoplasm of overlapping sites of bladder (Multi)   2/13/2024 Initial Diagnosis    Malignant neoplasm of overlapping sites of bladder (CMS/HCC)     2/14/2024 - 8/21/2024 Research Study Participant    ([facility]) [id number] Intensive PK Cohort - Disitamab Vedotin, 14 Day Cycles  Plan Provider: Fan Rapp MD PhD  Treatment goal: Control  Line of treatment: Second Line  Associated studies: A Study of Disitamab Vedotin in Subjects With HER2 Expressing Urothelial Carcinoma     2/14/2024 -  Research Study Participant    ([facility]) [id number] Intensive PK Cohort - Disitamab Vedotin, 14 Day Cycles  Plan Provider: Topher Castanon MD  Treatment goal: Control  Line of treatment: Second Line  Associated studies: A Study of Disitamab Vedotin in Subjects With HER2 Expressing Urothelial Carcinoma     8/8/2024 - 8/8/2024 Research Study Participant    ([facility]) [id number] Intensive PK Cohort - Disitamab Vedotin, 14 Day Cycles  Plan Provider: MERLIN Delong-CNP  Treatment goal: Control  Line of treatment: Second Line  Associated studies: A Study of Disitamab Vedotin in Subjects With HER2 Expressing Urothelial Carcinoma       11/5/2020: CTU shows mildly asymmetric generalized urinary bladder wall thickening with mild mucosal hyperenhancement, large prostate  1/29/2021: TURBT. 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 Posterior tumor reveals papillary high-grade at least pTa  urothelial carcinoma with no definite invasion, no muscle present  2/24/2021: Bladder tumor path shows high grade focally invasive urothelial carcinoma into the lamina propria, non-muscle invasive.   5/6/2021: Underwent radical cystectomy with ilial conduit due to multiple multiple disease recurrences without MIBC.  12/13/2021: CTU suspicious for new enlarged pelvic LN  2/17/2022: Started chemotherapy with split-dose gemcitabine and cisplatin  3/10/2022: C2 gem/cis  3/31/2022: C3 gem/cis  4/20/2022: Restaging scans revealed response  4/21/2022: C4 gem/cis  5/12/2022: C5 gem/cis  6/24/2022: Started maintenance avelumab. Treatment was placed on hold due to insurance coverage issues after 1 cycle  8/24/2022: CT scans stable  9/2/2022: Restarted avelumab    3/2/2023: Last dose avelumab, off after  12/28/2023: CT reveals increase in left para-aortic LN highly suspicious for metastatic LAD.  1/18/2024: Again increase in para-aortic LN.  1/31/2024: Screening for SEGE 1822: Disitamab vedotin  2/7/2024: Screening for SEGE 1822  2/14/2024: Cycle 1 day 1 disitamab vedotin as part of clinical trial  7/24/2024: Cycle 12 day 1 DV, dose reduction for neuropathy  9/4/2024: Cycle 15 day 1 DV    Interval History:  Mr. Lujan presents today in consideration of cycle 15 day 1 DV. He continues to feel well. He has been eating well. Paresthesias are intermittent in right hand and seem to have decreased a bit. Still able to perform fine movements. Otherwise, He otherwise has been feeling well without new or concerning symptoms. The remainder of his ROS is otherwise negative.  HPI    Objective    BSA: There is no height or weight on file to calculate BSA.  There were no vitals taken for this visit.    Physical Exam  General: alert, well-dressed in NAD. Speech is fluent and coherent, words clear. Good insight. Oriented x4   Skin: warm, dry, and pink without cyanosis or nail clubbing. No rash, petechiae, or ecchymoses  HEENT: Normocephalic  atraumatic. Sclera white, conjunctiva pink. EOMs intact. Hearing intact to spoken voice. Oral mucosa pink. No visible lesions  Respiratory: Chest expansion symmetric. Breath sounds vesicular in all lobes without crackles, wheezes, or rhonchi bilaterally.  CV: S1S2 audible and crisp without murmurs, rubs, or extra sounds. RRR. Radial pulses strong and regular. Extremities warm and pink. No edema bilaterally.  GI: abdomen is round, soft, and non-tender. Active bowel sounds.   Psych: engaged, polite, appropriate conversation and eye contact.    Current Medications:    Current Outpatient Medications:     acetaminophen (Tylenol) 500 mg tablet, Take 2 tablets (1,000 mg) by mouth every 8 hours if needed., Disp: , Rfl:     Advair Diskus 100-50 mcg/dose diskus inhaler, INHALE 1 PUFF BY MOUTH TWICE DAILY AS DIRECTED. RINSE AND GARGLE MOUTH WITH WATER AFTER EACH USE, Disp: , Rfl:     aspirin 81 mg EC tablet, Take 1 tablet (81 mg) by mouth once daily., Disp: , Rfl:     lisinopril 20 mg tablet, Take 1 tablet (20 mg) by mouth once daily., Disp: , Rfl:     metoprolol tartrate (Lopressor) 25 mg tablet, Take 1 tablet (25 mg) by mouth 2 times a day., Disp: , Rfl:     pantoprazole (ProtoNix) 40 mg EC tablet, Take 1 tablet (40 mg) by mouth once daily., Disp: , Rfl:     rosuvastatin (Crestor) 40 mg tablet, Take 1 tablet (40 mg) by mouth once daily., Disp: , Rfl:     sertraline (Zoloft) 50 mg tablet, Take 1 tablet (50 mg) by mouth once daily., Disp: , Rfl:     ALPRAZolam (Xanax) 0.5 mg tablet, Take one tablet one hour before anxiety-provoking situation. Limits one tab/day, three tabs/week., Disp: , Rfl:      Most Recent Labs:  Results for orders placed or performed during the hospital encounter of 09/04/24   CBC and Auto Differential   Result Value Ref Range    WBC 8.9 4.4 - 11.3 x10*3/uL    nRBC 0.0 0.0 - 0.0 /100 WBCs    RBC 4.87 4.50 - 5.90 x10*6/uL    Hemoglobin 14.9 13.5 - 17.5 g/dL    Hematocrit 44.8 41.0 - 52.0 %    MCV 92 80 -  "100 fL    MCH 30.6 26.0 - 34.0 pg    MCHC 33.3 32.0 - 36.0 g/dL    RDW 15.2 (H) 11.5 - 14.5 %    Platelets 208 150 - 450 x10*3/uL    Neutrophils % 62.5 40.0 - 80.0 %    Immature Granulocytes %, Automated 0.6 0.0 - 0.9 %    Lymphocytes % 23.8 13.0 - 44.0 %    Monocytes % 8.2 2.0 - 10.0 %    Eosinophils % 4.2 0.0 - 6.0 %    Basophils % 0.7 0.0 - 2.0 %    Neutrophils Absolute 5.54 1.20 - 7.70 x10*3/uL    Immature Granulocytes Absolute, Automated 0.05 0.00 - 0.70 x10*3/uL    Lymphocytes Absolute 2.11 1.20 - 4.80 x10*3/uL    Monocytes Absolute 0.73 0.10 - 1.00 x10*3/uL    Eosinophils Absolute 0.37 0.00 - 0.70 x10*3/uL    Basophils Absolute 0.06 0.00 - 0.10 x10*3/uL      No results found for: \"PSA\"     Performance Status:  ECOG Score: 0- Fully active, able to carry on all pre-disease performance w/o restriction.  Karnofsky Score: 100 - Fully active, able to carry on all pre-disease performed without restriction    Assessment/Plan   Toro Lujan is a 69 y.o. male with mUC to LN who presents in follow up and consideration of cycle 15 day 1 SEGE 1822 on DV. He looks and feels well. Will continue on therapy pending labs, dose reduced early for neuropathy. CMP and other labs pending but CBC unremarkable.    # mUC  - Continue SEGE 1822 with DV, dose-reduce for neuropathy to 1, pending labs  - Continue to monitor labs    # Neuropathy  - Monitor  - Dose reduction, continue    # Hypocalcemia  # Hyponatremia  # Hypomagnesemia  - Resolved, monitor CMP    # Triglycerides  - Continue with cardiology  - Monitor    # Health Maintenance  - Continue with PCP and other healthcare providers  - Advised exercise, heart-healthy diet    RTC per protocol    Total time spent on this encounter was 40 minutes, which included preparation, direct time with patient, documentation, and care coordination on the day of visit.    Daniella Pineda, MSN, APRN, AGNP-C, AOCNP  Associate Nurse Practitioner  [facility]  "

## 2024-09-05 NOTE — PROGRESS NOTES
Research Note Follow Up Visit       Toro Lujan is here today for consideration of C15D1 treatment with Disitamab vedotin monotherapy and follow up on SEGE 1822 intensive pk arm.  Follow up procedures completed per protocol. Patient is aware of continued follow up plan.    Previous treatment plan discontinued and new amended treatment plan placed.  Patient reconsented to Amendment 9. Main consent and optional  consent signed.    IV team assisted with IV.  Re-visited discussion about port, but he declined.    Questionnaires completed on tablet.    Very mild intermittent neuropathy right hand.  Does not affect function.  No neuropathy in left hand or feet.    Fatigue continues.  Dysgeusia continues.  Weight stable.     Feels great except for severe cataracts which render him unable to drive. To see Ophthalmologist in Sept.    Elevated glucose (126) but this is non-fasting.    RDW elevated at 15.2    All other labs WNL.    Calendar reviewed.      Education Documentation  Healthy Lifestyle, taught by Emma Mann RN at 9/4/2024 11:15 AM.  Learner: Patient  Readiness: Eager  Method: Explanation  Response: Verbalizes Understanding    Changes in Appetite, taught by Emma Mann RN at 9/4/2024 11:15 AM.  Learner: Patient  Readiness: Eager  Method: Explanation  Response: Verbalizes Understanding    Fatigue, taught by Emma Mann RN at 9/4/2024 11:15 AM.  Learner: Patient  Readiness: Eager  Method: Explanation  Response: Verbalizes Understanding    Treatment Plan and Schedule, taught by Emma Mann RN at 9/4/2024 11:15 AM.  Learner: Patient  Readiness: Eager  Method: Explanation  Response: Verbalizes Understanding    Comprehensive Metabolic Panel (CMP), taught by Emma Mann RN at 9/4/2024 11:15 AM.  Learner: Patient  Readiness: Eager  Method: Explanation  Response: Verbalizes Understanding    Complete Blood Count with Differential (CBC w/ Diff), taught by Emma Mann RN at 9/4/2024 11:15  SADI  Learner: Patient  Readiness: Eager  Method: Explanation  Response: Verbalizes Understanding    Education Comments  No comments found.

## 2024-09-09 DIAGNOSIS — Z00.6 EXAM FOR CLINICAL RESEARCH: Primary | ICD-10-CM

## 2024-09-11 ENCOUNTER — APPOINTMENT (OUTPATIENT)
Dept: RADIOLOGY | Facility: HOSPITAL | Age: 69
End: 2024-09-11
Payer: COMMERCIAL

## 2024-09-11 ENCOUNTER — HOSPITAL ENCOUNTER (OUTPATIENT)
Dept: RADIOLOGY | Facility: HOSPITAL | Age: 69
Discharge: HOME | End: 2024-09-11
Payer: COMMERCIAL

## 2024-09-11 DIAGNOSIS — Z00.6 EXAM FOR CLINICAL RESEARCH: ICD-10-CM

## 2024-09-11 PROCEDURE — 2550000001 HC RX 255 CONTRASTS: Performed by: STUDENT IN AN ORGANIZED HEALTH CARE EDUCATION/TRAINING PROGRAM

## 2024-09-11 PROCEDURE — 74177 CT ABD & PELVIS W/CONTRAST: CPT

## 2024-09-13 ENCOUNTER — TELEPHONE VISIT (OUTPATIENT)
Dept: HEMATOLOGY/ONCOLOGY | Facility: HOSPITAL | Age: 69
End: 2024-09-13
Payer: MEDICARE

## 2024-09-13 DIAGNOSIS — C67.8 MALIGNANT NEOPLASM OF OVERLAPPING SITES OF BLADDER (MULTI): Primary | ICD-10-CM

## 2024-09-13 PROCEDURE — 99212 OFFICE O/P EST SF 10 MIN: CPT | Performed by: INTERNAL MEDICINE

## 2024-09-13 NOTE — PROGRESS NOTES
Med Onc    Toro Lujan  [id number]  9/13/2024    Called pt to discuss recent scans  CR per RECIST remains  Will continue on trial  See us per protocol    Topher Castanon MD, FACP  Chief, Solid Tumor Oncology Division   Medical Oncology  Professor of Medicine and Urology  /[facility]

## 2024-09-16 ENCOUNTER — APPOINTMENT (OUTPATIENT)
Dept: OPHTHALMOLOGY | Facility: CLINIC | Age: 69
End: 2024-09-16
Payer: MEDICARE

## 2024-09-16 DIAGNOSIS — H25.811 COMBINED FORM OF AGE-RELATED CATARACT, RIGHT EYE: Primary | ICD-10-CM

## 2024-09-16 DIAGNOSIS — H25.812 COMBINED FORM OF AGE-RELATED CATARACT, LEFT EYE: ICD-10-CM

## 2024-09-16 DIAGNOSIS — H52.4 PRESBYOPIA: ICD-10-CM

## 2024-09-16 DIAGNOSIS — H26.8 MATURE CATARACT: ICD-10-CM

## 2024-09-16 DIAGNOSIS — H52.13 MYOPIA OF BOTH EYES: ICD-10-CM

## 2024-09-16 DIAGNOSIS — C67.8 MALIGNANT NEOPLASM OF OVERLAPPING SITES OF BLADDER (MULTI): ICD-10-CM

## 2024-09-16 DIAGNOSIS — H52.203 ASTIGMATISM OF BOTH EYES, UNSPECIFIED TYPE: ICD-10-CM

## 2024-09-16 RX ORDER — SODIUM CHLORIDE, SODIUM LACTATE, POTASSIUM CHLORIDE, CALCIUM CHLORIDE 600; 310; 30; 20 MG/100ML; MG/100ML; MG/100ML; MG/100ML
20 INJECTION, SOLUTION INTRAVENOUS CONTINUOUS
OUTPATIENT
Start: 2024-09-16

## 2024-09-16 RX ORDER — CYCLOPENTOLATE HYDROCHLORIDE 10 MG/ML
1 SOLUTION/ DROPS OPHTHALMIC
OUTPATIENT
Start: 2024-09-16 | End: 2024-09-16

## 2024-09-16 RX ORDER — TETRACAINE HYDROCHLORIDE 5 MG/ML
1 SOLUTION OPHTHALMIC ONCE
OUTPATIENT
Start: 2024-09-16 | End: 2024-09-16

## 2024-09-16 RX ORDER — PHENYLEPHRINE HYDROCHLORIDE 100 MG/ML
1 SOLUTION/ DROPS OPHTHALMIC
OUTPATIENT
Start: 2024-09-16 | End: 2024-09-16

## 2024-09-16 ASSESSMENT — REFRACTION_MANIFEST
OD_SPHERE: -3.50
OD_CYLINDER: -0.50
OS_ADD: +2.50
OD_AXIS: 060
OS_SPHERE: -6.50
OS_AXIS: 040
OS_CYLINDER: -1.00
OD_ADD: +2.50

## 2024-09-16 ASSESSMENT — REFRACTION_WEARINGRX
OS_SPHERE: -3.50
OS_CYLINDER: -1.00
OD_ADD: +2.50
OS_ADD: +2.50
OS_AXIS: 040
OD_SPHERE: -3.50
OD_AXIS: 060
OD_CYLINDER: -0.50

## 2024-09-16 ASSESSMENT — VISUAL ACUITY
CORRECTION_TYPE: GLASSES
OS_CC: 20/300
METHOD: SNELLEN - LINEAR
OD_CC: HM

## 2024-09-16 ASSESSMENT — EXTERNAL EXAM - RIGHT EYE: OD_EXAM: NORMAL

## 2024-09-16 ASSESSMENT — ENCOUNTER SYMPTOMS
RESPIRATORY NEGATIVE: 0
ENDOCRINE NEGATIVE: 0
GASTROINTESTINAL NEGATIVE: 0
CONSTITUTIONAL NEGATIVE: 0
CARDIOVASCULAR NEGATIVE: 0
MUSCULOSKELETAL NEGATIVE: 0
ALLERGIC/IMMUNOLOGIC NEGATIVE: 0
PSYCHIATRIC NEGATIVE: 0
NEUROLOGICAL NEGATIVE: 0
EYES NEGATIVE: 1
HEMATOLOGIC/LYMPHATIC NEGATIVE: 0

## 2024-09-16 ASSESSMENT — TONOMETRY
OD_IOP_MMHG: 17
OS_IOP_MMHG: 17
IOP_METHOD: GOLDMANN APPLANATION

## 2024-09-16 ASSESSMENT — SLIT LAMP EXAM - LIDS
COMMENTS: GOOD POSITION
COMMENTS: GOOD POSITION

## 2024-09-16 ASSESSMENT — EXTERNAL EXAM - LEFT EYE: OS_EXAM: NORMAL

## 2024-09-16 NOTE — PROGRESS NOTES
Combined form of age-related cataract, right eyeH25.811  Combined form of age-related cataract, left eyeH25.812  Mature cataract  -Visually significant cataract right eye. BCVA: HM. Symptoms: Decreased vision x 4 years. Harder to read small print. More difficulty seeing small words/numbers on TV. Having more trouble seeing road signs when driving. Glare from headlights when driving at night. A change in glasses prescription will not result in significant visual improvement at this time. No known history of trauma OD.   Indication/anticipated outcome for cataract surgery: To potentially improve visual acuity and improve quality of life/reduce symptoms. To obtain a better view of the retina/optic nerve. To reduce anisometropia.  Based on a comprehensive eye exam performed September 16, 2024, a visually significant cataract appears to be the source of decreased vision, diminished quality of life, and impairment of activities of daily living. Discussed option of cataract surgery vs observation. Patient can no longer function adequately with current best corrected visual acuity and wishes to have cataract surgery at this time. Discussed surgical procedure with patient. Discussed potential risks, benefits, and complications of cataract surgery including but not limited to pain, bleeding, infection, inflammation, edema, increased eye pressure, retinal tear/detachment, lens dislocation, ptosis, iris damage, need for additional surgery, need for glasses after surgery, loss of vision/loss of eye. Patient understands and wishes to proceed. All questions were answered. Will schedule cataract surgery right eye. Will evaluate other eye following cataract surgery right eye. Lenstar done September 16, 2024.   -Pentacam (9/16/24) - OD: Regular oblique. 42.6/43.1 @ 142.9. OS: Regular oblique. 42.8/43.3 @ 43.0.   -Bscan ultrasound (9/16/24) - OD: No mass, no RD, (+)PVD.   -Ascan (9/16/24) - OD: 26.06mm OS: 26.04mm  -OCT macula  (9/16/24) - attempted, unable to obtain due to dense cataracts.   Discussed IOL options (standard monofocal, monofocal with monovision, toric, multifocal). Lens chosen: Standard monofocal. Defer/decline toric/multifocal lens at this time.  Aim: -2.50. Had thorough discussion with patient re: aim. Discussed that may potentially need glasses for best vision both at distance and at near. Patient takes glasses off for reading. Discussed distance aim OU with reading glasses for best vision at near (explained will be unable to see clearly at near without reading glasses) vs retain myopic aim OU to allow for good uncorrected near/reading vision and will need to wear glasses for distance/driving. After discussion, patient elects to retain myopic aim OU. Patient understands they will still need glasses at distance for best vision.   Dominance: right eye  Special considerations:  Will need Trypan Blue due to dense, mature cataract and no red reflex. Guarded visual prognosis, cannot rule out retinal/optic nerve pathology due to no view of fundus. Discussed that due to dense nature of cataract, surgery will be more challenging and there may be a higher risk of complications, which may require additional surgery in the future. Discussed that due to dense cataracts, biometry/IOL calculations will be less accurate and may have residual refractive error that may require glasses/CL correction.   Best contact number: [phone]  Drops:   -Ketorolac (or Diclofenac) and Ofloxacin 4x/day starting 1 day prior to surgery; Prednisolone acetate 1% 4x/day starting after surgery  **Referred by Dr. Gore    Hyperopia  Astigmatism  Presbyopia  -Defer new Rx. No significant improvement in vision with refraction at this time.   -Advised patient not to drive for now.       No history of intraocular surgery/refractive surgery.   No FH of glaucoma  (+)FH AMD - father        OTHER HISTORY:    History of treatment for bladder cancer. Had  chemotherapy and in clinical trial currently.

## 2024-09-18 ENCOUNTER — HOSPITAL ENCOUNTER (OUTPATIENT)
Dept: RESEARCH | Facility: HOSPITAL | Age: 69
Discharge: HOME | End: 2024-09-18
Payer: MEDICARE

## 2024-09-18 ENCOUNTER — OFFICE VISIT (OUTPATIENT)
Dept: HEMATOLOGY/ONCOLOGY | Facility: HOSPITAL | Age: 69
End: 2024-09-18
Payer: MEDICARE

## 2024-09-18 ENCOUNTER — EDUCATION (OUTPATIENT)
Dept: HEMATOLOGY/ONCOLOGY | Facility: HOSPITAL | Age: 69
End: 2024-09-18

## 2024-09-18 VITALS
SYSTOLIC BLOOD PRESSURE: 136 MMHG | HEART RATE: 64 BPM | BODY MASS INDEX: 36.58 KG/M2 | RESPIRATION RATE: 18 BRPM | OXYGEN SATURATION: 100 % | TEMPERATURE: 97.7 F | DIASTOLIC BLOOD PRESSURE: 88 MMHG | WEIGHT: 251.54 LBS

## 2024-09-18 DIAGNOSIS — C67.8 MALIGNANT NEOPLASM OF OVERLAPPING SITES OF BLADDER (MULTI): ICD-10-CM

## 2024-09-18 DIAGNOSIS — C67.8 MALIGNANT NEOPLASM OF OVERLAPPING SITES OF BLADDER (MULTI): Primary | ICD-10-CM

## 2024-09-18 LAB
ALBUMIN SERPL BCP-MCNC: 4 G/DL (ref 3.4–5)
ALP SERPL-CCNC: 104 U/L (ref 33–136)
ALT SERPL W P-5'-P-CCNC: 20 U/L (ref 10–52)
ANION GAP SERPL CALC-SCNC: 12 MMOL/L (ref 10–20)
AST SERPL W P-5'-P-CCNC: 21 U/L (ref 9–39)
BASOPHILS # BLD AUTO: 0.08 X10*3/UL (ref 0–0.1)
BASOPHILS NFR BLD AUTO: 0.9 %
BILIRUB DIRECT SERPL-MCNC: 0.1 MG/DL (ref 0–0.3)
BILIRUB SERPL-MCNC: 0.6 MG/DL (ref 0–1.2)
BUN SERPL-MCNC: 17 MG/DL (ref 6–23)
CALCIUM SERPL-MCNC: 8.8 MG/DL (ref 8.6–10.6)
CHLORIDE SERPL-SCNC: 103 MMOL/L (ref 98–107)
CO2 SERPL-SCNC: 26 MMOL/L (ref 21–32)
CREAT SERPL-MCNC: 1.04 MG/DL (ref 0.5–1.3)
EGFRCR SERPLBLD CKD-EPI 2021: 78 ML/MIN/1.73M*2
EOSINOPHIL # BLD AUTO: 0.43 X10*3/UL (ref 0–0.7)
EOSINOPHIL NFR BLD AUTO: 4.9 %
ERYTHROCYTE [DISTWIDTH] IN BLOOD BY AUTOMATED COUNT: 15.1 % (ref 11.5–14.5)
GLUCOSE SERPL-MCNC: 127 MG/DL (ref 74–99)
HCT VFR BLD AUTO: 41.8 % (ref 41–52)
HGB BLD-MCNC: 14.2 G/DL (ref 13.5–17.5)
IMM GRANULOCYTES # BLD AUTO: 0.03 X10*3/UL (ref 0–0.7)
IMM GRANULOCYTES NFR BLD AUTO: 0.3 % (ref 0–0.9)
LYMPHOCYTES # BLD AUTO: 2.1 X10*3/UL (ref 1.2–4.8)
LYMPHOCYTES NFR BLD AUTO: 24 %
MAGNESIUM SERPL-MCNC: 1.86 MG/DL (ref 1.6–2.4)
MCH RBC QN AUTO: 30.7 PG (ref 26–34)
MCHC RBC AUTO-ENTMCNC: 34 G/DL (ref 32–36)
MCV RBC AUTO: 91 FL (ref 80–100)
MONOCYTES # BLD AUTO: 0.78 X10*3/UL (ref 0.1–1)
MONOCYTES NFR BLD AUTO: 8.9 %
NEUTROPHILS # BLD AUTO: 5.32 X10*3/UL (ref 1.2–7.7)
NEUTROPHILS NFR BLD AUTO: 61 %
NRBC BLD-RTO: 0 /100 WBCS (ref 0–0)
PHOSPHATE SERPL-MCNC: 4 MG/DL (ref 2.5–4.9)
PLATELET # BLD AUTO: 192 X10*3/UL (ref 150–450)
POTASSIUM SERPL-SCNC: 4.3 MMOL/L (ref 3.5–5.3)
PROT SERPL-MCNC: 6.6 G/DL (ref 6.4–8.2)
RBC # BLD AUTO: 4.62 X10*6/UL (ref 4.5–5.9)
SODIUM SERPL-SCNC: 137 MMOL/L (ref 136–145)
WBC # BLD AUTO: 8.7 X10*3/UL (ref 4.4–11.3)

## 2024-09-18 PROCEDURE — 83735 ASSAY OF MAGNESIUM: CPT | Performed by: INTERNAL MEDICINE

## 2024-09-18 PROCEDURE — 84075 ASSAY ALKALINE PHOSPHATASE: CPT | Performed by: INTERNAL MEDICINE

## 2024-09-18 PROCEDURE — 85025 COMPLETE CBC W/AUTO DIFF WBC: CPT | Performed by: INTERNAL MEDICINE

## 2024-09-18 PROCEDURE — 82248 BILIRUBIN DIRECT: CPT | Performed by: INTERNAL MEDICINE

## 2024-09-18 PROCEDURE — 36415 COLL VENOUS BLD VENIPUNCTURE: CPT

## 2024-09-18 PROCEDURE — 99215 OFFICE O/P EST HI 40 MIN: CPT | Performed by: NURSE PRACTITIONER

## 2024-09-18 PROCEDURE — 84100 ASSAY OF PHOSPHORUS: CPT | Performed by: INTERNAL MEDICINE

## 2024-09-18 RX ORDER — HEPARIN SODIUM,PORCINE/PF 10 UNIT/ML
50 SYRINGE (ML) INTRAVENOUS AS NEEDED
Status: DISCONTINUED | OUTPATIENT
Start: 2024-09-18 | End: 2024-09-19 | Stop reason: HOSPADM

## 2024-09-18 RX ORDER — HEPARIN SODIUM,PORCINE/PF 10 UNIT/ML
50 SYRINGE (ML) INTRAVENOUS AS NEEDED
OUTPATIENT
Start: 2024-09-18

## 2024-09-18 RX ORDER — GABAPENTIN 100 MG/1
100 CAPSULE ORAL NIGHTLY
Qty: 90 CAPSULE | Refills: 0 | Status: SHIPPED | OUTPATIENT
Start: 2024-09-18 | End: 2025-09-18

## 2024-09-18 RX ORDER — HEPARIN 100 UNIT/ML
500 SYRINGE INTRAVENOUS AS NEEDED
Status: DISCONTINUED | OUTPATIENT
Start: 2024-09-18 | End: 2024-09-19 | Stop reason: HOSPADM

## 2024-09-18 RX ORDER — HEPARIN 100 UNIT/ML
500 SYRINGE INTRAVENOUS AS NEEDED
OUTPATIENT
Start: 2024-09-18

## 2024-09-18 NOTE — PROGRESS NOTES
Patient ID: Toro Lujan is a 69 y.o. male.  Attending Physician: Dr. Topher Castanon  Cancer Diagnosis:  Cancer Staging   No matching staging information was found for the patient.    Current Therapy: SEGE 1822: Disitamab Vedotin, cohorts A and B in HER2-overexpressing IHC 1+, 2+, 3+ for locally advanced unresectable or metastatic UC who have received prior platinum-based chemotherapy  Dose reduced to 1 mg/kg IV every 2 weeks  Genetics: NA    Subjective      Cancer History:  Oncology History   Malignant neoplasm of overlapping sites of bladder (Multi)   2/13/2024 Initial Diagnosis    Malignant neoplasm of overlapping sites of bladder (CMS/HCC)     2/14/2024 - 8/21/2024 Research Study Participant    ([facility]) [id number] Intensive PK Cohort - Disitamab Vedotin, 14 Day Cycles  Plan Provider: Fan Rapp MD PhD  Treatment goal: Control  Line of treatment: Second Line  Associated studies: A Study of Disitamab Vedotin in Subjects With HER2 Expressing Urothelial Carcinoma     2/14/2024 -  Research Study Participant    ([facility]) [id number] Intensive PK Cohort - Disitamab Vedotin, 14 Day Cycles  Plan Provider: Topher Castanon MD  Treatment goal: Control  Line of treatment: Second Line  Associated studies: A Study of Disitamab Vedotin in Subjects With HER2 Expressing Urothelial Carcinoma     8/8/2024 - 8/8/2024 Research Study Participant    ([facility]) [id number] Intensive PK Cohort - Disitamab Vedotin, 14 Day Cycles  Plan Provider: MERLIN Delong-CNP  Treatment goal: Control  Line of treatment: Second Line  Associated studies: A Study of Disitamab Vedotin in Subjects With HER2 Expressing Urothelial Carcinoma       11/5/2020: CTU shows mildly asymmetric generalized urinary bladder wall thickening with mild mucosal hyperenhancement, large prostate  1/29/2021: TURBT. 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 Posterior tumor reveals papillary high-grade at least pTa  urothelial carcinoma with no definite invasion, no muscle present  2/24/2021: Bladder tumor path shows high grade focally invasive urothelial carcinoma into the lamina propria, non-muscle invasive.   5/6/2021: Underwent radical cystectomy with ilial conduit due to multiple multiple disease recurrences without MIBC.  12/13/2021: CTU suspicious for new enlarged pelvic LN  2/17/2022: Started chemotherapy with split-dose gemcitabine and cisplatin  3/10/2022: C2 gem/cis  3/31/2022: C3 gem/cis  4/20/2022: Restaging scans revealed response  4/21/2022: C4 gem/cis  5/12/2022: C5 gem/cis  6/24/2022: Started maintenance avelumab. Treatment was placed on hold due to insurance coverage issues after 1 cycle  8/24/2022: CT scans stable  9/2/2022: Restarted avelumab    3/2/2023: Last dose avelumab, off after  12/28/2023: CT reveals increase in left para-aortic LN highly suspicious for metastatic LAD.  1/18/2024: Again increase in para-aortic LN.  1/31/2024: Screening for SEGE 1822: Disitamab vedotin  2/7/2024: Screening for SEGE 1822  2/14/2024: Cycle 1 day 1 disitamab vedotin as part of clinical trial  7/24/2024: Cycle 12 day 1 DV, dose reduction for neuropathy  9/4/2024: Cycle 15 day 1 DV  9/18/2024: Cycle 16 day 1 DV    Interval History:  Mr. Lujan presents today in consideration of cycle 16 day 1 DV. 1 week ago, neuropathy become constant in right hand. Affected his ability to cut corn and scoop ice cream. Still able to do most oher fine motor but is now constant and inhibitory. He is on Zoloft at home, but no meds for neuropathy at this time. He has eye surgery in 2 weeks. Weight came up a bit. Otherwise, he feels well without new or concenring symptoms. The remainder of his ROS is otherwise negative.    HPI    Objective    BSA: There is no height or weight on file to calculate BSA.  There were no vitals taken for this visit.    Physical Exam  General: alert, well-dressed in NAD. Speech is fluent and coherent, words clear.  Good insight. Oriented x4   Skin: warm, dry, and pink without cyanosis or nail clubbing. No rash, petechiae, or ecchymoses  HEENT: Normocephalic atraumatic. Sclera white, conjunctiva pink. EOMs intact. Hearing intact to spoken voice. Oral mucosa pink. No visible lesions  Respiratory: Chest expansion symmetric. Breath sounds vesicular in all lobes without crackles, wheezes, or rhonchi bilaterally.  CV: S1S2 audible and crisp without murmurs, rubs, or extra sounds. RRR. Radial pulses strong and regular. Extremities warm and pink. No edema bilaterally.  GI: abdomen is round, soft, and non-tender. Active bowel sounds.   Psych: engaged, polite, appropriate conversation and eye contact.    Current Medications:    Current Outpatient Medications:     acetaminophen (Tylenol) 500 mg tablet, Take 2 tablets (1,000 mg) by mouth every 8 hours if needed., Disp: , Rfl:     Advair Diskus 100-50 mcg/dose diskus inhaler, INHALE 1 PUFF BY MOUTH TWICE DAILY AS DIRECTED. RINSE AND GARGLE MOUTH WITH WATER AFTER EACH USE, Disp: , Rfl:     ALPRAZolam (Xanax) 0.5 mg tablet, Take one tablet one hour before anxiety-provoking situation. Limits one tab/day, three tabs/week., Disp: , Rfl:     aspirin 81 mg EC tablet, Take 1 tablet (81 mg) by mouth once daily., Disp: , Rfl:     lisinopril 20 mg tablet, Take 1 tablet (20 mg) by mouth once daily., Disp: , Rfl:     metoprolol tartrate (Lopressor) 25 mg tablet, Take 1 tablet (25 mg) by mouth 2 times a day., Disp: , Rfl:     pantoprazole (ProtoNix) 40 mg EC tablet, Take 1 tablet (40 mg) by mouth once daily., Disp: , Rfl:     rosuvastatin (Crestor) 40 mg tablet, Take 1 tablet (40 mg) by mouth once daily., Disp: , Rfl:     sertraline (Zoloft) 50 mg tablet, Take 1 tablet (50 mg) by mouth once daily., Disp: , Rfl:   No current facility-administered medications for this visit.    Facility-Administered Medications Ordered in Other Visits:     alteplase (Cathflo Activase) injection 2 mg, 2 mg, intra-catheter,  "PRN, MERLIN Salgado-CNP    heparin flush 10 unit/mL syringe 50 Units, 50 Units, intra-catheter, PRNDaniella APRN-CNP    heparin flush 100 unit/mL syringe 500 Units, 500 Units, intra-catheter, PRNDaniella APRN-CNP     Most Recent Labs:  Results for orders placed or performed during the hospital encounter of 09/18/24   CBC and Auto Differential   Result Value Ref Range    WBC 8.7 4.4 - 11.3 x10*3/uL    nRBC 0.0 0.0 - 0.0 /100 WBCs    RBC 4.62 4.50 - 5.90 x10*6/uL    Hemoglobin 14.2 13.5 - 17.5 g/dL    Hematocrit 41.8 41.0 - 52.0 %    MCV 91 80 - 100 fL    MCH 30.7 26.0 - 34.0 pg    MCHC 34.0 32.0 - 36.0 g/dL    RDW 15.1 (H) 11.5 - 14.5 %    Platelets 192 150 - 450 x10*3/uL    Neutrophils % 61.0 40.0 - 80.0 %    Immature Granulocytes %, Automated 0.3 0.0 - 0.9 %    Lymphocytes % 24.0 13.0 - 44.0 %    Monocytes % 8.9 2.0 - 10.0 %    Eosinophils % 4.9 0.0 - 6.0 %    Basophils % 0.9 0.0 - 2.0 %    Neutrophils Absolute 5.32 1.20 - 7.70 x10*3/uL    Immature Granulocytes Absolute, Automated 0.03 0.00 - 0.70 x10*3/uL    Lymphocytes Absolute 2.10 1.20 - 4.80 x10*3/uL    Monocytes Absolute 0.78 0.10 - 1.00 x10*3/uL    Eosinophils Absolute 0.43 0.00 - 0.70 x10*3/uL    Basophils Absolute 0.08 0.00 - 0.10 x10*3/uL      No results found for: \"PSA\"     Performance Status:  ECOG Score: 0- Fully active, able to carry on all pre-disease performance w/o restriction.  Karnofsky Score: 100 - Fully active, able to carry on all pre-disease performed without restriction    Assessment/Plan   Toro Lujan is a 69 y.o. male with mUC to LN who presents in follow up and consideration of cycle 15 day 1 SEGE 1822 on DV. He looks and feels well. Will continue on therapy pending labs, dose reduced early for neuropathy. CMP and other labs pending but CBC unremarkable.    # mUC  - HOLD DV today due to neuropathy  - Continue to monitor labs    # Neuropathy  - Start gabapentin 100 mg PO nightly  - On Zoloft, working well for anxiety so " will avoid duloxetine at this time.  - Dose reduction, HOLD tx today    # Hypocalcemia  # Hyponatremia  # Hypomagnesemia  - Resolved, monitor CMP    # Triglycerides  - Continue with cardiology  - Monitor    # Health Maintenance  - Continue with PCP and other healthcare providers  - Advised exercise, heart-healthy diet    RTC per protocol    Total time spent on this encounter was 55 minutes, which included preparation, direct time with patient, documentation, and care coordination on the day of visit.    Daniella Pineda, MSN, APRN, AGNP-C, AOCNP  Associate Nurse Practitioner  [facility]

## 2024-09-18 NOTE — RESEARCH NOTES
[facility]<[id number]><C2,3,5+D1><INTENSIVEPK>  DCRU NURSING VISIT NOTE  Study Name: [id number]  IRB#: [id number]  DCRU#: # [id number]  Protocol Version Dated: V9 A8 06.06.23   PI: Fan Rapp MD.    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  Cycle = 14 days  Disitamab Vedotin administered IV Day 1 every 2 weeks     Admission and Prior to Starting Study Activities   1. Notify  when patient arrives to unit.  2. Complete DCRU/[facility] intake form in [facility].  4. Study Questionnaires   Coordinator will obtain - Cycle 2, 3, then every other cycle.  5. Obtain weight in kilograms - with shoes off & heavy items removed.  6. Obtain vital signs. Record in EMR.  7. Insert one peripheral IV line for sample collection procedures (flush line with normal saline following each blood draw). Access mediport (if available) otherwise insert second peripheral line in opposite arm for Investigative drug administration (if peripheral line, flush line with normal saline before & after infusion)  8. Do Not draw research samples from the same line the investigational drug infused through.  9. Physical Exam < 1 day  10. Confirm patient fasting for clinical safety labs (every 3rd month, if ordered).     Criteria to Treat   DCRU RN reviewed and meets eligibility to proceed with treatment plan   Time team notified: N/A   DCRU RN notifies study team to review eligibility and approval before dosing  procedures  Time team approves: N/A     Treatment held due to Grade 2 neuropathy.      Dietary Guidelines   REGULAR     Subjective   Toro Lujan is a 69 y.o. male and is here for a Research clinical visit.    Visit Provider: Levi Echevarria RN     Allergies: No Known Allergies    Objective     Vital Signs:    Vitals:    09/18/24 0808   BP: 136/88   Pulse: 64   Resp: 18   Temp: 36.5 °C (97.7 °F)   TempSrc: Oral   SpO2: 100%   Weight: 114 kg (251 lb 8.7 oz)       Physical Exam     ASSESSMENT and PLAN:  Problem List Items Addressed This Visit       Malignant neoplasm of overlapping sites of bladder (Multi)    Relevant Medications    heparin flush 10 unit/mL syringe 50 Units    heparin flush 100 unit/mL syringe 500 Units    alteplase (Cathflo Activase) injection 2 mg    Other Relevant Orders    CBC and Auto Differential (Completed)    Comprehensive metabolic panel (Completed)    Bilirubin, Direct (Completed)    Magnesium (Completed)    Phosphorus (Completed)    Nursing Communication - Vascular Access (Completed)    Venous Access, CVAD    CENTRAL VENOUS LINE DRESSING CHANGE - ADULT    Insert peripheral IV    Convert IV to saline lock        Medications as of the completion of today's visit:  Current Outpatient Medications   Medication Sig Dispense Refill    acetaminophen (Tylenol) 500 mg tablet Take 2 tablets (1,000 mg) by mouth every 8 hours if needed.      Advair Diskus 100-50 mcg/dose diskus inhaler INHALE 1 PUFF BY MOUTH TWICE DAILY AS DIRECTED. RINSE AND GARGLE MOUTH WITH WATER AFTER EACH USE      ALPRAZolam (Xanax) 0.5 mg tablet Take one tablet one hour before anxiety-provoking situation. Limits one tab/day, three tabs/week.      aspirin 81 mg EC tablet Take 1 tablet (81 mg) by mouth once daily.      gabapentin (Neurontin) 100 mg capsule Take 1 capsule (100 mg) by mouth once daily at bedtime. 90 capsule 0    lisinopril 20 mg tablet Take 1 tablet (20 mg) by mouth once daily.      metoprolol tartrate (Lopressor)  25 mg tablet Take 1 tablet (25 mg) by mouth 2 times a day.      pantoprazole (ProtoNix) 40 mg EC tablet Take 1 tablet (40 mg) by mouth once daily.      rosuvastatin (Crestor) 40 mg tablet Take 1 tablet (40 mg) by mouth once daily.      sertraline (Zoloft) 50 mg tablet Take 1 tablet (50 mg) by mouth once daily.       Current Facility-Administered Medications   Medication Dose Route Frequency Provider Last Rate Last Admin    alteplase (Cathflo Activase) injection 2 mg  2 mg intra-catheter PRN Daniella Pineda APRN-CNP        heparin flush 10 unit/mL syringe 50 Units  50 Units intra-catheter PRN Daniella Pineda, APRN-CNP        heparin flush 100 unit/mL syringe 500 Units  500 Units intra-catheter PRN MERLIN Salgado-RICO               Orders placed during today's visit:  Orders Placed This Encounter   Procedures    CBC and Auto Differential     Standing Status:   Standing     Number of Occurrences:   1     Order Specific Question:   Release result to MyChart     Answer:   Immediate [1]    Comprehensive metabolic panel     Standing Status:   Standing     Number of Occurrences:   1     Order Specific Question:   Release result to MyChart     Answer:   Immediate [1]    Bilirubin, Direct     Standing Status:   Standing     Number of Occurrences:   1     Order Specific Question:   Release result to MyChart     Answer:   Immediate [1]    Magnesium     Standing Status:   Standing     Number of Occurrences:   1     Order Specific Question:   Release result to MyChart     Answer:   Immediate [1]    Phosphorus     Standing Status:   Standing     Number of Occurrences:   1     Order Specific Question:   Release result to MyChart     Answer:   Immediate [1]    Nursing Communication - Vascular Access     Refer to the vascular access device resource page and the following policies for additional information on line insertion, maintenance and removal.    CP-148 - Central Vascular Access Device Insertion, Maintenance, and Removal,  Adult  NP-28 - Midline Cathter Maintenance & Removal, Adult  NP-34 - High-flow Catheters, (e.g. Hemodialysis, Apheresis, and Continuous Renal Replacement Therapy [CRRT]), Care and Utilization, Adult  NP-39 - Peripheral Intravenous Catheter (PIV) Insertion, Maintenance, Blood Collection & Removal - Adult     Standing Status:   Standing     Number of Occurrences:   1    CENTRAL VENOUS LINE DRESSING CHANGE - ADULT     Standing Status:   Standing     Number of Occurrences:   1    Venous Access, CVAD     Standing Status:   Standing     Number of Occurrences:   1    Insert peripheral IV     Obtain venous access for treatment via PIV if no CVAD access or if unable to obtain blood return from CVAD.     Standing Status:   Standing     Number of Occurrences:   1    Convert IV to saline lock     Only if venous access is required over consecutive days. Peripheral catheter can remain in place until completion of last consecutive day infusion, unless IV-related complications are encountered.     Standing Status:   Standing     Number of Occurrences:   1        [id number] - A Study of Disitamab Vedotin in Subjects With HER2 Expressing Urothelial Carcinoma    Patient has no adverse events documented in the Research Adverse Events activity.       Study Specific Instructions and Documentation   VITALS  LABS  VITALS   ECG (Cycles 2, 3, 5 and Every 3rd cycle thereafter)  CORRELATIVES (Cycles 2, 3, 5 and Every 3rd cycle thereafter)  PREMEDS  STUDY DRUG (DV)  CORRELATIVES (Cycles 2, 3, 5 and Every 3rd cycle thereafter)  DISCHARGE     PRE-DOSE Safety Labs  < 1 Day prior to Day 1     Refer to [place] Treatment Plan for orders     Weight-Based Dosing   Baseline (screening) weight (kg) 124.9    Date measured 2/14/24 Actual weight (kg) 114  (Weight on Day 1)   Date measured 9/18/24   Dosing should be based on the patient's actual weight    Adjust dose if patient's weight changes +/- 10% from baseline during the study.   Patients > 100 kg, total  dose will be calculated using 100 kg (Maximum = 200 mg) once every 2 week cycle.      Safety Parameters and Special Instructions   Disitamab Vedotin (TE70-LHO) is an antibody drug conjugate composed of a recombinant humanized IgG1 anti-HER2 mAb linked to the cytotoxic agent MMAE (monomethyl auristatin E).  Potential Side Effects/Adverse Events: infusion-related reaction, neutropenia, thrombocytopenia, anemia, peripheral neuropathy, elevated liver enzymes, fever, n/v, constipation, diarrhea, pruritus, arthralgia, alopecia, hyperglycemia, elevated triglycerides, hypokalemia, proteinuria, hypoalbuminemia.     PRE-DOSE Vital Signs   Temp, Heart Rate, Respiration, Blood Pressure    N/A    PRE-DOSE Medications (if previous infusion reaction)   Refer to [place] Treatment Plan for orders   Premedication 30 minutes prior to Disitamab Vedotin    N/A    Safety, Side and Adverse Effects with Special Instructions  Research Study Drugs   SEE  Safety Parameters and Special Instructions     Infusion-Related Reactions   [facility] Hypersensivity Orders      Day 1 Disitamab Vedotin Investigational Product Administration (sponsor provided)   Refer to [place] Treatment Plan for orders   Document medication administration in MAR activity. Document Research specific instructions below.   Disitamab Vedotin   If Infusion-Related Reaction, notify MD/Provider &     Grade 1: Mild reaction (transient flushing, rash or fever 38° C): may continue at investigator's discretion; administer symptomatic treatment following DCRU management of hypersensitivity reaction; monitor vital signs.    Grade 2: Moderate reaction (rash, flushing, urticaria, dyspnea, fever 38° C, chest discomfort, or back pain): interrupt infusion & follow DCRU management of hypersensitivity reaction; monitor vital signs.   If promptly responds to symptomatic treatment & is medically stable in the opinion of the investigator, infusion may be resumed at a slower rate.    Grade 3: Prolonged recurrence of symptoms or Grade 4: Life-threatening: stop infusion & follow DCRU management of hypersensitivity reaction. Remain at bedside and monitor until recovery from symptoms. Do not restart infusion.    N/A    Discharge Instructions   Discharge patient to home after study requirements completed or PK sample obtained.    Remind patient to return: per   Discharge time: 1000     Treatment held due to Grade 2 neuropathy.     Levi Echevarria RN  09/18/24

## 2024-09-19 NOTE — RESEARCH NOTES
Research Note Follow Up Visit       Toro Lujan is here today for consideration of C16D1 treatment and follow up on [id number].  Follow up procedures completed per protocol. Patient is aware of continued follow up plan.    After review of vitals, labs, exam, Aes and conmeds treatment was HELD (this is the first occurrence), due to worsening of sensory peripheral neuropathy, now Grade 2 in his right-hand fingers.  Patient reports he had difficulty cutting a corn cob and scooping ice cream.    Patient to start gabapentin 100 mg daily hs.    Patient was previously dose reduced conservatively when neuropathy was G1.      If neuropathy improves to G1 or less, treatment will need to be dose reduced to 0.75 mg/kg.    Patient has first cataract surgery on 03OCT24.    Second cataratct surgery is scheduled for 24OCT24.    Education Documentation  Healthy Lifestyle, taught by Emma Mann RN at 9/18/2024  9:40 AM.  Learner: Patient  Readiness: Eager  Method: Explanation  Response: Verbalizes Understanding    Care of Neuropathy, taught by Emma Mann RN at 9/18/2024  9:40 AM.  Learner: Patient  Readiness: Eager  Method: Explanation  Response: Verbalizes Understanding    Treatment Plan and Schedule, taught by Emma Mann RN at 9/18/2024  9:40 AM.  Learner: Patient  Readiness: Eager  Method: Explanation  Response: Verbalizes Understanding    Education Comments  No comments found.

## 2024-09-22 DIAGNOSIS — H25.811 COMBINED FORM OF AGE-RELATED CATARACT, RIGHT EYE: Primary | ICD-10-CM

## 2024-09-22 DIAGNOSIS — H25.812 COMBINED FORM OF AGE-RELATED CATARACT, LEFT EYE: ICD-10-CM

## 2024-09-22 RX ORDER — OFLOXACIN 3 MG/ML
SOLUTION/ DROPS OPHTHALMIC
Qty: 5 ML | Refills: 2 | Status: SHIPPED | OUTPATIENT
Start: 2024-09-22

## 2024-09-22 RX ORDER — PREDNISOLONE ACETATE 10 MG/ML
SUSPENSION/ DROPS OPHTHALMIC
Qty: 5 ML | Refills: 2 | Status: SHIPPED | OUTPATIENT
Start: 2024-09-22

## 2024-09-22 RX ORDER — KETOROLAC TROMETHAMINE 5 MG/ML
SOLUTION OPHTHALMIC
Qty: 5 ML | Refills: 2 | Status: SHIPPED | OUTPATIENT
Start: 2024-09-22

## 2024-09-22 ASSESSMENT — CUP TO DISC RATIO: OS_RATIO: 0.3

## 2024-10-01 ENCOUNTER — TELEPHONE (OUTPATIENT)
Dept: OPHTHALMOLOGY | Facility: CLINIC | Age: 69
End: 2024-10-01
Payer: MEDICARE

## 2024-10-01 NOTE — TELEPHONE ENCOUNTER
Pt called asking if he can get a primer IOL instead of standard IOL for Cataract surgery Right eye. Explain to pt, per Dr. Pandya: that he doesn't have enough astigmatism to get a toric IOL, not recom a multifocal IOL due to dense cataract, no view of retina, could have pathology that would decrease his visual outcome. Recom standard IOL would be best, happy to cancel surgery and seek a second opinion, pt declines and would like to continue with cataract surgery on 10/3/24 with Dr. Pandya. Verified with pt that he wanted to maintain being near sighted, to be able to read without his gls on and would need gls for BSCVA OD at distance, pt confirmed. Pt expressed verbal understanding of all information given.

## 2024-10-02 ENCOUNTER — APPOINTMENT (OUTPATIENT)
Dept: HEMATOLOGY/ONCOLOGY | Facility: HOSPITAL | Age: 69
End: 2024-10-02
Payer: COMMERCIAL

## 2024-10-02 ENCOUNTER — ANESTHESIA EVENT (OUTPATIENT)
Dept: OPERATING ROOM | Facility: CLINIC | Age: 69
End: 2024-10-02
Payer: MEDICARE

## 2024-10-03 ENCOUNTER — HOSPITAL ENCOUNTER (OUTPATIENT)
Facility: CLINIC | Age: 69
Setting detail: OUTPATIENT SURGERY
Discharge: HOME | End: 2024-10-03
Attending: OPHTHALMOLOGY | Admitting: OPHTHALMOLOGY
Payer: MEDICARE

## 2024-10-03 ENCOUNTER — ANESTHESIA (OUTPATIENT)
Dept: OPERATING ROOM | Facility: CLINIC | Age: 69
End: 2024-10-03
Payer: MEDICARE

## 2024-10-03 VITALS
BODY MASS INDEX: 36.34 KG/M2 | HEART RATE: 70 BPM | DIASTOLIC BLOOD PRESSURE: 81 MMHG | RESPIRATION RATE: 16 BRPM | HEIGHT: 69 IN | WEIGHT: 245.37 LBS | SYSTOLIC BLOOD PRESSURE: 120 MMHG | OXYGEN SATURATION: 99 % | TEMPERATURE: 97.5 F

## 2024-10-03 DIAGNOSIS — H25.811 COMBINED FORM OF AGE-RELATED CATARACT, RIGHT EYE: Primary | ICD-10-CM

## 2024-10-03 PROCEDURE — 3600000003 HC OR TIME - INITIAL BASE CHARGE - PROCEDURE LEVEL THREE: Performed by: OPHTHALMOLOGY

## 2024-10-03 PROCEDURE — 2760000001 HC OR 276 NO HCPCS: Performed by: OPHTHALMOLOGY

## 2024-10-03 PROCEDURE — 7100000009 HC PHASE TWO TIME - INITIAL BASE CHARGE: Performed by: OPHTHALMOLOGY

## 2024-10-03 PROCEDURE — 3600000008 HC OR TIME - EACH INCREMENTAL 1 MINUTE - PROCEDURE LEVEL THREE: Performed by: OPHTHALMOLOGY

## 2024-10-03 PROCEDURE — 66982 XCAPSL CTRC RMVL CPLX WO ECP: CPT | Performed by: OPHTHALMOLOGY

## 2024-10-03 PROCEDURE — 2500000004 HC RX 250 GENERAL PHARMACY W/ HCPCS (ALT 636 FOR OP/ED): Performed by: OPHTHALMOLOGY

## 2024-10-03 PROCEDURE — 3700000002 HC GENERAL ANESTHESIA TIME - EACH INCREMENTAL 1 MINUTE: Performed by: OPHTHALMOLOGY

## 2024-10-03 PROCEDURE — 2500000005 HC RX 250 GENERAL PHARMACY W/O HCPCS: Performed by: OPHTHALMOLOGY

## 2024-10-03 PROCEDURE — 2500000001 HC RX 250 WO HCPCS SELF ADMINISTERED DRUGS (ALT 637 FOR MEDICARE OP): Performed by: OPHTHALMOLOGY

## 2024-10-03 PROCEDURE — 2500000004 HC RX 250 GENERAL PHARMACY W/ HCPCS (ALT 636 FOR OP/ED): Performed by: NURSE ANESTHETIST, CERTIFIED REGISTERED

## 2024-10-03 PROCEDURE — 7100000010 HC PHASE TWO TIME - EACH INCREMENTAL 1 MINUTE: Performed by: OPHTHALMOLOGY

## 2024-10-03 PROCEDURE — V2632 POST CHMBR INTRAOCULAR LENS: HCPCS | Performed by: OPHTHALMOLOGY

## 2024-10-03 PROCEDURE — 3700000001 HC GENERAL ANESTHESIA TIME - INITIAL BASE CHARGE: Performed by: OPHTHALMOLOGY

## 2024-10-03 RX ORDER — FENTANYL CITRATE 50 UG/ML
50 INJECTION, SOLUTION INTRAMUSCULAR; INTRAVENOUS EVERY 5 MIN PRN
Status: DISCONTINUED | OUTPATIENT
Start: 2024-10-03 | End: 2024-10-03 | Stop reason: HOSPADM

## 2024-10-03 RX ORDER — OXYCODONE HYDROCHLORIDE 5 MG/1
5 TABLET ORAL EVERY 4 HOURS PRN
Status: DISCONTINUED | OUTPATIENT
Start: 2024-10-03 | End: 2024-10-03 | Stop reason: HOSPADM

## 2024-10-03 RX ORDER — DIPHENHYDRAMINE HYDROCHLORIDE 50 MG/ML
12.5 INJECTION INTRAMUSCULAR; INTRAVENOUS ONCE AS NEEDED
Status: DISCONTINUED | OUTPATIENT
Start: 2024-10-03 | End: 2024-10-03 | Stop reason: HOSPADM

## 2024-10-03 RX ORDER — FENTANYL CITRATE 50 UG/ML
25 INJECTION, SOLUTION INTRAMUSCULAR; INTRAVENOUS EVERY 5 MIN PRN
Status: DISCONTINUED | OUTPATIENT
Start: 2024-10-03 | End: 2024-10-03 | Stop reason: HOSPADM

## 2024-10-03 RX ORDER — ACETAMINOPHEN 325 MG/1
325 TABLET ORAL EVERY 4 HOURS PRN
Status: DISCONTINUED | OUTPATIENT
Start: 2024-10-03 | End: 2024-10-03 | Stop reason: HOSPADM

## 2024-10-03 RX ORDER — MIDAZOLAM HYDROCHLORIDE 1 MG/ML
INJECTION, SOLUTION INTRAMUSCULAR; INTRAVENOUS AS NEEDED
Status: DISCONTINUED | OUTPATIENT
Start: 2024-10-03 | End: 2024-10-03

## 2024-10-03 RX ORDER — TETRACAINE HYDROCHLORIDE 5 MG/ML
SOLUTION OPHTHALMIC AS NEEDED
Status: DISCONTINUED | OUTPATIENT
Start: 2024-10-03 | End: 2024-10-03 | Stop reason: HOSPADM

## 2024-10-03 RX ORDER — PHENYLEPHRINE HYDROCHLORIDE 100 MG/ML
1 SOLUTION/ DROPS OPHTHALMIC
Status: COMPLETED | OUTPATIENT
Start: 2024-10-03 | End: 2024-10-03

## 2024-10-03 RX ORDER — ONDANSETRON HYDROCHLORIDE 2 MG/ML
4 INJECTION, SOLUTION INTRAVENOUS ONCE AS NEEDED
Status: DISCONTINUED | OUTPATIENT
Start: 2024-10-03 | End: 2024-10-03 | Stop reason: HOSPADM

## 2024-10-03 RX ORDER — POVIDONE-IODINE 5 %
SOLUTION, NON-ORAL OPHTHALMIC (EYE) AS NEEDED
Status: DISCONTINUED | OUTPATIENT
Start: 2024-10-03 | End: 2024-10-03 | Stop reason: HOSPADM

## 2024-10-03 RX ORDER — WATER 1 ML/ML
IRRIGANT IRRIGATION AS NEEDED
Status: DISCONTINUED | OUTPATIENT
Start: 2024-10-03 | End: 2024-10-03 | Stop reason: HOSPADM

## 2024-10-03 RX ORDER — ALBUTEROL SULFATE 0.83 MG/ML
2.5 SOLUTION RESPIRATORY (INHALATION) ONCE AS NEEDED
Status: DISCONTINUED | OUTPATIENT
Start: 2024-10-03 | End: 2024-10-03 | Stop reason: HOSPADM

## 2024-10-03 RX ORDER — SODIUM CHLORIDE, SODIUM LACTATE, POTASSIUM CHLORIDE, CALCIUM CHLORIDE 600; 310; 30; 20 MG/100ML; MG/100ML; MG/100ML; MG/100ML
100 INJECTION, SOLUTION INTRAVENOUS CONTINUOUS
Status: DISCONTINUED | OUTPATIENT
Start: 2024-10-03 | End: 2024-10-03 | Stop reason: HOSPADM

## 2024-10-03 RX ORDER — CYCLOPENTOLATE HYDROCHLORIDE 10 MG/ML
1 SOLUTION/ DROPS OPHTHALMIC
Status: COMPLETED | OUTPATIENT
Start: 2024-10-03 | End: 2024-10-03

## 2024-10-03 RX ORDER — EPINEPHRINE 1 MG/ML
INJECTION, SOLUTION, CONCENTRATE INTRAVENOUS AS NEEDED
Status: DISCONTINUED | OUTPATIENT
Start: 2024-10-03 | End: 2024-10-03 | Stop reason: HOSPADM

## 2024-10-03 RX ORDER — SODIUM CHLORIDE, SODIUM LACTATE, POTASSIUM CHLORIDE, CALCIUM CHLORIDE 600; 310; 30; 20 MG/100ML; MG/100ML; MG/100ML; MG/100ML
20 INJECTION, SOLUTION INTRAVENOUS CONTINUOUS
Status: DISCONTINUED | OUTPATIENT
Start: 2024-10-03 | End: 2024-10-03 | Stop reason: HOSPADM

## 2024-10-03 RX ORDER — TETRACAINE HYDROCHLORIDE 5 MG/ML
1 SOLUTION OPHTHALMIC ONCE
Status: COMPLETED | OUTPATIENT
Start: 2024-10-03 | End: 2024-10-03

## 2024-10-03 RX ORDER — FENTANYL CITRATE 50 UG/ML
INJECTION, SOLUTION INTRAMUSCULAR; INTRAVENOUS AS NEEDED
Status: DISCONTINUED | OUTPATIENT
Start: 2024-10-03 | End: 2024-10-03

## 2024-10-03 SDOH — HEALTH STABILITY: MENTAL HEALTH: CURRENT SMOKER: 1

## 2024-10-03 ASSESSMENT — ENCOUNTER SYMPTOMS
EYE REDNESS: 0
JOINT SWELLING: 0
ABDOMINAL DISTENTION: 0
EYE DISCHARGE: 0
COLOR CHANGE: 0
AGITATION: 0
RHINORRHEA: 0
WHEEZING: 0
FEVER: 0
SEIZURES: 0
DIAPHORESIS: 0
FACIAL SWELLING: 0
STRIDOR: 0
FACIAL ASYMMETRY: 0

## 2024-10-03 ASSESSMENT — PAIN SCALES - GENERAL
PAINLEVEL_OUTOF10: 0 - NO PAIN

## 2024-10-03 ASSESSMENT — PAIN - FUNCTIONAL ASSESSMENT
PAIN_FUNCTIONAL_ASSESSMENT: 0-10

## 2024-10-03 NOTE — ANESTHESIA POSTPROCEDURE EVALUATION
Patient: Toro Lujan    Procedure Summary       Date: 10/03/24 Room / Location: [facility] [place] [facility] OR 02 / [facility] [place] [facility]    Anesthesia Start: 1341 Anesthesia Stop: 1418    Procedure: Phacoemulsification Cataract with Insertion Intraocular Lens (Right: Eye) Diagnosis:       Combined form of age-related cataract, right eye      (Combined form of age-related cataract, right eye [H25.811])    Surgeons: Tamara Brambila MD Responsible Provider: Wei Mendosa DO    Anesthesia Type: MAC ASA Status: 3            Anesthesia Type: MAC    Vitals Value Taken Time   /80 10/03/24 1418   Temp 36 10/03/24 1418   Pulse 67 10/03/24 1418   Resp 18 10/03/24 1418   SpO2 98 10/03/24 1418       Anesthesia Post Evaluation    Patient location during evaluation: PACU  Patient participation: complete - patient participated  Level of consciousness: awake and alert  Pain management: adequate  Airway patency: patent  Cardiovascular status: acceptable and hemodynamically stable  Respiratory status: acceptable and room air  Hydration status: acceptable  Postoperative Nausea and Vomiting: none        No notable events documented.

## 2024-10-03 NOTE — H&P
History Of Present Illness  Toro Lujan is a 69 y.o. male presenting for planned CEIOL OD.     Past Medical History  Past Medical History:   Diagnosis Date    Anxiety     Bladder cancer (Multi)     chemo 2022, 2023    Cataract     Cervical spine disease     COPD (chronic obstructive pulmonary disease) (Multi)     mild    Coronary artery disease     GERD (gastroesophageal reflux disease)     Hyperlipidemia     Hypertension     Peripheral neuropathy     hands    Ureteral stone     left       Surgical History  Past Surgical History:   Procedure Laterality Date    BLADDER SURGERY      needle biopsy    CERVICAL FUSION      COLONOSCOPY      CORONARY STENT PLACEMENT      2016    CYSTOURETHROSCOPY      KNEE ARTHROPLASTY Left     OTHER SURGICAL HISTORY      ileal conduit, 2021    PROSTATECTOMY      2021        Social History  He reports that he has been smoking cigarettes. He has never used smokeless tobacco. He reports current alcohol use. He reports that he does not currently use drugs.    Family History  Family History   Problem Relation Name Age of Onset    Glaucoma Neg Hx      Macular degeneration Neg Hx          Allergies  Patient has no known allergies.    Review of Systems   Constitutional:  Negative for diaphoresis and fever.   HENT:  Negative for facial swelling and rhinorrhea.    Eyes:  Negative for discharge and redness.   Respiratory:  Negative for wheezing and stridor.    Cardiovascular:  Negative for leg swelling.   Gastrointestinal:  Negative for abdominal distention.   Musculoskeletal:  Negative for joint swelling.   Skin:  Negative for color change and pallor.   Neurological:  Negative for seizures, syncope and facial asymmetry.   Psychiatric/Behavioral:  Negative for agitation and behavioral problems.         Physical Exam  Constitutional:       Appearance: Normal appearance.   HENT:      Head: Normocephalic and atraumatic.   Eyes:      General:         Right eye: No discharge.         Left eye: No  "discharge.      Conjunctiva/sclera: Conjunctivae normal.   Cardiovascular:      Rate and Rhythm: Normal rate and regular rhythm.   Pulmonary:      Effort: Pulmonary effort is normal. No respiratory distress.      Breath sounds: No stridor.   Abdominal:      General: Abdomen is flat. There is no distension.   Skin:     General: Skin is warm and dry.   Neurological:      General: No focal deficit present.      Mental Status: He is alert. Mental status is at baseline.   Psychiatric:         Mood and Affect: Mood normal.         Behavior: Behavior normal.          Last Recorded Vitals  Blood pressure 119/77, pulse 74, temperature 36 °C (96.8 °F), temperature source Temporal, resp. rate 16, height 1.753 m (5' 9\"), weight 111 kg (245 lb 6 oz), SpO2 97%.    Relevant Results             Assessment/Plan   Assessment & Plan  Combined form of age-related cataract, right eye      Proceed with planned CEIOL OD           Gerald Hugo MD    "

## 2024-10-03 NOTE — ANESTHESIA PREPROCEDURE EVALUATION
Patient: Toro Lujan    Procedure Information       Date/Time: 10/03/24 1415    Procedure: Phacoemulsification Cataract with Insertion Intraocular Lens (Right: Eye)    [place] [facility] [place] [facility] [place] [facility] [place] [facility]    Surgeons: Tamara Brambila MD            Relevant Problems   Cardiac   (+) Coronary artery disease involving native coronary artery of native heart without angina pectoris   (+) Essential hypertension      Pulmonary   (+) Chronic obstructive pulmonary disease (Multi)      Neuro   (+) KARLEY (generalized anxiety disorder)      GI   (+) GERD (gastroesophageal reflux disease)       Clinical information reviewed:   Tobacco  Allergies  Meds   Med Hx  Surg Hx   Fam Hx  Soc Hx        NPO Detail:  NPO/Void Status  Date of Last Liquid: 10/02/24  Time of Last Liquid: 2350  Date of Last Solid: 10/02/24  Time of Last Solid: 2350         Physical Exam    Airway  Mallampati: I  TM distance: >3 FB  Neck ROM: full  Comments: Full beard and moustache   Cardiovascular   Rhythm: regular  Rate: normal     Dental        Pulmonary   Breath sounds clear to auscultation     Abdominal   Abdomen: soft             Anesthesia Plan    History of general anesthesia?: yes  History of complications of general anesthesia?: no    ASA 3     MAC     The patient is a current smoker.  Patient was previously instructed to abstain from smoking on day of procedure.  Patient smoked on day of procedure.  Education provided regarding risk of obstructive sleep apnea.  intravenous induction   Anesthetic plan and risks discussed with patient.    Plan discussed with attending.

## 2024-10-03 NOTE — DISCHARGE INSTRUCTIONS
Surgeon: Tamara Brambila MD     Patient name: Toro Lujan    Date of surgery: October 3, 2024        DROP INSTRUCTIONS:    Prednisolone acetate 1% (pink or white cap) - One drop 4 times a day to operative eye    Ofloxacin (tan cap) - One drop 4 times a day to operative eye    Ketorolac (gray cap) - One drop 4 times a day to operative eye    When putting different drops in around the same administration time, please wait 1-2 minutes between drops  Avoid sleeping on side of operative eye  No heavy lifting over 10-15lbs and no bending over  Do not get eye wet, no eye rubbing  Wear sunglasses or glasses during the day and the shield when sleeping at night  Please call immediately if you develop any redness, pain, decreased vision, flashes, or floaters      During office hours (8:30a-4:30p): [phone]  After office hours: [phone]  [facility] : [phone]   Pager: [phone] for Dr. Pandya

## 2024-10-03 NOTE — OP NOTE
Phacoemulsification Cataract with Insertion Intraocular Lens (R) Operative Note     Date: 10/3/2024  [place] [facility] [place]    Name: Toro Lujan : 1955, Age: 69 y.o., MRN: [id number], Sex: male    Diagnosis  Pre-op Diagnosis      * Combined form of age-related cataract, right eye [H25.811] Post-op Diagnosis     * Combined form of age-related cataract, right eye [H25.811]     Procedures  Phacoemulsification Cataract with Insertion Intraocular Lens  06352 - MA XCAPSL CTRC RMVL INSJ IO LENS PROSTH CPLX WO ECP      Surgeons      * Tamara Brambila - Primary    Resident/Fellow/Other Assistant:  Surgeons and Role:  * No surgeons found with a matching role *    Procedure Summary  Anesthesia: Monitor Anesthesia Care  ASA: III  Anesthesia Staff: Anesthesiologist: Wei Mendosa DO  CRNA: MERLIN Pate-CRNA  Estimated Blood Loss: 0 mL  Intra-op Medications: Administrations occurring from 1415 to 1505 on 10/03/24:  * No intraprocedure medications in log *           Anesthesia Record               Intraprocedure I/O Totals       None           Specimen: No specimens collected     Staff:   Circulator: Katy Hurt Person: Betty         Drains and/or Catheters: * None in log *    Tourniquet Times:         Implants:  Implants       Type Name Action Serial No.       17.0 DIOPTER, TECNIS EYHANCE 1-PIECE IOL W/ SIMPLICITY DELIVERY SYSTEM, BICONVEX, UV-BLOCKING HYDROPHOBIC ACRYLIC, MODEL DIB00 Implanted [id number]              Findings: as below    Indications: Toro Lujan is an 69 y.o. male who is having surgery for Combined form of age-related cataract, right eye [H25.811].     Procedure Details:     Patient name: Toro Lujan  YOB: 1955   MRN: [id number]   Date of surgery: October 3, 2024   Preoperative diagnosis: Combined cataract of the RIGHT eye  Postoperative diagnosis: Combined cataract of the RIGHT eye  Procedure: Complex phacoemulsification of cataract with insertion of  intraocular lens, RIGHT eye with use of Trypan Blue  Surgeon: Tamara Brambila MD  Resident: Gerald Macedo MD  Anesthesia: MAC  Complications: None  Lens Inserted: Jaun & Jaun DIB00 with a power of +17.00 D. Serial #: [id number]. Exp date: 05/17/2027.    Procedure description: After the risks, benefits, and alternatives of the planned procedure were discussed with the patient, informed consent was obtained at the preoperative evaluation. On the day of surgery, there were no updates to the consent form and any patient questions were answered. The patient was correctly identified in the preoperative area and the RIGHT eye was marked as the operative eye. Dilating drops were instilled into the RIGHT eye in the preoperative area. The patient was then taken back to the operating room and placed under sedation. The patient was prepped and draped in the standard, sterile ophthalmic fashion in preparation for intraocular surgery.    A lid speculum was placed into the RIGHT palpebral fissure and the operating microscope was brought into position. Due to the dense nature of the cataract, a poor red reflex was observed. A paracentesis was made in superotemporal cornea at the limbus with a 1.0mm side port blade using a cotton tipped applicator to provide countertraction. Intracameral epishugarcaine was injected into the anterior chamber. Trypan blue was irrigated into the anterior chamber to stain the anterior capsule. BSS was used to irrigate out the excess Trypan Blue. Viscoat viscoelastic was then injected into the anterior chamber. Using 0.12 forceps to stabilize the globe, a 2.4mm keratome blade was used to create a limbal clear-corneal incision inferotemporally. A bent-needle cystotome and Utrata forceps were used to create a continuous curvilinear capsulorrhexis. Balanced salt saline solution on a blunt-tipped cannula was used to achieve hydrodissection.    A phacoemulsification device and a [place] spatula  were used to remove the nucleus using a divide-and-conquer technique. Residual cortical material was removed with the irrigation and aspiration handpiece. Provisc viscoelastic was then injected into the eye to reform the anterior chamber and to open the capsular bag. The posterior capsule was inspected and found to be clean and intact. The intraocular lens, Jaun & Jaun DIB00 with a power of +17.00 diopters was injected into the capsular bag. A lens positioner was used to center the lens and ensure good position within the bag. The remaining viscoelastic was removed using irrigation and aspiration. Balanced salt saline solution on a blunt-tipped cannula was then used to hydrate the corneal stroma adjacent to the main wound and paracentesis site as well as to reform the anterior chamber. The wound was checked and found to be watertight with normal intraocular pressure verified using digital palpation. At the conclusion of the case, a well-centered intraocular lens with a good red reflex was observed. Tetracaine, betadine, and BSS were instilled into the RIGHT eye. The lid speculum and drapes were removed. A clear plastic shield was then taped over the eye. The patient was taken to the recovery room in stable condition, having tolerated the procedure well. There were no complications.          Complications:  None; patient tolerated the procedure well.    Disposition: PACU - hemodynamically stable.  Condition: stable         Additional Details: none    Attending Attestation: I performed the procedure.    Tamara Brambila  Phone Number: [phone]

## 2024-10-04 ENCOUNTER — APPOINTMENT (OUTPATIENT)
Dept: OPHTHALMOLOGY | Facility: CLINIC | Age: 69
End: 2024-10-04
Payer: MEDICARE

## 2024-10-04 DIAGNOSIS — H25.812 COMBINED FORM OF AGE-RELATED CATARACT, LEFT EYE: ICD-10-CM

## 2024-10-04 DIAGNOSIS — Z96.1 PSEUDOPHAKIA: Primary | ICD-10-CM

## 2024-10-04 DIAGNOSIS — H26.491 PCO (POSTERIOR CAPSULAR OPACIFICATION), RIGHT: ICD-10-CM

## 2024-10-04 DIAGNOSIS — H52.13 MYOPIA OF BOTH EYES: ICD-10-CM

## 2024-10-04 DIAGNOSIS — H52.4 PRESBYOPIA: ICD-10-CM

## 2024-10-04 DIAGNOSIS — H52.203 ASTIGMATISM OF BOTH EYES, UNSPECIFIED TYPE: ICD-10-CM

## 2024-10-04 ASSESSMENT — TONOMETRY
IOP_METHOD: GOLDMANN APPLANATION
OD_IOP_MMHG: 22

## 2024-10-04 ASSESSMENT — ENCOUNTER SYMPTOMS
ALLERGIC/IMMUNOLOGIC NEGATIVE: 0
ENDOCRINE NEGATIVE: 0
PSYCHIATRIC NEGATIVE: 0
GASTROINTESTINAL NEGATIVE: 0
CARDIOVASCULAR NEGATIVE: 0
EYES NEGATIVE: 1
CONSTITUTIONAL NEGATIVE: 0
NEUROLOGICAL NEGATIVE: 0
MUSCULOSKELETAL NEGATIVE: 0
HEMATOLOGIC/LYMPHATIC NEGATIVE: 0
RESPIRATORY NEGATIVE: 0

## 2024-10-04 ASSESSMENT — REFRACTION_WEARINGRX
OS_SPHERE: -3.50
OD_ADD: +2.50
OS_ADD: +2.50
OD_AXIS: 062
OS_AXIS: 042
OS_CYLINDER: -1.00
OD_CYLINDER: -0.50
OD_SPHERE: -3.50

## 2024-10-04 ASSESSMENT — VISUAL ACUITY
OD_PH_SC: 20/60
METHOD: SNELLEN - LINEAR
OD_SC: 20/100

## 2024-10-04 ASSESSMENT — SLIT LAMP EXAM - LIDS
COMMENTS: GOOD POSITION
COMMENTS: GOOD POSITION

## 2024-10-04 ASSESSMENT — EXTERNAL EXAM - LEFT EYE: OS_EXAM: NORMAL

## 2024-10-04 ASSESSMENT — EXTERNAL EXAM - RIGHT EYE: OD_EXAM: NORMAL

## 2024-10-04 NOTE — PROGRESS NOTES
Pseudophakia of right eyeZ96.1 - 10/3/24 - Complex with Trypan Blue  Posterior capsular opacification, right eye  -Doing well. Good vision. IOP good.  Prednisolone acetate 1% - 4 times a day  Ofloxacin - 4 times a day  Ketorolac - 4 times a day  -D/w patient and daughter there is residual posterior capsular opacification that could not be removed safely at the time of surgery. Discussed will consider YAG capsulotomy in the future as needed if visually significant.   No heavy lifting over 10-15 lbs, no bending over, do not get eye wet, no eye rubbing. Wear eye shield at bedtime and sunglasses/glasses during the day. Advised to call if any redness, pain, decreased vision, flashes, floaters. F/u 1 week - refract OU (autorefract first), glare/BAT left eye. Plan for dilation OU if cataract left eye is visually significant and if patient would like to proceed with cataract surgery.     Combined form of age-related cataract, left eyeH25.812  -Tentatively scheduled for CEIOL OS 10/24/24  -Will discuss option of cataract surgery left eye at next visit. F/u 1 week - refract OU (autorefract first), glare/BAT left eye. Plan for dilation OU if cataract left eye is visually significant and if patient would like to proceed with cataract surgery.   **Referred by Dr. Gore    Myopia  Astigmatism  Presbyopia  -F/u 1 week - refract OU (autorefract first), glare/BAT left eye. Plan for dilation OU if cataract left eye is visually significant and if patient would like to proceed with cataract surgery.       No history of refractive surgery.   No FH of glaucoma  (+)FH AMD - father        OTHER HISTORY:    History of treatment for bladder cancer. Had chemotherapy and in clinical trial currently.

## 2024-10-04 NOTE — PATIENT INSTRUCTIONS
Surgeon: Tamara Brambila MD      Patient name: Toro Lujan    Date of visit: October 4, 2024      right eye    Prednisolone acetate (pink or white cap) - 4 times a day    Ofloxacin (tan cap) - 4 times a day    Ketorolac (gray cap) - 4 times a day      No heavy lifting over 10-15 lbs, no bending over at the waist for 1 week, do not get eye wet, no eye rubbing. You may take a shower and wash your face - please keep the operative eye closed to minimize water exposure. Wear eye shield at bedtime and sunglasses/glasses during the day. Please call immediately if you develop any redness, pain, decreased vision, flashes, floaters.       Surgical Scheduler (during office hours): [place]: [phone]  Office phone (after hours): [phone]  [facility] : [phone]                     Pager: [phone] for Dr. Pandya

## 2024-10-07 ASSESSMENT — EXTERNAL EXAM - LEFT EYE: OS_EXAM: NORMAL

## 2024-10-07 ASSESSMENT — CUP TO DISC RATIO
OS_RATIO: 0.3
OD_RATIO: 0.3

## 2024-10-07 ASSESSMENT — SLIT LAMP EXAM - LIDS
COMMENTS: GOOD POSITION
COMMENTS: GOOD POSITION

## 2024-10-07 ASSESSMENT — EXTERNAL EXAM - RIGHT EYE: OD_EXAM: NORMAL

## 2024-10-07 NOTE — PATIENT INSTRUCTIONS
Surgeon: Tamara Brambila MD                   [id number]      Patient name: Toro Lujan    Date of visit: 10/9/24      right eye    Prednisolone acetate (pink or white cap) - 4 times a day for 1 week, 3 times a day for 1 week, 2 times a day for 1 week, once a day for 1 week, then stop    Ofloxacin (tan cap) - 4 times a day for 5 more days, then stop    Ketorolac (gray cap) - 4 times a day for 5 more days, then stop      No heavy lifting over 15-20 lbs, try not to get eye wet, no eye rubbing. You may stop wearing the eye shield at night. Please continue wearing glasses or sunglasses during the day to protect your eyes. Please call immediately if you develop any redness, pain, decreased vision, flashes, floaters.     Surgical Scheduler (during office hours) - [place]: [phone]

## 2024-10-07 NOTE — PROGRESS NOTES
Combined form of age-related cataract, left eyeH25.812  -Visually significant cataract left eye. BCVA: 20/80. Symptoms: Decreased vision x 4 years. Harder to read small print. More difficulty seeing small words/numbers on TV. Having more trouble seeing road signs when driving. Glare from headlights when driving at night. A change in glasses prescription will not result in significant visual improvement at this time. No known history of trauma OD.   Indication/anticipated outcome for cataract surgery: To potentially improve visual acuity and improve quality of life/reduce symptoms. To obtain a better view of the retina/optic nerve. To reduce anisometropia.  Based on a comprehensive eye exam performed 10/9/24, a visually significant cataract appears to be the source of decreased vision, diminished quality of life, and impairment of activities of daily living. Discussed option of cataract surgery vs observation. Patient can no longer function adequately with current best corrected visual acuity and wishes to have cataract surgery at this time. Discussed surgical procedure with patient. Discussed potential risks, benefits, and complications of cataract surgery including but not limited to pain, bleeding, infection, inflammation, edema, increased eye pressure, retinal tear/detachment, lens dislocation, ptosis, iris damage, need for additional surgery, need for glasses after surgery, loss of vision/loss of eye. Patient understands and wishes to proceed. All questions were answered. Will schedule cataract surgery left eye. Lenstar done September 16, 2024.   -Pentacam (9/16/24) - OD: Regular oblique. 42.6/43.1 @ 142.9. OS: Regular oblique. 42.8/43.3 @ 43.0.   -Bscan ultrasound (9/16/24) - OD: No mass, no RD, (+)PVD.   -Ascan (9/16/24) - OD: 26.06mm OS: 26.04mm  -OCT macula (9/16/24) - attempted, unable to obtain due to dense cataracts.   Discussed IOL options (standard monofocal, toric, multifocal). Lens chosen: Standard  monofocal. Defer/decline toric/multifocal lens at this time.  Aim: -2.50. Had thorough discussion with patient re: aim. Discussed that may potentially need glasses for best vision both at distance and at near. Patient takes glasses off for reading. Discussed distance aim OU with reading glasses for best vision at near (explained will be unable to see clearly at near without reading glasses) vs retain myopic aim OU to allow for good uncorrected near/reading vision and will need to wear glasses for distance/driving. After discussion, patient elects to retain myopic aim OU. Patient understands they will still need glasses at distance for best vision.   Dominance: right eye  Special considerations:  Will need Trypan Blue due to dense, mature cataract and no red reflex. Guarded visual prognosis, cannot rule out retinal/optic nerve pathology due to no view of fundus. Discussed that due to dense nature of cataract, surgery will be more challenging and there may be a higher risk of complications, which may require additional surgery in the future. Discussed that due to dense cataracts, biometry/IOL calculations will be less accurate and may have residual refractive error that may require glasses/CL correction.   Best contact number: [phone]  Drops:   -Ketorolac (or Diclofenac) and Ofloxacin 4x/day starting 1 day prior to surgery; Prednisolone acetate 1% 4x/day starting after surgery  **Referred by Dr. Gore    Pseudophakia of right eyeZ96.1 - 10/3/24 - Complex with Trypan Blue  Posterior capsular opacification, right eye  -Doing well. Good vision. IOP good.  Prednisolone acetate 1% - 4/3/2/1 weekly taper  Ofloxacin - 4 times a day x 5 more days, then stop  Ketorolac - 4 times a day x 5 more days, then stop  -There is residual posterior capsular opacification that could not be removed safely at the time of surgery. Discussed will consider YAG capsulotomy in the future as needed if visually significant.   -No  heavy lifting over 15-20 lbs, do not get eye wet, no eye rubbing. Discontinue eye shield at bedtime but wear sunglasses/glasses during the day. Advised to call if any redness, pain, decreased vision, flashes, floaters.     Myopia  Astigmatism  Presbyopia  -Defer new Rx. No significant improvement in vision with refraction at this time.   -Refraction performed today for diagnostic purposes only and without specific intent to dispense Rx. Glasses Rx not dispensed today.       No history of refractive surgery.   No FH of glaucoma  (+)FH AMD - father        OTHER HISTORY:    History of treatment for bladder cancer. Had chemotherapy and in clinical trial currently.

## 2024-10-09 ENCOUNTER — APPOINTMENT (OUTPATIENT)
Dept: OPHTHALMOLOGY | Facility: CLINIC | Age: 69
End: 2024-10-09
Payer: MEDICARE

## 2024-10-09 DIAGNOSIS — H52.4 PRESBYOPIA: ICD-10-CM

## 2024-10-09 DIAGNOSIS — H26.491 PCO (POSTERIOR CAPSULAR OPACIFICATION), RIGHT: ICD-10-CM

## 2024-10-09 DIAGNOSIS — H52.13 MYOPIA OF BOTH EYES: ICD-10-CM

## 2024-10-09 DIAGNOSIS — Z96.1 PSEUDOPHAKIA: ICD-10-CM

## 2024-10-09 DIAGNOSIS — H25.812 COMBINED FORM OF AGE-RELATED CATARACT, LEFT EYE: Primary | ICD-10-CM

## 2024-10-09 DIAGNOSIS — H52.203 ASTIGMATISM OF BOTH EYES, UNSPECIFIED TYPE: ICD-10-CM

## 2024-10-09 RX ORDER — TETRACAINE HYDROCHLORIDE 5 MG/ML
1 SOLUTION OPHTHALMIC ONCE
OUTPATIENT
Start: 2024-10-09 | End: 2024-10-09

## 2024-10-09 RX ORDER — CYCLOPENTOLATE HYDROCHLORIDE 10 MG/ML
1 SOLUTION/ DROPS OPHTHALMIC
OUTPATIENT
Start: 2024-10-09 | End: 2024-10-09

## 2024-10-09 RX ORDER — PHENYLEPHRINE HYDROCHLORIDE 100 MG/ML
1 SOLUTION/ DROPS OPHTHALMIC
OUTPATIENT
Start: 2024-10-09 | End: 2024-10-09

## 2024-10-09 ASSESSMENT — ENCOUNTER SYMPTOMS
MUSCULOSKELETAL NEGATIVE: 0
GASTROINTESTINAL NEGATIVE: 0
NEUROLOGICAL NEGATIVE: 0
CARDIOVASCULAR NEGATIVE: 0
RESPIRATORY NEGATIVE: 0
ALLERGIC/IMMUNOLOGIC NEGATIVE: 0
HEMATOLOGIC/LYMPHATIC NEGATIVE: 0
EYES NEGATIVE: 1
CONSTITUTIONAL NEGATIVE: 0
ENDOCRINE NEGATIVE: 0
PSYCHIATRIC NEGATIVE: 0

## 2024-10-09 ASSESSMENT — VISUAL ACUITY
METHOD: SNELLEN - LINEAR
OS_BAT_MED: 20/150
OD_SC: 20/30
OS_SC: 20/300

## 2024-10-09 ASSESSMENT — REFRACTION_MANIFEST
OS_ADD: +2.50
OD_AXIS: 060
METHOD_AUTOREFRACTION: 1
OD_SPHERE: -0.75
OS_SPHERE: NO TARGET
OS_CYLINDER: -1.00
OS_AXIS: 040
OD_SPHERE: -0.50
OD_AXIS: 062
OD_ADD: +2.50
OS_SPHERE: -6.50
OD_CYLINDER: -0.75
OD_CYLINDER: -0.75

## 2024-10-09 ASSESSMENT — TONOMETRY
IOP_METHOD: GOLDMANN APPLANATION
OS_IOP_MMHG: 17
OD_IOP_MMHG: 16

## 2024-10-09 ASSESSMENT — REFRACTION_WEARINGRX
OS_CYLINDER: -1.00
OD_ADD: +2.50
OD_AXIS: 060
OS_SPHERE: -3.50
OD_CYLINDER: -0.50
OS_AXIS: 040
OS_ADD: +2.50
OD_SPHERE: -3.50

## 2024-10-16 ENCOUNTER — APPOINTMENT (OUTPATIENT)
Dept: HEMATOLOGY/ONCOLOGY | Facility: HOSPITAL | Age: 69
End: 2024-10-16
Payer: MEDICARE

## 2024-10-16 ENCOUNTER — APPOINTMENT (OUTPATIENT)
Dept: RESEARCH | Facility: HOSPITAL | Age: 69
End: 2024-10-16
Payer: MEDICARE

## 2024-10-22 ENCOUNTER — TELEPHONE (OUTPATIENT)
Dept: OPHTHALMOLOGY | Facility: CLINIC | Age: 69
End: 2024-10-22
Payer: MEDICARE

## 2024-10-22 NOTE — TELEPHONE ENCOUNTER
Called pt back, left message that per Dr. Pandya it is ok to have contrast dye tests tomorrow and have cataract surgery the next day. Please call me back at [phone] if you have any questions or concerns.

## 2024-10-23 ENCOUNTER — APPOINTMENT (OUTPATIENT)
Dept: RADIOLOGY | Facility: HOSPITAL | Age: 69
End: 2024-10-23
Payer: MEDICARE

## 2024-10-23 ENCOUNTER — ANESTHESIA EVENT (OUTPATIENT)
Dept: OPERATING ROOM | Facility: CLINIC | Age: 69
End: 2024-10-23
Payer: MEDICARE

## 2024-10-23 ENCOUNTER — APPOINTMENT (OUTPATIENT)
Dept: CARDIOLOGY | Facility: HOSPITAL | Age: 69
End: 2024-10-23
Payer: MEDICARE

## 2024-10-24 ENCOUNTER — ANESTHESIA (OUTPATIENT)
Dept: OPERATING ROOM | Facility: CLINIC | Age: 69
End: 2024-10-24
Payer: MEDICARE

## 2024-10-24 ENCOUNTER — APPOINTMENT (OUTPATIENT)
Dept: HEMATOLOGY/ONCOLOGY | Facility: HOSPITAL | Age: 69
End: 2024-10-24
Payer: MEDICARE

## 2024-10-24 ENCOUNTER — HOSPITAL ENCOUNTER (OUTPATIENT)
Facility: CLINIC | Age: 69
Setting detail: OUTPATIENT SURGERY
Discharge: HOME | End: 2024-10-24
Attending: OPHTHALMOLOGY | Admitting: OPHTHALMOLOGY
Payer: MEDICARE

## 2024-10-24 VITALS
BODY MASS INDEX: 36.54 KG/M2 | WEIGHT: 246.69 LBS | TEMPERATURE: 97.9 F | SYSTOLIC BLOOD PRESSURE: 129 MMHG | HEART RATE: 65 BPM | DIASTOLIC BLOOD PRESSURE: 77 MMHG | OXYGEN SATURATION: 97 % | HEIGHT: 69 IN | RESPIRATION RATE: 16 BRPM

## 2024-10-24 DIAGNOSIS — H25.812 COMBINED FORM OF AGE-RELATED CATARACT, LEFT EYE: Primary | ICD-10-CM

## 2024-10-24 PROCEDURE — 7100000010 HC PHASE TWO TIME - EACH INCREMENTAL 1 MINUTE: Performed by: OPHTHALMOLOGY

## 2024-10-24 PROCEDURE — 2500000001 HC RX 250 WO HCPCS SELF ADMINISTERED DRUGS (ALT 637 FOR MEDICARE OP): Performed by: OPHTHALMOLOGY

## 2024-10-24 PROCEDURE — 3600000003 HC OR TIME - INITIAL BASE CHARGE - PROCEDURE LEVEL THREE: Performed by: OPHTHALMOLOGY

## 2024-10-24 PROCEDURE — 2500000005 HC RX 250 GENERAL PHARMACY W/O HCPCS: Performed by: OPHTHALMOLOGY

## 2024-10-24 PROCEDURE — 7100000009 HC PHASE TWO TIME - INITIAL BASE CHARGE: Performed by: OPHTHALMOLOGY

## 2024-10-24 PROCEDURE — 2760000001 HC OR 276 NO HCPCS: Performed by: OPHTHALMOLOGY

## 2024-10-24 PROCEDURE — 66984 XCAPSL CTRC RMVL W/O ECP: CPT | Performed by: OPHTHALMOLOGY

## 2024-10-24 PROCEDURE — 3700000001 HC GENERAL ANESTHESIA TIME - INITIAL BASE CHARGE: Performed by: OPHTHALMOLOGY

## 2024-10-24 PROCEDURE — 3700000002 HC GENERAL ANESTHESIA TIME - EACH INCREMENTAL 1 MINUTE: Performed by: OPHTHALMOLOGY

## 2024-10-24 PROCEDURE — 2500000004 HC RX 250 GENERAL PHARMACY W/ HCPCS (ALT 636 FOR OP/ED): Performed by: OPHTHALMOLOGY

## 2024-10-24 PROCEDURE — V2632 POST CHMBR INTRAOCULAR LENS: HCPCS | Performed by: OPHTHALMOLOGY

## 2024-10-24 PROCEDURE — 3600000008 HC OR TIME - EACH INCREMENTAL 1 MINUTE - PROCEDURE LEVEL THREE: Performed by: OPHTHALMOLOGY

## 2024-10-24 PROCEDURE — 2500000004 HC RX 250 GENERAL PHARMACY W/ HCPCS (ALT 636 FOR OP/ED)

## 2024-10-24 RX ORDER — LIDOCAINE HYDROCHLORIDE 10 MG/ML
0.1 INJECTION, SOLUTION EPIDURAL; INFILTRATION; INTRACAUDAL; PERINEURAL ONCE
Status: DISCONTINUED | OUTPATIENT
Start: 2024-10-24 | End: 2024-10-24 | Stop reason: HOSPADM

## 2024-10-24 RX ORDER — ONDANSETRON HYDROCHLORIDE 2 MG/ML
4 INJECTION, SOLUTION INTRAVENOUS ONCE AS NEEDED
Status: DISCONTINUED | OUTPATIENT
Start: 2024-10-24 | End: 2024-10-24 | Stop reason: HOSPADM

## 2024-10-24 RX ORDER — TETRACAINE HYDROCHLORIDE 5 MG/ML
1 SOLUTION OPHTHALMIC ONCE
Status: COMPLETED | OUTPATIENT
Start: 2024-10-24 | End: 2024-10-24

## 2024-10-24 RX ORDER — CYCLOPENTOLATE HYDROCHLORIDE 10 MG/ML
1 SOLUTION/ DROPS OPHTHALMIC
Status: COMPLETED | OUTPATIENT
Start: 2024-10-24 | End: 2024-10-24

## 2024-10-24 RX ORDER — MIDAZOLAM HYDROCHLORIDE 1 MG/ML
INJECTION, SOLUTION INTRAMUSCULAR; INTRAVENOUS AS NEEDED
Status: DISCONTINUED | OUTPATIENT
Start: 2024-10-24 | End: 2024-10-24

## 2024-10-24 RX ORDER — FENTANYL CITRATE 50 UG/ML
50 INJECTION, SOLUTION INTRAMUSCULAR; INTRAVENOUS EVERY 5 MIN PRN
Status: DISCONTINUED | OUTPATIENT
Start: 2024-10-24 | End: 2024-10-24 | Stop reason: HOSPADM

## 2024-10-24 RX ORDER — LABETALOL HYDROCHLORIDE 5 MG/ML
5 INJECTION, SOLUTION INTRAVENOUS ONCE AS NEEDED
Status: DISCONTINUED | OUTPATIENT
Start: 2024-10-24 | End: 2024-10-24 | Stop reason: HOSPADM

## 2024-10-24 RX ORDER — FENTANYL CITRATE 50 UG/ML
25 INJECTION, SOLUTION INTRAMUSCULAR; INTRAVENOUS EVERY 5 MIN PRN
Status: DISCONTINUED | OUTPATIENT
Start: 2024-10-24 | End: 2024-10-24 | Stop reason: HOSPADM

## 2024-10-24 RX ORDER — TETRACAINE HYDROCHLORIDE 5 MG/ML
SOLUTION OPHTHALMIC AS NEEDED
Status: DISCONTINUED | OUTPATIENT
Start: 2024-10-24 | End: 2024-10-24 | Stop reason: HOSPADM

## 2024-10-24 RX ORDER — POVIDONE-IODINE 5 %
SOLUTION, NON-ORAL OPHTHALMIC (EYE) AS NEEDED
Status: DISCONTINUED | OUTPATIENT
Start: 2024-10-24 | End: 2024-10-24 | Stop reason: HOSPADM

## 2024-10-24 RX ORDER — PHENYLEPHRINE HYDROCHLORIDE 100 MG/ML
1 SOLUTION/ DROPS OPHTHALMIC
Status: COMPLETED | OUTPATIENT
Start: 2024-10-24 | End: 2024-10-24

## 2024-10-24 RX ORDER — ALBUTEROL SULFATE 0.83 MG/ML
2.5 SOLUTION RESPIRATORY (INHALATION) ONCE AS NEEDED
Status: DISCONTINUED | OUTPATIENT
Start: 2024-10-24 | End: 2024-10-24 | Stop reason: HOSPADM

## 2024-10-24 RX ORDER — METOCLOPRAMIDE HYDROCHLORIDE 5 MG/ML
10 INJECTION INTRAMUSCULAR; INTRAVENOUS ONCE AS NEEDED
Status: DISCONTINUED | OUTPATIENT
Start: 2024-10-24 | End: 2024-10-24 | Stop reason: HOSPADM

## 2024-10-24 RX ORDER — ACETAMINOPHEN 325 MG/1
650 TABLET ORAL EVERY 4 HOURS PRN
Status: DISCONTINUED | OUTPATIENT
Start: 2024-10-24 | End: 2024-10-24 | Stop reason: HOSPADM

## 2024-10-24 RX ORDER — EPINEPHRINE 1 MG/ML
INJECTION, SOLUTION, CONCENTRATE INTRAVENOUS AS NEEDED
Status: DISCONTINUED | OUTPATIENT
Start: 2024-10-24 | End: 2024-10-24 | Stop reason: HOSPADM

## 2024-10-24 RX ADMIN — CYCLOPENTOLATE HYDROCHLORIDE 1 DROP: 10 SOLUTION/ DROPS OPHTHALMIC at 12:10

## 2024-10-24 RX ADMIN — CYCLOPENTOLATE HYDROCHLORIDE 1 DROP: 10 SOLUTION/ DROPS OPHTHALMIC at 12:05

## 2024-10-24 RX ADMIN — PHENYLEPHRINE HYDROCHLORIDE 1 DROP: 100 SOLUTION/ DROPS OPHTHALMIC at 12:05

## 2024-10-24 RX ADMIN — TETRACAINE HYDROCHLORIDE 1 DROP: 5 SOLUTION OPHTHALMIC at 12:00

## 2024-10-24 RX ADMIN — PHENYLEPHRINE HYDROCHLORIDE 1 DROP: 100 SOLUTION/ DROPS OPHTHALMIC at 12:10

## 2024-10-24 RX ADMIN — CYCLOPENTOLATE HYDROCHLORIDE 1 DROP: 10 SOLUTION/ DROPS OPHTHALMIC at 12:00

## 2024-10-24 RX ADMIN — PHENYLEPHRINE HYDROCHLORIDE 1 DROP: 100 SOLUTION/ DROPS OPHTHALMIC at 12:00

## 2024-10-24 SDOH — HEALTH STABILITY: MENTAL HEALTH: CURRENT SMOKER: 1

## 2024-10-24 ASSESSMENT — PAIN - FUNCTIONAL ASSESSMENT
PAIN_FUNCTIONAL_ASSESSMENT: 0-10

## 2024-10-24 ASSESSMENT — PAIN SCALES - GENERAL
PAINLEVEL_OUTOF10: 0 - NO PAIN
PAIN_LEVEL: 0

## 2024-10-24 ASSESSMENT — COLUMBIA-SUICIDE SEVERITY RATING SCALE - C-SSRS

## 2024-10-24 ASSESSMENT — EXTERNAL EXAM - LEFT EYE: OS_EXAM: NORMAL

## 2024-10-24 ASSESSMENT — SLIT LAMP EXAM - LIDS
COMMENTS: GOOD POSITION
COMMENTS: GOOD POSITION

## 2024-10-24 ASSESSMENT — EXTERNAL EXAM - RIGHT EYE: OD_EXAM: NORMAL

## 2024-10-24 NOTE — ANESTHESIA POSTPROCEDURE EVALUATION
Patient: Toro Lujan    Procedure Summary       Date: 10/24/24 Room / [place] [facility] [place] [facility]    Anesthesia Start: 1239 Anesthesia Stop: 1316    Procedure: Phacoemulsification Cataract with Insertion Intraocular Lens (Left: Eye) Diagnosis:       Combined form of age-related cataract, left eye      (Combined form of age-related cataract, left eye [H25.812])    Surgeons: Tamara Brambila MD Responsible Provider: BHAVNA Baca    Anesthesia Type: MAC ASA Status: 3            Anesthesia Type: MAC    Vitals Value Taken Time   /69 10/24/24 1316   Temp 36.0 10/24/24 1316   Pulse 61 10/24/24 1316   Resp 16 10/24/24 1316   SpO2 100 10/24/24 1316       Anesthesia Post Evaluation    Patient location during evaluation: PACU  Patient participation: complete - patient participated  Level of consciousness: awake  Pain score: 0  Pain management: adequate  Airway patency: patent  Cardiovascular status: acceptable  Respiratory status: acceptable  Hydration status: acceptable  Postoperative Nausea and Vomiting: none        There were no known notable events for this encounter.

## 2024-10-24 NOTE — OP NOTE
Phacoemulsification Cataract with Insertion Intraocular Lens (L) Operative Note     Date: 10/24/2024  OR Location: [facility] OR    Name: Toro Lujan, : 1955, Age: 69 y.o., MRN: [id number], Sex: male    Diagnosis  Pre-op Diagnosis      * Combined form of age-related cataract, left eye [H25.812] Post-op Diagnosis     * Combined form of age-related cataract, left eye [H25.812]     Procedures  Phacoemulsification Cataract with Insertion Intraocular Lens  15812 - CT XCAPSL CTRC RMVL INSJ IO LENS PROSTH CPLX WO ECP      Surgeons      * Tamara Brambila - Primary    Resident/Fellow/Other Assistant:  Surgeons and Role:  * No surgeons found with a matching role *    Procedure Summary  Anesthesia: Anesthesia type not filed in the log.  ASA: III  Anesthesia Staff: CRNA: MERLIN Baca-CRNA  Estimated Blood Loss: 0 mL  Intra-op Medications: Administrations occurring from 1320 to 1410 on 10/24/24:  * No intraprocedure medications in log *        Specimen: No specimens collected     Staff:   Relief Circulator: Katy Le Scrub: Radha  Scrub Person: Isabel         Drains and/or Catheters:   Nephrostomy (Active)       Tourniquet Times:         Implants:  Implants       Type Name Action Serial No.       16.5 DIOPTER, TECNIS SIMPLICITY EYHANCE IOL PRELOADED DELIVERY SYSTEM, MODEL DIB00 Implanted [id number]              Findings: as below    Indications: Toro Lujan is an 69 y.o. male who is having surgery for Combined form of age-related cataract, left eye [H25.812].     Procedure Details:     Patient name: Toro Lujan   YOB: 1955   MRN: [id number]   Date of surgery: 2024  Preoperative diagnosis: Combined cataract of the LEFT eye  Postoperative diagnosis: Combined cataract of the LEFT eye  Procedure: Phacoemulsification of cataract with insertion of intraocular lens, LEFT eye   Surgeon: Tamara Brambila MD  Resident: Afua Cortés MD  Anesthesia: MAC  Complications:  None  Lens Inserted: Jaun & Jaun DIB00 with a power of +16.50 D. Serial #: [id number]. Exp date: 08/23/2027.    Procedure description: After the risks, benefits, and alternatives of the planned procedure were discussed with the patient, informed consent was obtained at the preoperative evaluation. On the day of surgery, there were no updates to the consent form and any patient questions were answered. The patient was correctly identified in the preoperative area and the LEFT eye was marked as the operative eye. Dilating drops were instilled into the LEFT eye in the preoperative area. The patient was then taken back to the operating room and placed under sedation. The patient was prepped and draped in the standard, sterile ophthalmic fashion in preparation for intraocular surgery.    A lid speculum was placed into the LEFT palpebral fissure and the operating microscope was brought into position. A paracentesis was made in inferotemporal cornea at the limbus with a 1.0mm side port blade using a cotton tipped applicator to provide countertraction. Intracameral lidocaine was injected into the anterior chamber followed by Viscoat viscoelastic. Using 0.12 forceps to stabilize the globe, a 2.4mm keratome blade was used to create a limbal clear-corneal incision superotemporally. A bent-needle cystotome and Utrata forceps were used to create a continuous curvilinear capsulorrhexis. Balanced salt saline solution on a blunt-tipped cannula was used to achieve hydrodissection.    A phacoemulsification device and a [place] spatula were used to remove the nucleus using a divide-and-conquer technique. Residual cortical material was removed with the irrigation and aspiration handpiece. Provisc viscoelastic was then injected into the eye to reform the anterior chamber and to open the capsular bag. The posterior capsule was inspected and found to be clean and intact. The intraocular lens, Jaun & Jaun DIB00 with a power of  +16.50 diopters was injected into the capsular bag. A lens positioner was used to center the lens and ensure good position within the bag. The remaining viscoelastic was removed using irrigation and aspiration. Balanced salt saline solution on a blunt-tipped cannula was then used to hydrate the corneal stroma adjacent to the main wound and paracentesis site as well as to reform the anterior chamber. The wound was checked and found to be watertight with normal intraocular pressure verified using digital palpation. At the conclusion of the case, a well-centered intraocular lens with a good red reflex was observed. Tetracaine, betadine, and BSS were instilled into the LEFT eye. The lid speculum and drapes were removed. A clear plastic shield was then taped over the eye. The patient was taken to the recovery room in stable condition, having tolerated the procedure well. There were no complications.            Complications:  None; patient tolerated the procedure well.    Disposition: PACU - hemodynamically stable.  Condition: stable         Additional Details: none    Attending Attestation: I performed the procedure.    Tamara Brambila  Phone Number: [phone]

## 2024-10-24 NOTE — DISCHARGE INSTRUCTIONS
Surgeon: Tamara Brambila MD     Patient name: Toro Lujan    Date of surgery: October 24, 2024        DROP INSTRUCTIONS:    Prednisolone acetate 1% (pink or white cap) - One drop 4 times a day to operative eye    Ofloxacin (tan cap) - One drop 4 times a day to operative eye    Ketorolac (gray cap) - One drop 4 times a day to operative eye    When putting different drops in around the same administration time, please wait 1-2 minutes between drops  Avoid sleeping on side of operative eye  No heavy lifting over 10-15lbs and no bending over  Do not get eye wet, no eye rubbing  Wear sunglasses or glasses during the day and the shield when sleeping at night  Please call immediately if you develop any redness, pain, decreased vision, flashes, or floaters      During office hours (8:30a-4:30p): [phone]  After office hours: [phone]  [facility] : [phone]   Pager: [phone] for Dr. Pandya

## 2024-10-24 NOTE — PROGRESS NOTES
Pseudophakia of left eyeZ96.1 - 10/24/24  -Doing well. Good vision. IOP good.  Prednisolone acetate 1% - 4 times a day  Ofloxacin - 4 times a day  Ketorolac - 4 times a day  No heavy lifting over 10-15 lbs, no bending over, do not get eye wet, no eye rubbing. Wear eye shield at bedtime and sunglasses/glasses during the day. Advised to call if any redness, pain, decreased vision, flashes, floaters. F/u 1 week - refract both eyes (autorefract).   **Referred by Dr. Gore    Pseudophakia of right eyeZ96.1 - 10/3/24 - Complex with Trypan Blue  Posterior capsular opacification, right eye  -Doing well. Good vision. IOP good.  Prednisolone acetate 1% - 2/1 weekly taper  -There is residual posterior capsular opacification that could not be removed safely at the time of surgery. Discussed will consider YAG capsulotomy in the future as needed if visually significant.   -No heavy lifting over 15-20 lbs, do not get eye wet, no eye rubbing. Discontinue eye shield at bedtime but wear sunglasses/glasses during the day. Advised to call if any redness, pain, decreased vision, flashes, floaters.     Myopia  Astigmatism  Presbyopia  -F/u 1 week - refract both eyes (autorefract).       No history of refractive surgery.   No FH of glaucoma  (+)FH AMD - father        OTHER HISTORY:    History of treatment for bladder cancer. Had chemotherapy and in clinical trial currently.

## 2024-10-24 NOTE — PATIENT INSTRUCTIONS
Surgeon: Tamara Brambila MD      Patient name: Toro Lujan    Date of visit: 10/25/24      left eye    Prednisolone acetate (pink or white cap) - 4 times a day    Ofloxacin (tan cap) - 4 times a day    Ketorolac (gray cap) - 4 times a day      RIGHT    Prednisolone acetate (pink or white cap) - 2 times a day for 1 week, once a day for 1 week, then stop        No heavy lifting over 10-15 lbs, no bending over at the waist for 1 week, do not get eye wet, no eye rubbing. You may take a shower and wash your face - please keep the operative eye closed to minimize water exposure. Wear eye shield at bedtime and sunglasses/glasses during the day. Please call immediately if you develop any redness, pain, decreased vision, flashes, floaters.       Surgical Scheduler (during office hours): [place]: [phone]  Office phone (after hours): [phone]  [facility] : [phone]                     Pager: [phone] for Dr. Pandya

## 2024-10-24 NOTE — H&P
History Of Present Illness  Toro Lujan is a 69 y.o. male presenting for planned cataract extraction and intraocular lens (IOL) implantation left eye.     Past Medical History  Past Medical History:   Diagnosis Date    Anxiety     Bladder cancer (Multi)     chemo 2022, 2023    Cataract     Cervical spine disease     COPD (chronic obstructive pulmonary disease) (Multi)     mild    Coronary artery disease     GERD (gastroesophageal reflux disease)     Hyperlipidemia     Hypertension     Peripheral neuropathy     hands    Ureteral stone     left       Surgical History  Past Surgical History:   Procedure Laterality Date    BLADDER SURGERY      needle biopsy    CATARACT EXTRACTION W/  INTRAOCULAR LENS IMPLANT Right 10/03/2024    Dr. Pandya    CERVICAL FUSION      COLONOSCOPY      CORONARY STENT PLACEMENT      2016    CYSTOURETHROSCOPY      KNEE ARTHROPLASTY Left     OTHER SURGICAL HISTORY      ileal conduit, 2021    PROSTATECTOMY      2021        Social History  He reports that he has been smoking cigarettes. He has never used smokeless tobacco. He reports current alcohol use. He reports that he does not currently use drugs.    Family History  Family History   Problem Relation Name Age of Onset    Glaucoma Neg Hx      Macular degeneration Neg Hx          Allergies  Patient has no known allergies.    Review of Systems   All other systems reviewed and are negative.       Physical Exam    Constitutional:       General: NAD, nontoxic appearing  Cardiovascular:      Well perfused, 2+ radial pulses b/l   Pulmonary:      No increased WOB  Psychiatric:     Appropriate affect        Last Recorded Vitals  There were no vitals taken for this visit.    No current facility-administered medications on file prior to encounter.     Current Outpatient Medications on File Prior to Encounter   Medication Sig Dispense Refill    acetaminophen (Tylenol) 500 mg tablet Take 2 tablets (1,000 mg) by mouth every 8 hours if needed.      Advair  Diskus 100-50 mcg/dose diskus inhaler INHALE 1 PUFF BY MOUTH TWICE DAILY AS DIRECTED. RINSE AND GARGLE MOUTH WITH WATER AFTER EACH USE      aspirin 81 mg EC tablet Take 1 tablet (81 mg) by mouth once daily.      gabapentin (Neurontin) 100 mg capsule Take 1 capsule (100 mg) by mouth once daily at bedtime. 90 capsule 0    ketorolac (Acular) 0.5 % ophthalmic solution 1 drop to surgical eye 4 times a day starting 1 day before surgery 5 mL 2    lisinopril 20 mg tablet Take 1 tablet (20 mg) by mouth once daily.      metoprolol tartrate (Lopressor) 25 mg tablet Take 1 tablet (25 mg) by mouth once daily.      ofloxacin (Ocuflox) 0.3 % ophthalmic solution 1 drop to operative eye 4 times a day starting 1 day before surgery 5 mL 2    pantoprazole (ProtoNix) 40 mg EC tablet Take 1 tablet (40 mg) by mouth once daily.      prednisoLONE acetate (Pred-Forte) 1 % ophthalmic suspension 1 drop to operative eye 4 times a day starting the day of surgery (after surgery is done) 5 mL 2    rosuvastatin (Crestor) 40 mg tablet Take 1 tablet (40 mg) by mouth once daily.      sertraline (Zoloft) 50 mg tablet Take 1 tablet (50 mg) by mouth once daily.      ALPRAZolam (Xanax) 0.5 mg tablet Take one tablet one hour before anxiety-provoking situation. Limits one tab/day, three tabs/week.         Assessment/Plan   Assessment & Plan  Combined form of age-related cataract, left eye      Toro Lujan is a 69 y.o. male presenting for planned cataract extraction and intraocular lens (IOL) implantation left eye.    Proceed with plan as above           Afua Cortés MD

## 2024-10-24 NOTE — ANESTHESIA PREPROCEDURE EVALUATION
Patient: Toro Lujan    Procedure Information       Date/Time: 10/24/24 1320    Procedure: Phacoemulsification Cataract with Insertion Intraocular Lens (Left: Eye)    [facility]: [facility]    Surgeons: Tamara Brambila MD          Vitals:    10/24/24 1158   BP: 130/68   Pulse: 64   Resp: 16   Temp: 36 °C (96.8 °F)   SpO2: 98%       Past Surgical History:   Procedure Laterality Date    BLADDER SURGERY      needle biopsy    CATARACT EXTRACTION W/  INTRAOCULAR LENS IMPLANT Right 10/03/2024    Dr. Pandya    CERVICAL FUSION      COLONOSCOPY      CORONARY STENT PLACEMENT      2016    CYSTOURETHROSCOPY      KNEE ARTHROPLASTY Left     OTHER SURGICAL HISTORY      ileal conduit, 2021    PROSTATECTOMY      2021     Past Medical History:   Diagnosis Date    Anxiety     Bladder cancer (Multi)     chemo 2022, 2023    Cataract     Cervical spine disease     COPD (chronic obstructive pulmonary disease) (Multi)     mild    Coronary artery disease     GERD (gastroesophageal reflux disease)     Hyperlipidemia     Hypertension     Peripheral neuropathy     hands    Ureteral stone     left     No current facility-administered medications for this encounter.  Prior to Admission medications    Medication Sig Start Date End Date Taking? Authorizing Provider   acetaminophen (Tylenol) 500 mg tablet Take 2 tablets (1,000 mg) by mouth every 8 hours if needed. 2/4/19  Yes Historical Provider, MD   Advair Diskus 100-50 mcg/dose diskus inhaler INHALE 1 PUFF BY MOUTH TWICE DAILY AS DIRECTED. RINSE AND GARGLE MOUTH WITH WATER AFTER EACH USE   Yes Historical Provider, MD   aspirin 81 mg EC tablet Take 1 tablet (81 mg) by mouth once daily. 10/20/22  Yes Historical Provider, MD   gabapentin (Neurontin) 100 mg capsule Take 1 capsule (100 mg) by mouth once daily at bedtime. 9/18/24 9/18/25 Yes Daniella Pineda APRN-CNP   ketorolac (Acular) 0.5 % ophthalmic solution 1 drop to surgical eye 4 times a day starting 1 day before  surgery 9/22/24  Yes Tamara Brambila MD   lisinopril 20 mg tablet Take 1 tablet (20 mg) by mouth once daily.   Yes Historical Provider, MD   metoprolol tartrate (Lopressor) 25 mg tablet Take 1 tablet (25 mg) by mouth once daily.   Yes Historical Provider, MD   ofloxacin (Ocuflox) 0.3 % ophthalmic solution 1 drop to operative eye 4 times a day starting 1 day before surgery 9/22/24  Yes Tamara Brambila MD   pantoprazole (ProtoNix) 40 mg EC tablet Take 1 tablet (40 mg) by mouth once daily.   Yes Historical Provider, MD   prednisoLONE acetate (Pred-Forte) 1 % ophthalmic suspension 1 drop to operative eye 4 times a day starting the day of surgery (after surgery is done) 9/22/24  Yes Tamara Brambila MD   rosuvastatin (Crestor) 40 mg tablet Take 1 tablet (40 mg) by mouth once daily.   Yes Historical Provider, MD   sertraline (Zoloft) 50 mg tablet Take 1 tablet (50 mg) by mouth once daily.   Yes Historical Provider, MD   ALPRAZolam (Xanax) 0.5 mg tablet Take one tablet one hour before anxiety-provoking situation. Limits one tab/day, three tabs/week. 8/26/22 7/22/24  Historical Provider, MD     No Known Allergies  Social History     Tobacco Use    Smoking status: Every Day     Current packs/day: 0.50     Types: Cigarettes    Smokeless tobacco: Never    Tobacco comments:     No hx of anesthesia reactions. No FH of MH.     No illnesses in the past 30 days. Is on immunotherapy trial at , suspended until after eye surgery.     No SOB with a flight of stairs.     No cane, walker, or wheelchair.     Is able to lie flat for 30 minutes.     Substance Use Topics    Alcohol use: Yes     Comment: one beer every 3 months         Chemistry    Lab Results   Component Value Date/Time     09/18/2024 0845    K 4.3 09/18/2024 0845     09/18/2024 0845    CO2 26 09/18/2024 0845    BUN 17 09/18/2024 0845    CREATININE 1.04 09/18/2024 0845    Lab Results   Component Value Date/Time    CALCIUM 8.8 09/18/2024 0845     ALKPHOS 104 09/18/2024 0845    AST 21 09/18/2024 0845    ALT 20 09/18/2024 0845    BILITOT 0.6 09/18/2024 0845          Lab Results   Component Value Date/Time    WBC 8.7 09/18/2024 0845    HGB 14.2 09/18/2024 0845    HCT 41.8 09/18/2024 0845     09/18/2024 0845     Lab Results   Component Value Date/Time    PROTIME 11.0 02/07/2024 1249    INR 1.0 02/07/2024 1249     No results found for this or any previous visit (from the past 4464 hours).  No results found for this or any previous visit from the past 1095 days.      Relevant Problems   Anesthesia (within normal limits)      Cardiac   (+) Coronary artery disease involving native coronary artery of native heart without angina pectoris   (+) Essential hypertension      Pulmonary   (+) Chronic obstructive pulmonary disease (Multi)      Neuro   (+) KARLEY (generalized anxiety disorder)      GI   (+) GERD (gastroesophageal reflux disease)      /Renal (within normal limits)      Liver (within normal limits)      Endocrine (within normal limits)      Hematology (within normal limits)      Musculoskeletal (within normal limits)      HEENT (within normal limits)      ID (within normal limits)      Skin (within normal limits)      GYN (within normal limits)       Clinical information reviewed:   Tobacco  Allergies  Meds   Med Hx  Surg Hx   Fam Hx  Soc Hx        NPO Detail:  NPO/Void Status  Date of Last Liquid: 10/23/24  Time of Last Liquid: 2300  Date of Last Solid: 10/23/24  Time of Last Solid: 2300         Physical Exam    Airway  Mallampati: III  TM distance: >3 FB  Neck ROM: full     Cardiovascular - normal exam  Rhythm: regular  Rate: normal     Dental - normal exam       Pulmonary - normal exam     Abdominal - normal exam  Abdomen: soft             Anesthesia Plan    History of general anesthesia?: yes  History of complications of general anesthesia?: no    ASA 3     MAC     The patient is a current smoker.    intravenous induction   Anesthetic plan and  risks discussed with patient and spouse.  Use of blood products discussed with patient and spouse who.    Plan discussed with CRNA and attending.

## 2024-10-25 ENCOUNTER — APPOINTMENT (OUTPATIENT)
Dept: OPHTHALMOLOGY | Facility: CLINIC | Age: 69
End: 2024-10-25
Payer: MEDICARE

## 2024-10-25 ENCOUNTER — APPOINTMENT (OUTPATIENT)
Dept: HEMATOLOGY/ONCOLOGY | Facility: HOSPITAL | Age: 69
End: 2024-10-25
Payer: MEDICARE

## 2024-10-25 DIAGNOSIS — H52.13 MYOPIA OF BOTH EYES: ICD-10-CM

## 2024-10-25 DIAGNOSIS — Z96.1 PSEUDOPHAKIA: ICD-10-CM

## 2024-10-25 DIAGNOSIS — H52.203 ASTIGMATISM OF BOTH EYES, UNSPECIFIED TYPE: ICD-10-CM

## 2024-10-25 DIAGNOSIS — H26.491 PCO (POSTERIOR CAPSULAR OPACIFICATION), RIGHT: Primary | ICD-10-CM

## 2024-10-25 DIAGNOSIS — H52.4 PRESBYOPIA: ICD-10-CM

## 2024-10-25 ASSESSMENT — ENCOUNTER SYMPTOMS
EYES NEGATIVE: 1
NEUROLOGICAL NEGATIVE: 0
HEMATOLOGIC/LYMPHATIC NEGATIVE: 0
ENDOCRINE NEGATIVE: 0
CONSTITUTIONAL NEGATIVE: 0
CARDIOVASCULAR NEGATIVE: 0
PSYCHIATRIC NEGATIVE: 0
ALLERGIC/IMMUNOLOGIC NEGATIVE: 0
MUSCULOSKELETAL NEGATIVE: 0
GASTROINTESTINAL NEGATIVE: 0
RESPIRATORY NEGATIVE: 0

## 2024-10-25 ASSESSMENT — TONOMETRY
OS_IOP_MMHG: 20
IOP_METHOD: GOLDMANN APPLANATION

## 2024-10-25 ASSESSMENT — VISUAL ACUITY
OS_SC: 20/50
METHOD: SNELLEN - LINEAR
OS_PH_SC: 20/30

## 2024-10-25 ASSESSMENT — PAIN SCALES - GENERAL: PAINLEVEL_OUTOF10: 0 - NO PAIN

## 2024-10-28 ENCOUNTER — APPOINTMENT (OUTPATIENT)
Dept: CARDIOLOGY | Facility: HOSPITAL | Age: 69
End: 2024-10-28
Payer: MEDICARE

## 2024-10-28 ASSESSMENT — SLIT LAMP EXAM - LIDS
COMMENTS: GOOD POSITION
COMMENTS: GOOD POSITION

## 2024-10-28 ASSESSMENT — EXTERNAL EXAM - LEFT EYE: OS_EXAM: NORMAL

## 2024-10-28 ASSESSMENT — EXTERNAL EXAM - RIGHT EYE: OD_EXAM: NORMAL

## 2024-10-29 ENCOUNTER — HOSPITAL ENCOUNTER (OUTPATIENT)
Dept: RADIOLOGY | Facility: HOSPITAL | Age: 69
Discharge: HOME | End: 2024-10-29
Payer: MEDICARE

## 2024-10-29 ENCOUNTER — HOSPITAL ENCOUNTER (OUTPATIENT)
Dept: CARDIOLOGY | Facility: HOSPITAL | Age: 69
Discharge: HOME | End: 2024-10-29
Payer: MEDICARE

## 2024-10-29 ENCOUNTER — TELEMEDICINE (OUTPATIENT)
Dept: HEMATOLOGY/ONCOLOGY | Facility: HOSPITAL | Age: 69
End: 2024-10-29
Payer: MEDICARE

## 2024-10-29 DIAGNOSIS — Z00.6 EXAM FOR CLINICAL RESEARCH: ICD-10-CM

## 2024-10-29 DIAGNOSIS — T45.1X5A CHEMOTHERAPY INDUCED NAUSEA AND VOMITING: ICD-10-CM

## 2024-10-29 DIAGNOSIS — C67.8 MALIGNANT NEOPLASM OF OVERLAPPING SITES OF BLADDER (MULTI): Primary | ICD-10-CM

## 2024-10-29 DIAGNOSIS — C77.9 REGIONAL LYMPH NODE METASTASIS PRESENT (MULTI): ICD-10-CM

## 2024-10-29 DIAGNOSIS — I25.10 CORONARY ARTERY DISEASE INVOLVING NATIVE CORONARY ARTERY OF NATIVE HEART WITHOUT ANGINA PECTORIS: Primary | ICD-10-CM

## 2024-10-29 DIAGNOSIS — K59.1 FUNCTIONAL DIARRHEA: ICD-10-CM

## 2024-10-29 DIAGNOSIS — G62.0 DRUG-INDUCED POLYNEUROPATHY (MULTI): ICD-10-CM

## 2024-10-29 DIAGNOSIS — R11.2 CHEMOTHERAPY INDUCED NAUSEA AND VOMITING: ICD-10-CM

## 2024-10-29 DIAGNOSIS — C67.8 MALIGNANT NEOPLASM OF OVERLAPPING SITES OF BLADDER (MULTI): ICD-10-CM

## 2024-10-29 DIAGNOSIS — Z92.21 S/P CHEMOTHERAPY, TIME SINCE GREATER THAN 12 WEEKS: ICD-10-CM

## 2024-10-29 DIAGNOSIS — Z00.6 RESEARCH SUBJECT: ICD-10-CM

## 2024-10-29 LAB
AORTIC VALVE PEAK VELOCITY: 1.26 M/S
AV PEAK GRADIENT: 6.4 MMHG
AVA (PEAK VEL): 3.07 CM2
EJECTION FRACTION APICAL 4 CHAMBER: 69.7
EJECTION FRACTION: 63 %
LEFT ATRIUM VOLUME AREA LENGTH INDEX BSA: 21.1 ML/M2
LEFT VENTRICULAR OUTFLOW TRACT DIAMETER: 2.04 CM
MITRAL VALVE E/A RATIO: 0.97
RIGHT VENTRICLE FREE WALL PEAK S': 12 CM/S
TRICUSPID ANNULAR PLANE SYSTOLIC EXCURSION: 2.3 CM

## 2024-10-29 PROCEDURE — 71260 CT THORAX DX C+: CPT

## 2024-10-29 PROCEDURE — 2500000004 HC RX 250 GENERAL PHARMACY W/ HCPCS (ALT 636 FOR OP/ED): Performed by: INTERNAL MEDICINE

## 2024-10-29 PROCEDURE — 93306 TTE W/DOPPLER COMPLETE: CPT

## 2024-10-29 PROCEDURE — 2550000001 HC RX 255 CONTRASTS: Performed by: INTERNAL MEDICINE

## 2024-10-30 ENCOUNTER — OFFICE VISIT (OUTPATIENT)
Dept: HEMATOLOGY/ONCOLOGY | Facility: HOSPITAL | Age: 69
End: 2024-10-30
Payer: MEDICARE

## 2024-10-30 ENCOUNTER — EDUCATION (OUTPATIENT)
Dept: HEMATOLOGY/ONCOLOGY | Facility: HOSPITAL | Age: 69
End: 2024-10-30

## 2024-10-30 ENCOUNTER — HOSPITAL ENCOUNTER (OUTPATIENT)
Dept: RESEARCH | Facility: HOSPITAL | Age: 69
Discharge: HOME | End: 2024-10-30
Payer: MEDICARE

## 2024-10-30 ENCOUNTER — APPOINTMENT (OUTPATIENT)
Dept: OPHTHALMOLOGY | Facility: CLINIC | Age: 69
End: 2024-10-30
Payer: MEDICARE

## 2024-10-30 VITALS
TEMPERATURE: 97.7 F | WEIGHT: 248.02 LBS | BODY MASS INDEX: 36.63 KG/M2 | OXYGEN SATURATION: 99 % | SYSTOLIC BLOOD PRESSURE: 113 MMHG | HEART RATE: 74 BPM | RESPIRATION RATE: 16 BRPM | DIASTOLIC BLOOD PRESSURE: 78 MMHG

## 2024-10-30 DIAGNOSIS — H52.203 ASTIGMATISM OF BOTH EYES, UNSPECIFIED TYPE: ICD-10-CM

## 2024-10-30 DIAGNOSIS — H26.491 PCO (POSTERIOR CAPSULAR OPACIFICATION), RIGHT: Primary | ICD-10-CM

## 2024-10-30 DIAGNOSIS — H52.4 PRESBYOPIA: ICD-10-CM

## 2024-10-30 DIAGNOSIS — C67.8 MALIGNANT NEOPLASM OF OVERLAPPING SITES OF BLADDER (MULTI): ICD-10-CM

## 2024-10-30 DIAGNOSIS — C67.8 MALIGNANT NEOPLASM OF OVERLAPPING SITES OF BLADDER (MULTI): Primary | ICD-10-CM

## 2024-10-30 DIAGNOSIS — Z96.1 PSEUDOPHAKIA: ICD-10-CM

## 2024-10-30 DIAGNOSIS — H52.13 MYOPIA OF BOTH EYES: ICD-10-CM

## 2024-10-30 LAB
ALBUMIN SERPL BCP-MCNC: 4 G/DL (ref 3.4–5)
ALP SERPL-CCNC: 106 U/L (ref 33–136)
ALT SERPL W P-5'-P-CCNC: 19 U/L (ref 10–52)
ANION GAP SERPL CALC-SCNC: 10 MMOL/L (ref 10–20)
AST SERPL W P-5'-P-CCNC: 15 U/L (ref 9–39)
BASOPHILS # BLD AUTO: 0.04 X10*3/UL (ref 0–0.1)
BASOPHILS NFR BLD AUTO: 0.5 %
BILIRUB DIRECT SERPL-MCNC: 0.2 MG/DL (ref 0–0.3)
BILIRUB SERPL-MCNC: 0.6 MG/DL (ref 0–1.2)
BUN SERPL-MCNC: 28 MG/DL (ref 6–23)
CALCIUM SERPL-MCNC: 8.9 MG/DL (ref 8.6–10.6)
CHLORIDE SERPL-SCNC: 105 MMOL/L (ref 98–107)
CHOLEST SERPL-MCNC: 119 MG/DL (ref 0–199)
CHOLESTEROL/HDL RATIO: 2.9
CO2 SERPL-SCNC: 26 MMOL/L (ref 21–32)
CREAT SERPL-MCNC: 1.16 MG/DL (ref 0.5–1.3)
EGFRCR SERPLBLD CKD-EPI 2021: 68 ML/MIN/1.73M*2
EOSINOPHIL # BLD AUTO: 0.33 X10*3/UL (ref 0–0.7)
EOSINOPHIL NFR BLD AUTO: 4.3 %
ERYTHROCYTE [DISTWIDTH] IN BLOOD BY AUTOMATED COUNT: 14.3 % (ref 11.5–14.5)
GLUCOSE P FAST SERPL-MCNC: 117 MG/DL (ref 74–99)
GLUCOSE SERPL-MCNC: 117 MG/DL (ref 74–99)
HCT VFR BLD AUTO: 41.4 % (ref 41–52)
HDLC SERPL-MCNC: 41.7 MG/DL
HGB BLD-MCNC: 14 G/DL (ref 13.5–17.5)
IMM GRANULOCYTES # BLD AUTO: 0.04 X10*3/UL (ref 0–0.7)
IMM GRANULOCYTES NFR BLD AUTO: 0.5 % (ref 0–0.9)
LDLC SERPL CALC-MCNC: 61 MG/DL
LYMPHOCYTES # BLD AUTO: 1.98 X10*3/UL (ref 1.2–4.8)
LYMPHOCYTES NFR BLD AUTO: 25.6 %
MAGNESIUM SERPL-MCNC: 1.93 MG/DL (ref 1.6–2.4)
MCH RBC QN AUTO: 30.6 PG (ref 26–34)
MCHC RBC AUTO-ENTMCNC: 33.8 G/DL (ref 32–36)
MCV RBC AUTO: 90 FL (ref 80–100)
MONOCYTES # BLD AUTO: 0.69 X10*3/UL (ref 0.1–1)
MONOCYTES NFR BLD AUTO: 8.9 %
NEUTROPHILS # BLD AUTO: 4.66 X10*3/UL (ref 1.2–7.7)
NEUTROPHILS NFR BLD AUTO: 60.2 %
NON HDL CHOLESTEROL: 77 MG/DL (ref 0–149)
NRBC BLD-RTO: 0 /100 WBCS (ref 0–0)
PHOSPHATE SERPL-MCNC: 4.3 MG/DL (ref 2.5–4.9)
PLATELET # BLD AUTO: 200 X10*3/UL (ref 150–450)
POTASSIUM SERPL-SCNC: 4.2 MMOL/L (ref 3.5–5.3)
PROT SERPL-MCNC: 6.3 G/DL (ref 6.4–8.2)
RBC # BLD AUTO: 4.58 X10*6/UL (ref 4.5–5.9)
SODIUM SERPL-SCNC: 137 MMOL/L (ref 136–145)
TRIGL SERPL-MCNC: 80 MG/DL (ref 0–149)
VLDL: 16 MG/DL (ref 0–40)
WBC # BLD AUTO: 7.7 X10*3/UL (ref 4.4–11.3)

## 2024-10-30 PROCEDURE — 83735 ASSAY OF MAGNESIUM: CPT | Performed by: INTERNAL MEDICINE

## 2024-10-30 PROCEDURE — 80053 COMPREHEN METABOLIC PANEL: CPT | Performed by: INTERNAL MEDICINE

## 2024-10-30 PROCEDURE — 99215 OFFICE O/P EST HI 40 MIN: CPT | Performed by: NURSE PRACTITIONER

## 2024-10-30 PROCEDURE — 82947 ASSAY GLUCOSE BLOOD QUANT: CPT | Performed by: INTERNAL MEDICINE

## 2024-10-30 PROCEDURE — 82248 BILIRUBIN DIRECT: CPT | Performed by: INTERNAL MEDICINE

## 2024-10-30 PROCEDURE — 80061 LIPID PANEL: CPT | Performed by: INTERNAL MEDICINE

## 2024-10-30 PROCEDURE — 84100 ASSAY OF PHOSPHORUS: CPT | Performed by: INTERNAL MEDICINE

## 2024-10-30 PROCEDURE — 85025 COMPLETE CBC W/AUTO DIFF WBC: CPT | Performed by: INTERNAL MEDICINE

## 2024-10-30 ASSESSMENT — ENCOUNTER SYMPTOMS
RESPIRATORY NEGATIVE: 0
MUSCULOSKELETAL NEGATIVE: 0
EYES NEGATIVE: 1
ALLERGIC/IMMUNOLOGIC NEGATIVE: 0
CARDIOVASCULAR NEGATIVE: 0
NEUROLOGICAL NEGATIVE: 0
GASTROINTESTINAL NEGATIVE: 0
PSYCHIATRIC NEGATIVE: 0
ENDOCRINE NEGATIVE: 0
HEMATOLOGIC/LYMPHATIC NEGATIVE: 0
CONSTITUTIONAL NEGATIVE: 0

## 2024-10-30 ASSESSMENT — REFRACTION_MANIFEST
OS_ADD: +2.50
OS_SPHERE: -1.50
OD_AXIS: 082
OD_AXIS: 080
OD_CYLINDER: -1.25
OD_SPHERE: -0.75
OS_CYLINDER: -0.50
OS_SPHERE: -1.75
OD_ADD: +2.50
METHOD_AUTOREFRACTION: 1
OD_CYLINDER: -1.25
OD_SPHERE: -1.00
OS_CYLINDER: -0.50
OS_AXIS: 094
OS_AXIS: 095

## 2024-10-30 ASSESSMENT — TONOMETRY
OD_IOP_MMHG: 16
IOP_METHOD: GOLDMANN APPLANATION
OS_IOP_MMHG: 16

## 2024-10-30 ASSESSMENT — VISUAL ACUITY
METHOD: SNELLEN - LINEAR
OD_SC: 20/30
OD_SC+: -2
OS_SC: 20/30

## 2024-10-30 ASSESSMENT — PAIN SCALES - GENERAL: PAINLEVEL_OUTOF10: 0-NO PAIN

## 2024-11-04 DIAGNOSIS — Z00.6 EXAM FOR CLINICAL RESEARCH: ICD-10-CM

## 2024-11-04 NOTE — PROGRESS NOTES
Research Note Follow Up Visit       Toro Lujan is here today for consideration of C16D1 treatment and follow up on SEGE 1822. Patient receives monotherapy with Disitamab Vedotin. Follow up procedures completed per protocol. Patient is aware of continued follow up plan.    After review of exam, labs, vitals, AE's and conmeds, treatment HELD for continued G2 sensory peripheral neuropathy.    Patient last received treatment on 04SEP. He had cataract surgery 03OCT and 24OCT and per sponsor, patient was not required per sponsor to be seen or have labs during this break.      Scans completed on 29OCT look great! Reviewed with Dr. Castanon.    Patient reports he has no issues with hips and legs.  Hands only. Bilateral. Entirety of fingers.  Started as fingertips and intermittent.  Now constant and full fingers.  Tingling and numbness now in both hands. Affecting function.  Intermittent effect on function. Clamping of fingers difficult.  Feet are OK.  No neuropathy.    Lump under skin on left upper arm.  Noticed last week when he went for cataract surgery.          Calendar reviewed.  Parking pass given.              Education Documentation  Changes in Appetite, taught by Emma Mann RN at 10/30/2024  9:03 AM.  Learner: Patient  Readiness: Eager  Method: Explanation  Response: Verbalizes Understanding    Care of Neuropathy, taught by Emma Mann RN at 10/30/2024  9:03 AM.  Learner: Patient  Readiness: Eager  Method: Explanation  Response: Verbalizes Understanding    Fatigue, taught by Emma Mann RN at 10/30/2024  9:03 AM.  Learner: Patient  Readiness: Eager  Method: Explanation  Response: Verbalizes Understanding    Supportive Medications, taught by Emma Mann RN at 10/30/2024  9:03 AM.  Learner: Patient  Readiness: Eager  Method: Explanation  Response: Verbalizes Understanding    Treatment Plan and Schedule, taught by Emma Mann RN at 10/30/2024  9:03 AM.  Learner: Patient  Readiness: Eager  Method:  Explanation  Response: Verbalizes Understanding    Comprehensive Metabolic Panel (CMP), taught by Emma Mann RN at 10/30/2024  9:03 AM.  Learner: Patient  Readiness: Eager  Method: Explanation  Response: Verbalizes Understanding    Complete Blood Count with Differential (CBC w/ Diff), taught by Emma Mann RN at 10/30/2024  9:03 AM.  Learner: Patient  Readiness: Eager  Method: Explanation  Response: Verbalizes Understanding    Education Comments  No comments found.

## 2024-11-12 DIAGNOSIS — Z00.6 RESEARCH SUBJECT: ICD-10-CM

## 2024-11-13 ENCOUNTER — EDUCATION (OUTPATIENT)
Dept: HEMATOLOGY/ONCOLOGY | Facility: HOSPITAL | Age: 69
End: 2024-11-13

## 2024-11-13 ENCOUNTER — OFFICE VISIT (OUTPATIENT)
Dept: HEMATOLOGY/ONCOLOGY | Facility: HOSPITAL | Age: 69
End: 2024-11-13
Payer: MEDICARE

## 2024-11-13 ENCOUNTER — HOSPITAL ENCOUNTER (OUTPATIENT)
Dept: RESEARCH | Facility: HOSPITAL | Age: 69
Discharge: HOME | End: 2024-11-13
Payer: MEDICARE

## 2024-11-13 VITALS
SYSTOLIC BLOOD PRESSURE: 118 MMHG | DIASTOLIC BLOOD PRESSURE: 80 MMHG | BODY MASS INDEX: 37.15 KG/M2 | WEIGHT: 251.54 LBS | RESPIRATION RATE: 18 BRPM | OXYGEN SATURATION: 99 % | TEMPERATURE: 96.8 F | HEART RATE: 67 BPM

## 2024-11-13 DIAGNOSIS — C67.8 MALIGNANT NEOPLASM OF OVERLAPPING SITES OF BLADDER (MULTI): Primary | ICD-10-CM

## 2024-11-13 DIAGNOSIS — C67.8 MALIGNANT NEOPLASM OF OVERLAPPING SITES OF BLADDER (MULTI): ICD-10-CM

## 2024-11-13 DIAGNOSIS — G62.0 DRUG-INDUCED POLYNEUROPATHY (MULTI): ICD-10-CM

## 2024-11-13 LAB
ALBUMIN SERPL BCP-MCNC: 4.3 G/DL (ref 3.4–5)
ALP SERPL-CCNC: 118 U/L (ref 33–136)
ALT SERPL W P-5'-P-CCNC: 26 U/L (ref 10–52)
ANION GAP SERPL CALC-SCNC: 12 MMOL/L (ref 10–20)
AST SERPL W P-5'-P-CCNC: 19 U/L (ref 9–39)
BASOPHILS # BLD AUTO: 0.06 X10*3/UL (ref 0–0.1)
BASOPHILS NFR BLD AUTO: 0.6 %
BILIRUB DIRECT SERPL-MCNC: 0.2 MG/DL (ref 0–0.3)
BILIRUB SERPL-MCNC: 0.7 MG/DL (ref 0–1.2)
BUN SERPL-MCNC: 22 MG/DL (ref 6–23)
CALCIUM SERPL-MCNC: 9.3 MG/DL (ref 8.6–10.6)
CHLORIDE SERPL-SCNC: 100 MMOL/L (ref 98–107)
CHOLEST SERPL-MCNC: 135 MG/DL (ref 0–199)
CHOLESTEROL/HDL RATIO: 3.1
CO2 SERPL-SCNC: 27 MMOL/L (ref 21–32)
CREAT SERPL-MCNC: 1.04 MG/DL (ref 0.5–1.3)
EGFRCR SERPLBLD CKD-EPI 2021: 78 ML/MIN/1.73M*2
EOSINOPHIL # BLD AUTO: 0.39 X10*3/UL (ref 0–0.7)
EOSINOPHIL NFR BLD AUTO: 3.9 %
ERYTHROCYTE [DISTWIDTH] IN BLOOD BY AUTOMATED COUNT: 13.7 % (ref 11.5–14.5)
GLUCOSE P FAST SERPL-MCNC: 120 MG/DL (ref 74–99)
GLUCOSE SERPL-MCNC: 120 MG/DL (ref 74–99)
HCT VFR BLD AUTO: 44.3 % (ref 41–52)
HDLC SERPL-MCNC: 44.2 MG/DL
HGB BLD-MCNC: 15 G/DL (ref 13.5–17.5)
IMM GRANULOCYTES # BLD AUTO: 0.03 X10*3/UL (ref 0–0.7)
IMM GRANULOCYTES NFR BLD AUTO: 0.3 % (ref 0–0.9)
LDLC SERPL CALC-MCNC: 69 MG/DL
LYMPHOCYTES # BLD AUTO: 2.23 X10*3/UL (ref 1.2–4.8)
LYMPHOCYTES NFR BLD AUTO: 22.4 %
MAGNESIUM SERPL-MCNC: 1.84 MG/DL (ref 1.6–2.4)
MCH RBC QN AUTO: 30.3 PG (ref 26–34)
MCHC RBC AUTO-ENTMCNC: 33.9 G/DL (ref 32–36)
MCV RBC AUTO: 90 FL (ref 80–100)
MONOCYTES # BLD AUTO: 0.69 X10*3/UL (ref 0.1–1)
MONOCYTES NFR BLD AUTO: 6.9 %
NEUTROPHILS # BLD AUTO: 6.55 X10*3/UL (ref 1.2–7.7)
NEUTROPHILS NFR BLD AUTO: 65.9 %
NON HDL CHOLESTEROL: 91 MG/DL (ref 0–149)
NRBC BLD-RTO: 0 /100 WBCS (ref 0–0)
PHOSPHATE SERPL-MCNC: 4.3 MG/DL (ref 2.5–4.9)
PLATELET # BLD AUTO: 207 X10*3/UL (ref 150–450)
POTASSIUM SERPL-SCNC: 4.5 MMOL/L (ref 3.5–5.3)
PROT SERPL-MCNC: 6.8 G/DL (ref 6.4–8.2)
RBC # BLD AUTO: 4.95 X10*6/UL (ref 4.5–5.9)
SODIUM SERPL-SCNC: 134 MMOL/L (ref 136–145)
TRIGL SERPL-MCNC: 110 MG/DL (ref 0–149)
VLDL: 22 MG/DL (ref 0–40)
WBC # BLD AUTO: 10 X10*3/UL (ref 4.4–11.3)

## 2024-11-13 PROCEDURE — 36415 COLL VENOUS BLD VENIPUNCTURE: CPT

## 2024-11-13 PROCEDURE — 82947 ASSAY GLUCOSE BLOOD QUANT: CPT | Performed by: INTERNAL MEDICINE

## 2024-11-13 PROCEDURE — 84075 ASSAY ALKALINE PHOSPHATASE: CPT | Performed by: INTERNAL MEDICINE

## 2024-11-13 PROCEDURE — 84100 ASSAY OF PHOSPHORUS: CPT | Performed by: INTERNAL MEDICINE

## 2024-11-13 PROCEDURE — 80061 LIPID PANEL: CPT | Performed by: INTERNAL MEDICINE

## 2024-11-13 PROCEDURE — 85025 COMPLETE CBC W/AUTO DIFF WBC: CPT | Performed by: INTERNAL MEDICINE

## 2024-11-13 PROCEDURE — 82248 BILIRUBIN DIRECT: CPT | Performed by: INTERNAL MEDICINE

## 2024-11-13 PROCEDURE — 83735 ASSAY OF MAGNESIUM: CPT | Performed by: INTERNAL MEDICINE

## 2024-11-13 PROCEDURE — 99215 OFFICE O/P EST HI 40 MIN: CPT | Performed by: NURSE PRACTITIONER

## 2024-11-13 RX ORDER — GABAPENTIN 300 MG/1
300 CAPSULE ORAL NIGHTLY
Qty: 30 CAPSULE | Refills: 2 | Status: SHIPPED | OUTPATIENT
Start: 2024-11-13 | End: 2025-02-11

## 2024-11-13 RX ORDER — HEPARIN SODIUM,PORCINE/PF 10 UNIT/ML
50 SYRINGE (ML) INTRAVENOUS AS NEEDED
OUTPATIENT
Start: 2024-11-13

## 2024-11-13 RX ORDER — HEPARIN 100 UNIT/ML
500 SYRINGE INTRAVENOUS AS NEEDED
OUTPATIENT
Start: 2024-11-13

## 2024-11-13 NOTE — RESEARCH NOTES
[facility]<[id number]><C2,3,5+D1><INTENSIVEPK>  DCRU NURSING VISIT NOTE  Study Name: [id number]  IRB#: [id number]  DCRU#: # [id number]  Protocol Version Dated: V9 A8 06.06.23   PI: Fan Rapp MD.    Time point: Cycle 2, 3, 5 AND BEYOND - Day 1 INTENSIVE PK  CYCLE 16    Encounter Date: 11/13/2024  Encounter Time:  8:00 AM EST  Encounter Department: [place] [id number] RESEARCH     #1     Phone Pager   [facility] [place] [phone] [facility]    #2 Phone Pager   Martita Helms [phone] [place]   Other Phone Pager          Study Regimen and Dosing   Intensive PK patients with HER2-overexpressing immunohistochemistry (IHC) 1+, 2+, 3+ locally advanced unresectable or metastatic urothelial carcinoma who have received prior platinum-based chemotherapy.  Cycle = 14 days  Disitamab Vedotin administered IV Day 1 every 2 weeks     Admission and Prior to Starting Study Activities   1. Notify  when patient arrives to unit.  2. Complete DCRU/[facility] intake form in [facility].  4. Study Questionnaires   Coordinator will obtain - Cycle 2, 3, then every other cycle.  5. Obtain weight in kilograms - with shoes off & heavy items removed.  6. Obtain vital signs. Record in EMR.  7. Insert one peripheral IV line for sample collection procedures (flush line with normal saline following each blood draw). Access mediport (if available) otherwise insert second peripheral line in opposite arm for Investigative drug administration (if peripheral line, flush line with normal saline before & after infusion)  8. Do Not draw research samples from the same line the investigational drug infused through.  9. Physical Exam < 1 day  10. Confirm patient fasting for clinical safety labs (every 3rd month, if ordered).     Criteria to Treat   DCRU RN reviewed and meets eligibility to proceed with treatment plan   Time team notified: N/A   DCRU RN notifies study team to review eligibility and approval before dosing  procedures  Time team approves: N/A      Dietary Guidelines   REGULAR     Subjective   Toro Lujan is a 69 y.o. male and is here for a Research clinical visit.    Visit Provider: [id number], [facility] ROOM 5 [facility]     Allergies: No Known Allergies    Objective     Vital Signs:    Vitals:    11/13/24 0806   BP: 118/80   Pulse: 67   Resp: 18   Temp: 36 °C (96.8 °F)   TempSrc: Oral   SpO2: 99%   Weight: 114 kg (251 lb 8.7 oz)       Physical Exam     ASSESSMENT and PLAN:  Problem List Items Addressed This Visit          Hematology and Neoplasia    Malignant neoplasm of overlapping sites of bladder (Multi)    Relevant Orders    Nursing Communication - Vascular Access (Completed)    Insert peripheral IV    Convert IV to saline lock    CBC and Auto Differential    Comprehensive metabolic panel    Bilirubin, Direct    Glucose, Fasting    Lipid Panel    Magnesium    Phosphorus        Medications as of the completion of today's visit:  Current Outpatient Medications   Medication Sig Dispense Refill    acetaminophen (Tylenol) 500 mg tablet Take 2 tablets (1,000 mg) by mouth every 8 hours if needed.      Advair Diskus 100-50 mcg/dose diskus inhaler INHALE 1 PUFF BY MOUTH TWICE DAILY AS DIRECTED. RINSE AND GARGLE MOUTH WITH WATER AFTER EACH USE      ALPRAZolam (Xanax) 0.5 mg tablet Take one tablet one hour before anxiety-provoking situation. Limits one tab/day, three tabs/week.      aspirin 81 mg EC tablet Take 1 tablet (81 mg) by mouth once daily.      gabapentin (Neurontin) 100 mg capsule Take 1 capsule (100 mg) by mouth once daily at bedtime. 90 capsule 0    ketorolac (Acular) 0.5 % ophthalmic solution 1 drop to surgical eye 4 times a day starting 1 day before surgery 5 mL 2    lisinopril 20 mg tablet Take 1 tablet (20 mg) by mouth once daily.      metoprolol tartrate (Lopressor) 25 mg tablet Take 1 tablet (25 mg) by mouth once daily.      ofloxacin (Ocuflox) 0.3 % ophthalmic solution 1 drop to operative eye 4 times a day starting  1 day before surgery 5 mL 2    pantoprazole (ProtoNix) 40 mg EC tablet Take 1 tablet (40 mg) by mouth once daily.      prednisoLONE acetate (Pred-Forte) 1 % ophthalmic suspension 1 drop to operative eye 4 times a day starting the day of surgery (after surgery is done) 5 mL 2    rosuvastatin (Crestor) 40 mg tablet Take 1 tablet (40 mg) by mouth once daily.      sertraline (Zoloft) 50 mg tablet Take 1 tablet (50 mg) by mouth once daily.       No current facility-administered medications for this encounter.           Orders placed during today's visit:  Orders Placed This Encounter   Procedures    CBC and Auto Differential     Standing Status:   Standing     Number of Occurrences:   1     Order Specific Question:   Release result to MyChart     Answer:   Immediate [1]    Comprehensive metabolic panel     Standing Status:   Standing     Number of Occurrences:   1     Order Specific Question:   Release result to MyChart     Answer:   Immediate [1]    Bilirubin, Direct     Standing Status:   Standing     Number of Occurrences:   1     Order Specific Question:   Release result to MyChart     Answer:   Immediate [1]    Glucose, Fasting     Standing Status:   Standing     Number of Occurrences:   1     Order Specific Question:   Release result to MyChart     Answer:   Immediate [1]    Lipid Panel     Standing Status:   Standing     Number of Occurrences:   1     Order Specific Question:   Release result to MyChart     Answer:   Immediate [1]    Magnesium     Standing Status:   Standing     Number of Occurrences:   1     Order Specific Question:   Release result to MyChart     Answer:   Immediate [1]    Phosphorus     Standing Status:   Standing     Number of Occurrences:   1     Order Specific Question:   Release result to MyChart     Answer:   Immediate [1]    Nursing Communication - Vascular Access     Refer to the vascular access device resource page and the following policies for additional information on line insertion,  maintenance and removal.    CP-148 - Central Vascular Access Device Insertion, Maintenance, and Removal, Adult  NP-28 - Midline Cathter Maintenance & Removal, Adult  NP-34 - High-flow Catheters, (e.g. Hemodialysis, Apheresis, and Continuous Renal Replacement Therapy [CRRT]), Care and Utilization, Adult  NP-39 - Peripheral Intravenous Catheter (PIV) Insertion, Maintenance, Blood Collection & Removal - Adult     Standing Status:   Standing     Number of Occurrences:   1    Insert peripheral IV     Obtain venous access for treatment via PIV if no CVAD access or if unable to obtain blood return from CVAD.     Standing Status:   Standing     Number of Occurrences:   1    Convert IV to saline lock     Only if venous access is required over consecutive days. Peripheral catheter can remain in place until completion of last consecutive day infusion, unless IV-related complications are encountered.     Standing Status:   Standing     Number of Occurrences:   1        [id number] - A Study of Disitamab Vedotin in Subjects With HER2 Expressing Urothelial Carcinoma    Patient has no adverse events documented in the Research Adverse Events activity.       Study Specific Instructions and Documentation   VITALS  LABS  VITALS   ECG (Cycles 2, 3, 5 and Every 3rd cycle thereafter)  CORRELATIVES (Cycles 2, 3, 5 and Every 3rd cycle thereafter)  PREMEDS  STUDY DRUG (DV)  CORRELATIVES (Cycles 2, 3, 5 and Every 3rd cycle thereafter)  DISCHARGE     PRE-DOSE Safety Labs  < 1 Day prior to Day 1     Refer to [place] Treatment Plan for orders     Weight-Based Dosing   Baseline (screening) weight (kg) 124.9 kg       Date measured 2/14/2024 Actual (current) weight (kg) 114.1 kg   Date measured 11/13/2024   Dosing is calculated based on baseline weight at screening unless weight changes +/- 10% from baseline during the study.   Patients > 100 kg, total dose will be calculated using 100 kg (Maximum = 200 mg) once every 2 week cycle.      Safety  Parameters and Special Instructions   Disitamab Vedotin ([id number]) is an antibody drug conjugate composed of a recombinant humanized IgG1 anti-HER2 mAb linked to the cytotoxic agent MMAE (monomethyl auristatin E).  Potential Side Effects/Adverse Events: infusion-related reaction, neutropenia, thrombocytopenia, anemia, peripheral neuropathy, elevated liver enzymes, fever, n/v, constipation, diarrhea, pruritus, arthralgia, alopecia, hyperglycemia, elevated triglycerides, hypokalemia, proteinuria, hypoalbuminemia.     *Pt. not treated due to AE's, per Mackenzie CANALES.*    PRE-DOSE Vital Signs   Temp, Heart Rate, Respiration, Blood Pressure      PRE-DOSE ECG: Before Research Labs   Triplicate - Each approximately 2 minutes apart  MISTI 150c Study-specific ECG Machine   Rest at least 10 minutes prior.  Collect within 30 minutes prior to pre-dose research labs.   Contact MD/Provider &  if abnormal from baseline or if QTcF >500 msec  QTcF calculation Fridericia's formula: S:\DCRU_Staff\Nursing\Protocols\QTcF calculator    ECG #1: QTcF N/A    ECG #2: QTcF N/A    ECG #3: QTcF N/A  Time Point Cycle Completion Time      Pre-dose   2, 3, 5 and  every 3rd cycle thereafter N/A     N/A     N/A        PRE-DOSE Research Correlatives: After ECGs  Deliver to DCRU/[place] lab for processing   Access Type: N/A Location: N/A   Refer to [place] Treatment Plan for orders   Time point Cycle Specimen Test Volume Tube Handling Draw Time   Pre-dose  (within 1hour) 2,3,5 & every 3rd cycle after PK 2 x 6 mL Red Top Ambient, invert 5x N/A     ADA 4 mL Red Top Ambient, invert 5x N/A        PRE-DOSE Medications (if previous infusion reaction)   Refer to [place] Treatment Plan for orders   Premedication 30 minutes prior to Disitamab Vedotin      Safety, Side and Adverse Effects with Special Instructions  Research Study Drugs   SEE  Safety Parameters and Special Instructions     Infusion-Related Reactions   [facility] SOC Hypersensivity Orders      Day 1  Disitamab Vedotin Investigational Product Administration (sponsor provided)   Refer to [email or web address] Treatment Plan for orders   Document medication administration in MAR activity. Document Research specific instructions below.   Disitamab Vedotin   If Infusion-Related Reaction, notify MD/Provider &     Grade 1: Mild reaction (transient flushing, rash or fever 38° C): may continue at investigator's discretion; administer symptomatic treatment following DCRU management of hypersensitivity reaction; monitor vital signs.    Grade 2: Moderate reaction (rash, flushing, urticaria, dyspnea, fever 38° C, chest discomfort, or back pain): interrupt infusion & follow DCRU management of hypersensitivity reaction; monitor vital signs.   If promptly responds to symptomatic treatment & is medically stable in the opinion of the investigator, infusion may be resumed at a slower rate.   Grade 3: Prolonged recurrence of symptoms or Grade 4: Life-threatening: stop infusion & follow DCRU management of hypersensitivity reaction. Remain at bedside and monitor until recovery from symptoms. Do not restart infusion.      POST-DOSE ECG: Before Research Labs   Triplicate - Each approximately 2 minutes apart  MISTI 150c Study-specific ECG Machine   Rest at least 10 minutes prior.  Collect within 30 minutes prior to pre-dose research labs.   Contact MD/Provider &  if abnormal from baseline or if QTcF >500 msec  QTcF calculation Fridericia's formula: S:\DCRU_Staff\Nursing\Protocols\QTcF calculator    ECG #1: QTcF N/A    ECG #2: QTcF N/A    ECG #3: QTcF N/A  Time Point Cycle Completion Time      POST-dose   2, 3, 5 and  every 3rd cycle thereafter N/A     N/A     N/A        Day 1 Post-Dose Disitamab Vedotin Research Correlatives  Deliver to DCR[facility] for Processing   Access Type: N/A Location: N/A   Refer to [place] Treatment Plan for orders   Time point Cycle Specimen Test Volume Tube Handling  Draw Time   End of  Infusion  (+/-15mins ) 2,3,5 & every 3rd cycle after   PK  2 x 6 mL  Red Top  Ambient, invert 5x   N/A        Discharge Instructions   Discharge patient to home after study requirements completed or PK sample obtained.    Remind patient to return: per   Discharge time: 0927     Philomena Holley RN  11/13/24

## 2024-11-13 NOTE — PROGRESS NOTES
Patient ID: Toro Lujan is a 69 y.o. male.  Attending Physician: Dr. Topher Castanon  Cancer Diagnosis:  Cancer Staging   No matching staging information was found for the patient.    Current Therapy: SEGE 1822: Disitamab Vedotin, cohorts A and B in HER2-overexpressing IHC 1+, 2+, 3+ for locally advanced unresectable or metastatic UC who have received prior platinum-based chemotherapy  Dose reduced to 1 mg/kg IV every 2 weeks  Genetics: NA    Subjective      Cancer History:  Oncology History   Malignant neoplasm of overlapping sites of bladder (Multi)   2/13/2024 Initial Diagnosis    Malignant neoplasm of overlapping sites of bladder (CMS/HCC)     2/14/2024 - 8/21/2024 Research Study Participant    (RUST) [id number] Intensive PK Cohort - Disitamab Vedotin, 14 Day Cycles  Plan Provider: Fan Rapp MD PhD  Treatment goal: Control  Line of treatment: Second Line  Associated studies: A Study of Disitamab Vedotin in Subjects With HER2 Expressing Urothelial Carcinoma     2/14/2024 -  Research Study Participant    (RUST) [id number] Intensive PK Cohort - Disitamab Vedotin, 14 Day Cycles  Plan Provider: Topher Castanon MD  Treatment goal: Control  Line of treatment: Second Line  Associated studies: A Study of Disitamab Vedotin in Subjects With HER2 Expressing Urothelial Carcinoma     8/8/2024 - 8/8/2024 Research Study Participant    (RUST) [id number] Intensive PK Cohort - Disitamab Vedotin, 14 Day Cycles  Plan Provider: MERLIN Delong-CNP  Treatment goal: Control  Line of treatment: Second Line  Associated studies: A Study of Disitamab Vedotin in Subjects With HER2 Expressing Urothelial Carcinoma       11/5/2020: CTU shows mildly asymmetric generalized urinary bladder wall thickening with mild mucosal hyperenhancement, large prostate  1/29/2021: TURBT. 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 Posterior tumor reveals papillary high-grade at least pTa  urothelial carcinoma with no definite invasion, no muscle present  2/24/2021: Bladder tumor path shows high grade focally invasive urothelial carcinoma into the lamina propria, non-muscle invasive.   5/6/2021: Underwent radical cystectomy with ilial conduit due to multiple multiple disease recurrences without MIBC.  12/13/2021: CTU suspicious for new enlarged pelvic LN  2/17/2022: Started chemotherapy with split-dose gemcitabine and cisplatin  3/10/2022: C2 gem/cis  3/31/2022: C3 gem/cis  4/20/2022: Restaging scans revealed response  4/21/2022: C4 gem/cis  5/12/2022: C5 gem/cis  6/24/2022: Started maintenance avelumab. Treatment was placed on hold due to insurance coverage issues after 1 cycle  8/24/2022: CT scans stable  9/2/2022: Restarted avelumab    3/2/2023: Last dose avelumab, off after  12/28/2023: CT reveals increase in left para-aortic LN highly suspicious for metastatic LAD.  1/18/2024: Again increase in para-aortic LN.  1/31/2024: Screening for SEGE 1822: Disitamab vedotin  2/7/2024: Screening for SEGE 1822  2/14/2024: Cycle 1 day 1 disitamab vedotin as part of clinical trial  7/24/2024: Cycle 12 day 1 DV, dose reduction for neuropathy  9/4/2024: Cycle 15 day 1 DV  9/18/2024: Cycle 16 day 1 DV HELD due to neuropathy  10/30/2024: Cycle 16 again HELD due to neuropathy  11/13/2024: cycle 16 again HELD due to neuropathy    Interval History:  Mr. Lujan presents today in consideration of cycle 16 day 1 DV, delayed due to neuropathy since September. This continues in his right hand. Affecting some small tasks, including opening ostomy and penmanship. No real changes in his left hand function or feet. His vision is now doing great. The remainder of his ROS is otherwise negative.  HPI    Objective    BSA: There is no height or weight on file to calculate BSA.  There were no vitals taken for this visit.    Physical Exam  General: alert, well-dressed in NAD. Speech is fluent and coherent, words clear. Good  insight. Oriented x4   Skin: warm, dry, and pink without cyanosis or nail clubbing. No rash, petechiae, or ecchymoses.  HEENT: Normocephalic atraumatic. Sclera white, conjunctiva pink. EOMs intact. Hearing intact to spoken voice. Oral mucosa pink. No visible lesions  Respiratory: Chest expansion symmetric. Breath sounds vesicular in all lobes without crackles, wheezes, or rhonchi bilaterally.  CV: S1S2 audible and crisp without murmurs, rubs, or extra sounds. RRR. Radial pulses strong and regular. Extremities warm and pink. No edema bilaterally.  GI: abdomen is round, soft, and non-tender. Active bowel sounds.   Psych: engaged, polite, appropriate conversation and eye contact.  Neuro:  strength normal.    Current Medications:    Current Outpatient Medications:     acetaminophen (Tylenol) 500 mg tablet, Take 2 tablets (1,000 mg) by mouth every 8 hours if needed., Disp: , Rfl:     Advair Diskus 100-50 mcg/dose diskus inhaler, INHALE 1 PUFF BY MOUTH TWICE DAILY AS DIRECTED. RINSE AND GARGLE MOUTH WITH WATER AFTER EACH USE, Disp: , Rfl:     ALPRAZolam (Xanax) 0.5 mg tablet, Take one tablet one hour before anxiety-provoking situation. Limits one tab/day, three tabs/week., Disp: , Rfl:     aspirin 81 mg EC tablet, Take 1 tablet (81 mg) by mouth once daily., Disp: , Rfl:     gabapentin (Neurontin) 100 mg capsule, Take 1 capsule (100 mg) by mouth once daily at bedtime., Disp: 90 capsule, Rfl: 0    ketorolac (Acular) 0.5 % ophthalmic solution, 1 drop to surgical eye 4 times a day starting 1 day before surgery, Disp: 5 mL, Rfl: 2    lisinopril 20 mg tablet, Take 1 tablet (20 mg) by mouth once daily., Disp: , Rfl:     metoprolol tartrate (Lopressor) 25 mg tablet, Take 1 tablet (25 mg) by mouth once daily., Disp: , Rfl:     ofloxacin (Ocuflox) 0.3 % ophthalmic solution, 1 drop to operative eye 4 times a day starting 1 day before surgery, Disp: 5 mL, Rfl: 2    pantoprazole (ProtoNix) 40 mg EC tablet, Take 1 tablet (40 mg) by  "mouth once daily., Disp: , Rfl:     prednisoLONE acetate (Pred-Forte) 1 % ophthalmic suspension, 1 drop to operative eye 4 times a day starting the day of surgery (after surgery is done), Disp: 5 mL, Rfl: 2    rosuvastatin (Crestor) 40 mg tablet, Take 1 tablet (40 mg) by mouth once daily., Disp: , Rfl:     sertraline (Zoloft) 50 mg tablet, Take 1 tablet (50 mg) by mouth once daily., Disp: , Rfl:      Most Recent Labs:  Results for orders placed or performed during the hospital encounter of 11/13/24   CBC and Auto Differential    Collection Time: 11/13/24  9:24 AM   Result Value Ref Range    WBC 10.0 4.4 - 11.3 x10*3/uL    nRBC 0.0 0.0 - 0.0 /100 WBCs    RBC 4.95 4.50 - 5.90 x10*6/uL    Hemoglobin 15.0 13.5 - 17.5 g/dL    Hematocrit 44.3 41.0 - 52.0 %    MCV 90 80 - 100 fL    MCH 30.3 26.0 - 34.0 pg    MCHC 33.9 32.0 - 36.0 g/dL    RDW 13.7 11.5 - 14.5 %    Platelets 207 150 - 450 x10*3/uL    Neutrophils % 65.9 40.0 - 80.0 %    Immature Granulocytes %, Automated 0.3 0.0 - 0.9 %    Lymphocytes % 22.4 13.0 - 44.0 %    Monocytes % 6.9 2.0 - 10.0 %    Eosinophils % 3.9 0.0 - 6.0 %    Basophils % 0.6 0.0 - 2.0 %    Neutrophils Absolute 6.55 1.20 - 7.70 x10*3/uL    Immature Granulocytes Absolute, Automated 0.03 0.00 - 0.70 x10*3/uL    Lymphocytes Absolute 2.23 1.20 - 4.80 x10*3/uL    Monocytes Absolute 0.69 0.10 - 1.00 x10*3/uL    Eosinophils Absolute 0.39 0.00 - 0.70 x10*3/uL    Basophils Absolute 0.06 0.00 - 0.10 x10*3/uL      No results found for: \"PSA\"     Performance Status:  ECOG Score: 0- Fully active, able to carry on all pre-disease performance w/o restriction.  Karnofsky Score: 100 - Fully active, able to carry on all pre-disease performed without restriction    Assessment/Plan   Toro Lujan is a 69 y.o. male with mUC to LN who presents in follow up and consideration of cycle 15 day 1 SEGE 1822 on DV. He looks and feels well, though has neuropathy, grade 2. Labs pending.    We will continue hold.    # mUC  - " HOLD DV today due to neuropathy  - Continue to monitor labs    # Neuropathy  - Start gabapentin 300 mg PO nightly  - On Zoloft, working well for anxiety so will avoid duloxetine at this time.  - Dose reduction further on resumption, HOLD tx today  - OT referral placed today and informed to call closest center to home    # Hypocalcemia  # Hyponatremia  # Hypomagnesemia  - Resolved, monitor CMP    # Triglycerides  - Continue with cardiology  - Monitor    # Health Maintenance  - Continue with PCP and other healthcare providers  - Advised exercise, heart-healthy diet    RTC per protocol    Total time spent on this encounter was 55 minutes, which included preparation, direct time with patient, documentation, and care coordination on the day of visit.    Daniella Pineda, MSN, APRN, AGNP-C, AOCNP  Associate Nurse Practitioner  [facility], [facility]

## 2024-11-27 ENCOUNTER — APPOINTMENT (OUTPATIENT)
Dept: HEMATOLOGY/ONCOLOGY | Facility: HOSPITAL | Age: 69
End: 2024-11-27
Payer: MEDICARE

## 2024-11-27 ENCOUNTER — EDUCATION (OUTPATIENT)
Dept: HEMATOLOGY/ONCOLOGY | Facility: HOSPITAL | Age: 69
End: 2024-11-27

## 2024-11-27 ENCOUNTER — APPOINTMENT (OUTPATIENT)
Dept: RESEARCH | Facility: HOSPITAL | Age: 69
End: 2024-11-27
Payer: MEDICARE

## 2024-11-27 NOTE — RESEARCH NOTES
[facility]<[id number]><C3+D1><COHORTC>  DCRU NURSING VISIT NOTE  Study Name: [id number]  IRB#: [id number]  DCRU#: # [id number]  Protocol Version Dated: V9 A8 06.06.23   PI: Fan Rapp MD.    Time point: Cycle 3 AND BEYOND - Day 1 COHORT C  CYCLE ***    Encounter Date: 11/27/2024  Encounter Time:  8:00 AM EST  Encounter Department: [place] [address] RESEARCH     #1     Phone Pager   Emma Mann [phone] [place]    #2 Phone Pager   Martita Helms [phone] [place]   Other Phone Pager          Study Regimen and Dosing   Cohort C - patients with no prior systemic therapy for locally advanced unresectable or metastatic urothelial carcinoma.  Cycle = 6 weeks.  Disitamab Vedotin administered IV Days 1, 15 & 29 every 6 weeks  Pembrolizumab administered IV Day 1 every 6 weeks.     Admission and Prior to Starting Study Activities   Complete DCRU and [facility] Admission/Visit Note.  Notify (name)  (pager/Halo) when patient arrives to unit.  Complete DCRU/[facility] intake form in EMR.  Confirm DCRU Standing Orders (provided by Study Team/) are signed & available on chart (Expires after 1 year).  Study Questionnaires - Coordinator will obtain.  Obtain weight in kilograms - with shoes off & heavy items removed.  Obtain vital signs. Record in EMR.  Insert one peripheral IV line for sample collection procedures (flush line with normal saline following each blood draw). Access mediport (if available) otherwise insert second peripheral line in opposite arm for Investigative drug administration (if peripheral line, flush line with normal saline before & after infusion)  Do Not draw research samples from the same line the investigational drug is infused through.  Physical Exam < 1 day     Criteria to Treat   DCRU RN reviewed and meets eligibility to proceed with treatment plan   Time team notified: *** {AM/PM:25507}  DCRU RN notifies study team to review eligibility and approval before  dosing procedures  Time team approves: *** {AM/PM:92288}     Dietary Guidelines   REGULAR     Subjective   Toro Lujan is a 69 y.o. male and is here for a Research clinical visit.    Visit Provider: Mian, [facility] ROOM 3 [facility]     Allergies: No Known Allergies    Objective     Vital Signs:    There were no vitals filed for this visit.    Physical Exam     ASSESSMENT and PLAN:  Problem List Items Addressed This Visit    None       Medications as of the completion of today's visit:  Current Outpatient Medications   Medication Sig Dispense Refill    acetaminophen (Tylenol) 500 mg tablet Take 2 tablets (1,000 mg) by mouth every 8 hours if needed.      Advair Diskus 100-50 mcg/dose diskus inhaler INHALE 1 PUFF BY MOUTH TWICE DAILY AS DIRECTED. RINSE AND GARGLE MOUTH WITH WATER AFTER EACH USE      ALPRAZolam (Xanax) 0.5 mg tablet Take one tablet one hour before anxiety-provoking situation. Limits one tab/day, three tabs/week.      aspirin 81 mg EC tablet Take 1 tablet (81 mg) by mouth once daily.      gabapentin (Neurontin) 300 mg capsule Take 1 capsule (300 mg) by mouth once daily at bedtime. 30 capsule 2    ketorolac (Acular) 0.5 % ophthalmic solution 1 drop to surgical eye 4 times a day starting 1 day before surgery 5 mL 2    lisinopril 20 mg tablet Take 1 tablet (20 mg) by mouth once daily.      metoprolol tartrate (Lopressor) 25 mg tablet Take 1 tablet (25 mg) by mouth once daily.      ofloxacin (Ocuflox) 0.3 % ophthalmic solution 1 drop to operative eye 4 times a day starting 1 day before surgery 5 mL 2    pantoprazole (ProtoNix) 40 mg EC tablet Take 1 tablet (40 mg) by mouth once daily.      prednisoLONE acetate (Pred-Forte) 1 % ophthalmic suspension 1 drop to operative eye 4 times a day starting the day of surgery (after surgery is done) 5 mL 2    rosuvastatin (Crestor) 40 mg tablet Take 1 tablet (40 mg) by mouth once daily.      sertraline (Zoloft) 50 mg tablet Take 1 tablet (50 mg) by mouth once daily.       No  current facility-administered medications for this visit.           Orders placed during today's visit:  No orders of the defined types were placed in this encounter.       [id number] - A Study of Disitamab Vedotin in Subjects With HER2 Expressing Urothelial Carcinoma    Patient has no adverse events documented in the Research Adverse Events activity.       Study Specific Instructions and Documentation   VITALS  LABS  VITALS   CORRELATIVES  PREMEDS  STUDY DRUG (DV)  CORRELATIVES  STUDY DRUG (PEMBRO)  DISCHARGE     PRE-DOSE Safety Labs  < 1 Day prior to Day 1     Refer to [place] Treatment Plan for orders     Weight-Based Dosing   Baseline (screening) weight (kg) _______       Date measured _____/_____/20___ Actual (current) weight (kg) _________    Date measured _____/_____/20___   Dosing is calculated based on baseline weight at screening unless weight changes +/- 10% from baseline during the study.   Patients > 100 kg, total dose will be calculated using 100 kg (Maximum = 200 mg) once every 2 week cycle.      Safety Parameters and Special Instructions   Disitamab Vedotin (UY24-NQU) is an antibody drug conjugate composed of a recombinant humanized IgG1 anti-HER2 mAb linked to the cytotoxic agent MMAE (monomethyl auristatin E).  Potential Side Effects/Adverse Events: infusion-related reaction, neutropenia, thrombocytopenia, anemia, peripheral neuropathy, elevated liver enzymes, fever, n/v, constipation, diarrhea, pruritus, arthralgia, alopecia, hyperglycemia, elevated triglycerides, hypokalemia, proteinuria, hypoalbuminemia.  Administered IV Days 1, 15 & 29 of each cycle.    Pembrolizumab is a PD-1 receptor-ligand (PD-L1) inhibitor  Potential Side Effects/ adverse effects include hyperglycemia, infusion reactions, and immune-mediated toxicities.  Administered Day 1 of each 6-week cycle following Disitamab Vedotin infusion.  Maximum of 18 (6-weekly) infusions.     PRE-DOSE Vital Signs   Temp, Heart Rate, Respiration,  Blood Pressure      PRE-DOSE Research Correlatives:   Access Type: *** Location: ***   Refer to [place] Treatment Plan for orders   Time point Specimen Test Volume Tube Handling Draw Time   Pre-dose  (within 1hour) DV PK 2 x 6 mL Red Top Ambient, invert 5x ***    DV ADA 4 mL Red Top Ambient, invert 5x ***    Pembro PK/ADA 4 mL Red Top Ambient, invert 5x ***        PRE-DOSE Medications (if previous infusion reaction)   Document medication administration in MAR activity. Document Research specific instructions below.   Refer to [place] Treatment Plan for orders   Premedication 1.5 hours (+/- 30 minutes) prior to Disitamab Vedotin      Safety, Side and Adverse Effects with Special Instructions  Research Study Drugs   SEE  Safety Parameters and Special Instructions     Infusion-Related Reactions   [facility] Hypersensivity Orders      Day 1 Disitamab Vedotin Investigational Product Administration (sponsor provided)   Refer to [place] Treatment Plan for orders   Document medication administration in MAR activity. Document Research specific instructions below.   Disitamab Vedotin   If Infusion-Related Reaction, notify MD/Provider &     Grade 1: Mild reaction (transient flushing, rash or fever 38° C): may continue at investigator's discretion; administer symptomatic treatment following DCRU management of hypersensitivity reaction; monitor vital signs.    Grade 2: Moderate reaction (rash, flushing, urticaria, dyspnea, fever 38° C, chest discomfort, or back pain): interrupt infusion & follow DCRU management of hypersensitivity reaction; monitor vital signs.   If promptly responds to symptomatic treatment & is medically stable in the opinion of the investigator, infusion may be resumed at a slower rate.   Grade 3: Prolonged recurrence of symptoms or Grade 4: Life-threatening: stop infusion & follow DCRU management of hypersensitivity reaction. Remain at bedside and monitor until recovery from symptoms. Do not  restart infusion.   Wait approximately 30 minutes after end of Disitamab Vedotin before starting Pembrolizumab.     Day 1 Post-Dose Disitamab Vedotin Research Correlatives  Deliver to DCRU/[facility] for Processing   Access Type: *** Location: ***   Refer to [place] Treatment Plan for orders   Time point Specimen Test Volume Tube Handling Draw Time   End of Infusion (+/-15mins )   DV PK  2 x 6 mL  Red Top  Ambient, invert 5x  ***        Day 1 Pembrolizumab Administration (sponsor provided)   Refer to [place] Treatment Plan for orders   Document medication administration in MAR activity. Document Research specific instructions below.  Pembrolizumab IVPB   Wait approximately 30 minutes after end of Disitamab Vedotin before starting Pembrolizumab     Discharge Instructions   Discharge patient to home after study requirements completed or PK sample obtained.    Remind patient to return: DAY 15 for treatment  Discharge time: ***     Hayley Simons RN  11/27/24

## 2024-11-27 NOTE — RESEARCH NOTES
[facility]<[id number]><C2,3,5+D1><INTENSIVEPK>  DCRU NURSING VISIT NOTE  Study Name: [id number]  IRB#: [id number]  DCRU#: # [id number]  Protocol Version Dated: V9 A8 06.06.23   PI: Fan Rapp MD.    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  Cycle = 14 days  Disitamab Vedotin administered IV Day 1 every 2 weeks     Admission and Prior to Starting Study Activities   1. Notify  when patient arrives to unit.  2. Complete DCR[facility] intake form in [facility].  4. Study Questionnaires   Coordinator will obtain - Cycle 2, 3, then every other cycle.  5. Obtain weight in kilograms - with shoes off & heavy items removed.  6. Obtain vital signs. Record in EMR.  7. Insert one peripheral IV line for sample collection procedures (flush line with normal saline following each blood draw). Access mediport (if available) otherwise insert second peripheral line in opposite arm for Investigative drug administration (if peripheral line, flush line with normal saline before & after infusion)  8. Do Not draw research samples from the same line the investigational drug infused through.  9. Physical Exam < 1 day  10. Confirm patient fasting for clinical safety labs (every 3rd month, if ordered).     Criteria to Treat   DCRU RN reviewed and meets eligibility to proceed with treatment plan   Time team notified: *** {AM/PM:03819}  DCRU RN notifies study team to review eligibility and approval before  dosing procedures  Time team approves: *** {AM/PM:00439}     Dietary Guidelines   REGULAR     Subjective   Toro Lujan is a 69 y.o. male and is here for a Research clinical visit.    Visit Provider: Mian, [facility] ROOM 3 [facility]     Allergies: No Known Allergies    Objective     Vital Signs:    There were no vitals filed for this visit.    Physical Exam     ASSESSMENT and PLAN:  Problem List Items Addressed This Visit    None       Medications as of the completion of today's visit:  Current Outpatient Medications   Medication Sig Dispense Refill    acetaminophen (Tylenol) 500 mg tablet Take 2 tablets (1,000 mg) by mouth every 8 hours if needed.      Advair Diskus 100-50 mcg/dose diskus inhaler INHALE 1 PUFF BY MOUTH TWICE DAILY AS DIRECTED. RINSE AND GARGLE MOUTH WITH WATER AFTER EACH USE      ALPRAZolam (Xanax) 0.5 mg tablet Take one tablet one hour before anxiety-provoking situation. Limits one tab/day, three tabs/week.      aspirin 81 mg EC tablet Take 1 tablet (81 mg) by mouth once daily.      gabapentin (Neurontin) 300 mg capsule Take 1 capsule (300 mg) by mouth once daily at bedtime. 30 capsule 2    ketorolac (Acular) 0.5 % ophthalmic solution 1 drop to surgical eye 4 times a day starting 1 day before surgery 5 mL 2    lisinopril 20 mg tablet Take 1 tablet (20 mg) by mouth once daily.      metoprolol tartrate (Lopressor) 25 mg tablet Take 1 tablet (25 mg) by mouth once daily.      ofloxacin (Ocuflox) 0.3 % ophthalmic solution 1 drop to operative eye 4 times a day starting 1 day before surgery 5 mL 2    pantoprazole (ProtoNix) 40 mg EC tablet Take 1 tablet (40 mg) by mouth once daily.      prednisoLONE acetate (Pred-Forte) 1 % ophthalmic suspension 1 drop to operative eye 4 times a day starting the day of surgery (after surgery is done) 5 mL 2    rosuvastatin (Crestor) 40 mg tablet Take 1 tablet (40 mg) by mouth once daily.      sertraline (Zoloft) 50 mg tablet Take 1 tablet (50 mg) by mouth once daily.       No  current facility-administered medications for this visit.           Orders placed during today's visit:  No orders of the defined types were placed in this encounter.       [id number] - A Study of Disitamab Vedotin in Subjects With HER2 Expressing Urothelial Carcinoma    Patient has no adverse events documented in the Research Adverse Events activity.       Study Specific Instructions and Documentation   VITALS  LABS  VITALS   ECG (Cycles 2, 3, 5 and Every 3rd cycle thereafter)  CORRELATIVES (Cycles 2, 3, 5 and Every 3rd cycle thereafter)  PREMEDS  STUDY DRUG (DV)  CORRELATIVES (Cycles 2, 3, 5 and Every 3rd cycle thereafter)  DISCHARGE     PRE-DOSE Safety Labs  < 1 Day prior to Day 1     Refer to [place] Treatment Plan for orders     Weight-Based Dosing   Baseline (screening) weight (kg) _______       Date measured _____/_____/20___ Actual (current) weight (kg) _________    Date measured _____/_____/20___   Dosing is calculated based on baseline weight at screening unless weight changes +/- 10% from baseline during the study.   Patients > 100 kg, total dose will be calculated using 100 kg (Maximum = 200 mg) once every 2 week cycle.      Safety Parameters and Special Instructions   Disitamab Vedotin (VN22-BRP) is an antibody drug conjugate composed of a recombinant humanized IgG1 anti-HER2 mAb linked to the cytotoxic agent MMAE (monomethyl auristatin E).  Potential Side Effects/Adverse Events: infusion-related reaction, neutropenia, thrombocytopenia, anemia, peripheral neuropathy, elevated liver enzymes, fever, n/v, constipation, diarrhea, pruritus, arthralgia, alopecia, hyperglycemia, elevated triglycerides, hypokalemia, proteinuria, hypoalbuminemia.     PRE-DOSE Vital Signs   Temp, Heart Rate, Respiration, Blood Pressure      PRE-DOSE ECG: Before Research Labs   Triplicate - Each approximately 2 minutes apart  MISTI 150c Study-specific ECG Machine   Rest at least 10 minutes prior.  Collect within 30 minutes prior to  pre-dose research labs.   Contact MD/Provider &  if abnormal from baseline or if QTcF >500 msec  QTcF calculation Fridericia's formula: S:\DCRU_Staff\Nursing\Protocols\QTcF calculator    ECG #1: QTcF ***    ECG #2: QTcF ***    ECG #3: QTcF ***  Time Point Cycle Completion Time      Pre-dose   2, 3, 5 and  every 3rd cycle thereafter ***     ***     ***        PRE-DOSE Research Correlatives: After ECGs  Deliver to DCRU/[facility] lab for processing   Access Type: *** Location: ***   Refer to [place] Treatment Plan for orders   Time point Cycle Specimen Test Volume Tube Handling Draw Time   Pre-dose  (within 1hour) 2,3,5 & every 3rd cycle after PK 2 x 6 mL Red Top Ambient, invert 5x ***     ADA 4 mL Red Top Ambient, invert 5x ***        PRE-DOSE Medications (if previous infusion reaction)   Refer to [place] Treatment Plan for orders   Premedication 30 minutes prior to Disitamab Vedotin      Safety, Side and Adverse Effects with Special Instructions  Research Study Drugs   SEE  Safety Parameters and Special Instructions     Infusion-Related Reactions   [facility] Hypersensivity Orders      Weight-Based Dosing   Baseline (screening) weight (kg) _______       Date measured _____/_____/20___ Actual weight (kg) _________   (Weight on Day 1)   Date measured _____/_____/20___   Dosing should be based on the patient's actual weight    Adjust dose if patient's weight changes +/- 10% from baseline during the study.   Patients > 100 kg, total dose will be calculated using 100 kg (Maximum = 200 mg) once every 2 week cycle.      Day 1 Disitamab Vedotin Investigational Product Administration (sponsor provided)   Refer to [place] Treatment Plan for orders   Document medication administration in MAR activity. Document Research specific instructions below.   Disitamab Vedotin   If Infusion-Related Reaction, notify MD/Provider &     Grade 1: Mild reaction (transient flushing, rash or fever 38° C): may continue at  investigator's discretion; administer symptomatic treatment following DCRU management of hypersensitivity reaction; monitor vital signs.    Grade 2: Moderate reaction (rash, flushing, urticaria, dyspnea, fever 38° C, chest discomfort, or back pain): interrupt infusion & follow DCRU management of hypersensitivity reaction; monitor vital signs.   If promptly responds to symptomatic treatment & is medically stable in the opinion of the investigator, infusion may be resumed at a slower rate.   Grade 3: Prolonged recurrence of symptoms or Grade 4: Life-threatening: stop infusion & follow DCRU management of hypersensitivity reaction. Remain at bedside and monitor until recovery from symptoms. Do not restart infusion.      POST-DOSE ECG: Before Research Labs   Triplicate - Each approximately 2 minutes apart  MISTI 150c Study-specific ECG Machine   Rest at least 10 minutes prior.  Collect within 30 minutes prior to pre-dose research labs.   Contact MD/Provider &  if abnormal from baseline or if QTcF >500 msec  QTcF calculation Fridericia's formula: S:\DCRU_Staff\Nursing\Protocols\QTcF calculator    ECG #1: QTcF ***    ECG #2: QTcF ***    ECG #3: QTcF ***  Time Point Cycle Completion Time      POST-dose   2, 3, 5 and  every 3rd cycle thereafter ***     ***     ***        Day 1 Post-Dose Disitamab Vedotin Research Correlatives  [facility]   Access Type: *** Location: ***   Refer to [place] Treatment Plan for orders   Time point Cycle Specimen Test Volume Tube Handling  Draw Time   End of Infusion  (+/-15mins ) 2,3,5 & every 3rd cycle after   PK  2 x 6 mL  Red Top  Ambient, invert 5x   ***        Discharge Instructions   Discharge patient to home after study requirements completed or PK sample obtained.    Remind patient to return: per   Discharge time: ***     Hayley Simons RN  11/27/24

## 2024-12-04 ENCOUNTER — HOSPITAL ENCOUNTER (OUTPATIENT)
Dept: RADIOLOGY | Facility: HOSPITAL | Age: 69
Discharge: HOME | End: 2024-12-04
Payer: COMMERCIAL

## 2024-12-04 ENCOUNTER — EDUCATION (OUTPATIENT)
Dept: HEMATOLOGY/ONCOLOGY | Facility: HOSPITAL | Age: 69
End: 2024-12-04

## 2024-12-04 DIAGNOSIS — Z00.6 EXAM FOR CLINICAL RESEARCH: ICD-10-CM

## 2024-12-04 DIAGNOSIS — Z00.6 EXAMINATION FOR NORMAL COMPARISON FOR CLINICAL RESEARCH: ICD-10-CM

## 2024-12-04 PROCEDURE — 74177 CT ABD & PELVIS W/CONTRAST: CPT

## 2024-12-04 PROCEDURE — 2550000001 HC RX 255 CONTRASTS: Performed by: INTERNAL MEDICINE

## 2024-12-05 ENCOUNTER — TELEMEDICINE (OUTPATIENT)
Dept: HEMATOLOGY/ONCOLOGY | Facility: HOSPITAL | Age: 69
End: 2024-12-05
Payer: COMMERCIAL

## 2024-12-05 DIAGNOSIS — T45.1X5A CHEMOTHERAPY INDUCED NAUSEA AND VOMITING: ICD-10-CM

## 2024-12-05 DIAGNOSIS — R11.2 CHEMOTHERAPY INDUCED NAUSEA AND VOMITING: ICD-10-CM

## 2024-12-05 DIAGNOSIS — C77.9 REGIONAL LYMPH NODE METASTASIS PRESENT (MULTI): ICD-10-CM

## 2024-12-05 DIAGNOSIS — C67.9 MALIGNANT NEOPLASM OF URINARY BLADDER, UNSPECIFIED SITE (MULTI): ICD-10-CM

## 2024-12-05 DIAGNOSIS — K59.1 FUNCTIONAL DIARRHEA: ICD-10-CM

## 2024-12-05 DIAGNOSIS — Z92.21 S/P CHEMOTHERAPY, TIME SINCE GREATER THAN 12 WEEKS: ICD-10-CM

## 2024-12-05 DIAGNOSIS — C67.8 MALIGNANT NEOPLASM OF OVERLAPPING SITES OF BLADDER (MULTI): Primary | ICD-10-CM

## 2024-12-05 DIAGNOSIS — G62.0 DRUG-INDUCED POLYNEUROPATHY (MULTI): ICD-10-CM

## 2024-12-05 DIAGNOSIS — Z00.6 RESEARCH SUBJECT: ICD-10-CM

## 2024-12-06 DIAGNOSIS — Z00.6 RESEARCH SUBJECT: ICD-10-CM

## 2024-12-09 ENCOUNTER — EVALUATION (OUTPATIENT)
Dept: OCCUPATIONAL THERAPY | Facility: CLINIC | Age: 69
End: 2024-12-09
Payer: COMMERCIAL

## 2024-12-09 DIAGNOSIS — C67.8 MALIGNANT NEOPLASM OF OVERLAPPING SITES OF BLADDER (MULTI): ICD-10-CM

## 2024-12-09 DIAGNOSIS — G62.0 DRUG-INDUCED POLYNEUROPATHY (MULTI): ICD-10-CM

## 2024-12-09 PROCEDURE — 97110 THERAPEUTIC EXERCISES: CPT | Mod: GO

## 2024-12-09 PROCEDURE — 97165 OT EVAL LOW COMPLEX 30 MIN: CPT | Mod: GO

## 2024-12-09 ASSESSMENT — ENCOUNTER SYMPTOMS
DEPRESSION: 0
OCCASIONAL FEELINGS OF UNSTEADINESS: 0
LOSS OF SENSATION IN FEET: 0

## 2024-12-09 NOTE — PROGRESS NOTES
Occupational Therapy Orthopedic Evaluation    Patient Name: Toro Lujan  MRN: [id number]  Today's Date: 12/9/2024  Time Calculation  Start Time: 1330  Stop Time: 1415  Time Calculation (min): 45 min    Insurance:  Visit number: 1  Insurance Type: [employer], Medicare A,B   Authorization info: [place]    General:  Reason for visit: Bilateral hands polyneuropathy   Referred by: Daniella Pineda CNP    Current Problem  1. Malignant neoplasm of overlapping sites of bladder (Multi)  Referral to Occupational Therapy      2. Drug-induced polyneuropathy (Multi)  Referral to Occupational Therapy        Precautions:    Medical History Form: Reviewed (scanned into chart)  Diagnoses pertinent to therapy: Bladder cancer with stoma, heart disease, HTN      SUBJECTIVE:   JOSE: Patient is a 69 y.o.male who presents to OT clinic with bilateral hand numbness and tingling. Pt was participating in a clinical immunotherapy trial/infusions for bladder cancer since January 2024. He started to experience paresthesias in both hands in July-August 2024 and getting progressively worse. Chemo treatment [id number]        Clinical trials temporarily stopped , as pt underwent bilateral cataract surgery in the fall.     Date of onset: July/August 2024  Date of surgery: NA  Chief complaints/concerns from patient/family member: Occasional difficulty with emptying ostomy bag. Decreased legibility with writing. Fxal pinching , fine motor for manipulating food,containers,  decreased tight .     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,  "meetings   Leisure/Hobbies: Reading, working on projects around the home/yard     Patients Living Environment: Daughter recently moved in with patient   Primary Language: English    Pt goals for therapy: Decrease symptoms in hands, increase ease with hand use    OBJECTIVE:     ROM:  ELBOW/FOREARM: AROM (Degrees of motion)   * WFL unless documented below                 Right             Left   Flexion     Extension      Pronation      Supination        WRIST AROM  (Degrees of motion)  * WFL unless documented below                Right             Left   Flexion      Extension      Radial Deviation     Ulnar Deviation        HAND/DIGIT AROM:    Bilateral       *WFL unless documented below         Index       Middle         Ring       Small       MCP WFL /WNL for age         PIP          DIP           [place]GL[place]          Thumb AROM: WFL/WNL    Hand Strength: LBS                Right                Left    Dynamometer  52 63   Lateral Pinch 8 10   3 PT Pinch  Tip Pinch 2  1.5 7.5  5      Physical Observation:   Edema:     None noted   Paresthesias: Began as intermittent, now constant  Right hand > Left symptoms  Bilateral tingling in D 2-5 from PIP jts and distally  Right palm \"slight numbness\"   Light touch grossly intact   Scar/Incision: NA  Coordination: Nine Hole peg test: R: 29 secs   L 23 secs    Quick Dash Outcome Measurement:   TBA    Red Flags: Do you have any of the following? No  Fever/chills, unexplained weight changes, dizziness/fainting, unexplained change in bowel or bladder functions, unexplained malaise or muscle weakness, night pain/sweats, numbness or tingling    Treatment:   OT evaluation completed and HEP issued.     Therapeutic Exercise :  Patient education on rationale and possible benefits of MH and /or warm soaks  use to decrease symptoms.  Sensory stimulation activities with using various textures, sensory bin use and rationale  Light tan putty issued for progressed , light lateral and 3 pt " pinch strength/endurance, rolling for sensory input.     Patient verbalizes and demonstrates good understanding,technique and precautions with above.  Written and illustrated handouts issued to patient.     OT Evaluation Time Entry  OT Evaluation (Low) Time Entry: 35  OT Therapeutic Procedures Time Entry  Therapeutic Exercise Time Entry: 10    ASSESSMENT:   Patient is a 69 y.o. male  with the diagnosis of bilateral hand paresthias resulting in limited ability and participation in ADLs, IADLS activities. Pt demonstrating bilateral hand numbness/tingling, decreased right  strength and FMC ability. Pt would benefit from Occupational Therapy to address the impairments documented in the objective section in order to return to fxal activities with increased independence.     PLAN:  Goals:  Active       OT Goals       Patient to be independent with HEP to further fxal progress and ability.         Start:  12/09/24    Expected End:  02/07/25            Patient to be independent with conservative management principles of diagnosis, including home modalities and sensory stimulation principles to decrease paresthesias by 25% .        Start:  12/09/24    Expected End:  02/07/25            Patient to increase  strength to > 62#  of right  hand for ease with lifting and carrying tasks.        Start:  12/09/24    Expected End:  02/07/25            Patient to increase 3 pt pinch strength to > 5#  open/close, manipulate ostomy bag and other household objects.        Start:  12/09/24    Expected End:  02/07/25              Planned Interventions include: therapeutic exercise, therapeutic activity, self-care home management, manual therapy, neuromuscular education , electric stimulation, fluidotherapy, ultrasound, Home exercise program, orthosis fabrication, wound care/scar management.     Frequency: 1 x week,  every 1 1/2 weeks  Duration: 3-4 weeks    Rehab Potential: Good   Plan of care was developed with input and agreement  by the patient.     Complexity of evaluation: Low     Melania Santizo MS, OTR/L [id number]

## 2024-12-10 ENCOUNTER — TELEPHONE (OUTPATIENT)
Dept: ADMISSION | Facility: HOSPITAL | Age: 69
End: 2024-12-10
Payer: COMMERCIAL

## 2024-12-10 DIAGNOSIS — Z00.6 EXAM FOR CLINICAL RESEARCH: ICD-10-CM

## 2024-12-10 NOTE — TELEPHONE ENCOUNTER
Pt called- he said he spoke to Dr. Castanon last week who informed him that his clinical trial is being suspended until January. He wants to know if he still needs to come in for his FUV with Daniella tomorrow. Message sent to the team.

## 2024-12-10 NOTE — TELEPHONE ENCOUNTER
Per Emma, the patient will still need lab work and a visit tomorrow.   Patient agreeable and will keep everything as is. Denied further questions

## 2024-12-11 ENCOUNTER — HOSPITAL ENCOUNTER (OUTPATIENT)
Dept: RESEARCH | Facility: HOSPITAL | Age: 69
Discharge: HOME | End: 2024-12-11

## 2024-12-11 ENCOUNTER — LAB (OUTPATIENT)
Dept: LAB | Facility: HOSPITAL | Age: 69
End: 2024-12-11
Payer: MEDICARE

## 2024-12-11 ENCOUNTER — OFFICE VISIT (OUTPATIENT)
Dept: HEMATOLOGY/ONCOLOGY | Facility: HOSPITAL | Age: 69
End: 2024-12-11
Payer: MEDICARE

## 2024-12-11 VITALS
WEIGHT: 251.32 LBS | HEART RATE: 69 BPM | RESPIRATION RATE: 16 BRPM | SYSTOLIC BLOOD PRESSURE: 100 MMHG | BODY MASS INDEX: 37.11 KG/M2 | OXYGEN SATURATION: 97 % | TEMPERATURE: 96.8 F | DIASTOLIC BLOOD PRESSURE: 62 MMHG

## 2024-12-11 DIAGNOSIS — C67.9 MALIGNANT NEOPLASM OF URINARY BLADDER, UNSPECIFIED SITE (MULTI): Primary | ICD-10-CM

## 2024-12-11 DIAGNOSIS — Z00.6 EXAM FOR CLINICAL RESEARCH: ICD-10-CM

## 2024-12-11 LAB
ALBUMIN SERPL BCP-MCNC: 4.3 G/DL (ref 3.4–5)
ALP SERPL-CCNC: 109 U/L (ref 33–136)
ALT SERPL W P-5'-P-CCNC: 31 U/L (ref 10–52)
ANION GAP SERPL CALC-SCNC: 11 MMOL/L (ref 10–20)
AST SERPL W P-5'-P-CCNC: 19 U/L (ref 9–39)
BASOPHILS # BLD AUTO: 0.07 X10*3/UL (ref 0–0.1)
BASOPHILS NFR BLD AUTO: 0.7 %
BILIRUB DIRECT SERPL-MCNC: 0.1 MG/DL (ref 0–0.3)
BILIRUB SERPL-MCNC: 0.6 MG/DL (ref 0–1.2)
BUN SERPL-MCNC: 23 MG/DL (ref 6–23)
CALCIUM SERPL-MCNC: 9.2 MG/DL (ref 8.6–10.3)
CHLORIDE SERPL-SCNC: 100 MMOL/L (ref 98–107)
CHOLEST SERPL-MCNC: 146 MG/DL (ref 0–199)
CHOLESTEROL/HDL RATIO: 2.9
CO2 SERPL-SCNC: 29 MMOL/L (ref 21–32)
CREAT SERPL-MCNC: 1.1 MG/DL (ref 0.5–1.3)
EGFRCR SERPLBLD CKD-EPI 2021: 73 ML/MIN/1.73M*2
EOSINOPHIL # BLD AUTO: 0.43 X10*3/UL (ref 0–0.7)
EOSINOPHIL NFR BLD AUTO: 4.3 %
ERYTHROCYTE [DISTWIDTH] IN BLOOD BY AUTOMATED COUNT: 13.5 % (ref 11.5–14.5)
GLUCOSE P FAST SERPL-MCNC: 100 MG/DL (ref 74–99)
GLUCOSE SERPL-MCNC: 109 MG/DL (ref 74–99)
HCT VFR BLD AUTO: 46 % (ref 41–52)
HDLC SERPL-MCNC: 50.4 MG/DL
HGB BLD-MCNC: 15.5 G/DL (ref 13.5–17.5)
IMM GRANULOCYTES # BLD AUTO: 0.05 X10*3/UL (ref 0–0.7)
IMM GRANULOCYTES NFR BLD AUTO: 0.5 % (ref 0–0.9)
LDLC SERPL CALC-MCNC: 73 MG/DL
LYMPHOCYTES # BLD AUTO: 2.72 X10*3/UL (ref 1.2–4.8)
LYMPHOCYTES NFR BLD AUTO: 27.1 %
MAGNESIUM SERPL-MCNC: 1.73 MG/DL (ref 1.6–2.4)
MCH RBC QN AUTO: 30.3 PG (ref 26–34)
MCHC RBC AUTO-ENTMCNC: 33.7 G/DL (ref 32–36)
MCV RBC AUTO: 90 FL (ref 80–100)
MONOCYTES # BLD AUTO: 0.77 X10*3/UL (ref 0.1–1)
MONOCYTES NFR BLD AUTO: 7.7 %
NEUTROPHILS # BLD AUTO: 6.01 X10*3/UL (ref 1.2–7.7)
NEUTROPHILS NFR BLD AUTO: 59.7 %
NON HDL CHOLESTEROL: 96 MG/DL (ref 0–149)
NRBC BLD-RTO: 0 /100 WBCS (ref 0–0)
PHOSPHATE SERPL-MCNC: 4.1 MG/DL (ref 2.5–4.9)
PLATELET # BLD AUTO: 211 X10*3/UL (ref 150–450)
POTASSIUM SERPL-SCNC: 4 MMOL/L (ref 3.5–5.3)
PROT SERPL-MCNC: 7.2 G/DL (ref 6.4–8.2)
RBC # BLD AUTO: 5.11 X10*6/UL (ref 4.5–5.9)
SODIUM SERPL-SCNC: 136 MMOL/L (ref 136–145)
TRIGL SERPL-MCNC: 115 MG/DL (ref 0–149)
VLDL: 23 MG/DL (ref 0–40)
WBC # BLD AUTO: 10.1 X10*3/UL (ref 4.4–11.3)

## 2024-12-11 PROCEDURE — 82248 BILIRUBIN DIRECT: CPT

## 2024-12-11 PROCEDURE — 84075 ASSAY ALKALINE PHOSPHATASE: CPT

## 2024-12-11 PROCEDURE — 84100 ASSAY OF PHOSPHORUS: CPT

## 2024-12-11 PROCEDURE — 82947 ASSAY GLUCOSE BLOOD QUANT: CPT

## 2024-12-11 PROCEDURE — 99215 OFFICE O/P EST HI 40 MIN: CPT | Performed by: NURSE PRACTITIONER

## 2024-12-11 PROCEDURE — 85025 COMPLETE CBC W/AUTO DIFF WBC: CPT

## 2024-12-11 PROCEDURE — 80061 LIPID PANEL: CPT

## 2024-12-11 PROCEDURE — 83735 ASSAY OF MAGNESIUM: CPT

## 2024-12-11 PROCEDURE — 36415 COLL VENOUS BLD VENIPUNCTURE: CPT

## 2024-12-11 ASSESSMENT — PAIN SCALES - GENERAL: PAINLEVEL_OUTOF10: 0-NO PAIN

## 2024-12-11 NOTE — RESEARCH NOTES
[facility]<[id number]><C2,3,5+D1><INTENSIVEPK>  DCRU NURSING VISIT NOTE  Study Name: [id number]  IRB#: [id number]  DCRU#: # [id number]  Protocol Version Dated: V9 A8 06.06.23   PI: Fan Rapp MD.    Time point: Cycle 2, 3, 5 AND BEYOND - Day 1 INTENSIVE PK  CYCLE C17D1    Encounter Date: 12/11/2024  Encounter Time:  8:00 AM EST  Encounter Department: [place] [address] RESEARCH     #1     Phone Pager   Em[facility] [place] [phone] [facility]    #2 Phone Pager   Martita Helms [phone] [place]   Other Phone Pager          Study Regimen and Dosing   Intensive PK patients with HER2-overexpressing immunohistochemistry (IHC) 1+, 2+, 3+ locally advanced unresectable or metastatic urothelial carcinoma who have received prior platinum-based chemotherapy.  Cycle = 14 days  Disitamab Vedotin administered IV Day 1 every 2 weeks     Admission and Prior to Starting Study Activities   1. Notify  when patient arrives to unit.  2. Complete DCRU/[facility] intake form in [facility].  4. Study Questionnaires   Coordinator will obtain - Cycle 2, 3, then every other cycle.  5. Obtain weight in kilograms - with shoes off & heavy items removed.  6. Obtain vital signs. Record in EMR.  7. Insert one peripheral IV line for sample collection procedures (flush line with normal saline following each blood draw). Access mediport (if available) otherwise insert second peripheral line in opposite arm for Investigative drug administration (if peripheral line, flush line with normal saline before & after infusion)  8. Do Not draw research samples from the same line the investigational drug infused through.  9. Physical Exam < 1 day  10. Confirm patient fasting for clinical safety labs (every 3rd month, if ordered).     Criteria to Treat   DCRU RN reviewed and meets eligibility to proceed with treatment plan   Time team notified: *** {AM/PM:64969}  DCRU RN notifies study team to review eligibility and approval before  dosing procedures  Time team approves: *** {AM/PM:54528}     Dietary Guidelines   REGULAR     Subjective   Toro Lujan is a 69 y.o. male and is here for a Research clinical visit.    Visit Provider: Christiana, [facility] ROOM 6 [facility]     Allergies: No Known Allergies    Objective     Vital Signs:    There were no vitals filed for this visit.    Physical Exam     ASSESSMENT and PLAN:  Problem List Items Addressed This Visit    None       Medications as of the completion of today's visit:      Orders placed during today's visit:  No orders of the defined types were placed in this encounter.       [id number] - A Study of Disitamab Vedotin in Subjects With HER2 Expressing Urothelial Carcinoma    Patient has no adverse events documented in the Research Adverse Events activity.       Study Specific Instructions and Documentation   VITALS  LABS  VITALS   ECG (Cycles 2, 3, 5 and Every 3rd cycle thereafter)  CORRELATIVES (Cycles 2, 3, 5 and Every 3rd cycle thereafter)  PREMEDS  STUDY DRUG (DV)  CORRELATIVES (Cycles 2, 3, 5 and Every 3rd cycle thereafter)  DISCHARGE     PRE-DOSE Safety Labs  < 1 Day prior to Day 1     Refer to [place] Treatment Plan for orders     Weight-Based Dosing   Baseline (screening) weight (kg) _______       Date measured _____/_____/20___ Actual (current) weight (kg) _________    Date measured _____/_____/20___   Dosing is calculated based on baseline weight at screening unless weight changes +/- 10% from baseline during the study.   Patients > 100 kg, total dose will be calculated using 100 kg (Maximum = 200 mg) once every 2 week cycle.      Safety Parameters and Special Instructions   Disitamab Vedotin ([id number]) is an antibody drug conjugate composed of a recombinant humanized IgG1 anti-HER2 mAb linked to the cytotoxic agent MMAE (monomethyl auristatin E).  Potential Side Effects/Adverse Events: infusion-related reaction, neutropenia, thrombocytopenia, anemia, peripheral neuropathy, elevated liver enzymes,  fever, n/v, constipation, diarrhea, pruritus, arthralgia, alopecia, hyperglycemia, elevated triglycerides, hypokalemia, proteinuria, hypoalbuminemia.     PRE-DOSE Vital Signs   Temp, Heart Rate, Respiration, Blood Pressure      PRE-DOSE ECG: Before Research Labs   Triplicate - Each approximately 2 minutes apart  MISTI 150c Study-specific ECG Machine   Rest at least 10 minutes prior.  Collect within 30 minutes prior to pre-dose research labs.   Contact MD/Provider &  if abnormal from baseline or if QTcF >500 msec  QTcF calculation Fridericia's formula: S:\DCRU_Staff\Nursing\Protocols\QTcF calculator    ECG #1: QTcF ***    ECG #2: QTcF ***    ECG #3: QTcF ***  Time Point Cycle Completion Time      Pre-dose   2, 3, 5 and  every 3rd cycle thereafter ***     ***     ***        PRE-DOSE Research Correlatives: After ECGs  Deliver to DCRU/[facility] lab for processing   Access Type: *** Location: ***   Refer to [place] Treatment Plan for orders   Time point Cycle Specimen Test Volume Tube Handling Draw Time   Pre-dose  (within 1hour) 2,3,5 & every 3rd cycle after PK 2 x 6 mL Red Top Ambient, invert 5x ***     ADA 4 mL Red Top Ambient, invert 5x ***        PRE-DOSE Medications (if previous infusion reaction)   Refer to [place] Treatment Plan for orders   Premedication 30 minutes prior to Disitamab Vedotin      Safety, Side and Adverse Effects with Special Instructions  Research Study Drugs   SEE  Safety Parameters and Special Instructions     Infusion-Related Reactions   [facility] Hypersensivity Orders      Weight-Based Dosing   Baseline (screening) weight (kg) _______       Date measured _____/_____/20___ Actual weight (kg) _________   (Weight on Day 1)   Date measured _____/_____/20___   Dosing should be based on the patient's actual weight    Adjust dose if patient's weight changes +/- 10% from baseline during the study.   Patients > 100 kg, total dose will be calculated using 100 kg (Maximum = 200 mg) once every 2  week cycle.      Day 1 Disitamab Vedotin Investigational Product Administration (sponsor provided)   Refer to [place] Treatment Plan for orders   Document medication administration in MAR activity. Document Research specific instructions below.   Disitamab Vedotin   If Infusion-Related Reaction, notify MD/Provider &     Grade 1: Mild reaction (transient flushing, rash or fever 38° C): may continue at investigator's discretion; administer symptomatic treatment following DCRU management of hypersensitivity reaction; monitor vital signs.    Grade 2: Moderate reaction (rash, flushing, urticaria, dyspnea, fever 38° C, chest discomfort, or back pain): interrupt infusion & follow DCRU management of hypersensitivity reaction; monitor vital signs.   If promptly responds to symptomatic treatment & is medically stable in the opinion of the investigator, infusion may be resumed at a slower rate.   Grade 3: Prolonged recurrence of symptoms or Grade 4: Life-threatening: stop infusion & follow DCRU management of hypersensitivity reaction. Remain at bedside and monitor until recovery from symptoms. Do not restart infusion.      POST-DOSE ECG: Before Research Labs   Triplicate - Each approximately 2 minutes apart  [address] Study-specific ECG Machine   Rest at least 10 minutes prior.  Collect within 30 minutes prior to pre-dose research labs.   Contact MD/Provider &  if abnormal from baseline or if QTcF >500 msec  QTcF calculation Fridericia's formula: S:\DCRU_Staff\Nursing\Protocols\QTcF calculator    ECG #1: QTcF ***    ECG #2: QTcF ***    ECG #3: QTcF ***  Time Point Cycle Completion Time      POST-dose   2, 3, 5 and  every 3rd cycle thereafter ***     ***     ***        Day 1 Post-Dose Disitamab Vedotin Research Correlatives  Deliver to DCRU/[facility] for Processing   Access Type: *** Location: ***   Refer to [place] Treatment Plan for orders   Time point Cycle Specimen Test Volume Tube Handling  Draw  Time   End of Infusion  (+/-15mins ) 2,3,5 & every 3rd cycle after   PK  2 x 6 mL  Red Top  Ambient, invert 5x   ***        Discharge Instructions   Discharge patient to home after study requirements completed or PK sample obtained.    Remind patient to return: per   Discharge time: ***     Hayley Simons RN  12/11/24

## 2024-12-11 NOTE — PROGRESS NOTES
Patient ID: Toro Lujan is a 69 y.o. male.  Attending Physician: Dr. Topher Castanon  Cancer Diagnosis:  Cancer Staging   No matching staging information was found for the patient.    Current Therapy: SEGE 1822: Disitamab Vedotin, cohorts A and B in HER2-overexpressing IHC 1+, 2+, 3+ for locally advanced unresectable or metastatic UC who have received prior platinum-based chemotherapy  Dose reduced to 1 mg/kg IV every 2 weeks - on HOLD  Genetics: NA    Subjective      Cancer History:  Oncology History   Malignant neoplasm of overlapping sites of bladder (Multi)   2/13/2024 Initial Diagnosis    Malignant neoplasm of overlapping sites of bladder (CMS/HCC)     2/14/2024 - 8/21/2024 Research Study Participant    ([facility]) [id number] Intensive PK Cohort - Disitamab Vedotin, 14 Day Cycles  Plan Provider: Fan Rapp MD PhD  Treatment goal: Control  Line of treatment: Second Line  Associated studies: A Study of Disitamab Vedotin in Subjects With HER2 Expressing Urothelial Carcinoma     2/14/2024 -  Research Study Participant    ([facility]) [id number] Intensive PK Cohort - Disitamab Vedotin, 14 Day Cycles  Plan Provider: Topher Castanon MD  Treatment goal: Control  Line of treatment: Second Line  Associated studies: A Study of Disitamab Vedotin in Subjects With HER2 Expressing Urothelial Carcinoma     8/8/2024 - 8/8/2024 Research Study Participant    ([facility]) [id number] Intensive PK Cohort - Disitamab Vedotin, 14 Day Cycles  Plan Provider: MERLIN Delong-CNP  Treatment goal: Control  Line of treatment: Second Line  Associated studies: A Study of Disitamab Vedotin in Subjects With HER2 Expressing Urothelial Carcinoma       11/5/2020: CTU shows mildly asymmetric generalized urinary bladder wall thickening with mild mucosal hyperenhancement, large prostate  1/29/2021: TURBT. 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 Posterior tumor reveals papillary high-grade at least pTa  urothelial carcinoma with no definite invasion, no muscle present  2/24/2021: Bladder tumor path shows high grade focally invasive urothelial carcinoma into the lamina propria, non-muscle invasive.   5/6/2021: Underwent radical cystectomy with ilial conduit due to multiple multiple disease recurrences without MIBC.  12/13/2021: CTU suspicious for new enlarged pelvic LN  2/17/2022: Started chemotherapy with split-dose gemcitabine and cisplatin  3/10/2022: C2 gem/cis  3/31/2022: C3 gem/cis  4/20/2022: Restaging scans revealed response  4/21/2022: C4 gem/cis  5/12/2022: C5 gem/cis  6/24/2022: Started maintenance avelumab. Treatment was placed on hold due to insurance coverage issues after 1 cycle  8/24/2022: CT scans stable  9/2/2022: Restarted avelumab    3/2/2023: Last dose avelumab, off after  12/28/2023: CT reveals increase in left para-aortic LN highly suspicious for metastatic LAD.  1/18/2024: Again increase in para-aortic LN.  1/31/2024: Screening for SEGE 1822: Disitamab vedotin  2/7/2024: Screening for SEGE 1822  2/14/2024: Cycle 1 day 1 disitamab vedotin as part of clinical trial  7/24/2024: Cycle 12 day 1 DV, dose reduction for neuropathy  9/4/2024: Cycle 15 day 1 DV  9/18/2024: Cycle 16 day 1 DV HELD due to neuropathy  10/30/2024: Cycle 16 again HELD due to neuropathy  11/13/2024: cycle 16 again HELD due to neuropathy    Interval History:  Mr. Lujan presents today in consideration of cycle 16 day 1 DV, delayed due to neuropathy since September. He continues to have R>L neuropathy. Mostly tingling, though also with motor function decrease. Started PT, and is doing his exercises. Signed up for two more sessions. Reading quite a bit, and sleeping a about 4 hour intervals. No new or concerning sympoms otherwise. The remainder of his ROS is otherwise negative.  HPI    Objective    BSA: 2.36 meters squared  /62   Pulse 69   Temp 36 °C (96.8 °F)   Resp 16   Wt 114 kg (251 lb 5.2 oz)   SpO2 97%   BMI  37.11 kg/m²     Physical Exam  General: alert, well-dressed in NAD. Speech is fluent and coherent, words clear. Good insight. Oriented x4   Skin: warm, dry, and pink without cyanosis or nail clubbing. No rash, petechiae, or ecchymoses. Small cystic lesion to left arm, he reports not enlarging.  HEENT: Normocephalic atraumatic. Sclera white, conjunctiva pink. EOMs intact. Hearing intact to spoken voice. Oral mucosa pink. No visible lesions  Respiratory: Chest expansion symmetric. Breath sounds vesicular in all lobes without crackles, wheezes, or rhonchi bilaterally.  CV: S1S2 audible and crisp without murmurs, rubs, or extra sounds. RRR. Radial pulses strong and regular. Extremities warm and pink. No edema bilaterally.  GI: abdomen is round, soft, and non-tender. Active bowel sounds.   Psych: engaged, polite, appropriate conversation and eye contact.  Neuro:  strength normal.    Current Medications:    Current Outpatient Medications:     acetaminophen (Tylenol) 500 mg tablet, Take 2 tablets (1,000 mg) by mouth every 8 hours if needed., Disp: , Rfl:     Advair Diskus 100-50 mcg/dose diskus inhaler, INHALE 1 PUFF BY MOUTH TWICE DAILY AS DIRECTED. RINSE AND GARGLE MOUTH WITH WATER AFTER EACH USE, Disp: , Rfl:     aspirin 81 mg EC tablet, Take 1 tablet (81 mg) by mouth once daily., Disp: , Rfl:     gabapentin (Neurontin) 300 mg capsule, Take 1 capsule (300 mg) by mouth once daily at bedtime., Disp: 30 capsule, Rfl: 2    ketorolac (Acular) 0.5 % ophthalmic solution, 1 drop to surgical eye 4 times a day starting 1 day before surgery, Disp: 5 mL, Rfl: 2    lisinopril 20 mg tablet, Take 1 tablet (20 mg) by mouth once daily., Disp: , Rfl:     metoprolol tartrate (Lopressor) 25 mg tablet, Take 1 tablet (25 mg) by mouth once daily., Disp: , Rfl:     ofloxacin (Ocuflox) 0.3 % ophthalmic solution, 1 drop to operative eye 4 times a day starting 1 day before surgery, Disp: 5 mL, Rfl: 2    pantoprazole (ProtoNix) 40 mg EC  tablet, Take 1 tablet (40 mg) by mouth once daily., Disp: , Rfl:     prednisoLONE acetate (Pred-Forte) 1 % ophthalmic suspension, 1 drop to operative eye 4 times a day starting the day of surgery (after surgery is done), Disp: 5 mL, Rfl: 2    rosuvastatin (Crestor) 40 mg tablet, Take 1 tablet (40 mg) by mouth once daily., Disp: , Rfl:     sertraline (Zoloft) 50 mg tablet, Take 1 tablet (50 mg) by mouth once daily., Disp: , Rfl:     ALPRAZolam (Xanax) 0.5 mg tablet, Take one tablet one hour before anxiety-provoking situation. Limits one tab/day, three tabs/week., Disp: , Rfl:      Most Recent Labs:  Results for orders placed or performed in visit on 12/11/24   CBC and Auto Differential    Collection Time: 12/11/24  7:56 AM   Result Value Ref Range    WBC 10.1 4.4 - 11.3 x10*3/uL    nRBC 0.0 0.0 - 0.0 /100 WBCs    RBC 5.11 4.50 - 5.90 x10*6/uL    Hemoglobin 15.5 13.5 - 17.5 g/dL    Hematocrit 46.0 41.0 - 52.0 %    MCV 90 80 - 100 fL    MCH 30.3 26.0 - 34.0 pg    MCHC 33.7 32.0 - 36.0 g/dL    RDW 13.5 11.5 - 14.5 %    Platelets 211 150 - 450 x10*3/uL    Neutrophils % 59.7 40.0 - 80.0 %    Immature Granulocytes %, Automated 0.5 0.0 - 0.9 %    Lymphocytes % 27.1 13.0 - 44.0 %    Monocytes % 7.7 2.0 - 10.0 %    Eosinophils % 4.3 0.0 - 6.0 %    Basophils % 0.7 0.0 - 2.0 %    Neutrophils Absolute 6.01 1.20 - 7.70 x10*3/uL    Immature Granulocytes Absolute, Automated 0.05 0.00 - 0.70 x10*3/uL    Lymphocytes Absolute 2.72 1.20 - 4.80 x10*3/uL    Monocytes Absolute 0.77 0.10 - 1.00 x10*3/uL    Eosinophils Absolute 0.43 0.00 - 0.70 x10*3/uL    Basophils Absolute 0.07 0.00 - 0.10 x10*3/uL   Comprehensive Metabolic Panel    Collection Time: 12/11/24  7:56 AM   Result Value Ref Range    Glucose 109 (H) 74 - 99 mg/dL    Sodium 136 136 - 145 mmol/L    Potassium 4.0 3.5 - 5.3 mmol/L    Chloride 100 98 - 107 mmol/L    Bicarbonate 29 21 - 32 mmol/L    Anion Gap 11 10 - 20 mmol/L    Urea Nitrogen 23 6 - 23 mg/dL    Creatinine 1.10 0.50  "- 1.30 mg/dL    eGFR 73 >60 mL/min/1.73m*2    Calcium 9.2 8.6 - 10.3 mg/dL    Albumin 4.3 3.4 - 5.0 g/dL    Alkaline Phosphatase 109 33 - 136 U/L    Total Protein 7.2 6.4 - 8.2 g/dL    AST 19 9 - 39 U/L    Bilirubin, Total 0.6 0.0 - 1.2 mg/dL    ALT 31 10 - 52 U/L   Magnesium    Collection Time: 12/11/24  7:56 AM   Result Value Ref Range    Magnesium 1.73 1.60 - 2.40 mg/dL      No results found for: \"PSA\"     Performance Status:  ECOG Score: 0- Fully active, able to carry on all pre-disease performance w/o restriction.  Karnofsky Score: 100 - Fully active, able to carry on all pre-disease performed without restriction    Assessment/Plan   Toro Lujan is a 69 y.o. male with mUC to LN who presents in follow up and consideration of cycle 16 day 1 SEGE 1822 on DV. He looks and feels well, though has neuropathy, grade 2. Labs pending, CBC, CMP, Mag unremarkable.    We will continue hold.    # mUC  - HOLD DV today due to neuropathy  - Continue to monitor labs    # Neuropathy  - Continue gabapentin 300 mg PO nightly  - On Zoloft, working well for anxiety so will avoid duloxetine at this time.  - Dose reduction further on resumption, HOLD tx today  - Continue PT/OT for motor function    # Hypocalcemia  # Hyponatremia  # Hypomagnesemia  - Resolved, monitor CMP    # Triglycerides  - Continue with cardiology  - Monitor    # Health Maintenance  - Continue with PCP and other healthcare providers  - Advised exercise, heart-healthy diet    RTC per protocol    Total time spent on this encounter was 50 minutes, which included preparation, direct time with patient, documentation, and care coordination on the day of visit.    Daniella Pineda, MSN, APRN, AGNP-C, AOCNP  Associate Nurse Practitioner  [facility]  "

## 2024-12-16 ENCOUNTER — APPOINTMENT (OUTPATIENT)
Dept: OPHTHALMOLOGY | Facility: CLINIC | Age: 69
End: 2024-12-16

## 2024-12-17 ENCOUNTER — APPOINTMENT (OUTPATIENT)
Dept: OCCUPATIONAL THERAPY | Facility: CLINIC | Age: 69
End: 2024-12-17
Payer: COMMERCIAL

## 2024-12-24 ENCOUNTER — APPOINTMENT (OUTPATIENT)
Dept: HEMATOLOGY/ONCOLOGY | Facility: HOSPITAL | Age: 69
End: 2024-12-24
Payer: COMMERCIAL

## 2024-12-24 ENCOUNTER — HOSPITAL ENCOUNTER (OUTPATIENT)
Dept: RESEARCH | Facility: HOSPITAL | Age: 69
Discharge: HOME | End: 2024-12-24
Payer: COMMERCIAL

## 2024-12-24 ENCOUNTER — OFFICE VISIT (OUTPATIENT)
Dept: HEMATOLOGY/ONCOLOGY | Facility: HOSPITAL | Age: 69
End: 2024-12-24
Payer: COMMERCIAL

## 2024-12-24 ENCOUNTER — EDUCATION (OUTPATIENT)
Dept: HEMATOLOGY/ONCOLOGY | Facility: HOSPITAL | Age: 69
End: 2024-12-24

## 2024-12-24 VITALS
SYSTOLIC BLOOD PRESSURE: 113 MMHG | HEART RATE: 78 BPM | OXYGEN SATURATION: 97 % | RESPIRATION RATE: 16 BRPM | DIASTOLIC BLOOD PRESSURE: 74 MMHG | BODY MASS INDEX: 37.57 KG/M2 | TEMPERATURE: 97.7 F | WEIGHT: 254.41 LBS

## 2024-12-24 DIAGNOSIS — C67.8 MALIGNANT NEOPLASM OF OVERLAPPING SITES OF BLADDER (MULTI): Primary | ICD-10-CM

## 2024-12-24 DIAGNOSIS — C67.8 MALIGNANT NEOPLASM OF OVERLAPPING SITES OF BLADDER (MULTI): ICD-10-CM

## 2024-12-24 LAB
ALBUMIN SERPL BCP-MCNC: 4 G/DL (ref 3.4–5)
ALP SERPL-CCNC: 112 U/L (ref 33–136)
ALT SERPL W P-5'-P-CCNC: 30 U/L (ref 10–52)
ANION GAP SERPL CALC-SCNC: 13 MMOL/L (ref 10–20)
AST SERPL W P-5'-P-CCNC: 20 U/L (ref 9–39)
BASOPHILS # BLD AUTO: 0.07 X10*3/UL (ref 0–0.1)
BASOPHILS NFR BLD AUTO: 0.8 %
BILIRUB DIRECT SERPL-MCNC: 0.1 MG/DL (ref 0–0.3)
BILIRUB SERPL-MCNC: 0.4 MG/DL (ref 0–1.2)
BUN SERPL-MCNC: 20 MG/DL (ref 6–23)
CALCIUM SERPL-MCNC: 8.9 MG/DL (ref 8.6–10.6)
CHLORIDE SERPL-SCNC: 103 MMOL/L (ref 98–107)
CHOLEST SERPL-MCNC: 134 MG/DL (ref 0–199)
CHOLESTEROL/HDL RATIO: 3
CO2 SERPL-SCNC: 25 MMOL/L (ref 21–32)
CREAT SERPL-MCNC: 0.98 MG/DL (ref 0.5–1.3)
EGFRCR SERPLBLD CKD-EPI 2021: 83 ML/MIN/1.73M*2
EOSINOPHIL # BLD AUTO: 0.48 X10*3/UL (ref 0–0.7)
EOSINOPHIL NFR BLD AUTO: 5.6 %
ERYTHROCYTE [DISTWIDTH] IN BLOOD BY AUTOMATED COUNT: 13.2 % (ref 11.5–14.5)
GLUCOSE P FAST SERPL-MCNC: 132 MG/DL (ref 74–99)
GLUCOSE SERPL-MCNC: 132 MG/DL (ref 74–99)
HCT VFR BLD AUTO: 45.4 % (ref 41–52)
HDLC SERPL-MCNC: 44.2 MG/DL
HGB BLD-MCNC: 15.1 G/DL (ref 13.5–17.5)
IMM GRANULOCYTES # BLD AUTO: 0.05 X10*3/UL (ref 0–0.7)
IMM GRANULOCYTES NFR BLD AUTO: 0.6 % (ref 0–0.9)
LDLC SERPL CALC-MCNC: 62 MG/DL
LYMPHOCYTES # BLD AUTO: 2.75 X10*3/UL (ref 1.2–4.8)
LYMPHOCYTES NFR BLD AUTO: 31.9 %
MAGNESIUM SERPL-MCNC: 1.78 MG/DL (ref 1.6–2.4)
MCH RBC QN AUTO: 29.5 PG (ref 26–34)
MCHC RBC AUTO-ENTMCNC: 33.3 G/DL (ref 32–36)
MCV RBC AUTO: 89 FL (ref 80–100)
MONOCYTES # BLD AUTO: 0.7 X10*3/UL (ref 0.1–1)
MONOCYTES NFR BLD AUTO: 8.1 %
NEUTROPHILS # BLD AUTO: 4.56 X10*3/UL (ref 1.2–7.7)
NEUTROPHILS NFR BLD AUTO: 53 %
NON HDL CHOLESTEROL: 90 MG/DL (ref 0–149)
NRBC BLD-RTO: 0 /100 WBCS (ref 0–0)
PHOSPHATE SERPL-MCNC: 3.5 MG/DL (ref 2.5–4.9)
PLATELET # BLD AUTO: 200 X10*3/UL (ref 150–450)
POTASSIUM SERPL-SCNC: 4.3 MMOL/L (ref 3.5–5.3)
PROT SERPL-MCNC: 6.7 G/DL (ref 6.4–8.2)
RBC # BLD AUTO: 5.11 X10*6/UL (ref 4.5–5.9)
SODIUM SERPL-SCNC: 137 MMOL/L (ref 136–145)
TRIGL SERPL-MCNC: 138 MG/DL (ref 0–149)
VLDL: 28 MG/DL (ref 0–40)
WBC # BLD AUTO: 8.6 X10*3/UL (ref 4.4–11.3)

## 2024-12-24 PROCEDURE — 83735 ASSAY OF MAGNESIUM: CPT

## 2024-12-24 PROCEDURE — 36415 COLL VENOUS BLD VENIPUNCTURE: CPT

## 2024-12-24 PROCEDURE — 82248 BILIRUBIN DIRECT: CPT

## 2024-12-24 PROCEDURE — 84100 ASSAY OF PHOSPHORUS: CPT

## 2024-12-24 PROCEDURE — 80053 COMPREHEN METABOLIC PANEL: CPT

## 2024-12-24 PROCEDURE — 85025 COMPLETE CBC W/AUTO DIFF WBC: CPT

## 2024-12-24 PROCEDURE — 82947 ASSAY GLUCOSE BLOOD QUANT: CPT

## 2024-12-24 PROCEDURE — 80061 LIPID PANEL: CPT

## 2024-12-24 PROCEDURE — 99215 OFFICE O/P EST HI 40 MIN: CPT | Performed by: STUDENT IN AN ORGANIZED HEALTH CARE EDUCATION/TRAINING PROGRAM

## 2024-12-24 RX ORDER — GABAPENTIN 300 MG/1
300 CAPSULE ORAL 2 TIMES DAILY
Qty: 180 CAPSULE | Refills: 2 | Status: SHIPPED | OUTPATIENT
Start: 2024-12-24 | End: 2024-12-27 | Stop reason: SDUPTHER

## 2024-12-24 RX ORDER — EPINEPHRINE 0.3 MG/.3ML
0.3 INJECTION SUBCUTANEOUS EVERY 5 MIN PRN
OUTPATIENT
Start: 2024-12-24

## 2024-12-24 RX ORDER — ALBUTEROL SULFATE 0.83 MG/ML
3 SOLUTION RESPIRATORY (INHALATION) AS NEEDED
OUTPATIENT
Start: 2024-12-24

## 2024-12-24 RX ORDER — FAMOTIDINE 10 MG/ML
20 INJECTION INTRAVENOUS ONCE AS NEEDED
OUTPATIENT
Start: 2024-12-24

## 2024-12-24 RX ORDER — DIPHENHYDRAMINE HYDROCHLORIDE 50 MG/ML
50 INJECTION INTRAMUSCULAR; INTRAVENOUS AS NEEDED
OUTPATIENT
Start: 2024-12-24

## 2024-12-24 ASSESSMENT — PAIN SCALES - GENERAL: PAINLEVEL_OUTOF10: 0-NO PAIN

## 2024-12-24 NOTE — RESEARCH NOTES
[facility]<[id number]><C2,3,5+D1><INTENSIVEPK>  DCRU NURSING VISIT NOTE  Study Name: [id number]  IRB#: [id number]  DCRU#: # [id number]  Protocol Version Dated: V9 A8 06.06.23   PI: Fan Rapp MD.    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  Cycle = 14 days  Disitamab Vedotin administered IV Day 1 every 2 weeks     Admission and Prior to Starting Study Activities   1. Notify  when patient arrives to unit.  2. Complete DCRU/[facility] intake form in [facility].  4. Study Questionnaires   Coordinator will obtain - Cycle 2, 3, then every other cycle.  5. Obtain weight in kilograms - with shoes off & heavy items removed.  6. Obtain vital signs. Record in EMR.  7. Insert one peripheral IV line for sample collection procedures (flush line with normal saline following each blood draw). Access mediport (if available) otherwise insert second peripheral line in opposite arm for Investigative drug administration (if peripheral line, flush line with normal saline before & after infusion)  8. Do Not draw research samples from the same line the investigational drug infused through.  9. Physical Exam < 1 day  10. Confirm patient fasting for clinical safety labs (every 3rd month, if ordered).     Criteria to Treat   DCRU RN reviewed and meets eligibility to proceed with treatment plan   Time team notified: JAZMIN   DCRU RN notifies study team to review eligibility and approval before dosing  procedures  Time team approves: NA   Treatment held per Dr. More due to continued  S&S of neuropathy     Dietary Guidelines   REGULAR     Subjective   Toro Lujan is a 69 y.o. male and is here for a Research clinical visit.    Visit Provider: [id number], [facility] ROOM 5 [facility]     Allergies: No Known Allergies    Objective     Vital Signs:    Vitals:    12/24/24 0754   BP: 113/74   Pulse: 78   Resp: 16   Temp: 36.5 °C (97.7 °F)   TempSrc: Oral   SpO2: 97%   Weight: 115 kg (254 lb 6.6 oz)   PainSc: 0-No pain       Physical Exam     ASSESSMENT and PLAN:  Problem List Items Addressed This Visit       Malignant neoplasm of overlapping sites of bladder (Multi)    Relevant Orders    CBC and Auto Differential (Completed)    Comprehensive metabolic panel (Completed)    Bilirubin, Direct (Completed)    Glucose, Fasting (Completed)    Lipid Panel (Completed)    Magnesium (Completed)    Phosphorus (Completed)        Medications as of the completion of today's visit:  Current Outpatient Medications   Medication Sig Dispense Refill    acetaminophen (Tylenol) 500 mg tablet Take 2 tablets (1,000 mg) by mouth every 8 hours if needed.      Advair Diskus 100-50 mcg/dose diskus inhaler INHALE 1 PUFF BY MOUTH TWICE DAILY AS DIRECTED. RINSE AND GARGLE MOUTH WITH WATER AFTER EACH USE      ALPRAZolam (Xanax) 0.5 mg tablet Take one tablet one hour before anxiety-provoking situation. Limits one tab/day, three tabs/week.      aspirin 81 mg EC tablet Take 1 tablet (81 mg) by mouth once daily.      gabapentin (Neurontin) 300 mg capsule Take 1 capsule (300 mg) by mouth once daily at bedtime. 30 capsule 2    ketorolac (Acular) 0.5 % ophthalmic solution 1 drop to surgical eye 4 times a day starting 1 day before surgery 5 mL 2    lisinopril 20 mg tablet Take 1 tablet (20 mg) by mouth once daily.      metoprolol tartrate (Lopressor) 25 mg tablet Take 1 tablet (25 mg) by mouth once daily.      ofloxacin (Ocuflox) 0.3 % ophthalmic solution 1 drop to  operative eye 4 times a day starting 1 day before surgery 5 mL 2    pantoprazole (ProtoNix) 40 mg EC tablet Take 1 tablet (40 mg) by mouth once daily.      prednisoLONE acetate (Pred-Forte) 1 % ophthalmic suspension 1 drop to operative eye 4 times a day starting the day of surgery (after surgery is done) 5 mL 2    rosuvastatin (Crestor) 40 mg tablet Take 1 tablet (40 mg) by mouth once daily.      sertraline (Zoloft) 50 mg tablet Take 1 tablet (50 mg) by mouth once daily.       No current facility-administered medications for this encounter.           Orders placed during today's visit:  Orders Placed This Encounter   Procedures    CBC and Auto Differential     Standing Status:   Standing     Number of Occurrences:   1     Order Specific Question:   Release result to MyChart     Answer:   Immediate [1]    Comprehensive metabolic panel     Standing Status:   Standing     Number of Occurrences:   1     Order Specific Question:   Release result to MyChart     Answer:   Immediate [1]    Bilirubin, Direct     Standing Status:   Standing     Number of Occurrences:   1     Order Specific Question:   Release result to MyChart     Answer:   Immediate [1]    Glucose, Fasting     Standing Status:   Standing     Number of Occurrences:   1     Order Specific Question:   Release result to MyChart     Answer:   Immediate [1]    Lipid Panel     Standing Status:   Standing     Number of Occurrences:   1     Order Specific Question:   Release result to MyChart     Answer:   Immediate [1]    Magnesium     Standing Status:   Standing     Number of Occurrences:   1     Order Specific Question:   Release result to MyChart     Answer:   Immediate [1]    Phosphorus     Standing Status:   Standing     Number of Occurrences:   1     Order Specific Question:   Release result to MyChart     Answer:   Immediate [1]        [id number] - A Study of Disitamab Vedotin in Subjects With HER2 Expressing Urothelial Carcinoma    Patient has no adverse  events documented in the Research Adverse Events activity.       Study Specific Instructions and Documentation   VITALS  LABS  VITALS   ECG (Cycles 2, 3, 5 and Every 3rd cycle thereafter)  CORRELATIVES (Cycles 2, 3, 5 and Every 3rd cycle thereafter)  PREMEDS  STUDY DRUG (DV)  CORRELATIVES (Cycles 2, 3, 5 and Every 3rd cycle thereafter)  DISCHARGE     PRE-DOSE Safety Labs  < 1 Day prior to Day 1     Refer to [place] Treatment Plan for orders   Safety labs obtained at 0822 via #23g butterfly needle right hand    Weight-Based Dosing   Baseline (screening) weight (kg) _______       Date measured _____/_____/20___ Actual (current) weight (kg) _________    Date measured _____/_____/20___   Dosing is calculated based on baseline weight at screening unless weight changes +/- 10% from baseline during the study.   Patients > 100 kg, total dose will be calculated using 100 kg (Maximum = 200 mg) once every 2 week cycle.      Safety Parameters and Special Instructions   Disitamab Vedotin ([id number]) is an antibody drug conjugate composed of a recombinant humanized IgG1 anti-HER2 mAb linked to the cytotoxic agent MMAE (monomethyl auristatin E).  Potential Side Effects/Adverse Events: infusion-related reaction, neutropenia, thrombocytopenia, anemia, peripheral neuropathy, elevated liver enzymes, fever, n/v, constipation, diarrhea, pruritus, arthralgia, alopecia, hyperglycemia, elevated triglycerides, hypokalemia, proteinuria, hypoalbuminemia.     PRE-DOSE Vital Signs   Temp, Heart Rate, Respiration, Blood Pressure      PRE-DOSE ECG: Before Research Labs   Triplicate - Each approximately 2 minutes apart  [place] [id number] Study-specific ECG Machine   Rest at least 10 minutes prior.  Collect within 30 minutes prior to pre-dose research labs.   Contact MD/Provider &  if abnormal from baseline or if QTcF >500 msec  QTcF calculation Fridericia's formula: S:\DCRU_Staff\Nursing\Protocols\QTcF calculator    ECG #1: QTcF NA    ECG  #2: QTcF NA    ECG #3: QTcF NA  Time Point Cycle Completion Time      Pre-dose   2, 3, 5 and  every 3rd cycle thereafter NA     NA     NA        PRE-DOSE Research Correlatives: After ECGs  Deliver to DCRU/TRPC lab for processing   Access Type: NA Location: NA   Refer to [place] Treatment Plan for orders   Time point Cycle Specimen Test Volume Tube Handling Draw Time   Pre-dose  (within 1hour) 2,3,5 & every 3rd cycle after PK 2 x 6 mL Red Top Ambient, invert 5x NA     ADA 4 mL Red Top Ambient, invert 5x NA        PRE-DOSE Medications (if previous infusion reaction)   Refer to [place] Treatment Plan for orders   Premedication 30 minutes prior to Disitamab Vedotin      Safety, Side and Adverse Effects with Special Instructions  Research Study Drugs   SEE  Safety Parameters and Special Instructions     Infusion-Related Reactions   [facility] Hypersensivity Orders      Weight-Based Dosing   Baseline (screening) weight (kg) _______       Date measured _____/_____/20___ Actual weight (kg) _________   (Weight on Day 1)   Date measured _____/_____/20___   Dosing should be based on the patient's actual weight    Adjust dose if patient's weight changes +/- 10% from baseline during the study.   Patients > 100 kg, total dose will be calculated using 100 kg (Maximum = 200 mg) once every 2 week cycle.      Day 1 Disitamab Vedotin Investigational Product Administration (sponsor provided)   Refer to [place] Treatment Plan for orders   Document medication administration in MAR activity. Document Research specific instructions below.   Disitamab Vedotin   If Infusion-Related Reaction, notify MD/Provider &     Grade 1: Mild reaction (transient flushing, rash or fever 38° C): may continue at investigator's discretion; administer symptomatic treatment following DCRU management of hypersensitivity reaction; monitor vital signs.    Grade 2: Moderate reaction (rash, flushing, urticaria, dyspnea, fever 38° C, chest discomfort, or  back pain): interrupt infusion & follow DCRU management of hypersensitivity reaction; monitor vital signs.   If promptly responds to symptomatic treatment & is medically stable in the opinion of the investigator, infusion may be resumed at a slower rate.   Grade 3: Prolonged recurrence of symptoms or Grade 4: Life-threatening: stop infusion & follow DCRU management of hypersensitivity reaction. Remain at bedside and monitor until recovery from symptoms. Do not restart infusion.      POST-DOSE ECG: Before Research Labs   Triplicate - Each approximately 2 minutes apart  MISTI 150c Study-specific ECG Machine   Rest at least 10 minutes prior.  Collect within 30 minutes prior to pre-dose research labs.   Contact MD/Provider &  if abnormal from baseline or if QTcF >500 msec  QTcF calculation Fridericia's formula: S:\DCRU_Staff\Nursing\Protocols\QTcF calculator    ECG #1: QTcF NA    ECG #2: QTcF NA    ECG #3: QTcF NA  Time Point Cycle Completion Time      POST-dose   2, 3, 5 and  every 3rd cycle thereafter NA     NA     NA        Day 1 Post-Dose Disitamab Vedotin Research Correlatives  Deliver to DCRU/TRPC for Processing   Access Type: NA Location: NA   Refer to [place] Treatment Plan for orders   Time point Cycle Specimen Test Volume Tube Handling  Draw Time   End of Infusion  (+/-15mins ) 2,3,5 & every 3rd cycle after   PK  2 x 6 mL  Red Top  Ambient, invert 5x   NA        Discharge Instructions   Discharge patient to home after study requirements completed or PK sample obtained.    Remind patient to return: per   Discharge time: 0900     Yesica Wright RN  12/24/24

## 2024-12-24 NOTE — PROGRESS NOTES
Patient ID: Toro Lujan is a 69 y.o. male.  Attending Physician: Dr. Topher Castanon    Current Therapy: SEGE 1822: Disitamab Vedotin, cohorts A and B in HER2-overexpressing IHC 1+, 2+, 3+ for locally advanced unresectable or metastatic UC who have received prior platinum-based chemotherapy  Dose reduced to 1 mg/kg IV every 2 weeks - on HOLD  Genetics: NA    Cancer History:  11/5/2020: CTU shows mildly asymmetric generalized urinary bladder wall thickening with mild mucosal hyperenhancement, large prostate  1/29/2021: TURBT. 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 Posterior tumor reveals papillary high-grade at least pTa urothelial carcinoma with no definite invasion, no muscle present  2/24/2021: Bladder tumor path shows high grade focally invasive urothelial carcinoma into the lamina propria, non-muscle invasive.   5/6/2021: Underwent radical cystectomy with ilial conduit due to multiple multiple disease recurrences without MIBC.  12/13/2021: CTU suspicious for new enlarged pelvic LN  2/17/2022: Started chemotherapy with split-dose gemcitabine and cisplatin  3/10/2022: C2 gem/cis  3/31/2022: C3 gem/cis  4/20/2022: Restaging scans revealed response  4/21/2022: C4 gem/cis  5/12/2022: C5 gem/cis  6/24/2022: Started maintenance avelumab. Treatment was placed on hold due to insurance coverage issues after 1 cycle  8/24/2022: CT scans stable  9/2/2022: Restarted avelumab    3/2/2023: Last dose avelumab, off after  12/28/2023: CT reveals increase in left para-aortic LN highly suspicious for metastatic LAD.  1/18/2024: Again increase in para-aortic LN.  1/31/2024: Screening for SEGE 1822: Disitamab vedotin  2/7/2024: Screening for SEGE 1822  2/14/2024: Cycle 1 day 1 disitamab vedotin as part of clinical trial  7/24/2024: Cycle 12 day 1 DV, dose reduction for neuropathy  9/4/2024: Cycle 15 day 1 DV  9/18/2024: Cycle 16 day 1 DV HELD due to  neuropathy  10/30/2024: Cycle 16 again HELD due to neuropathy  11/13/2024: cycle 16 again HELD due to neuropathy  12/24/24 - Scans show overall stable disease    Objective    BSA: There is no height or weight on file to calculate BSA.  There were no vitals taken for this visit.    Physical Exam  General: alert, well-dressed in NAD. Speech is fluent and coherent, words clear. Good insight. Oriented x4   Skin: warm, dry, and pink without cyanosis or nail clubbing. No rash, petechiae, or ecchymoses. Small cystic lesion to left arm, he reports not enlarging.  HEENT: Normocephalic atraumatic. Sclera white, conjunctiva pink. EOMs intact. Hearing intact to spoken voice. Oral mucosa pink. No visible lesions  Respiratory: Chest expansion symmetric. Breath sounds vesicular in all lobes without crackles, wheezes, or rhonchi bilaterally.  CV: S1S2 audible and crisp without murmurs, rubs, or extra sounds. RRR. Radial pulses strong and regular. Extremities warm and pink. No edema bilaterally.  GI: abdomen is round, soft, and non-tender. Active bowel sounds.   Psych: engaged, polite, appropriate conversation and eye contact.  Neuro:  strength normal.    Current Medications:    Current Outpatient Medications:     acetaminophen (Tylenol) 500 mg tablet, Take 2 tablets (1,000 mg) by mouth every 8 hours if needed., Disp: , Rfl:     Advair Diskus 100-50 mcg/dose diskus inhaler, INHALE 1 PUFF BY MOUTH TWICE DAILY AS DIRECTED. RINSE AND GARGLE MOUTH WITH WATER AFTER EACH USE, Disp: , Rfl:     ALPRAZolam (Xanax) 0.5 mg tablet, Take one tablet one hour before anxiety-provoking situation. Limits one tab/day, three tabs/week., Disp: , Rfl:     aspirin 81 mg EC tablet, Take 1 tablet (81 mg) by mouth once daily., Disp: , Rfl:     gabapentin (Neurontin) 300 mg capsule, Take 1 capsule (300 mg) by mouth once daily at bedtime., Disp: 30 capsule, Rfl: 2    ketorolac (Acular) 0.5 % ophthalmic solution, 1 drop to surgical eye 4 times a day  starting 1 day before surgery, Disp: 5 mL, Rfl: 2    lisinopril 20 mg tablet, Take 1 tablet (20 mg) by mouth once daily., Disp: , Rfl:     metoprolol tartrate (Lopressor) 25 mg tablet, Take 1 tablet (25 mg) by mouth once daily., Disp: , Rfl:     ofloxacin (Ocuflox) 0.3 % ophthalmic solution, 1 drop to operative eye 4 times a day starting 1 day before surgery, Disp: 5 mL, Rfl: 2    pantoprazole (ProtoNix) 40 mg EC tablet, Take 1 tablet (40 mg) by mouth once daily., Disp: , Rfl:     prednisoLONE acetate (Pred-Forte) 1 % ophthalmic suspension, 1 drop to operative eye 4 times a day starting the day of surgery (after surgery is done), Disp: 5 mL, Rfl: 2    rosuvastatin (Crestor) 40 mg tablet, Take 1 tablet (40 mg) by mouth once daily., Disp: , Rfl:     sertraline (Zoloft) 50 mg tablet, Take 1 tablet (50 mg) by mouth once daily., Disp: , Rfl:      Most Recent Labs:  Results for orders placed or performed in visit on 12/11/24   CBC and Auto Differential    Collection Time: 12/11/24  7:56 AM   Result Value Ref Range    WBC 10.1 4.4 - 11.3 x10*3/uL    nRBC 0.0 0.0 - 0.0 /100 WBCs    RBC 5.11 4.50 - 5.90 x10*6/uL    Hemoglobin 15.5 13.5 - 17.5 g/dL    Hematocrit 46.0 41.0 - 52.0 %    MCV 90 80 - 100 fL    MCH 30.3 26.0 - 34.0 pg    MCHC 33.7 32.0 - 36.0 g/dL    RDW 13.5 11.5 - 14.5 %    Platelets 211 150 - 450 x10*3/uL    Neutrophils % 59.7 40.0 - 80.0 %    Immature Granulocytes %, Automated 0.5 0.0 - 0.9 %    Lymphocytes % 27.1 13.0 - 44.0 %    Monocytes % 7.7 2.0 - 10.0 %    Eosinophils % 4.3 0.0 - 6.0 %    Basophils % 0.7 0.0 - 2.0 %    Neutrophils Absolute 6.01 1.20 - 7.70 x10*3/uL    Immature Granulocytes Absolute, Automated 0.05 0.00 - 0.70 x10*3/uL    Lymphocytes Absolute 2.72 1.20 - 4.80 x10*3/uL    Monocytes Absolute 0.77 0.10 - 1.00 x10*3/uL    Eosinophils Absolute 0.43 0.00 - 0.70 x10*3/uL    Basophils Absolute 0.07 0.00 - 0.10 x10*3/uL   Comprehensive Metabolic Panel    Collection Time: 12/11/24  7:56 AM   Result  Value Ref Range    Glucose 109 (H) 74 - 99 mg/dL    Sodium 136 136 - 145 mmol/L    Potassium 4.0 3.5 - 5.3 mmol/L    Chloride 100 98 - 107 mmol/L    Bicarbonate 29 21 - 32 mmol/L    Anion Gap 11 10 - 20 mmol/L    Urea Nitrogen 23 6 - 23 mg/dL    Creatinine 1.10 0.50 - 1.30 mg/dL    eGFR 73 >60 mL/min/1.73m*2    Calcium 9.2 8.6 - 10.3 mg/dL    Albumin 4.3 3.4 - 5.0 g/dL    Alkaline Phosphatase 109 33 - 136 U/L    Total Protein 7.2 6.4 - 8.2 g/dL    AST 19 9 - 39 U/L    Bilirubin, Total 0.6 0.0 - 1.2 mg/dL    ALT 31 10 - 52 U/L   Magnesium    Collection Time: 12/11/24  7:56 AM   Result Value Ref Range    Magnesium 1.73 1.60 - 2.40 mg/dL   Phosphorus    Collection Time: 12/11/24  7:56 AM   Result Value Ref Range    Phosphorus 4.1 2.5 - 4.9 mg/dL   Bilirubin, Direct    Collection Time: 12/11/24  7:56 AM   Result Value Ref Range    Bilirubin, Direct 0.1 0.0 - 0.3 mg/dL   Glucose, Fasting    Collection Time: 12/11/24  7:56 AM   Result Value Ref Range    Glucose, Fasting 100 (H) 74 - 99 mg/dL   Lipid Panel    Collection Time: 12/11/24  7:56 AM   Result Value Ref Range    Cholesterol 146 0 - 199 mg/dL    HDL-Cholesterol 50.4 mg/dL    Cholesterol/HDL Ratio 2.9     LDL Calculated 73 <=99 mg/dL    VLDL 23 0 - 40 mg/dL    Triglycerides 115 0 - 149 mg/dL    Non HDL Cholesterol 96 0 - 149 mg/dL      Performance Status:  ECOG Score: 0- Fully active, able to carry on all pre-disease performance w/o restriction.  Karnofsky Score: 100 - Fully active, able to carry on all pre-disease performed without restriction    Assessment/Plan   Toro Lujan is a 69 y.o. male with mUC on DV on SEGE 1822 trial. Treatment on hold s/t neuropathy since September, with stable disease in scans. He looks and feels well, though has neuropathy, grade 2. Will remain off treatment and will increase gabapentin to 600 + 300 mg/. Scans in few weeks.     I saw and evaluated the patient. I personally obtained the key and critical portions of the history and  physical exam or was physically present for key and critical portions performed by the resident/fellow. I reviewed the resident/fellow's documentation and discussed the patient with the resident/fellow. I agree with the resident/fellow's medical decision making as documented in the note.   Jeison More MD MSc FACP  [facility] Chair in Cancer Research   in Medicine [facility] [employer]  Director Clinical  Medical Oncology Research Program   [facility] / [facility]  Cell [phone]  Office [phone]

## 2024-12-27 ENCOUNTER — TELEPHONE (OUTPATIENT)
Dept: ADMISSION | Facility: HOSPITAL | Age: 69
End: 2024-12-27
Payer: COMMERCIAL

## 2024-12-27 DIAGNOSIS — C67.8 MALIGNANT NEOPLASM OF OVERLAPPING SITES OF BLADDER (MULTI): ICD-10-CM

## 2024-12-27 RX ORDER — GABAPENTIN 300 MG/1
CAPSULE ORAL
Qty: 90 CAPSULE | Refills: 2 | Status: SHIPPED | OUTPATIENT
Start: 2024-12-27 | End: 2025-01-26

## 2024-12-27 NOTE — TELEPHONE ENCOUNTER
Porfirio from [facility] pharmacy called to confirm Gabapentin script sent on 12/24.  Gabapentin dose was increased but pharmacy confused about order.  Previously ordered, 1 capsule by mouth for 30 days (300mg) daily.  New script 1 capsule by mouth 2x daily, then 2 in morning and 1 at night total 900mg.  Calling to confirm order with 2 different directions.

## 2024-12-30 ENCOUNTER — APPOINTMENT (OUTPATIENT)
Dept: HEMATOLOGY/ONCOLOGY | Facility: HOSPITAL | Age: 69
End: 2024-12-30
Payer: MEDICARE

## 2024-12-31 ENCOUNTER — TREATMENT (OUTPATIENT)
Dept: OCCUPATIONAL THERAPY | Facility: CLINIC | Age: 69
End: 2024-12-31
Payer: COMMERCIAL

## 2024-12-31 DIAGNOSIS — G62.0 DRUG-INDUCED POLYNEUROPATHY (MULTI): Primary | ICD-10-CM

## 2024-12-31 PROCEDURE — 97022 WHIRLPOOL THERAPY: CPT | Mod: GO

## 2024-12-31 PROCEDURE — 97110 THERAPEUTIC EXERCISES: CPT | Mod: GO

## 2024-12-31 NOTE — PROGRESS NOTES
Occupational Therapy   Occupational Therapy Treatment    Patient Name: Toro Lujan  MRN: [id number]  Today's Date: 12/31/2024  Time Calculation  Start Time: 1210  Stop Time: 1255  Time Calculation (min): 45 min    Insurance:  Visit number: 2  Insurance Type: Payor: AETNA / Plan: AETNA  HEALTHCARE / Product Type: *No Product type* /   Authorization [place]      Current Problem  1. Drug-induced polyneuropathy (Multi)          Precautions     SUBJECTIVE:   Patient reports continued numbness in bilateral hands with decreased intensity. Right > Left. Symptoms mostly in RF,SF versus median nerve distribution.       Pain:   No pain reports   Location:   Description:     Performing HEP: Yes     OBJECTIVE:   HAND STRENGTH (Lbs)   R 60   Dynamometer  45 from 52    Lateral Pinch 7.5 12   3jaw Pinch  Tip Pinch  4.5 from 2  3.5 from 1.5  11.5  7      Treatment:    Modalities:  10 Min   Fluidotherapy treatment Right forearm, wrist and hand with AROM  ,sensory stimulation x 10 min    Therapeutic Exercise:  35  min   Objective measurements taken. See above for details   Review of warm soaks/benefits  UPGRADED HEP: Patient instructed on and completed with use of handout as follows: Issued Red therapy ball for , lateral, 3 pt pinch and thumb adduction x 10 reps. Ulnar Nerve glides : Phase  1-3 x 5 reps  .Handouts issued.     Manual Therapy:     min    Therapeutic Activity:     min    Neuromuscular Re-education:  min    Orthosis:   min    Wound Care:     min    Self Care/ADL   min    Other Treatment:   min    ASSESSMENT:  Decreased right  strength since IE. Decreased intensity/degree of paresthesias reported by pt. Symptoms greater in ulnar portion of hand vs. Radial. Patient verbalizes and demonstrates good understanding and technique of upgraded HEP.    PLAN:   Continue with POC. Review JOSR's     Melania Santizo MS, OTR/L [id number]                  OT Therapeutic Procedures Time Entry  Therapeutic Exercise Time Entry: 35

## 2025-01-08 ENCOUNTER — HOSPITAL ENCOUNTER (OUTPATIENT)
Dept: RESEARCH | Facility: HOSPITAL | Age: 70
Discharge: HOME | End: 2025-01-08
Payer: COMMERCIAL

## 2025-01-08 ENCOUNTER — EDUCATION (OUTPATIENT)
Dept: HEMATOLOGY/ONCOLOGY | Facility: HOSPITAL | Age: 70
End: 2025-01-08

## 2025-01-08 ENCOUNTER — OFFICE VISIT (OUTPATIENT)
Dept: HEMATOLOGY/ONCOLOGY | Facility: HOSPITAL | Age: 70
End: 2025-01-08
Payer: COMMERCIAL

## 2025-01-08 VITALS
OXYGEN SATURATION: 96 % | SYSTOLIC BLOOD PRESSURE: 122 MMHG | DIASTOLIC BLOOD PRESSURE: 80 MMHG | TEMPERATURE: 98.2 F | BODY MASS INDEX: 37.54 KG/M2 | RESPIRATION RATE: 18 BRPM | HEART RATE: 75 BPM | WEIGHT: 254.19 LBS

## 2025-01-08 DIAGNOSIS — C67.8 MALIGNANT NEOPLASM OF OVERLAPPING SITES OF BLADDER (MULTI): Primary | ICD-10-CM

## 2025-01-08 DIAGNOSIS — G62.0 DRUG-INDUCED POLYNEUROPATHY (MULTI): ICD-10-CM

## 2025-01-08 DIAGNOSIS — Z00.6 RESEARCH SUBJECT: ICD-10-CM

## 2025-01-08 DIAGNOSIS — K59.1 FUNCTIONAL DIARRHEA: ICD-10-CM

## 2025-01-08 LAB
ALBUMIN SERPL BCP-MCNC: 3.9 G/DL (ref 3.4–5)
ALP SERPL-CCNC: 125 U/L (ref 33–136)
ALT SERPL W P-5'-P-CCNC: 18 U/L (ref 10–52)
ANION GAP SERPL CALC-SCNC: 12 MMOL/L (ref 10–20)
AST SERPL W P-5'-P-CCNC: 14 U/L (ref 9–39)
BASOPHILS # BLD AUTO: 0.06 X10*3/UL (ref 0–0.1)
BASOPHILS NFR BLD AUTO: 0.7 %
BILIRUB DIRECT SERPL-MCNC: 0.1 MG/DL (ref 0–0.3)
BILIRUB SERPL-MCNC: 0.5 MG/DL (ref 0–1.2)
BUN SERPL-MCNC: 21 MG/DL (ref 6–23)
CALCIUM SERPL-MCNC: 8.8 MG/DL (ref 8.6–10.6)
CHLORIDE SERPL-SCNC: 103 MMOL/L (ref 98–107)
CHOLEST SERPL-MCNC: 84 MG/DL (ref 0–199)
CHOLESTEROL/HDL RATIO: 2.9
CO2 SERPL-SCNC: 23 MMOL/L (ref 21–32)
CREAT SERPL-MCNC: 0.94 MG/DL (ref 0.5–1.3)
EGFRCR SERPLBLD CKD-EPI 2021: 88 ML/MIN/1.73M*2
EOSINOPHIL # BLD AUTO: 0.27 X10*3/UL (ref 0–0.7)
EOSINOPHIL NFR BLD AUTO: 3.1 %
ERYTHROCYTE [DISTWIDTH] IN BLOOD BY AUTOMATED COUNT: 13.2 % (ref 11.5–14.5)
GLUCOSE P FAST SERPL-MCNC: 111 MG/DL (ref 74–99)
GLUCOSE SERPL-MCNC: 111 MG/DL (ref 74–99)
HCT VFR BLD AUTO: 43.6 % (ref 41–52)
HDLC SERPL-MCNC: 28.8 MG/DL
HGB BLD-MCNC: 14.8 G/DL (ref 13.5–17.5)
IMM GRANULOCYTES # BLD AUTO: 0.05 X10*3/UL (ref 0–0.7)
IMM GRANULOCYTES NFR BLD AUTO: 0.6 % (ref 0–0.9)
LDLC SERPL CALC-MCNC: 39 MG/DL
LYMPHOCYTES # BLD AUTO: 2.01 X10*3/UL (ref 1.2–4.8)
LYMPHOCYTES NFR BLD AUTO: 23.3 %
MAGNESIUM SERPL-MCNC: 1.63 MG/DL (ref 1.6–2.4)
MCH RBC QN AUTO: 29.2 PG (ref 26–34)
MCHC RBC AUTO-ENTMCNC: 33.9 G/DL (ref 32–36)
MCV RBC AUTO: 86 FL (ref 80–100)
MONOCYTES # BLD AUTO: 0.85 X10*3/UL (ref 0.1–1)
MONOCYTES NFR BLD AUTO: 9.9 %
NEUTROPHILS # BLD AUTO: 5.37 X10*3/UL (ref 1.2–7.7)
NEUTROPHILS NFR BLD AUTO: 62.4 %
NON HDL CHOLESTEROL: 55 MG/DL (ref 0–149)
NRBC BLD-RTO: 0 /100 WBCS (ref 0–0)
PHOSPHATE SERPL-MCNC: 3.9 MG/DL (ref 2.5–4.9)
PLATELET # BLD AUTO: 180 X10*3/UL (ref 150–450)
POTASSIUM SERPL-SCNC: 4 MMOL/L (ref 3.5–5.3)
PROT SERPL-MCNC: 6.4 G/DL (ref 6.4–8.2)
RBC # BLD AUTO: 5.06 X10*6/UL (ref 4.5–5.9)
SODIUM SERPL-SCNC: 134 MMOL/L (ref 136–145)
TRIGL SERPL-MCNC: 79 MG/DL (ref 0–149)
VLDL: 16 MG/DL (ref 0–40)
WBC # BLD AUTO: 8.6 X10*3/UL (ref 4.4–11.3)

## 2025-01-08 PROCEDURE — 82947 ASSAY GLUCOSE BLOOD QUANT: CPT

## 2025-01-08 PROCEDURE — 83735 ASSAY OF MAGNESIUM: CPT

## 2025-01-08 PROCEDURE — 84100 ASSAY OF PHOSPHORUS: CPT

## 2025-01-08 PROCEDURE — 82248 BILIRUBIN DIRECT: CPT

## 2025-01-08 PROCEDURE — 85025 COMPLETE CBC W/AUTO DIFF WBC: CPT

## 2025-01-08 PROCEDURE — 36415 COLL VENOUS BLD VENIPUNCTURE: CPT

## 2025-01-08 PROCEDURE — 80053 COMPREHEN METABOLIC PANEL: CPT

## 2025-01-08 PROCEDURE — 99215 OFFICE O/P EST HI 40 MIN: CPT | Performed by: STUDENT IN AN ORGANIZED HEALTH CARE EDUCATION/TRAINING PROGRAM

## 2025-01-08 PROCEDURE — 80061 LIPID PANEL: CPT

## 2025-01-08 RX ORDER — ALBUTEROL SULFATE 0.83 MG/ML
3 SOLUTION RESPIRATORY (INHALATION) AS NEEDED
OUTPATIENT
Start: 2025-01-08

## 2025-01-08 RX ORDER — FAMOTIDINE 10 MG/ML
20 INJECTION INTRAVENOUS ONCE AS NEEDED
OUTPATIENT
Start: 2025-01-08

## 2025-01-08 RX ORDER — EPINEPHRINE 1 MG/ML
0.3 INJECTION, SOLUTION, CONCENTRATE INTRAVENOUS EVERY 5 MIN PRN
OUTPATIENT
Start: 2025-01-08

## 2025-01-08 RX ORDER — DIPHENHYDRAMINE HYDROCHLORIDE 50 MG/ML
50 INJECTION INTRAMUSCULAR; INTRAVENOUS AS NEEDED
OUTPATIENT
Start: 2025-01-08

## 2025-01-08 ASSESSMENT — PAIN SCALES - GENERAL: PAINLEVEL_OUTOF10: 0-NO PAIN

## 2025-01-08 NOTE — PROGRESS NOTES
Patient ID: Toro Lujan is a 69 y.o. male.  Attending Physician: Dr. Topher Castanon    Current Therapy: SEGE 1822: Disitamab Vedotin, cohorts A and B in HER2-overexpressing IHC 1+, 2+, 3+ for locally advanced unresectable or metastatic UC who have received prior platinum-based chemotherapy  Dose reduced to 1 mg/kg IV every 2 weeks - on HOLD  Genetics: NA    Cancer History:  11/5/2020: CTU shows mildly asymmetric generalized urinary bladder wall thickening with mild mucosal hyperenhancement, large prostate  1/29/2021: TURBT. 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 Posterior tumor reveals papillary high-grade at least pTa urothelial carcinoma with no definite invasion, no muscle present  2/24/2021: Bladder tumor path shows high grade focally invasive urothelial carcinoma into the lamina propria, non-muscle invasive.   5/6/2021: Underwent radical cystectomy with ilial conduit due to multiple multiple disease recurrences without MIBC.  12/13/2021: CTU suspicious for new enlarged pelvic LN  2/17/2022: Started chemotherapy with split-dose gemcitabine and cisplatin  3/10/2022: C2 gem/cis  3/31/2022: C3 gem/cis  4/20/2022: Restaging scans revealed response  4/21/2022: C4 gem/cis  5/12/2022: C5 gem/cis  6/24/2022: Started maintenance avelumab. Treatment was placed on hold due to insurance coverage issues after 1 cycle  8/24/2022: CT scans stable  9/2/2022: Restarted avelumab    3/2/2023: Last dose avelumab, off after  12/28/2023: CT reveals increase in left para-aortic LN highly suspicious for metastatic LAD.  1/18/2024: Again increase in para-aortic LN.  1/31/2024: Screening for SEGE 1822: Disitamab vedotin  2/7/2024: Screening for SEGE 1822  2/14/2024: Cycle 1 day 1 disitamab vedotin as part of clinical trial  7/24/2024: Cycle 12 day 1 DV, dose reduction for neuropathy  9/4/2024: Cycle 15 day 1 DV  9/18/2024: Cycle 16 day 1 DV HELD due to  neuropathy  10/30/2024: Cycle 16 again HELD due to neuropathy  11/13/2024: cycle 16 again HELD due to neuropathy  12/4/24: Scans show overall stable disease    HPI    Mr. Lujan presents unaccompanied for follow up. He reports that neuropathy in his hands is getting better and is less prominent in his left hand than previously. He has done a few sessions of physical therapy but is unclear whether this has helped. He reports that last week he had a two day episode of diarrhea. He lost appetite as well and has only slowly been regaining appetite. He is eating less currently as a result. He reports energy level is good. He reports no fevers, chills, chest pain, dyspnea, nausea, vomiting, abdominal pain, constipation, extremity weakness, vision changes, or headaches.    Objective    BSA: There is no height or weight on file to calculate BSA.  There were no vitals taken for this visit.    Physical Exam  General: alert, well-dressed in NAD. Speech is fluent and coherent, words clear. Good insight. Oriented x4   Skin: warm, dry, and pink without cyanosis or nail clubbing. No rash, petechiae, or ecchymoses. Left arm cystic lesion largely resolved  HEENT: Normocephalic atraumatic. Sclera white, conjunctiva pink. EOMs intact. Hearing intact to spoken voice. Oral mucosa pink. No visible lesions  Respiratory: Chest expansion symmetric. Breath sounds vesicular in all lobes without crackles, wheezes, or rhonchi bilaterally.  CV: S1S2 audible and crisp without murmurs, rubs, or extra sounds. RRR. Radial pulses strong and regular. Extremities warm and pink. No edema bilaterally.  GI: abdomen is round, soft, and non-tender. Active bowel sounds.   Psych: engaged, polite, appropriate conversation and eye contact.  Neuro:  strength normal.    Current Medications:    Current Outpatient Medications:     acetaminophen (Tylenol) 500 mg tablet, Take 2 tablets (1,000 mg) by mouth every 8 hours if needed., Disp: , Rfl:     Advair Diskus  100-50 mcg/dose diskus inhaler, INHALE 1 PUFF BY MOUTH TWICE DAILY AS DIRECTED. RINSE AND GARGLE MOUTH WITH WATER AFTER EACH USE, Disp: , Rfl:     ALPRAZolam (Xanax) 0.5 mg tablet, Take one tablet one hour before anxiety-provoking situation. Limits one tab/day, three tabs/week., Disp: , Rfl:     aspirin 81 mg EC tablet, Take 1 tablet (81 mg) by mouth once daily., Disp: , Rfl:     gabapentin (Neurontin) 300 mg capsule, Take 2 capsules (600 mg) by mouth early in the morning. AND 1 capsule (300 mg) once daily at bedtime., Disp: 90 capsule, Rfl: 2    ketorolac (Acular) 0.5 % ophthalmic solution, 1 drop to surgical eye 4 times a day starting 1 day before surgery, Disp: 5 mL, Rfl: 2    lisinopril 20 mg tablet, Take 1 tablet (20 mg) by mouth once daily., Disp: , Rfl:     metoprolol tartrate (Lopressor) 25 mg tablet, Take 1 tablet (25 mg) by mouth once daily., Disp: , Rfl:     ofloxacin (Ocuflox) 0.3 % ophthalmic solution, 1 drop to operative eye 4 times a day starting 1 day before surgery, Disp: 5 mL, Rfl: 2    pantoprazole (ProtoNix) 40 mg EC tablet, Take 1 tablet (40 mg) by mouth once daily., Disp: , Rfl:     prednisoLONE acetate (Pred-Forte) 1 % ophthalmic suspension, 1 drop to operative eye 4 times a day starting the day of surgery (after surgery is done), Disp: 5 mL, Rfl: 2    rosuvastatin (Crestor) 40 mg tablet, Take 1 tablet (40 mg) by mouth once daily., Disp: , Rfl:     sertraline (Zoloft) 50 mg tablet, Take 1 tablet (50 mg) by mouth once daily., Disp: , Rfl:      Most Recent Labs:  Results for orders placed or performed during the hospital encounter of 01/08/25   CBC and Auto Differential    Collection Time: 01/08/25  8:58 AM   Result Value Ref Range    WBC 8.6 4.4 - 11.3 x10*3/uL    nRBC 0.0 0.0 - 0.0 /100 WBCs    RBC 5.06 4.50 - 5.90 x10*6/uL    Hemoglobin 14.8 13.5 - 17.5 g/dL    Hematocrit 43.6 41.0 - 52.0 %    MCV 86 80 - 100 fL    MCH 29.2 26.0 - 34.0 pg    MCHC 33.9 32.0 - 36.0 g/dL    RDW 13.2 11.5 - 14.5  %    Platelets 180 150 - 450 x10*3/uL    Neutrophils % 62.4 40.0 - 80.0 %    Immature Granulocytes %, Automated 0.6 0.0 - 0.9 %    Lymphocytes % 23.3 13.0 - 44.0 %    Monocytes % 9.9 2.0 - 10.0 %    Eosinophils % 3.1 0.0 - 6.0 %    Basophils % 0.7 0.0 - 2.0 %    Neutrophils Absolute 5.37 1.20 - 7.70 x10*3/uL    Immature Granulocytes Absolute, Automated 0.05 0.00 - 0.70 x10*3/uL    Lymphocytes Absolute 2.01 1.20 - 4.80 x10*3/uL    Monocytes Absolute 0.85 0.10 - 1.00 x10*3/uL    Eosinophils Absolute 0.27 0.00 - 0.70 x10*3/uL    Basophils Absolute 0.06 0.00 - 0.10 x10*3/uL   Comprehensive metabolic panel    Collection Time: 01/08/25  8:58 AM   Result Value Ref Range    Glucose 111 (H) 74 - 99 mg/dL    Sodium 134 (L) 136 - 145 mmol/L    Potassium 4.0 3.5 - 5.3 mmol/L    Chloride 103 98 - 107 mmol/L    Bicarbonate 23 21 - 32 mmol/L    Anion Gap 12 10 - 20 mmol/L    Urea Nitrogen 21 6 - 23 mg/dL    Creatinine 0.94 0.50 - 1.30 mg/dL    eGFR 88 >60 mL/min/1.73m*2    Calcium 8.8 8.6 - 10.6 mg/dL    Albumin 3.9 3.4 - 5.0 g/dL    Alkaline Phosphatase 125 33 - 136 U/L    Total Protein 6.4 6.4 - 8.2 g/dL    AST 14 9 - 39 U/L    Bilirubin, Total 0.5 0.0 - 1.2 mg/dL    ALT 18 10 - 52 U/L   Bilirubin, Direct    Collection Time: 01/08/25  8:58 AM   Result Value Ref Range    Bilirubin, Direct 0.1 0.0 - 0.3 mg/dL   Glucose, Fasting    Collection Time: 01/08/25  8:58 AM   Result Value Ref Range    Glucose, Fasting 111 (H) 74 - 99 mg/dL   Lipid Panel    Collection Time: 01/08/25  8:58 AM   Result Value Ref Range    Cholesterol 84 0 - 199 mg/dL    HDL-Cholesterol 28.8 mg/dL    Cholesterol/HDL Ratio 2.9     LDL Calculated 39 <=99 mg/dL    VLDL 16 0 - 40 mg/dL    Triglycerides 79 0 - 149 mg/dL    Non HDL Cholesterol 55 0 - 149 mg/dL   Magnesium    Collection Time: 01/08/25  8:58 AM   Result Value Ref Range    Magnesium 1.63 1.60 - 2.40 mg/dL   Phosphorus    Collection Time: 01/08/25  8:58 AM   Result Value Ref Range    Phosphorus 3.9 2.5 -  4.9 mg/dL      Performance Status:  ECOG Score: 0- Fully active, able to carry on all pre-disease performance w/o restriction.  Karnofsky Score: 100 - Fully active, able to carry on all pre-disease performed without restriction    Assessment/Plan   Toro Lujan is a 69 y.o. male with mUC on DV on SEGE 1822 trial. Treatment on hold s/t neuropathy since September, with stable disease in scans. He looks and feels well, though has neuropathy, grade 2 but improving. Will remain off treatment and continue twice daily gabapentin. Diarrhea has resolved and is unlikely due to treatment.    He will remain on treatment hold. He should continue physical therapy and gabapentin for neuropathy. Given these symptoms and overall stable disease on imaging, there is a high threshold that needs to be achieved before restarting therapy.    The above plan was discussed with the patient, and he was in agreement. All questions were answered to his satisfaction.    More than 40 minutes were spent in review of records, face-to-face encounter, coordination of care, and documentation.

## 2025-01-13 ENCOUNTER — TREATMENT (OUTPATIENT)
Dept: OCCUPATIONAL THERAPY | Facility: CLINIC | Age: 70
End: 2025-01-13
Payer: COMMERCIAL

## 2025-01-13 DIAGNOSIS — C67.8 MALIGNANT NEOPLASM OF OVERLAPPING SITES OF BLADDER (MULTI): ICD-10-CM

## 2025-01-13 DIAGNOSIS — G62.0 DRUG-INDUCED POLYNEUROPATHY (MULTI): Primary | ICD-10-CM

## 2025-01-13 PROCEDURE — 97110 THERAPEUTIC EXERCISES: CPT | Mod: GO

## 2025-01-13 PROCEDURE — 97022 WHIRLPOOL THERAPY: CPT | Mod: GO

## 2025-01-13 NOTE — PROGRESS NOTES
Occupational Therapy   Occupational Therapy Treatment    Patient Name: Toro Lujan  MRN: [id number]  Today's Date: 1/13/2025  Time Calculation  Start Time: 0800  Stop Time: 0838  Time Calculation (min): 38 min    Insurance:  Visit number: 3  Insurance Type: Payor: AETNA / Plan: AETNA  HEALTHCARE / Product Type: *No Product type* /   Authorization [place]      Current Problem  1. Drug-induced polyneuropathy (Multi)  Follow Up In Occupational Therapy    Follow Up In Occupational Therapy      2. Malignant neoplasm of overlapping sites of bladder (Multi)  Follow Up In Occupational Therapy    Follow Up In Occupational Therapy        Precautions     SUBJECTIVE:   Patient reports numbness/tingling now occurs intermittently.     Pain:   No pain reports   Location:   Description:     Performing HEP: Yes     OBJECTIVE:   HAND STRENGTH (Lbs)   R L   Dynamometer  55 66   Lateral Pinch 8.5 12.5   3jaw Pinch  Tip Pinch  3 from 2  NE 8.5  NE      Treatment:    Modalities:  10 Min  Fluidotherapy treatment Right forearm, wrist and hand with AROM ,sensory stimulation x 10 min    Therapeutic Exercise:  28 min   Objective measurements taken. See above for details   Continued review of warm soaks/benefits for increased circulation   LLFS Prayer wrist extension stretch x 2 reps   Hand helper bilateral  20# x 10 reps each     Ulnar Nerve glides : Phase  1-4 x 3 reps, each Bilaterally    Review of HEP and importance /benefit of consistancy    Manual Therapy:     min    Therapeutic Activity:     min    Neuromuscular Re-education:  min    Orthosis:   min    Wound Care:     min    Self Care/ADL   min    Other Treatment:   min    ASSESSMENT:  Pt progressing slowly with decreased awareness and occurrence of paresthesias reported by pt. Continued decreased right  and pinch strength versus non dominant hand.    PLAN:   Continue with POC. Pt going out of town for approx 3 weeks. He would like to return for reassessment at that time.      Melania Santizo MS, OTR/L [id number]                  OT Therapeutic Procedures Time Entry  Therapeutic Exercise Time Entry: 28

## 2025-01-14 NOTE — RESEARCH NOTES
Research Note Follow Up Visit       Toro Lujan is here today for consideration of C16D1 treatment and follow up on SEGE 1822.  Follow up procedures completed per protocol. Patient is aware of continued follow up plan.    After review of vitals, exam, labs, AE's and conmeds, treatment HELD for continued sensory peripheral neuropathy G2. Otherwise feels great.    Treatment has been held since SEP2024.    Patient continues with physical therapy.  Gabapentin 300 mg HS.          Education Documentation  Nutrition, taught by Emma Mann RN at 11/13/2024  9:00 AM.  Learner: Patient  Readiness: Eager  Method: Explanation  Response: Verbalizes Understanding    Care of Neuropathy, taught by Emma Mann RN at 11/13/2024  9:00 AM.  Learner: Patient  Readiness: Eager  Method: Explanation  Response: Verbalizes Understanding    Diarrhea, taught by Emma Mann RN at 11/13/2024  9:00 AM.  Learner: Patient  Readiness: Eager  Method: Explanation  Response: Verbalizes Understanding    Constipation, taught by Emma Mann RN at 11/13/2024  9:00 AM.  Learner: Patient  Readiness: Eager  Method: Explanation  Response: Verbalizes Understanding    Fatigue, taught by Emma Mann RN at 11/13/2024  9:00 AM.  Learner: Patient  Readiness: Eager  Method: Explanation  Response: Verbalizes Understanding    Treatment Plan and Schedule, taught by Emma Mann RN at 11/13/2024  9:00 AM.  Learner: Patient  Readiness: Eager  Method: Explanation  Response: Verbalizes Understanding    Comprehensive Metabolic Panel (CMP), taught by Emma Mann RN at 11/13/2024  9:00 AM.  Learner: Patient  Readiness: Eager  Method: Explanation  Response: Verbalizes Understanding    Complete Blood Count with Differential (CBC w/ Diff), taught by Emma Mann RN at 11/13/2024  9:00 AM.  Learner: Patient  Readiness: Eager  Method: Explanation  Response: Verbalizes Understanding    Education Comments  No comments found.

## 2025-01-14 NOTE — RESEARCH NOTES
Research Note Follow Up Visit       Toro Lujan was supposed to be here today for consideration of C16D1 treatment and follow up on SEGE 1822.  Patient called to cancel as he was not feeling well (nausea and nasal congestion).    To return to clinic in two weeks.    Education Documentation  Nutrition, taught by Emma Mann RN at 11/27/2024  9:00 AM.  Learner: Patient  Readiness: Eager  Method: Explanation  Response: Verbalizes Understanding    Care of Neuropathy, taught by Emma Mann RN at 11/27/2024  9:00 AM.  Learner: Patient  Readiness: Eager  Method: Explanation  Response: Verbalizes Understanding    Diarrhea, taught by Emma Mann RN at 11/27/2024  9:00 AM.  Learner: Patient  Readiness: Eager  Method: Explanation  Response: Verbalizes Understanding    Constipation, taught by Emma Mann RN at 11/27/2024  9:00 AM.  Learner: Patient  Readiness: Eager  Method: Explanation  Response: Verbalizes Understanding    Nausea Management, taught by Emma Mann RN at 11/27/2024  9:00 AM.  Learner: Patient  Readiness: Eager  Method: Explanation  Response: Verbalizes Understanding    Fatigue, taught by Emma Mann RN at 11/27/2024  9:00 AM.  Learner: Patient  Readiness: Eager  Method: Explanation  Response: Verbalizes Understanding    Treatment Plan and Schedule, taught by Emma Mann RN at 11/27/2024  9:00 AM.  Learner: Patient  Readiness: Eager  Method: Explanation  Response: Verbalizes Understanding    Comprehensive Metabolic Panel (CMP), taught by Emma Mann RN at 11/27/2024  9:00 AM.  Learner: Patient  Readiness: Eager  Method: Explanation  Response: Verbalizes Understanding    Complete Blood Count with Differential (CBC w/ Diff), taught by Emma Mann RN at 11/27/2024  9:00 AM.  Learner: Patient  Readiness: Eager  Method: Explanation  Response: Verbalizes Understanding    Education Comments  No comments found.

## 2025-01-15 ENCOUNTER — HOSPITAL ENCOUNTER (OUTPATIENT)
Dept: RADIOLOGY | Facility: HOSPITAL | Age: 70
Discharge: HOME | End: 2025-01-15
Payer: COMMERCIAL

## 2025-01-15 ENCOUNTER — HOSPITAL ENCOUNTER (OUTPATIENT)
Dept: CARDIOLOGY | Facility: HOSPITAL | Age: 70
Discharge: HOME | End: 2025-01-15
Payer: COMMERCIAL

## 2025-01-15 DIAGNOSIS — Z00.6 EXAMINATION FOR NORMAL COMPARISON FOR CLINICAL RESEARCH: ICD-10-CM

## 2025-01-15 DIAGNOSIS — Z00.6 RESEARCH SUBJECT: Primary | ICD-10-CM

## 2025-01-15 DIAGNOSIS — Z00.6 RESEARCH SUBJECT: ICD-10-CM

## 2025-01-15 LAB
AORTIC VALVE MEAN GRADIENT: 4 MMHG
AORTIC VALVE PEAK VELOCITY: 1.28 M/S
AV PEAK GRADIENT: 7 MMHG
AVA (PEAK VEL): 3.89 CM2
AVA (VTI): 3.62 CM2
EJECTION FRACTION APICAL 4 CHAMBER: 62.9
EJECTION FRACTION: 61 %
LEFT ATRIUM VOLUME AREA LENGTH INDEX BSA: 29 ML/M2
LEFT VENTRICULAR OUTFLOW TRACT DIAMETER: 2.26 CM
MITRAL VALVE E/A RATIO: 1.11
RIGHT VENTRICLE FREE WALL PEAK S': 12 CM/S
TRICUSPID ANNULAR PLANE SYSTOLIC EXCURSION: 2.5 CM

## 2025-01-15 PROCEDURE — 2550000001 HC RX 255 CONTRASTS: Performed by: INTERNAL MEDICINE

## 2025-01-15 PROCEDURE — C8929 TTE W OR WO FOL WCON,DOPPLER: HCPCS

## 2025-01-15 PROCEDURE — 74177 CT ABD & PELVIS W/CONTRAST: CPT

## 2025-01-15 PROCEDURE — 2500000004 HC RX 250 GENERAL PHARMACY W/ HCPCS (ALT 636 FOR OP/ED): Performed by: NURSE PRACTITIONER

## 2025-01-15 RX ADMIN — PERFLUTREN 4 ML OF DILUTION: 6.52 INJECTION, SUSPENSION INTRAVENOUS at 12:00

## 2025-01-15 RX ADMIN — IOHEXOL 75 ML: 350 INJECTION, SOLUTION INTRAVENOUS at 09:40

## 2025-01-15 NOTE — RESEARCH NOTES
Research Note Follow Up Visit       Toro Lujan is here today for consideration of C16D1 treatment and follow up on SEGE 1822.  Follow up procedures completed per protocol. Patient is aware of continued follow up plan.    After review of vitals, labs, exam, AE's and Conmeds, treatment HELD for continued sensory peripheral neuropathy.    Diarrhea resolved - felt like a bug lasted two days.  Started 8-9 days ago.  Pepto Bismol    Anorexia - getting over it - food not as appetizing.    Leaving next Thursday right after other appointments. Coming back 08FEB.    Week of the 10th  able to come in.    Has not started 900 mg dose of gabapentin.  Has been out the last few days. Hopes to get this afternoon.    Calendar reviewed.  Parking pass given.            Education Documentation  Changes in Appetite, taught by Emma Mann RN at 1/8/2025 10:30 AM.  Learner: Patient  Readiness: Eager  Method: Explanation  Response: Verbalizes Understanding    Care of Neuropathy, taught by Emma Mann RN at 1/8/2025 10:30 AM.  Learner: Patient  Readiness: Eager  Method: Explanation  Response: Verbalizes Understanding    Diarrhea, taught by Emma Mann RN at 1/8/2025 10:30 AM.  Learner: Patient  Readiness: Eager  Method: Explanation  Response: Verbalizes Understanding    Nausea Management, taught by Emma Mann RN at 1/8/2025 10:30 AM.  Learner: Patient  Readiness: Eager  Method: Explanation  Response: Verbalizes Understanding    Fatigue, taught by Emma Mann RN at 1/8/2025 10:30 AM.  Learner: Patient  Readiness: Eager  Method: Explanation  Response: Verbalizes Understanding    Treatment Plan and Schedule, taught by Emma Mann RN at 1/8/2025 10:30 AM.  Learner: Patient  Readiness: Eager  Method: Explanation  Response: Verbalizes Understanding    Comprehensive Metabolic Panel (CMP), taught by Emma Mann RN at 1/8/2025 10:30 AM.  Learner: Patient  Readiness: Eager  Method: Explanation  Response: Verbalizes  Understanding    Complete Blood Count with Differential (CBC w/ Diff), taught by Emma Mann RN at 1/8/2025 10:30 AM.  Learner: Patient  Readiness: Eager  Method: Explanation  Response: Verbalizes Understanding    Education Comments  No comments found.

## 2025-01-15 NOTE — RESEARCH NOTES
Research Note Follow Up Visit       Toro Lujan is here today for consideration of C16D1 treatment and  follow up on SEGE 1822.  Follow up procedures completed per protocol. Patient is aware of continued follow up plan.    After review of vitals, labs, exam, AE's and conmeds, treatment HELD again for continued G2 sensory peripheral neuropathy.    Seeing Dr. Rapp on 08JAN.  [facility] of [facility] for “multiple weeks.”    Left hand nearly resolved.  Right hand improved and about the level of the left hand. Never in feet.    Next treatment 12FEB.    Neuropathy affecting opening bottles. Can button front of shirt.  Cuffs are harder.  Affecting writing. Continued G2.    No other complaints besides neuropathy.    Increase gabapentin to 900 mg daily.    300  hs ; 600 am.    +Constipation intermittent.    10-15 cigarettes daily.    Calendar reviewed.  Parking pass given.    Will need to set up a zoom with medical monitor and Dr. Rapp to discuss extended treatment hold.          Education Documentation  Nutrition, taught by Emma Mann RN at 12/24/2024  9:00 AM.  Learner: Patient  Readiness: Eager  Method: Explanation  Response: Verbalizes Understanding    Healthy Lifestyle, taught by Emma Mann RN at 12/24/2024  9:00 AM.  Learner: Patient  Readiness: Eager  Method: Explanation  Response: Verbalizes Understanding    Care of Neuropathy, taught by Emma Mann RN at 12/24/2024  9:00 AM.  Learner: Patient  Readiness: Eager  Method: Explanation  Response: Verbalizes Understanding    Diarrhea, taught by Emma Mann RN at 12/24/2024  9:00 AM.  Learner: Patient  Readiness: Eager  Method: Explanation  Response: Verbalizes Understanding    Constipation, taught by Emma Mann RN at 12/24/2024  9:00 AM.  Learner: Patient  Readiness: Eager  Method: Explanation  Response: Verbalizes Understanding    Nausea Management, taught by Emma Mann RN at 12/24/2024  9:00 AM.  Learner: Patient  Readiness: Eager  Method:  Explanation  Response: Verbalizes Understanding    Fatigue, taught by Emma Mann RN at 12/24/2024  9:00 AM.  Learner: Patient  Readiness: Eager  Method: Explanation  Response: Verbalizes Understanding    Treatment Plan and Schedule, taught by Emma Mann RN at 12/24/2024  9:00 AM.  Learner: Patient  Readiness: Eager  Method: Explanation  Response: Verbalizes Understanding    Comprehensive Metabolic Panel (CMP), taught by Emma Mann RN at 12/24/2024  9:00 AM.  Learner: Patient  Readiness: Eager  Method: Explanation  Response: Verbalizes Understanding    Complete Blood Count with Differential (CBC w/ Diff), taught by Emma Mann RN at 12/24/2024  9:00 AM.  Learner: Patient  Readiness: Eager  Method: Explanation  Response: Verbalizes Understanding    Education Comments  No comments found.

## 2025-01-16 ENCOUNTER — TELEMEDICINE (OUTPATIENT)
Dept: HEMATOLOGY/ONCOLOGY | Facility: HOSPITAL | Age: 70
End: 2025-01-16
Payer: COMMERCIAL

## 2025-01-16 DIAGNOSIS — T45.1X5A CHEMOTHERAPY INDUCED NAUSEA AND VOMITING: ICD-10-CM

## 2025-01-16 DIAGNOSIS — R11.2 CHEMOTHERAPY INDUCED NAUSEA AND VOMITING: ICD-10-CM

## 2025-01-16 DIAGNOSIS — C67.9 MALIGNANT NEOPLASM OF URINARY BLADDER, UNSPECIFIED SITE (MULTI): ICD-10-CM

## 2025-01-16 DIAGNOSIS — G62.0 DRUG-INDUCED POLYNEUROPATHY (MULTI): Primary | ICD-10-CM

## 2025-01-16 DIAGNOSIS — C77.9 REGIONAL LYMPH NODE METASTASIS PRESENT (MULTI): ICD-10-CM

## 2025-01-16 DIAGNOSIS — C67.8 MALIGNANT NEOPLASM OF OVERLAPPING SITES OF BLADDER (MULTI): ICD-10-CM

## 2025-01-16 DIAGNOSIS — Z92.21 S/P CHEMOTHERAPY, TIME SINCE GREATER THAN 12 WEEKS: ICD-10-CM

## 2025-01-16 NOTE — PROGRESS NOTES
" Medical Oncology    Toro Lujan  [id number]  1/16/2025      68 yo male known t us with Met [facility]  SEGE 1822: Disitamab Vedotin, cohorts A and B in HER2-overexpressing IHC 1+, 2+, 3+ for locally advanced unresectable or metastatic UC who have received prior platinum-based chemotherapy  Dose reduced to 1 mg/kg IV every 2 weeks - on HOLD  Near CR by scans  Labs adequate  Scans showed persistent response  AEs on therapy remain and he thinks his peripheral neuropathy appears to be better  Traveling next week with family   Agree with continue holding therapy    Lab Results   Component Value Date    WBC 8.6 01/08/2025    HGB 14.8 01/08/2025    HCT 43.6 01/08/2025    MCV 86 01/08/2025     01/08/2025     Lab Results   Component Value Date    GLUCOSE 111 (H) 01/08/2025    CALCIUM 8.8 01/08/2025     (L) 01/08/2025    K 4.0 01/08/2025    CO2 23 01/08/2025     01/08/2025    BUN 21 01/08/2025    CREATININE 0.94 01/08/2025     No results found for: \"TESTOSTERONE\"  Lab Results   Component Value Date    ALT 18 01/08/2025    AST 14 01/08/2025    ALKPHOS 125 01/08/2025    BILITOT 0.5 01/08/2025       Narrative & Impression   Interpreted By:  Satya Alexander,  and Stephan Connell   STUDY:  CT CHEST ABDOMEN PELVIS W IV CONTRAST;  12/4/2024 1:41 pm      INDICATION:  Signs/Symptoms:staging per clinical trial. 69-year-old with  metastatic urothelial carcinoma status post radical cystectomy.  Patient is status post adjuvant platinum-based combination  chemotherapy (completed 5/2022) followed by avelumab then developed  disease progression (2 lymph nodes) and subsequently started  disitamab vedotin clinical trial.      ,Z00.6 Encounter for examination for normal comparison and control in  clinical research program      COMPARISON:  CT chest abdomen pelvis on 10/29/2024.      ACCESSION NUMBER(S):  [id number]      ORDERING CLINICIAN:  YRN MANUEL      TECHNIQUE:  CT of the chest, abdomen, and pelvis was performed.  Contiguous " axial  images were obtained at 3 mm slice thickness through the chest,  abdomen and pelvis. Coronal and sagittal reconstructions at 3 mm  slice thickness were performed. 90 ML of Omnipaque 350 was  administered intravenously without immediate complication.      FINDINGS:  CHEST:      LUNG/PLEURA/LARGE AIRWAYS:  Trachea and central airways are patent. Minimal right basilar  subsegmental atelectasis. Similar size of left upper lobe medial  nodule measuring 0.5 cm (series 204, image 102), previously 0.4 cm  and left lower lobe nodule measuring 0.3 cm (series 204, image 187),  previously 0.2 cm. Stable right upper lobe subcentimeter benign  calcified granuloma (series 204, image 220). No new or enlarging lung  nodules.      VESSELS:  Aorta and pulmonary arteries are normal caliber.  Minimal  atherosclerotic changes are noted of the aorta and branching vessels.  Moderate coronary artery calcifications are present.      HEART:  The heart is normal in size.  There is no pericardial effusion.      MEDIASTINUM AND EBONY:  No mediastinal, hilar or axillary lymphadenopathy is present.  Small  hiatal hernia with distal esophageal thickening, similar to prior.      CHEST WALL AND LOWER NECK:  The soft tissues of the chest wall demonstrate no gross abnormality.  The partially visualized thyroid gland appears within normal limits.      ABDOMEN:      LIVER:  Liver is normal in size. Similar size of segment 8 liver cyst (series  201, image 83) measuring 1.2 cm, previously 1.4 cm. Remaining  scattered hypodense liver lesions are subcentimeter in size which are  too small to characterize but statistically favored to represent  simple cysts.      BILE DUCTS:  The intrahepatic and extrahepatic ducts are not dilated.      GALLBLADDER:  No calcified stones. No wall thickening.      PANCREAS:  The pancreas appears unremarkable without evidence of ductal  dilatation or masses.      SPLEEN:  The spleen is normal in size without focal  lesions.      ADRENAL GLANDS:  Slightly decreased prominence of indeterminate right adrenal nodule  (series 201, image 99) measuring 1.4 x 0.9 cm, previously 1.4 x 1.3  cm favored to represent adrenal adenoma. Left adrenal gland  thickening is similar compared to multiple prior CTs.      KIDNEYS AND URETERS:  Similar appearance of lobulated kidney contours and mild left  pararenal space fat stranding. No hydroureteronephrosis or  nephroureterolithiasis is identified.      PELVIS:      BLADDER:  Status post radical cystoprostatectomy with ileal conduit visualized  in the right mid abdomen. The right parastomal hernia is again  visualized containing fat.      REPRODUCTIVE ORGANS:  The prostate is surgically absent. No pelvic mass or soft tissue  thickening.      BOWEL:  The stomach, small and large bowel are normal in appearance without  wall thickening or obstruction. The appendix appears normal.          VESSELS:  The aorta and IVC appear normal.      PERITONEUM/RETROPERITONEUM/LYMPH NODES:  No ascites or free air, no fluid collection.  Slightly decreased size  of right external iliac lymph node (series 201, image 191) measuring  1.1 cm, previously 1.4 cm. No evidence of new or enlarging  abdominopelvic lymph node.      BONE AND SOFT TISSUE:  No suspicious osseous lesions are identified. Unchanged bilateral  pars interarticularis fracture with associated grade 1-2 L5 on S1  anterolisthesis. Degenerative discogenic disease is noted in the  lower thoracic and lumbar spine. Fat and bowel containing umbilical  hernia (series 204, image 142) with defects as measuring up to 4.5 x  3.3 cm, similar to prior which previously measured 4.3 x 3.6 cm.      IMPRESSION:  1. Decreased prominence of right external iliac lymph node without  evidence of locoregional recurrence or metastatic disease in the  chest, abdomen, or pelvis.  2. Small hiatal hernia with distal esophageal thickening, correlate  with reflux esophagitis.  3.  Remaining chronic incidental findings are described above.      I personally reviewed the images/study and agree with the findings as  stated by oK Rothman MD (Resident Physician). This study was  interpreted at [facility],  [place], [place].      MACRO:  None      Signed by: Satya Alexander 12/5/2024 6:50 AM  Dictation workstation:   [id number]       Topher Castanon MD, FACP  Chief, Solid Tumor Oncology Division   Medical Oncology  Professor of Medicine and Urology  /[facility]

## 2025-01-17 ENCOUNTER — TELEPHONE (OUTPATIENT)
Dept: HEMATOLOGY/ONCOLOGY | Facility: HOSPITAL | Age: 70
End: 2025-01-17
Payer: COMMERCIAL

## 2025-01-17 NOTE — TELEPHONE ENCOUNTER
"Zoom meeting held today with Dr. Fan Rapp, Yani Rodriguez, this nurse, together with the medical monitor of MARGUERITE 1822, Arnold Duncan to discuss the extended treatment hold.  Patient held from treatment since SEP2024 due to G2 sensory peripheral neuropathy.    Medical monitor said it is OK for the patient to stay \"on treatment\" and continue hold.    Patient previously dose reduced and last dose received was 1 mg/kg.     PN improving and it was acknowledged it may take a few more months to resolve to baseline/G1.    If and when patient is re-challenged, Dr. Rapp recommended re-challenging at a dose reduction of 0.75 mg/kg.    Plan is to have patient assessed with labs and exam every two weeks.  Patient will get labs drawn at [facility] walk-in lab and see provider in Clinic.  If and when it is decided to re-challenge, patient will be scheduled in [facility]CR[facility].    "

## 2025-01-22 ENCOUNTER — APPOINTMENT (OUTPATIENT)
Dept: HEMATOLOGY/ONCOLOGY | Facility: HOSPITAL | Age: 70
End: 2025-01-22
Payer: MEDICARE

## 2025-01-22 ENCOUNTER — APPOINTMENT (OUTPATIENT)
Dept: RESEARCH | Facility: HOSPITAL | Age: 70
End: 2025-01-22
Payer: MEDICARE

## 2025-01-29 DIAGNOSIS — Z00.6 RESEARCH SUBJECT: ICD-10-CM

## 2025-01-29 DIAGNOSIS — C67.8 MALIGNANT NEOPLASM OF OVERLAPPING SITES OF BLADDER (MULTI): ICD-10-CM

## 2025-02-05 ENCOUNTER — APPOINTMENT (OUTPATIENT)
Dept: HEMATOLOGY/ONCOLOGY | Facility: HOSPITAL | Age: 70
End: 2025-02-05
Payer: COMMERCIAL

## 2025-02-07 DIAGNOSIS — Z00.6 EXAM FOR CLINICAL RESEARCH: ICD-10-CM

## 2025-02-10 ENCOUNTER — EDUCATION (OUTPATIENT)
Dept: HEMATOLOGY/ONCOLOGY | Facility: HOSPITAL | Age: 70
End: 2025-02-10

## 2025-02-10 ENCOUNTER — OFFICE VISIT (OUTPATIENT)
Dept: HEMATOLOGY/ONCOLOGY | Facility: HOSPITAL | Age: 70
End: 2025-02-10
Payer: MEDICARE

## 2025-02-10 ENCOUNTER — LAB (OUTPATIENT)
Dept: LAB | Facility: HOSPITAL | Age: 70
End: 2025-02-10
Payer: MEDICARE

## 2025-02-10 VITALS
WEIGHT: 264.99 LBS | TEMPERATURE: 96.8 F | SYSTOLIC BLOOD PRESSURE: 131 MMHG | BODY MASS INDEX: 39.13 KG/M2 | DIASTOLIC BLOOD PRESSURE: 73 MMHG | OXYGEN SATURATION: 100 % | RESPIRATION RATE: 20 BRPM | HEART RATE: 77 BPM

## 2025-02-10 DIAGNOSIS — C77.9 REGIONAL LYMPH NODE METASTASIS PRESENT (MULTI): ICD-10-CM

## 2025-02-10 DIAGNOSIS — Z00.6 EXAM FOR CLINICAL RESEARCH: ICD-10-CM

## 2025-02-10 DIAGNOSIS — Z00.6 RESEARCH SUBJECT: ICD-10-CM

## 2025-02-10 DIAGNOSIS — C67.8 MALIGNANT NEOPLASM OF OVERLAPPING SITES OF BLADDER (MULTI): ICD-10-CM

## 2025-02-10 DIAGNOSIS — G62.0 DRUG-INDUCED POLYNEUROPATHY (MULTI): Primary | ICD-10-CM

## 2025-02-10 DIAGNOSIS — Z92.21 S/P CHEMOTHERAPY, TIME SINCE GREATER THAN 12 WEEKS: ICD-10-CM

## 2025-02-10 DIAGNOSIS — C67.9 MALIGNANT NEOPLASM OF URINARY BLADDER, UNSPECIFIED SITE (MULTI): ICD-10-CM

## 2025-02-10 LAB
ALBUMIN SERPL BCP-MCNC: 4.4 G/DL (ref 3.4–5)
ALP SERPL-CCNC: 134 U/L (ref 33–136)
ALT SERPL W P-5'-P-CCNC: 17 U/L (ref 10–52)
ANION GAP SERPL CALC-SCNC: 14 MMOL/L (ref 10–20)
AST SERPL W P-5'-P-CCNC: 15 U/L (ref 9–39)
BASOPHILS # BLD AUTO: 0.06 X10*3/UL (ref 0–0.1)
BASOPHILS NFR BLD AUTO: 0.7 %
BILIRUB DIRECT SERPL-MCNC: 0.1 MG/DL (ref 0–0.3)
BILIRUB SERPL-MCNC: 0.5 MG/DL (ref 0–1.2)
BUN SERPL-MCNC: 20 MG/DL (ref 6–23)
CALCIUM SERPL-MCNC: 9.2 MG/DL (ref 8.6–10.3)
CHLORIDE SERPL-SCNC: 101 MMOL/L (ref 98–107)
CHOLEST SERPL-MCNC: 130 MG/DL (ref 0–199)
CHOLESTEROL/HDL RATIO: 3.2
CO2 SERPL-SCNC: 25 MMOL/L (ref 21–32)
CREAT SERPL-MCNC: 1.07 MG/DL (ref 0.5–1.3)
EGFRCR SERPLBLD CKD-EPI 2021: 75 ML/MIN/1.73M*2
EOSINOPHIL # BLD AUTO: 0.39 X10*3/UL (ref 0–0.7)
EOSINOPHIL NFR BLD AUTO: 4.5 %
ERYTHROCYTE [DISTWIDTH] IN BLOOD BY AUTOMATED COUNT: 13.8 % (ref 11.5–14.5)
GLUCOSE P FAST SERPL-MCNC: 112 MG/DL (ref 74–99)
GLUCOSE SERPL-MCNC: 122 MG/DL (ref 74–99)
HCT VFR BLD AUTO: 47.4 % (ref 41–52)
HDLC SERPL-MCNC: 40.9 MG/DL
HGB BLD-MCNC: 15.7 G/DL (ref 13.5–17.5)
IMM GRANULOCYTES # BLD AUTO: 0.07 X10*3/UL (ref 0–0.7)
IMM GRANULOCYTES NFR BLD AUTO: 0.8 % (ref 0–0.9)
LDLC SERPL CALC-MCNC: 68 MG/DL
LYMPHOCYTES # BLD AUTO: 2.21 X10*3/UL (ref 1.2–4.8)
LYMPHOCYTES NFR BLD AUTO: 25.2 %
MAGNESIUM SERPL-MCNC: 1.72 MG/DL (ref 1.6–2.4)
MCH RBC QN AUTO: 29.7 PG (ref 26–34)
MCHC RBC AUTO-ENTMCNC: 33.1 G/DL (ref 32–36)
MCV RBC AUTO: 90 FL (ref 80–100)
MONOCYTES # BLD AUTO: 0.59 X10*3/UL (ref 0.1–1)
MONOCYTES NFR BLD AUTO: 6.7 %
NEUTROPHILS # BLD AUTO: 5.44 X10*3/UL (ref 1.2–7.7)
NEUTROPHILS NFR BLD AUTO: 62.1 %
NON HDL CHOLESTEROL: 89 MG/DL (ref 0–149)
NRBC BLD-RTO: 0 /100 WBCS (ref 0–0)
PHOSPHATE SERPL-MCNC: 3.5 MG/DL (ref 2.5–4.9)
PLATELET # BLD AUTO: 208 X10*3/UL (ref 150–450)
POTASSIUM SERPL-SCNC: 4.3 MMOL/L (ref 3.5–5.3)
PROT SERPL-MCNC: 7.6 G/DL (ref 6.4–8.2)
RBC # BLD AUTO: 5.28 X10*6/UL (ref 4.5–5.9)
SODIUM SERPL-SCNC: 136 MMOL/L (ref 136–145)
TRIGL SERPL-MCNC: 108 MG/DL (ref 0–149)
VLDL: 22 MG/DL (ref 0–40)
WBC # BLD AUTO: 8.8 X10*3/UL (ref 4.4–11.3)

## 2025-02-10 PROCEDURE — 82947 ASSAY GLUCOSE BLOOD QUANT: CPT | Mod: 59

## 2025-02-10 PROCEDURE — 99214 OFFICE O/P EST MOD 30 MIN: CPT | Performed by: INTERNAL MEDICINE

## 2025-02-10 PROCEDURE — 80061 LIPID PANEL: CPT

## 2025-02-10 PROCEDURE — 85025 COMPLETE CBC W/AUTO DIFF WBC: CPT

## 2025-02-10 PROCEDURE — 84100 ASSAY OF PHOSPHORUS: CPT

## 2025-02-10 PROCEDURE — 36415 COLL VENOUS BLD VENIPUNCTURE: CPT

## 2025-02-10 PROCEDURE — 80053 COMPREHEN METABOLIC PANEL: CPT

## 2025-02-10 PROCEDURE — 82248 BILIRUBIN DIRECT: CPT

## 2025-02-10 PROCEDURE — 83735 ASSAY OF MAGNESIUM: CPT

## 2025-02-10 RX ORDER — NITROGLYCERIN 0.4 MG/1
TABLET SUBLINGUAL
COMMUNITY
Start: 2025-01-02

## 2025-02-10 RX ORDER — SERTRALINE HYDROCHLORIDE 100 MG/1
TABLET, FILM COATED ORAL
COMMUNITY
Start: 2025-02-09

## 2025-02-10 ASSESSMENT — PAIN SCALES - GENERAL: PAINLEVEL_OUTOF10: 0-NO PAIN

## 2025-02-10 NOTE — RESEARCH NOTES
Research Note Unscheduled Visit     Toro Lujan is enrolled on SEGE 1822 and he is on an extended hold due to sensory peripheral neuropathy (approved by sponsor).  HOLD continues today.    Mr. Lujan just returned from extended travel.  He reports feeling very well.    Labs, AE's, conmeds and vitals reviewed.    Calendar reviewed.  Neuropathy continues, mostly in his right hand.  He goes to physical therapy but has not been doing the exercises at home.  He will try to be more consistent.  Has also been inconsistent in dose of gabapentin, taking from 300-600 daily due to prescription issues. He finally picked up a new prescription and will take 900 mg gabapentin daily going forward.    Dr. Castanon completed recist for 15JAN25 scans.  Next scans 03MAR2025.  Continued CR.  Patient reports some intermittent swelling in bilateral hands and lower extremity.  He attributes to eating out a lot lately including salty food; and also to being sedentary as his travel was on a train. No pain/pinching/redness/heat.    [place] 27FEB - 01MAR.  Will come home Sunday 02MAR.  Parking pass given.                Education Documentation  Monitoring Weight, taught by Emma Mann RN at 2/10/2025  4:11 PM.  Learner: Patient  Readiness: Eager  Method: Explanation  Response: Verbalizes Understanding    Tips for Daily Living, taught by Emma Mann RN at 2/10/2025  4:11 PM.  Learner: Patient  Readiness: Eager  Method: Explanation  Response: Verbalizes Understanding    Nutrition/Diet, taught by Emma Mann RN at 2/10/2025  4:11 PM.  Learner: Patient  Readiness: Eager  Method: Explanation  Response: Verbalizes Understanding    Food-Drug Interactions, taught by Emma Mann RN at 2/10/2025  4:11 PM.  Learner: Patient  Readiness: Eager  Method: Explanation  Response: Verbalizes Understanding    Nutrition Related Education, taught by Emma Mann RN at 2/10/2025  4:11 PM.  Learner: Patient  Readiness: Eager  Method:  Explanation  Response: Verbalizes Understanding    Care of Neuropathy, taught by Emma Mann RN at 2/10/2025  4:11 PM.  Learner: Patient  Readiness: Eager  Method: Explanation  Response: Verbalizes Understanding    Comprehensive Metabolic Panel (CMP), taught by Emma Mann RN at 2/10/2025  4:11 PM.  Learner: Patient  Readiness: Eager  Method: Explanation  Response: Verbalizes Understanding    Complete Blood Count with Differential (CBC w/ Diff), taught by Emma Mann RN at 2/10/2025  4:11 PM.  Learner: Patient  Readiness: Eager  Method: Explanation  Response: Verbalizes Understanding    Education Comments  No comments found.

## 2025-02-10 NOTE — PROGRESS NOTES
" Medical Oncology Out Patient Clinic Note    Patient's Name: Toro Lujan  MRN: [id number]  Date of Service: Feb/10/2025  In- Person Visit: YES    Reason for Visit: FU    HPI:  70 yo male known to us with Met TCC on second-line therapy on trial with SEGE 1822: Disitamab Vedotin, cohorts A and B in HER2-overexpressing IHC 1+, 2+, 3+ for locally advanced unresectable or metastatic UC who have received prior platinum-based chemotherapy     TCC history:  Initially diagnosed with BCG-refractory TCC  Early cystectomy at [facility] with LND and urinary diversion- Path CIS and negative Lns  PD in pelvic LNs- bx proven met disease  Chemo nad maintenance Avelumab with near CR  PD in RPLNs and now on SEGE 1822: Disitamab Vedotin, cohorts A and B in HER2-overexpressing IHC 1+, 2+, 3+ for locally advanced unresectable or metastatic UC who have received prior platinum-based chemotherapy  Dose reduced to 1.1 mg/kg IV every 2 weeks  Feels well  G2 neuropathy remains  QOL is maintained still  Scans continue to show CR by RECIST    Updated PMHx  None    Review of Systems  13 point review negative    Physical Exam:  /73 (BP Location: Left arm, Patient Position: Sitting, BP Cuff Size: Large adult)   Pulse 77   Temp 36 °C (96.8 °F) (Temporal)   Resp 20   Wt 120 kg (264 lb 15.9 oz)   SpO2 100%   BMI 39.13 kg/m²   ECOG Performance Status: 0  HEENT: Normal  ENT: Normal  CV: NA  Pulmonary: NA  GI: Stoma clean and urine clear  : NA  Extremities: BL hands legs and arms edema -often PRN but improves in 1-2 weeks  Neurologic: Normal  Skin: Normal skin turgor  Psych: Appropriate mood      LABS:  No results found for: \"PSA\"  Lab Results   Component Value Date    WBC 8.8 02/10/2025    HGB 15.7 02/10/2025    HCT 47.4 02/10/2025    MCV 90 02/10/2025     02/10/2025     Lab Results   Component Value Date    GLUCOSE 122 (H) 02/10/2025    CALCIUM 9.2 02/10/2025     02/10/2025    K 4.3 02/10/2025    CO2 25 02/10/2025     " "02/10/2025    BUN 20 02/10/2025    CREATININE 1.07 02/10/2025     No results found for: \"TESTOSTERONE\"  Lab Results   Component Value Date    ALT 17 02/10/2025    AST 15 02/10/2025    ALKPHOS 134 02/10/2025    BILITOT 0.5 02/10/2025       IMAGING:    Study Result    Narrative & Impression   Interpreted By:  Tiago Tang and Booth Cameron   STUDY:  CT CHEST ABDOMEN PELVIS W IV CONTRAST;  1/15/2025 9:45 am      INDICATION:  Signs/Symptoms:staging per clinical trial; 69-year-old male with  history of papillary urothelial carcinoma of the bladder, status post  radical cystectomy with ileal conduit creation on 05/06/2021.  Subsequently completed 5 cycles of adjuvant gemcitabine/cisplatin  followed by maintenance therapy with nivolumab through 03/02/2023.  Follow-up CT 12/28/2023 revealed enlarging left para-aortic lymph  nodes suspicious for recurrent disease. Subsequently started to  disitimab vedotin in as part of clinical trial on 07/14/2024, most  recently with stable disease on 12/04/2024..      ,Z00.6 Encounter for examination for normal comparison and control in  clinical research program      COMPARISON:  None.      ACCESSION NUMBER(S):  [id number]      ORDERING CLINICIAN:  YRN MANUEL      TECHNIQUE:  CT of the chest, abdomen, and pelvis was performed.  Contiguous axial  images were obtained at 3 mm slice thickness through the chest,  abdomen and pelvis. Coronal and sagittal reconstructions at 3 mm  slice thickness were performed. 75 ML of Omnipaque 350 was  administered intravenously without immediate complication.      FINDINGS:  CHEST:      LUNG/PLEURA/LARGE AIRWAYS:  No lung masses, pulmonary nodules or consolidations are present.  There is no pleural effusion or pneumothorax.      VESSELS:  Aorta and pulmonary arteries are normal caliber.  No atherosclerotic  changes of the aorta are identified.  Moderate coronary artery  calcifications are present with stent material visualized within the  LAD " and a diagonal branch. Noncalcific plaque is present within the  brachiocephalic trunk distally with minimal associated stenosis.      HEART:  The heart is normal in size.  There is no pericardial effusion.      MEDIASTINUM AND EBONY:  No mediastinal, hilar or axillary lymphadenopathy is present.  The  esophagus is normal.      CHEST WALL AND LOWER NECK:  The soft tissues of the chest wall demonstrate no gross abnormality.  The visualized thyroid gland appears within normal limits.      ABDOMEN:      LIVER:  Scattered hypoattenuating foci are again seen throughout the right  hepatic lobe, the largest of which measures 1.3 cm (series 2, image  79). All are stable in comparison to prior and most likely represent  benign etiology such as simple hepatic cysts.      BILE DUCTS:  The intrahepatic and extrahepatic ducts are not dilated.      GALLBLADDER:  The gallbladder is nondistended and without evidence of radiopaque  stones.      PANCREAS:  The pancreas appears unremarkable without evidence of ductal  dilatation or masses.      SPLEEN:  Borderline enlargement of the spleen, measuring 13.6 cm in  craniocaudal dimension, slightly increased in size from prior.      ADRENAL GLANDS:  There is a nodule in the left adrenal gland measuring 2.5 x 1.1 cm  (series 2, image 98), stable from prior. The contralateral adrenal  gland appears normal.      KIDNEYS AND URETERS:  The left kidney appears relatively atrophic and hypoattenuating in  comparison to the right. Scattered cortical cysts are again seen  bilaterally, stable from prior. No hydroureteronephrosis or  nephroureterolithiasis is identified. The ureters drain into ileal  conduit without evidence of ureteric obstruction or ureteric wall  thickening.      PELVIS:      BLADDER:  The urinary bladder is surgically absent. An ileal conduit is located  within the right lower quadrant.      REPRODUCTIVE ORGANS:  The prostate is surgically absent.      BOWEL:  The stomach is  unremarkable.  Postsurgical changes consistent with  ileal conduit creation. The small and large bowel are normal in  caliber and demonstrate no wall thickening. The appendix is not  definitely visualized. There is however no pericecal stranding or  fluid.          VESSELS:  There is no aneurysmal dilatation of the abdominal aorta. The IVC  appears normal.      PERITONEUM/RETROPERITONEUM/LYMPH NODES:  No ascites or free air, no fluid collection.  Decreased size of right  external iliac node, now measuring 9 mm from previous measurement of  11 mm (series 2, image 186).      BONE AND SOFT TISSUE:  No suspicious osseous lesions are identified. Degenerative discogenic  disease is noted in the lower thoracic and lumbar spine.  4.9 cm  defect within the anterior abdominal wall is again seen with  moderate-sized ventral hernia containing loop of small bowel that  features suture material related to ileal conduit creation. No  evidence of associated bowel dilation or wall thickening to suggest  obstruction.      IMPRESSION:  Restaging of papillary urothelial carcinoma of the bladder following  clinical trial, as compared to CT study 12/04/2024:      Continued decrease in size of right external iliac lymph node without  evidence of new disease. Other findings are stable as detailed.      I personally reviewed the images/study and I agree with the findings  as stated. This study was interpreted at [place], [place].      MACRO:  None      Signed by: Tiago Tang 1/16/2025 10:59 AM  Dictation workstation:   [id number]     GENOMICS:  Tumor Mutational [place]  m/MB 6.1   Microsatellite Instability  MSI-High not detected       A/P: Met TCC  Remains CR on trial but therapy on hold sec peripheral neuropathy  Labs adequate  Will continue with PT  RTC per trial    Discussed importance of a healthier diet - offered to see Nutritional Services; Also discussed the importance of daily  regular exercise (aerobic and resistance differences noted)  Offered to see Supportive Services if needed as well as integrative Oncology and Psychosocial Support    Topher Castanon MD, FACP  Chief, Solid Tumor Oncology Division   Medical Oncology  Professor of Medicine and Urology  /[facility]

## 2025-02-17 ENCOUNTER — TREATMENT (OUTPATIENT)
Dept: OCCUPATIONAL THERAPY | Facility: CLINIC | Age: 70
End: 2025-02-17
Payer: COMMERCIAL

## 2025-02-17 DIAGNOSIS — G62.0 DRUG-INDUCED POLYNEUROPATHY (MULTI): Primary | ICD-10-CM

## 2025-02-17 DIAGNOSIS — C67.8 MALIGNANT NEOPLASM OF OVERLAPPING SITES OF BLADDER (MULTI): ICD-10-CM

## 2025-02-17 PROCEDURE — 97140 MANUAL THERAPY 1/> REGIONS: CPT | Mod: GO

## 2025-02-17 PROCEDURE — 97022 WHIRLPOOL THERAPY: CPT | Mod: GO

## 2025-02-17 PROCEDURE — 97110 THERAPEUTIC EXERCISES: CPT | Mod: GO

## 2025-02-17 NOTE — PROGRESS NOTES
"Occupational Therapy   Occupational Therapy Treatment    Patient Name: Toro Lujan  MRN: [id number]  Today's Date: 2/17/2025  Time Calculation  Start Time: 0855  Stop Time: 0940  Time Calculation (min): 45 min    Insurance:  Visit number: 4  Insurance Type: Payor: AETNA / Plan: AET[employer]  HEALTHCARE / Product Type: *No Product type* /   Authorization [place]      Current Problem  1. Drug-induced polyneuropathy (Multi)          Precautions     SUBJECTIVE:   \"Left hand almost normal at times.\" \"Right hand seems to be having less numbness more tingling, nothing severe.\" He reports improved ability for writing with decreased effort.     Pain:   No pain reports Stiffness reports   Location:   Description:     Performing HEP: Yes     OBJECTIVE:   HAND STRENGTH (Lbs)   R L   Dynamometer  57 from 52 69   Lateral Pinch 10 from 8 13   3jaw Pinch  Tip Pinch  4.5 from 2  2 from 1.5  11  9      Quick Dash completed: 20.45%     Treatment:    Modalities:  15 Min  Fluidotherapy treatment Right forearm, wrist and hand with AROM ,sensory stimulation x 15 min    Therapeutic Exercise: 20  min   Objective measurements taken. See above for details   LLFS Prayer wrist extension stretch x 8 reps   Seat: UE pulleys AAROM flexion/exten, scaption x 20 reps each   Nicola elbow extension with F/A rotation x 10   Sensory act using right hand: object retrieval in popcorn bin x 10 (vision occluded)    Review of HEP including right /pinch strengthening.     Manual Therapy: 10  min   PROM  F/A , wrist all planes     Therapeutic Activity:     min    Neuromuscular Re-education:  min    Orthosis:   min    Wound Care:     min    Self Care/ADL   min    Other Treatment:   min    ASSESSMENT:  Decreasing paresthesia reports in both hands since IE. Left hand reported as nearly absent. Significant right hand/thumb weakness persists as measured above.     PLAN:   Continue with POC. Schedule one more OT visit.     Melania Santizo MS, OTR/L [id number]                  OT " Therapeutic Procedures Time Entry  Manual Therapy Time Entry: 10  Therapeutic Exercise Time Entry: 20

## 2025-02-19 DIAGNOSIS — Z00.6 EXAM FOR CLINICAL RESEARCH: ICD-10-CM

## 2025-02-25 DIAGNOSIS — Z00.6 RESEARCH SUBJECT: ICD-10-CM

## 2025-02-26 ENCOUNTER — APPOINTMENT (OUTPATIENT)
Dept: HEMATOLOGY/ONCOLOGY | Facility: HOSPITAL | Age: 70
End: 2025-02-26
Payer: MEDICARE

## 2025-03-03 ENCOUNTER — HOSPITAL ENCOUNTER (OUTPATIENT)
Dept: RADIOLOGY | Facility: HOSPITAL | Age: 70
Discharge: HOME | End: 2025-03-03
Payer: COMMERCIAL

## 2025-03-03 DIAGNOSIS — C67.8 MALIGNANT NEOPLASM OF OVERLAPPING SITES OF BLADDER (MULTI): ICD-10-CM

## 2025-03-03 DIAGNOSIS — Z00.6 RESEARCH SUBJECT: ICD-10-CM

## 2025-03-03 PROCEDURE — 74177 CT ABD & PELVIS W/CONTRAST: CPT

## 2025-03-03 PROCEDURE — 2550000001 HC RX 255 CONTRASTS: Performed by: INTERNAL MEDICINE

## 2025-03-03 RX ADMIN — IOHEXOL 75 ML: 350 INJECTION, SOLUTION INTRAVENOUS at 13:23

## 2025-03-06 ENCOUNTER — APPOINTMENT (OUTPATIENT)
Dept: OCCUPATIONAL THERAPY | Facility: CLINIC | Age: 70
End: 2025-03-06
Payer: COMMERCIAL

## 2025-03-06 ENCOUNTER — TELEMEDICINE (OUTPATIENT)
Dept: HEMATOLOGY/ONCOLOGY | Facility: HOSPITAL | Age: 70
End: 2025-03-06
Payer: MEDICARE

## 2025-03-06 ENCOUNTER — APPOINTMENT (OUTPATIENT)
Dept: HEMATOLOGY/ONCOLOGY | Facility: HOSPITAL | Age: 70
End: 2025-03-06
Payer: MEDICARE

## 2025-03-06 ENCOUNTER — DOCUMENTATION (OUTPATIENT)
Dept: OCCUPATIONAL THERAPY | Facility: CLINIC | Age: 70
End: 2025-03-06
Payer: MEDICARE

## 2025-03-06 DIAGNOSIS — Z92.21 S/P CHEMOTHERAPY, TIME SINCE GREATER THAN 12 WEEKS: ICD-10-CM

## 2025-03-06 DIAGNOSIS — C67.9 MALIGNANT NEOPLASM OF URINARY BLADDER, UNSPECIFIED SITE (MULTI): ICD-10-CM

## 2025-03-06 DIAGNOSIS — G62.0 DRUG-INDUCED POLYNEUROPATHY (MULTI): Primary | ICD-10-CM

## 2025-03-06 DIAGNOSIS — C77.9 REGIONAL LYMPH NODE METASTASIS PRESENT (MULTI): ICD-10-CM

## 2025-03-06 DIAGNOSIS — C67.8 MALIGNANT NEOPLASM OF OVERLAPPING SITES OF BLADDER (MULTI): ICD-10-CM

## 2025-03-06 NOTE — PROGRESS NOTES
Occupational Therapy                 Therapy Communication Note    Patient Name: Toro Lujan  MRN: [id number]  Department:   Room:   Today's Date: 3/6/2025     Discipline: Occupational Therapy          Missed Visit Reason:  Injury     Missed Time: Cancel    Comment:

## 2025-03-06 NOTE — PROGRESS NOTES
" Medical Oncology Virtual    Toor Lujan  [id number]  3/6/2025    71 yo male known to us with Met TCC   S/p Chemo and Io based maintenance with Avelumab  After PD he enrolled on SEGE 1822: Disitamab Vedotin, cohorts A and B in HER2-overexpressing IHC 1+, 2+, 3+ for locally advanced unresectable or metastatic UC who have received prior platinum-based chemotherapy  Dose reduced to 1 mg/kg IV every 2 weeks - on HOLD  Near CR by scans  Labs adequate  Scans showed persistent response  AEs on therapy remain and he thinks his peripheral neuropathy appears to be better  Agree with continue holding therapy sec p neuropathy and approved by study  RTC per trial    Lab Results   Component Value Date    WBC 8.8 02/10/2025    HGB 15.7 02/10/2025    HCT 47.4 02/10/2025    MCV 90 02/10/2025     02/10/2025     Lab Results   Component Value Date    GLUCOSE 122 (H) 02/10/2025    CALCIUM 9.2 02/10/2025     02/10/2025    K 4.3 02/10/2025    CO2 25 02/10/2025     02/10/2025    BUN 20 02/10/2025    CREATININE 1.07 02/10/2025     No results found for: \"TESTOSTERONE\"  Lab Results   Component Value Date    ALT 17 02/10/2025    AST 15 02/10/2025    ALKPHOS 134 02/10/2025    BILITOT 0.5 02/10/2025       Narrative & Impression   Interpreted By:  Thai Orta  and Stephan Connell   STUDY:  CT CHEST ABDOMEN PELVIS W IV CONTRAST;  3/3/2025 1:22 pm      INDICATION:  Signs/Symptoms:RESTAGING. Per EMR: \" 69-year-old male with history of  papillary urothelial carcinoma of the bladder, status post radical  cystectomy with ileal conduit creation on 05/06/2021. Subsequently  completed 5 cycles of adjuvant gemcitabine/cisplatin followed by  maintenance therapy with nivolumab through 03/02/2023.\" However, he  was subsequently transitioned to disitimab vedotin clinical trial  since 07/14/2024 due to progressive disease. Most recent restaging CT  on 01/15/2025 noted stable disease burden with decreased size of  right external iliac " lymph node. Most recently, therapy on hold due  to peripheral neuropathy.      ,Z00.6 Encounter for examination for normal comparison and control in  clinical research program,C67.8 Malignant neoplasm of overlapping  sites of bladder (Multi)      COMPARISON:  CT, 03/03/2025  CT, 12/04/2024.      ACCESSION NUMBER(S):  [id number]      ORDERING CLINICIAN:  YRN MANUEL      TECHNIQUE:  CT of the chest, abdomen, and pelvis was performed.  Contiguous axial  images were obtained at 3 mm slice thickness through the chest,  abdomen and pelvis. Coronal and sagittal reconstructions at 3 mm  slice thickness were performed. 75 ML of Omnipaque 350 was  administered intravenously without immediate complication.      FINDINGS:  CHEST:      LUNG/PLEURA/LARGE AIRWAYS:  No lung masses, pulmonary nodules or consolidations are present.  There is no pleural effusion or pneumothorax.      VESSELS:  Aorta and pulmonary arteries are normal caliber.  No atherosclerotic  changes of the aorta are identified.  Mild coronary artery  calcifications are present.      HEART:  The heart is normal in size.  There is no pericardial effusion.      MEDIASTINUM AND EBONY:  No mediastinal, hilar or axillary lymphadenopathy is present.  The  esophagus is normal.      CHEST WALL AND LOWER NECK:  The soft tissues of the chest wall demonstrate no gross abnormality.  The thyroid gland is diffusely enlarged.      ABDOMEN:      LIVER:  Hypoattenuating lesions measuring up to 1.2 cm, previously 1.3 cm.  There are also subcentimeter liver hypodensities, too small to  characterize      BILE DUCTS:  The intrahepatic and extrahepatic ducts are not dilated.      GALLBLADDER:  No calcified stones. No wall thickening.      PANCREAS:  Unremarkable.      SPLEEN:  Borderline enlarged measuring 13.1 cm.      ADRENAL GLANDS:  Stable size of left adrenal nodularity series 2, image 105),  measuring 2.8 x 1.5 cm, previously 2.5 x 1.4 cm.      The right adrenal gland is  unremarkable.      KIDNEYS AND URETERS:  The left kidney is slightly atrophic compared to the right with  cortical based subcentimeter hypodensities too small to characterize.  The right kidney is unremarkable. No hydroureteronephrosis or  nephroureterolithiasis is identified. The bilateral ureters are seen  emptying into the ileal conduit, similar to prior.      PELVIS:      BLADDER:  The bladder is surgically absent. There is slight hazy mesenteric and  fat prominence surrounding the right sided ileal conduit, slight  decreased compared to prior.      REPRODUCTIVE ORGANS:  The prostate is surgically absent with multiple surgical clips again  visualized.      BOWEL:  The stomach, small and large bowel are normal in appearance without  wall thickening or obstruction. The appendix appears normal.          VESSELS:  The aorta and IVC appear normal. Mild calcified atherosclerosis of  abdominal aorta and distal branches.      PERITONEUM/RETROPERITONEUM/LYMPH NODES:  No ascites or free air, no fluid collection.  Stable size of right  external iliac lymph node measuring 1.1 cm, previously 0.9 cm and  compared to prior CT dated 01/15/2025 measured 1.1 cm on 12/04/2024.  No other new or enlarging abdominopelvic lymph nodes.      BONE AND SOFT TISSUE:  No suspicious osseous lesions are identified. Degenerative changes of  the visualized lower spine. Stable bilateral L5 pars defect with  similar degree L5 on S1 anterolisthesis. 4.5 cm ventral abdominal  defect containing bowel containing suture material without adjacent  inflammatory changes.      IMPRESSION:  Restaging papillary urothelial carcinoma compared to prior CT on  01/15/2025  1. Stable tumor burden without evidence of new disease in the chest,  abdomen, or pelvis.  2. Remaining findings are described above.      I personally reviewed the images/study and agree with the findings as  stated by Ko Rothman MD (Resident Physician). This study was  interpreted at  [facility],  [place], [place].      MACRO:  None      Signed by: Thai Orta 3/3/2025 5:12 PM  Dictation workstation:   [id number]     Topher Castanon MD, FACP  Chief, Solid Tumor Oncology Division   Medical Oncology  Professor of Medicine and Urology  /[facility]

## 2025-03-10 ENCOUNTER — OFFICE VISIT (OUTPATIENT)
Dept: HEMATOLOGY/ONCOLOGY | Facility: HOSPITAL | Age: 70
End: 2025-03-10
Payer: MEDICARE

## 2025-03-10 ENCOUNTER — EDUCATION (OUTPATIENT)
Dept: HEMATOLOGY/ONCOLOGY | Facility: HOSPITAL | Age: 70
End: 2025-03-10

## 2025-03-10 ENCOUNTER — LAB (OUTPATIENT)
Dept: LAB | Facility: HOSPITAL | Age: 70
End: 2025-03-10
Payer: MEDICARE

## 2025-03-10 VITALS
SYSTOLIC BLOOD PRESSURE: 133 MMHG | RESPIRATION RATE: 20 BRPM | HEART RATE: 75 BPM | OXYGEN SATURATION: 100 % | TEMPERATURE: 97.2 F | WEIGHT: 259.48 LBS | DIASTOLIC BLOOD PRESSURE: 81 MMHG | BODY MASS INDEX: 38.32 KG/M2

## 2025-03-10 DIAGNOSIS — Z00.6 RESEARCH SUBJECT: ICD-10-CM

## 2025-03-10 DIAGNOSIS — C67.8 MALIGNANT NEOPLASM OF OVERLAPPING SITES OF BLADDER (MULTI): Primary | ICD-10-CM

## 2025-03-10 LAB
ALBUMIN SERPL BCP-MCNC: 4.7 G/DL (ref 3.4–5)
ALP SERPL-CCNC: 159 U/L (ref 33–136)
ALT SERPL W P-5'-P-CCNC: 21 U/L (ref 10–52)
ANION GAP SERPL CALC-SCNC: 13 MMOL/L (ref 10–20)
AST SERPL W P-5'-P-CCNC: 16 U/L (ref 9–39)
BASOPHILS # BLD AUTO: 0.06 X10*3/UL (ref 0–0.1)
BASOPHILS NFR BLD AUTO: 0.6 %
BILIRUB DIRECT SERPL-MCNC: 0.1 MG/DL (ref 0–0.3)
BILIRUB SERPL-MCNC: 0.6 MG/DL (ref 0–1.2)
BUN SERPL-MCNC: 24 MG/DL (ref 6–23)
CALCIUM SERPL-MCNC: 9.7 MG/DL (ref 8.6–10.3)
CHLORIDE SERPL-SCNC: 98 MMOL/L (ref 98–107)
CO2 SERPL-SCNC: 27 MMOL/L (ref 21–32)
CREAT SERPL-MCNC: 1.19 MG/DL (ref 0.5–1.3)
EGFRCR SERPLBLD CKD-EPI 2021: 66 ML/MIN/1.73M*2
EOSINOPHIL # BLD AUTO: 0.37 X10*3/UL (ref 0–0.7)
EOSINOPHIL NFR BLD AUTO: 3.5 %
ERYTHROCYTE [DISTWIDTH] IN BLOOD BY AUTOMATED COUNT: 13.7 % (ref 11.5–14.5)
GLUCOSE SERPL-MCNC: 106 MG/DL (ref 74–99)
HCT VFR BLD AUTO: 49.6 % (ref 41–52)
HGB BLD-MCNC: 16.7 G/DL (ref 13.5–17.5)
IMM GRANULOCYTES # BLD AUTO: 0.06 X10*3/UL (ref 0–0.7)
IMM GRANULOCYTES NFR BLD AUTO: 0.6 % (ref 0–0.9)
LYMPHOCYTES # BLD AUTO: 1.99 X10*3/UL (ref 1.2–4.8)
LYMPHOCYTES NFR BLD AUTO: 18.7 %
MAGNESIUM SERPL-MCNC: 1.91 MG/DL (ref 1.6–2.4)
MCH RBC QN AUTO: 29.6 PG (ref 26–34)
MCHC RBC AUTO-ENTMCNC: 33.7 G/DL (ref 32–36)
MCV RBC AUTO: 88 FL (ref 80–100)
MONOCYTES # BLD AUTO: 0.81 X10*3/UL (ref 0.1–1)
MONOCYTES NFR BLD AUTO: 7.6 %
NEUTROPHILS # BLD AUTO: 7.37 X10*3/UL (ref 1.2–7.7)
NEUTROPHILS NFR BLD AUTO: 69 %
NRBC BLD-RTO: 0 /100 WBCS (ref 0–0)
PHOSPHATE SERPL-MCNC: 4.2 MG/DL (ref 2.5–4.9)
PLATELET # BLD AUTO: 240 X10*3/UL (ref 150–450)
POTASSIUM SERPL-SCNC: 4.4 MMOL/L (ref 3.5–5.3)
PROT SERPL-MCNC: 8 G/DL (ref 6.4–8.2)
RBC # BLD AUTO: 5.65 X10*6/UL (ref 4.5–5.9)
SODIUM SERPL-SCNC: 134 MMOL/L (ref 136–145)
WBC # BLD AUTO: 10.7 X10*3/UL (ref 4.4–11.3)

## 2025-03-10 PROCEDURE — 84100 ASSAY OF PHOSPHORUS: CPT

## 2025-03-10 PROCEDURE — 36415 COLL VENOUS BLD VENIPUNCTURE: CPT

## 2025-03-10 PROCEDURE — 99215 OFFICE O/P EST HI 40 MIN: CPT | Performed by: NURSE PRACTITIONER

## 2025-03-10 PROCEDURE — 83735 ASSAY OF MAGNESIUM: CPT

## 2025-03-10 PROCEDURE — 82248 BILIRUBIN DIRECT: CPT

## 2025-03-10 PROCEDURE — 80053 COMPREHEN METABOLIC PANEL: CPT

## 2025-03-10 PROCEDURE — 85025 COMPLETE CBC W/AUTO DIFF WBC: CPT

## 2025-03-10 ASSESSMENT — PAIN SCALES - GENERAL: PAINLEVEL_OUTOF10: 0-NO PAIN

## 2025-03-10 NOTE — RESEARCH NOTES
Research Note Follow Up Visit       Toro Lujan is here today for an unscheduled visit in order to re-assess his sensory peripheral neuropathy. He continues his extended treatment HOLD. Follow up procedures completed per protocol. Patient is aware of continued follow up plan.    Patient c/o sciatica pain on his right side that started 2 weeks ago.  He has been taking ibuprofen and is seeing a chiropractor. He says it is getting better.  Sensory peripheral neuropathy is slightly improved but still G2.  R greater than left.    No other complaints.  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  Considering stepping down to allow for more travel.    Calendar reviewed and parking pass given.              Education Documentation  Pain Rating Scale, taught by Emma Mann RN at 3/10/2025 11:30 AM.  Learner: Patient  Readiness: Eager  Method: Explanation  Response: Verbalizes Understanding    Care of Neuropathy, taught by Emma Mann RN at 3/10/2025 11:30 AM.  Learner: Patient  Readiness: Eager  Method: Explanation  Response: Verbalizes Understanding    Treatment Plan and Schedule, taught by Emma Mann RN at 3/10/2025 11:30 AM.  Learner: Patient  Readiness: Eager  Method: Explanation  Response: Verbalizes Understanding    Comprehensive Metabolic Panel (CMP), taught by Emma Mann RN at 3/10/2025 11:30 AM.  Learner: Patient  Readiness: Eager  Method: Explanation  Response: Verbalizes Understanding    Complete Blood Count with Differential (CBC w/ Diff), taught by Emma Mann RN at 3/10/2025 11:30 AM.  Learner: Patient  Readiness: Eager  Method: Explanation  Response: Verbalizes Understanding    Education Comments  No comments found.

## 2025-03-10 NOTE — PROGRESS NOTES
Patient ID: Toro Lujan is a 70 y.o. male.  Attending Physician: Dr. Topher Castanon  Cancer Diagnosis:  Cancer Staging   No matching staging information was found for the patient.    Current Therapy: SEGE 1822: Disitamab Vedotin, cohorts A and B in HER2-overexpressing IHC 1+, 2+, 3+ for locally advanced unresectable or metastatic UC who have received prior platinum-based chemotherapy  Dose reduced to 1 mg/kg IV every 2 weeks - on HOLD  Genetics: NA    Subjective      Cancer History:  Oncology History   Malignant neoplasm of overlapping sites of bladder (Multi)   2/13/2024 Initial Diagnosis    Malignant neoplasm of overlapping sites of bladder (CMS/HCC)     2/14/2024 - 8/21/2024 Research Study Participant    ([facility]) [id number] Intensive PK Cohort - Disitamab Vedotin, 14 Day Cycles  Plan Provider: Fan Rapp MD PhD  Treatment goal: Control  Line of treatment: Second Line  Associated studies: A Study of Disitamab Vedotin in Subjects With HER2 Expressing Urothelial Carcinoma     2/14/2024 -  Research Study Participant    ([facility]) [id number] Intensive PK Cohort - Disitamab Vedotin, 14 Day Cycles  Plan Provider: Topher Castanon MD  Treatment goal: Control  Line of treatment: Second Line  Associated studies: A Study of Disitamab Vedotin in Subjects With HER2 Expressing Urothelial Carcinoma     8/8/2024 - 8/8/2024 Research Study Participant    ([facility]) [id number] Intensive PK Cohort - Disitamab Vedotin, 14 Day Cycles  Plan Provider: MERLIN Delong-CNP  Treatment goal: Control  Line of treatment: Second Line  Associated studies: A Study of Disitamab Vedotin in Subjects With HER2 Expressing Urothelial Carcinoma       11/5/2020: CTU shows mildly asymmetric generalized urinary bladder wall thickening with mild mucosal hyperenhancement, large prostate  1/29/2021: TURBT. 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 Posterior tumor reveals papillary high-grade at least pTa  urothelial carcinoma with no definite invasion, no muscle present  2/24/2021: Bladder tumor path shows high grade focally invasive urothelial carcinoma into the lamina propria, non-muscle invasive.   5/6/2021: Underwent radical cystectomy with ilial conduit due to multiple multiple disease recurrences without MIBC.  12/13/2021: CTU suspicious for new enlarged pelvic LN  2/17/2022: Started chemotherapy with split-dose gemcitabine and cisplatin  3/10/2022: C2 gem/cis  3/31/2022: C3 gem/cis  4/20/2022: Restaging scans revealed response  4/21/2022: C4 gem/cis  5/12/2022: C5 gem/cis  6/24/2022: Started maintenance avelumab. Treatment was placed on hold due to insurance coverage issues after 1 cycle  8/24/2022: CT scans stable  9/2/2022: Restarted avelumab    3/2/2023: Last dose avelumab, off after  12/28/2023: CT reveals increase in left para-aortic LN highly suspicious for metastatic LAD.  1/18/2024: Again increase in para-aortic LN.  1/31/2024: Screening for SEGE 1822: Disitamab vedotin  2/7/2024: Screening for SEGE 1822  2/14/2024: Cycle 1 day 1 disitamab vedotin as part of clinical trial  7/24/2024: Cycle 12 day 1 DV, dose reduction for neuropathy  9/4/2024: Cycle 15 day 1 DV  9/18/2024: Cycle 16 day 1 DV HELD due to neuropathy, extended hold due to disease response and continued symptoms.    Interval History:  Mr. Lujan presents today in consideration of cycle 16 day 1 DV, delayed due to neuropathy since September 2024. He continues to have neuropathy. R>L still. Says his strength numbers have continued to climb and feel a bit better, though still with motor function decrease. He recently returned from a trip to [facility] for his [facility]. Unfortunately has had about two weeks of sciatica. Last episode of this was 25 years ago after lifting. No trauma that he knows about. Inhibited his movement for the trip. Working with chiropractor and no more sciatic pain, some pain in right side but continued improvement. NO other  new or concerning symptoms. The remainder of his ROS is otherwise negative.  HPI    Objective    BSA: 2.4 meters squared  /81   Pulse 75   Temp 36.2 °C (97.2 °F) (Core)   Resp 20   Wt 118 kg (259 lb 7.7 oz)   SpO2 100%   BMI 38.32 kg/m²     Physical Exam  General: alert, well-dressed in NAD. Speech is fluent and coherent, words clear. Good insight.   Skin: warm, dry, and pink without cyanosis or nail clubbing. No rash, petechiae, or ecchymoses.  HEENT: Normocephalic atraumatic. Sclera white, conjunctiva pink. EOMs intact. Hearing intact to spoken voice. Oral mucosa pink. No visible lesions  Respiratory: Chest expansion symmetric. Breath sounds vesicular in all lobes without crackles, wheezes, or rhonchi bilaterally.  CV: S1S2 audible and crisp without murmurs, rubs, or extra sounds. RRR. Radial pulses strong and regular. Extremities warm and pink. No edema bilaterally.  GI: abdomen is round, soft, and non-tender. Active bowel sounds.   Psych: engaged, polite, appropriate conversation and eye contact.    Current Medications:    Current Outpatient Medications:     acetaminophen (Tylenol) 500 mg tablet, Take 2 tablets (1,000 mg) by mouth every 8 hours if needed., Disp: , Rfl:     Advair Diskus 100-50 mcg/dose diskus inhaler, INHALE 1 PUFF BY MOUTH TWICE DAILY AS DIRECTED. RINSE AND GARGLE MOUTH WITH WATER AFTER EACH USE, Disp: , Rfl:     aspirin 81 mg EC tablet, Take 1 tablet (81 mg) by mouth once daily., Disp: , Rfl:     gabapentin (Neurontin) 300 mg capsule, Take 2 capsules (600 mg) by mouth early in the morning. AND 1 capsule (300 mg) once daily at bedtime., Disp: 90 capsule, Rfl: 2    lisinopril 20 mg tablet, Take 1 tablet (20 mg) by mouth once daily., Disp: , Rfl:     metoprolol tartrate (Lopressor) 25 mg tablet, Take 1 tablet (25 mg) by mouth once daily., Disp: , Rfl:     nitroglycerin (Nitrostat) 0.4 mg SL tablet, DISSOLVE 1 TABLET UNDER THE TONGUE EVERY 5 MINUTES AS NEEDED FOR CHEST PAIN FOR UP TO 3  "TABLET TOTAL. CALL 911 WITH 2ND TABLET IF NO RELIEF, Disp: , Rfl:     pantoprazole (ProtoNix) 40 mg EC tablet, Take 1 tablet (40 mg) by mouth once daily., Disp: , Rfl:     rosuvastatin (Crestor) 40 mg tablet, Take 1 tablet (40 mg) by mouth once daily., Disp: , Rfl:     sertraline (Zoloft) 100 mg tablet, , Disp: , Rfl:     ALPRAZolam (Xanax) 0.5 mg tablet, Take one tablet one hour before anxiety-provoking situation. Limits one tab/day, three tabs/week., Disp: , Rfl:      Most Recent Labs:  Results for orders placed or performed in visit on 03/10/25   CBC and Auto Differential    Collection Time: 03/10/25 10:32 AM   Result Value Ref Range    WBC 10.7 4.4 - 11.3 x10*3/uL    nRBC 0.0 0.0 - 0.0 /100 WBCs    RBC 5.65 4.50 - 5.90 x10*6/uL    Hemoglobin 16.7 13.5 - 17.5 g/dL    Hematocrit 49.6 41.0 - 52.0 %    MCV 88 80 - 100 fL    MCH 29.6 26.0 - 34.0 pg    MCHC 33.7 32.0 - 36.0 g/dL    RDW 13.7 11.5 - 14.5 %    Platelets 240 150 - 450 x10*3/uL    Neutrophils % 69.0 40.0 - 80.0 %    Immature Granulocytes %, Automated 0.6 0.0 - 0.9 %    Lymphocytes % 18.7 13.0 - 44.0 %    Monocytes % 7.6 2.0 - 10.0 %    Eosinophils % 3.5 0.0 - 6.0 %    Basophils % 0.6 0.0 - 2.0 %    Neutrophils Absolute 7.37 1.20 - 7.70 x10*3/uL    Immature Granulocytes Absolute, Automated 0.06 0.00 - 0.70 x10*3/uL    Lymphocytes Absolute 1.99 1.20 - 4.80 x10*3/uL    Monocytes Absolute 0.81 0.10 - 1.00 x10*3/uL    Eosinophils Absolute 0.37 0.00 - 0.70 x10*3/uL    Basophils Absolute 0.06 0.00 - 0.10 x10*3/uL      No results found for: \"PSA\"     Performance Status:  ECOG Score: 0- Fully active, able to carry on all pre-disease performance w/o restriction.  Karnofsky Score: 100 - Fully active, able to carry on all pre-disease performed without restriction    Assessment/Plan   Toro Lujan is a 70 y.o. male with mUC to LN who presents in follow up and consideration of cycle 16 day 1 SEGE 1822 on DV. He looks and feels well, though has neuropathy, grade 2. " Labs unremarkable. Has been on hold for about 6 months at this time. Will continue hold.    # mUC  - HOLD DV today due to neuropathy  - Continue to monitor labs    # Neuropathy  - Continue gabapentin 300 mg PO daily, 600 mg at night  - On Zoloft, working well for anxiety so will avoid duloxetine at this time.  - Dose reduction further on resumption, HOLD tx today  - Continue PT/OT for motor function    # Triglycerides  - Continue with cardiology  - Monitor    # Health Maintenance  - Continue with PCP and other healthcare providers  - Advised exercise, heart-healthy diet    RTC per protocol    Total time spent on this encounter was 55 minutes, which included preparation, direct time with patient, documentation, and care coordination on the day of visit.    Daniella Pineda, MSN, APRN, AGNP-C, AOCNP  Associate Nurse Practitioner  [facility], [facility]

## 2025-03-13 DIAGNOSIS — Z00.6 RESEARCH SUBJECT: ICD-10-CM

## 2025-03-24 ENCOUNTER — TELEPHONE (OUTPATIENT)
Dept: ADMISSION | Facility: HOSPITAL | Age: 70
End: 2025-03-24
Payer: MEDICARE

## 2025-03-24 ENCOUNTER — APPOINTMENT (OUTPATIENT)
Dept: HEMATOLOGY/ONCOLOGY | Facility: HOSPITAL | Age: 70
End: 2025-03-24
Payer: MEDICARE

## 2025-03-24 NOTE — TELEPHONE ENCOUNTER
Toro asking if there is any way today's visit could be done virtually as he is dealing with sciatica and cannot make the trip in today.   Secure Chat sent to team.   Research nurse, Emma, to call Toro directly.

## 2025-03-25 DIAGNOSIS — Z00.6 EXAM FOR CLINICAL RESEARCH: ICD-10-CM

## 2025-03-26 ENCOUNTER — LAB (OUTPATIENT)
Dept: LAB | Facility: HOSPITAL | Age: 70
End: 2025-03-26
Payer: COMMERCIAL

## 2025-03-26 ENCOUNTER — EDUCATION (OUTPATIENT)
Dept: HEMATOLOGY/ONCOLOGY | Facility: HOSPITAL | Age: 70
End: 2025-03-26

## 2025-03-26 ENCOUNTER — OFFICE VISIT (OUTPATIENT)
Dept: HEMATOLOGY/ONCOLOGY | Facility: HOSPITAL | Age: 70
End: 2025-03-26
Payer: COMMERCIAL

## 2025-03-26 VITALS
HEART RATE: 86 BPM | TEMPERATURE: 96.8 F | BODY MASS INDEX: 39.17 KG/M2 | WEIGHT: 265.21 LBS | RESPIRATION RATE: 17 BRPM | SYSTOLIC BLOOD PRESSURE: 147 MMHG | DIASTOLIC BLOOD PRESSURE: 88 MMHG | OXYGEN SATURATION: 98 %

## 2025-03-26 DIAGNOSIS — Z00.6 EXAM FOR CLINICAL RESEARCH: ICD-10-CM

## 2025-03-26 DIAGNOSIS — C67.8 MALIGNANT NEOPLASM OF OVERLAPPING SITES OF BLADDER (MULTI): Primary | ICD-10-CM

## 2025-03-26 LAB
ALBUMIN SERPL BCP-MCNC: 4.5 G/DL (ref 3.4–5)
ALP SERPL-CCNC: 176 U/L (ref 33–136)
ALT SERPL W P-5'-P-CCNC: 19 U/L (ref 10–52)
ANION GAP SERPL CALC-SCNC: 13 MMOL/L (ref 10–20)
AST SERPL W P-5'-P-CCNC: 13 U/L (ref 9–39)
BASOPHILS # BLD AUTO: 0.07 X10*3/UL (ref 0–0.1)
BASOPHILS NFR BLD AUTO: 0.5 %
BILIRUB DIRECT SERPL-MCNC: 0.1 MG/DL (ref 0–0.3)
BILIRUB SERPL-MCNC: 0.4 MG/DL (ref 0–1.2)
BUN SERPL-MCNC: 26 MG/DL (ref 6–23)
CALCIUM SERPL-MCNC: 9.4 MG/DL (ref 8.6–10.3)
CHLORIDE SERPL-SCNC: 100 MMOL/L (ref 98–107)
CO2 SERPL-SCNC: 29 MMOL/L (ref 21–32)
CREAT SERPL-MCNC: 1.12 MG/DL (ref 0.5–1.3)
EGFRCR SERPLBLD CKD-EPI 2021: 71 ML/MIN/1.73M*2
EOSINOPHIL # BLD AUTO: 0.26 X10*3/UL (ref 0–0.7)
EOSINOPHIL NFR BLD AUTO: 1.9 %
ERYTHROCYTE [DISTWIDTH] IN BLOOD BY AUTOMATED COUNT: 13.9 % (ref 11.5–14.5)
GLUCOSE SERPL-MCNC: 105 MG/DL (ref 74–99)
HCT VFR BLD AUTO: 43.3 % (ref 41–52)
HGB BLD-MCNC: 14.2 G/DL (ref 13.5–17.5)
IMM GRANULOCYTES # BLD AUTO: 0.22 X10*3/UL (ref 0–0.7)
IMM GRANULOCYTES NFR BLD AUTO: 1.6 % (ref 0–0.9)
LYMPHOCYTES # BLD AUTO: 3.79 X10*3/UL (ref 1.2–4.8)
LYMPHOCYTES NFR BLD AUTO: 28.1 %
MAGNESIUM SERPL-MCNC: 1.76 MG/DL (ref 1.6–2.4)
MCH RBC QN AUTO: 29.6 PG (ref 26–34)
MCHC RBC AUTO-ENTMCNC: 32.8 G/DL (ref 32–36)
MCV RBC AUTO: 90 FL (ref 80–100)
MONOCYTES # BLD AUTO: 1.03 X10*3/UL (ref 0.1–1)
MONOCYTES NFR BLD AUTO: 7.6 %
NEUTROPHILS # BLD AUTO: 8.12 X10*3/UL (ref 1.2–7.7)
NEUTROPHILS NFR BLD AUTO: 60.3 %
NRBC BLD-RTO: 0 /100 WBCS (ref 0–0)
PHOSPHATE SERPL-MCNC: 4.2 MG/DL (ref 2.5–4.9)
PLATELET # BLD AUTO: 240 X10*3/UL (ref 150–450)
POTASSIUM SERPL-SCNC: 4.5 MMOL/L (ref 3.5–5.3)
PROT SERPL-MCNC: 7.4 G/DL (ref 6.4–8.2)
RBC # BLD AUTO: 4.79 X10*6/UL (ref 4.5–5.9)
SODIUM SERPL-SCNC: 137 MMOL/L (ref 136–145)
WBC # BLD AUTO: 13.5 X10*3/UL (ref 4.4–11.3)

## 2025-03-26 PROCEDURE — 84100 ASSAY OF PHOSPHORUS: CPT

## 2025-03-26 PROCEDURE — 80053 COMPREHEN METABOLIC PANEL: CPT

## 2025-03-26 PROCEDURE — 36415 COLL VENOUS BLD VENIPUNCTURE: CPT

## 2025-03-26 PROCEDURE — 85025 COMPLETE CBC W/AUTO DIFF WBC: CPT

## 2025-03-26 PROCEDURE — 82248 BILIRUBIN DIRECT: CPT

## 2025-03-26 PROCEDURE — 83735 ASSAY OF MAGNESIUM: CPT

## 2025-03-26 PROCEDURE — 99215 OFFICE O/P EST HI 40 MIN: CPT | Performed by: NURSE PRACTITIONER

## 2025-03-26 ASSESSMENT — PAIN SCALES - GENERAL: PAINLEVEL_OUTOF10: 6

## 2025-03-26 NOTE — PROGRESS NOTES
Patient ID: Toro Lujan is a 70 y.o. male.  Attending Physician: Dr. Topher Castanon  Cancer Diagnosis:  Cancer Staging   No matching staging information was found for the patient.    Current Therapy: SEGE 1822: Disitamab Vedotin, cohorts A and B in HER2-overexpressing IHC 1+, 2+, 3+ for locally advanced unresectable or metastatic UC who have received prior platinum-based chemotherapy  Dose reduced to 1 mg/kg IV every 2 weeks - on HOLD    Genetics: NA    Subjective      Cancer History:  Oncology History   Malignant neoplasm of overlapping sites of bladder (Multi)   2/13/2024 Initial Diagnosis    Malignant neoplasm of overlapping sites of bladder (CMS/HCC)     2/14/2024 - 8/21/2024 Research Study Participant    ([facility]) [id number] Intensive PK Cohort - Disitamab Vedotin, 14 Day Cycles  Plan Provider: Fan Rapp MD PhD  Treatment goal: Control  Line of treatment: Second Line  Associated studies: A Study of Disitamab Vedotin in Subjects With HER2 Expressing Urothelial Carcinoma     2/14/2024 -  Research Study Participant    ([facility]) [id number] Intensive PK Cohort - Disitamab Vedotin, 14 Day Cycles  Plan Provider: Topher Castanon MD  Treatment goal: Control  Line of treatment: Second Line  Associated studies: A Study of Disitamab Vedotin in Subjects With HER2 Expressing Urothelial Carcinoma     8/8/2024 - 8/8/2024 Research Study Participant    ([facility]) [id number] Intensive PK Cohort - Disitamab Vedotin, 14 Day Cycles  Plan Provider: MERLIN Delong-CNP  Treatment goal: Control  Line of treatment: Second Line  Associated studies: A Study of Disitamab Vedotin in Subjects With HER2 Expressing Urothelial Carcinoma       11/5/2020: CTU shows mildly asymmetric generalized urinary bladder wall thickening with mild mucosal hyperenhancement, large prostate  1/29/2021: TURBT. 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 Posterior tumor reveals papillary high-grade at least  pTa urothelial carcinoma with no definite invasion, no muscle present  2/24/2021: Bladder tumor path shows high grade focally invasive urothelial carcinoma into the lamina propria, non-muscle invasive.   5/6/2021: Underwent radical cystectomy with ilial conduit due to multiple multiple disease recurrences without MIBC.  12/13/2021: CTU suspicious for new enlarged pelvic LN  2/17/2022: Started chemotherapy with split-dose gemcitabine and cisplatin  3/10/2022: C2 gem/cis  3/31/2022: C3 gem/cis  4/20/2022: Restaging scans revealed response  4/21/2022: C4 gem/cis  5/12/2022: C5 gem/cis  6/24/2022: Started maintenance avelumab. Treatment was placed on hold due to insurance coverage issues after 1 cycle  8/24/2022: CT scans stable  9/2/2022: Restarted avelumab    3/2/2023: Last dose avelumab, off after  12/28/2023: CT reveals increase in left para-aortic LN highly suspicious for metastatic LAD.  1/18/2024: Again increase in para-aortic LN.  1/31/2024: Screening for SEGE 1822: Disitamab vedotin  2/7/2024: Screening for SEGE 1822  2/14/2024: Cycle 1 day 1 disitamab vedotin as part of clinical trial  7/24/2024: Cycle 12 day 1 DV, dose reduction for neuropathy  9/4/2024: Cycle 15 day 1 DV  9/18/2024: Cycle 16 day 1 DV HELD due to neuropathy, extended hold due to disease response and continued symptoms.    Interval History:  Mr. Lujan presents today in follow up. He's been overall feeling well, though his back and knee are painful. Left knee has a history of surgery and recently started hurting again. He is on a prednisone taper now, which may be helping. Couple days of hemorrhoidal bleeding, which he has a history of. 3 days on prednisone as part of a taper currently. He has noticed his weight has increased lately as well. Eating more. Motor and sensory function are improving over time, though still some difficulty with fine motor function. The remainder of his ROS is otherwise negative.  HPI    Objective    BSA: 2.42  meters squared  /88   Pulse 86   Temp 36 °C (96.8 °F)   Resp 17   Wt 120 kg (265 lb 3.4 oz)   SpO2 98%   BMI 39.17 kg/m²     Physical Exam  General: alert, well-dressed in NAD. Speech is fluent and coherent, words clear. Good insight.   Skin: warm, dry, and pink without cyanosis or nail clubbing. No rash, petechiae, or ecchymoses.  HEENT: Normocephalic atraumatic. Sclera white, conjunctiva pink. EOMs intact. Hearing intact to spoken voice. Oral mucosa pink. No visible lesions.  Respiratory: Chest expansion symmetric. Breath sounds vesicular in all lobes without crackles, wheezes, or rhonchi bilaterally.  CV: S1S2 audible and crisp without murmurs, rubs, or extra sounds. RRR. Radial pulses strong and regular. Extremities warm and pink. Trace edema R>L he reports longstanding  GI: abdomen is round, soft, and non-tender. Active bowel sounds.  Psych: engaged, polite, appropriate conversation and eye contact.    Current Medications:    Current Outpatient Medications:     acetaminophen (Tylenol) 500 mg tablet, Take 2 tablets (1,000 mg) by mouth every 8 hours if needed., Disp: , Rfl:     Advair Diskus 100-50 mcg/dose diskus inhaler, INHALE 1 PUFF BY MOUTH TWICE DAILY AS DIRECTED. RINSE AND GARGLE MOUTH WITH WATER AFTER EACH USE, Disp: , Rfl:     aspirin 81 mg EC tablet, Take 1 tablet (81 mg) by mouth once daily., Disp: , Rfl:     lisinopril 20 mg tablet, Take 1 tablet (20 mg) by mouth once daily., Disp: , Rfl:     metoprolol tartrate (Lopressor) 25 mg tablet, Take 1 tablet (25 mg) by mouth once daily., Disp: , Rfl:     nitroglycerin (Nitrostat) 0.4 mg SL tablet, DISSOLVE 1 TABLET UNDER THE TONGUE EVERY 5 MINUTES AS NEEDED FOR CHEST PAIN FOR UP TO 3 TABLET TOTAL. CALL 911 WITH 2ND TABLET IF NO RELIEF, Disp: , Rfl:     pantoprazole (ProtoNix) 40 mg EC tablet, Take 1 tablet (40 mg) by mouth once daily., Disp: , Rfl:     rosuvastatin (Crestor) 40 mg tablet, Take 1 tablet (40 mg) by mouth once daily., Disp: , Rfl:      sertraline (Zoloft) 100 mg tablet, , Disp: , Rfl:     ALPRAZolam (Xanax) 0.5 mg tablet, Take one tablet one hour before anxiety-provoking situation. Limits one tab/day, three tabs/week., Disp: , Rfl:     gabapentin (Neurontin) 300 mg capsule, Take 2 capsules (600 mg) by mouth early in the morning. AND 1 capsule (300 mg) once daily at bedtime., Disp: 90 capsule, Rfl: 2     Most Recent Labs:  Results for orders placed or performed in visit on 03/26/25   Magnesium    Collection Time: 03/26/25 12:53 PM   Result Value Ref Range    Magnesium 1.76 1.60 - 2.40 mg/dL   CBC and Auto Differential    Collection Time: 03/26/25 12:53 PM   Result Value Ref Range    WBC 13.5 (H) 4.4 - 11.3 x10*3/uL    nRBC 0.0 0.0 - 0.0 /100 WBCs    RBC 4.79 4.50 - 5.90 x10*6/uL    Hemoglobin 14.2 13.5 - 17.5 g/dL    Hematocrit 43.3 41.0 - 52.0 %    MCV 90 80 - 100 fL    MCH 29.6 26.0 - 34.0 pg    MCHC 32.8 32.0 - 36.0 g/dL    RDW 13.9 11.5 - 14.5 %    Platelets 240 150 - 450 x10*3/uL    Neutrophils % 60.3 40.0 - 80.0 %    Immature Granulocytes %, Automated 1.6 (H) 0.0 - 0.9 %    Lymphocytes % 28.1 13.0 - 44.0 %    Monocytes % 7.6 2.0 - 10.0 %    Eosinophils % 1.9 0.0 - 6.0 %    Basophils % 0.5 0.0 - 2.0 %    Neutrophils Absolute 8.12 (H) 1.20 - 7.70 x10*3/uL    Immature Granulocytes Absolute, Automated 0.22 0.00 - 0.70 x10*3/uL    Lymphocytes Absolute 3.79 1.20 - 4.80 x10*3/uL    Monocytes Absolute 1.03 (H) 0.10 - 1.00 x10*3/uL    Eosinophils Absolute 0.26 0.00 - 0.70 x10*3/uL    Basophils Absolute 0.07 0.00 - 0.10 x10*3/uL   Comprehensive Metabolic Panel    Collection Time: 03/26/25 12:53 PM   Result Value Ref Range    Glucose 105 (H) 74 - 99 mg/dL    Sodium 137 136 - 145 mmol/L    Potassium 4.5 3.5 - 5.3 mmol/L    Chloride 100 98 - 107 mmol/L    Bicarbonate 29 21 - 32 mmol/L    Anion Gap 13 10 - 20 mmol/L    Urea Nitrogen 26 (H) 6 - 23 mg/dL    Creatinine 1.12 0.50 - 1.30 mg/dL    eGFR 71 >60 mL/min/1.73m*2    Calcium 9.4 8.6 - 10.3 mg/dL     "Albumin 4.5 3.4 - 5.0 g/dL    Alkaline Phosphatase 176 (H) 33 - 136 U/L    Total Protein 7.4 6.4 - 8.2 g/dL    AST 13 9 - 39 U/L    Bilirubin, Total 0.4 0.0 - 1.2 mg/dL    ALT 19 10 - 52 U/L      No results found for: \"PSA\"     Performance Status:  ECOG Score: 0- Fully active, able to carry on all pre-disease performance w/o restriction.  Karnofsky Score: 100 - Fully active, able to carry on all pre-disease performed without restriction    Assessment/Plan   Toro Lujan is a 70 y.o. male with mUC to LN who presents in follow up and consideration of cycle 16 day 1 SEGE 1822 on DV. He looks and feels well, though has neuropathy, grade 2. This is continuing to improve. Labs unremarkable. Has been on hold for about 6 months at this time. Will continue hold.    He is agreeable to see ortho for back and knee pain.    # mUC  - HOLD DV today due to neuropathy  - Continue to monitor labs    # Neuropathy  - Continue gabapentin 300 mg PO daily, 600 mg at night  - On Zoloft, working well for anxiety so will avoid duloxetine at this time.  - Dose reduction further on resumption, HOLD tx today  - Continue PT/OT for motor function    # Back pain  # L knee pain  - Referral to ortho, walked him to scheduling today    # Triglycerides  - Continue with cardiology  - Monitor    # Health Maintenance  - Continue with PCP and other healthcare providers  - Advised exercise, heart-healthy diet    RTC per protocol    Total time spent on this encounter was 40 minutes, which included preparation, direct time with patient, documentation, and care coordination on the day of visit.    Daniella Pineda, MSN, APRN, AGNP-C, AOCNP  Associate Nurse Practitioner  [facility]  "

## 2025-03-27 NOTE — RESEARCH NOTES
Research Note Follow Up Visit       Toro Lujan is here today for an unscheduled visit on SEGE 1822.  He has been on hold due to sensory peripheral neuropathy and comes in q 2 weeks for labs and assessment in consideration of re-starting treatment. Follow up procedures completed per protocol. Patient is aware of continued follow up plan. HOLD continues today.    G2 sensory peripheral neuropathy continues although is improving  G2 pain - sciatica on right side continues  G2 pain - left knee (patient has a history of knee surgery on this knee).     On day 3 of prednisone taper.  Being referred to ortho    All else stable.  Calendar reviewed.  Parking pass given.        Education Documentation  Monitoring Weight, taught by Emma Mann RN at 3/26/2025  2:00 PM.  Learner: Patient  Readiness: Eager  Method: Explanation  Response: Verbalizes Understanding    Pain Management, taught by Emma Mann RN at 3/26/2025  2:00 PM.  Learner: Patient  Readiness: Eager  Method: Explanation  Response: Verbalizes Understanding    Pain Rating Scale, taught by Emma Mann RN at 3/26/2025  2:00 PM.  Learner: Patient  Readiness: Eager  Method: Explanation  Response: Verbalizes Understanding    Care of Neuropathy, taught by Emma Mann RN at 3/26/2025  2:00 PM.  Learner: Patient  Readiness: Eager  Method: Explanation  Response: Verbalizes Understanding    Treatment Plan and Schedule, taught by Emma Mann RN at 3/26/2025  2:00 PM.  Learner: Patient  Readiness: Eager  Method: Explanation  Response: Verbalizes Understanding    Comprehensive Metabolic Panel (CMP), taught by Emma Mann RN at 3/26/2025  2:00 PM.  Learner: Patient  Readiness: Eager  Method: Explanation  Response: Verbalizes Understanding    Complete Blood Count with Differential (CBC w/ Diff), taught by Emma Mann RN at 3/26/2025  2:00 PM.  Learner: Patient  Readiness: Eager  Method: Explanation  Response: Verbalizes Understanding    Education  Comments  No comments found.

## 2025-03-31 ENCOUNTER — HOSPITAL ENCOUNTER (OUTPATIENT)
Dept: RADIOLOGY | Facility: CLINIC | Age: 70
Discharge: HOME | End: 2025-03-31
Payer: MEDICARE

## 2025-03-31 ENCOUNTER — APPOINTMENT (OUTPATIENT)
Facility: CLINIC | Age: 70
End: 2025-03-31
Payer: MEDICARE

## 2025-03-31 VITALS — WEIGHT: 260 LBS | HEIGHT: 70 IN | BODY MASS INDEX: 37.22 KG/M2

## 2025-03-31 DIAGNOSIS — M17.12 PRIMARY OSTEOARTHRITIS OF LEFT KNEE: Primary | ICD-10-CM

## 2025-03-31 DIAGNOSIS — M25.562 LEFT KNEE PAIN, UNSPECIFIED CHRONICITY: ICD-10-CM

## 2025-03-31 DIAGNOSIS — M25.551 RIGHT HIP PAIN: ICD-10-CM

## 2025-03-31 DIAGNOSIS — M16.11 PRIMARY OSTEOARTHRITIS OF RIGHT HIP: ICD-10-CM

## 2025-03-31 PROCEDURE — 73502 X-RAY EXAM HIP UNI 2-3 VIEWS: CPT | Mod: RT

## 2025-03-31 PROCEDURE — 73560 X-RAY EXAM OF KNEE 1 OR 2: CPT | Mod: RT

## 2025-03-31 PROCEDURE — 73564 X-RAY EXAM KNEE 4 OR MORE: CPT | Mod: LT

## 2025-03-31 RX ORDER — PREDNISONE 10 MG/1
TABLET ORAL
COMMUNITY
Start: 2025-03-20 | End: 2025-04-01

## 2025-03-31 ASSESSMENT — PAIN - FUNCTIONAL ASSESSMENT: PAIN_FUNCTIONAL_ASSESSMENT: NO/DENIES PAIN

## 2025-03-31 NOTE — LETTER
March 31, 2025     Topher Castanon MD  [id number]  [facility] [address]    Patient: Toro Lujan   YOB: 1955   Date of Visit: 3/31/2025       Dear Dr. Topher Castanon MD:    Thank you for referring Toro Lujan to me for evaluation. Below are my notes for this consultation.  If you have questions, please do not hesitate to call me. I look forward to following your patient along with you.       Sincerely,     Mari Helms DO      CC: No Recipients  ______________________________________________________________________________________    PRIMARY CARE PHYSICIAN: Topher Castanon MD  REFERRING PROVIDER: Mari Helms DO  [address] [address]     CONSULT ORTHOPAEDIC: Hip and Knee Evaluation        ASSESSMENT & PLAN    IMPRESSION:  Primary osteoarthritis, right hip  Primary osteoarthritis, left knee    PLAN:  Discussed with patient his daughter's findings above and reviewed his current x-rays with him.  He does have severe arthritis in left knee and moderate to severe arthritis of the right hip.  We discussed treatment options and at this point wants to continue conservative care.  Patient is currently a research participant for his active bladder cancer and I reviewed with him that we could potentially proceed with a right hip or left knee corticosteroid injection but would recommend he reach out to his oncologist to make sure he is okay to get steroid injections while he is research participant.  In the event that he does want a hip injection we reviewed that he would be referred to one of our partners to get this done under ultrasound which they are understanding of.  At some point may consider surgical intervention if his quality of life continues to worsen.  Patient daughter agree with her plan of care.  Will follow-up with us once discussed with her oncologist.      SUBJECTIVE  CHIEF COMPLAINT:   Chief Complaint   Patient presents with   • Right Hip - Pain   •  Left Knee - Pain        HPI: Toro Lujan is a 70 y.o. patient. Toro Lujan has had progressive problems with  their right hip over the past 5 weeks and left knee  over the past 1 week. They do not report any trauma. They do not report any constant or progressive numbness or tingling in their legs. Their symptoms are interfering with activities which include right hip posterior sided pain which travels to the groin and down the proximal 1/2 of the posterior leg, stiffness. In regards to the left knee anterolateral sided pain, instability, and some swelling. XR's done today.    FUNCTIONAL STATUS: limited:  unable to perform activities of daily living.  AMBULATORY STATUS:  Cane   PREVIOUS TREATMENTS: NSAIDS Advil with mild improvement, still taking, PRN  Therapy PT still taking at [facility]  chiropractor still taking, in [place], [place]  Surgery L knee scope about 30 years ago    Analgesics Tylenol still taking PRN and Gabapentin   predniSONE, still taking  HISTORY OF SURGERY ON AFFECTED HIP(S): No   HISTORY OF SURGERY ON AFFECTED KNEE(S): Yes   BACK PAIN REPORTED: No       REVIEW OF SYSTEMS  Constitutional: See HPI for pain assessment, No significant weight loss, recent trauma  Cardiovascular: No chest pain, shortness of breath  Respiratory: No difficulty breathing, cough  Gastrointestinal: No nausea, vomiting, diarrhea, constipation  Musculoskeletal: Noted in HPI, positive for pain, restricted motion, stiffness  Integumentary: No rashes, easy bruising, redness   Neurological: no numbness or tingling in extremities, no gait disturbances   Psychiatric: No mood changes, memory changes, social issues  Heme/Lymph: no excessive swelling, easy bruising, excessive bleeding  ENT: No hearing changes  Eyes: No vision changes    Past Medical History:   Diagnosis Date   • Anxiety    • Bladder cancer (Multi)     chemo 2022, 2023   • Cataract    • Cervical spine disease    • COPD (chronic obstructive pulmonary  disease) (Multi)     mild   • Coronary artery disease    • GERD (gastroesophageal reflux disease)    • Hyperlipidemia    • Hypertension    • Peripheral neuropathy     hands   • Ureteral stone     left        No Known Allergies     Past Surgical History:   Procedure Laterality Date   • BLADDER SURGERY      needle biopsy   • CATARACT EXTRACTION W/  INTRAOCULAR LENS IMPLANT Right 10/03/2024    Dr. Pandya   • CATARACT EXTRACTION W/  INTRAOCULAR LENS IMPLANT Left 10/24/2024    Dr. Pandya   • CERVICAL FUSION     • COLONOSCOPY     • CORONARY STENT PLACEMENT      2016   • CYSTOURETHROSCOPY     • KNEE ARTHROPLASTY Left    • OTHER SURGICAL HISTORY      ileal conduit, 2021   • PROSTATECTOMY      2021        Family History   Problem Relation Name Age of Onset   • Glaucoma Neg Hx     • Macular degeneration Neg Hx          Social History     Socioeconomic History   • Marital status:      Spouse name: Not on file   • Number of children: Not on file   • Years of education: Not on file   • Highest education level: Not on file   Occupational History   • Not on file   Tobacco Use   • Smoking status: Every Day     Current packs/day: 0.50     Types: Cigarettes   • Smokeless tobacco: Never   • Tobacco comments:     No hx of anesthesia reactions. No FH of MH.     No illnesses in the past 30 days. Is on immunotherapy trial at , suspended until after eye surgery.     No SOB with a flight of stairs.     No cane, walker, or wheelchair.     Is able to lie flat for 30 minutes.     Vaping Use   • Vaping status: Never Used   Substance and Sexual Activity   • Alcohol use: Yes     Comment: one beer every 3 months   • Drug use: Not Currently   • Sexual activity: Not on file   Other Topics Concern   • Not on file   Social History Narrative   • Not on file     Social Drivers of Health     Financial Resource Strain: Low Risk  (5/18/2020)    [facility]    Overall Financial Resource Strain (CARDIA)    •  Difficulty of Paying Living Expenses: Not very hard   Food Insecurity: No Food Insecurity (5/18/2020)    Received from [facility]    Hunger Vital Sign    • Worried About Running Out of Food in the Last Year: Never true    • Ran Out of Food in the Last Year: Never true   Transportation Needs: No Transportation Needs (5/18/2020)    Received from [facility]    PRAPARE - Transportation    • Lack of Transportation (Medical): No    • Lack of Transportation (Non-Medical): No   Physical Activity: Insufficiently Active (12/9/2019)    Received from [facility]    Exercise Vital Sign    • Days of Exercise per Week: 1 day    • Minutes of Exercise per Session: 10 min   Stress: Stress Concern Present (12/9/2019)    Received from [facility]    [employer] [place] [employer] - Occupational Stress Questionnaire    • Feeling of Stress : To some extent   Social Connections: Moderately Integrated (12/9/2019)    Received from [facility]    Social Connection and Isolation Panel [NHANES]    • Frequency of Communication with Friends and Family: More than three times a week    • Frequency of Social Gatherings with Friends and Family: Twice a week    • Attends Adventism Services: 1 to 4 times per year    • Active Member of Clubs or Organizations: Yes    • Attends Club or Organization Meetings: More than 4 times per year    • Marital Status:    Intimate Partner Violence: Not on file   Housing Stability: Unknown (2/8/2021)    Received from [facility]    Housing Stability Vital Sign    • Unable to Pay for Housing in the Last Year: No    • Number of Places Lived in the Last Year: 1    • Unstable Housing in the Last Year: Not on file        CURRENT MEDICATIONS:   Current Outpatient Medications   Medication Sig Dispense Refill   • acetaminophen (Tylenol) 500 mg tablet Take 2 tablets (1,000  "mg) by mouth every 8 hours if needed.     • Advair Diskus 100-50 mcg/dose diskus inhaler INHALE 1 PUFF BY MOUTH TWICE DAILY AS DIRECTED. RINSE AND GARGLE MOUTH WITH WATER AFTER EACH USE     • ALPRAZolam (Xanax) 0.5 mg tablet Take one tablet one hour before anxiety-provoking situation. Limits one tab/day, three tabs/week.     • aspirin 81 mg EC tablet Take 1 tablet (81 mg) by mouth once daily.     • gabapentin (Neurontin) 300 mg capsule Take 2 capsules (600 mg) by mouth early in the morning. AND 1 capsule (300 mg) once daily at bedtime. 90 capsule 2   • lisinopril 20 mg tablet Take 1 tablet (20 mg) by mouth once daily.     • metoprolol tartrate (Lopressor) 25 mg tablet Take 1 tablet (25 mg) by mouth once daily.     • nitroglycerin (Nitrostat) 0.4 mg SL tablet DISSOLVE 1 TABLET UNDER THE TONGUE EVERY 5 MINUTES AS NEEDED FOR CHEST PAIN FOR UP TO 3 TABLET TOTAL. CALL 911 WITH 2ND TABLET IF NO RELIEF     • pantoprazole (ProtoNix) 40 mg EC tablet Take 1 tablet (40 mg) by mouth once daily.     • predniSONE (Deltasone) 10 mg tablet Take by mouth.     • rosuvastatin (Crestor) 40 mg tablet Take 1 tablet (40 mg) by mouth once daily.     • sertraline (Zoloft) 100 mg tablet        No current facility-administered medications for this visit.        OBJECTIVE    PHYSICAL EXAM      12/11/2024     8:23 AM 12/24/2024     7:54 AM 1/8/2025     8:30 AM 2/10/2025    12:27 PM 3/10/2025    10:41 AM 3/26/2025     1:10 PM 3/31/2025     9:07 AM   Vitals   Systolic 100 113 122 131 133 147    Diastolic 62 74 80 73 81 88    BP Location  Right arm Right arm Left arm      Heart Rate 69 78 75 77 75 86    Temp 36 °C (96.8 °F) 36.5 °C (97.7 °F) 36.8 °C (98.2 °F) 36 °C (96.8 °F) 36.2 °C (97.2 °F) 36 °C (96.8 °F)    Resp 16 16 18 20 20 17    Height       1.778 m (5' 10\")   Weight (lb) 251.32 254.41 254.19 264.99 259.48 265.21 260   BMI 37.11 kg/m2 37.57 kg/m2 37.54 kg/m2 39.13 kg/m2 38.32 kg/m2 39.17 kg/m2 37.31 kg/m2   BSA (m2) 2.36 m2 2.37 m2 2.37 m2 " 2.42 m2 2.4 m2 2.42 m2 2.41 m2   Visit Report Report Report Report Report Report Report Report      Body mass index is 37.31 kg/m².    GENERAL: A/Ox3, NAD. Appears healthy, well nourished  PSYCHIATRIC: Mood stable, appropriate memory recall  EYES: EOM intact, no scleral icterus  CARDIAC: regular rate  LUNGS: Breathing non-labored  SKIN: no erythema, rashes, or ecchymoses     MUSCULOSKELETAL:  Laterality: right Hip Exam  - ROM, Extension: full, no flexion contracture  - Strength: Abduction 5/5 against resitance, Flexion 5/5  - Palpation: mild TTP along greater trochanter  - Log roll/IR exam: painful and limited motion. Pain with hip flexion past 90 degrees and internal rotation  - Straight leg raise: negative  - EHL/PF/DF motor intact  - Gait: antalgic to arthritic side, negative Trendelenburg gait   - Special Tests: none performed    Laterality: left Knee Exam  - Alignment: partial correctible valgus deformity  - ROM: 5 to 115 degrees  - Effusion: none  - Strength: knee extension and flexion 5/5, EHL/PF/DF motor intact  - Palpation: TTP along  medial and lateral  joint line  - Stability: Anterior/Posterior stable, varus/valgus stable  - Gait: normal  - Hip Exam: flexion to 100+ degrees, full extension, internal/external rotation adequate, and no pain with log roll  - Special Tests: none performed      NEUROVASCULAR:  - Neurovascular Status: sensation intact to light touch distally  - Capillary refill brisk at extremities, Bilateral dorsalis pedis pulse 2+       IMAGING:  Multiple views of the affected right hip(s) demonstrate: Moderate to severe arthritis with joint space narrowing, subchondral sclerosis, marginal osteophytic change.  Multi views of left knee demonstrate severe arthritis of the lateral compartment complete joint space narrowing, subchondral sclerosis, marginal osteophytic change and valgus deformity.   X-rays were personally reviewed and interpreted by me.  Radiology reports were reviewed by me as  well, if readily available at the time.        Mari Helms, DO  Attending Surgeon  Joint Replacement and Adult Reconstructive Surgery  [place], [place]

## 2025-03-31 NOTE — PROGRESS NOTES
PRIMARY CARE PHYSICIAN: Topher Castanon MD  REFERRING PROVIDER: Mari MARY Huertael, DO  [address] [address]     CONSULT ORTHOPAEDIC: Hip and Knee Evaluation        ASSESSMENT & PLAN    IMPRESSION:  Primary osteoarthritis, right hip  Primary osteoarthritis, left knee    PLAN:  Discussed with patient his daughter's findings above and reviewed his current x-rays with him.  He does have severe arthritis in left knee and moderate to severe arthritis of the right hip.  We discussed treatment options and at this point wants to continue conservative care.  Patient is currently a research participant for his active bladder cancer and I reviewed with him that we could potentially proceed with a right hip or left knee corticosteroid injection but would recommend he reach out to his oncologist to make sure he is okay to get steroid injections while he is research participant.  In the event that he does want a hip injection we reviewed that he would be referred to one of our partners to get this done under ultrasound which they are understanding of.  At some point may consider surgical intervention if his quality of life continues to worsen.  Patient daughter agree with her plan of care.  Will follow-up with us once discussed with her oncologist.      SUBJECTIVE  CHIEF COMPLAINT:   Chief Complaint   Patient presents with    Right Hip - Pain    Left Knee - Pain        HPI: Toro Lujan is a 70 y.o. patient. Toro Lujan has had progressive problems with  their right hip over the past 5 weeks and left knee  over the past 1 week. They do not report any trauma. They do not report any constant or progressive numbness or tingling in their legs. Their symptoms are interfering with activities which include right hip posterior sided pain which travels to the groin and down the proximal 1/2 of the posterior leg, stiffness. In regards to the left knee anterolateral sided pain, instability, and some swelling.  XR's done today.    FUNCTIONAL STATUS: limited:  unable to perform activities of daily living.  AMBULATORY STATUS:  Cane   PREVIOUS TREATMENTS: NSAIDS Advil with mild improvement, still taking, PRN  Therapy PT still taking at [facility]  chiropractor still taking, in [place], [place]  Surgery L knee scope about 30 years ago    Analgesics Tylenol still taking PRN and Gabapentin   predniSONE, still taking  HISTORY OF SURGERY ON AFFECTED HIP(S): No   HISTORY OF SURGERY ON AFFECTED KNEE(S): Yes   BACK PAIN REPORTED: No       REVIEW OF SYSTEMS  Constitutional: See HPI for pain assessment, No significant weight loss, recent trauma  Cardiovascular: No chest pain, shortness of breath  Respiratory: No difficulty breathing, cough  Gastrointestinal: No nausea, vomiting, diarrhea, constipation  Musculoskeletal: Noted in HPI, positive for pain, restricted motion, stiffness  Integumentary: No rashes, easy bruising, redness   Neurological: no numbness or tingling in extremities, no gait disturbances   Psychiatric: No mood changes, memory changes, social issues  Heme/Lymph: no excessive swelling, easy bruising, excessive bleeding  ENT: No hearing changes  Eyes: No vision changes    Past Medical History:   Diagnosis Date    Anxiety     Bladder cancer (Multi)     chemo 2022, 2023    Cataract     Cervical spine disease     COPD (chronic obstructive pulmonary disease) (Multi)     mild    Coronary artery disease     GERD (gastroesophageal reflux disease)     Hyperlipidemia     Hypertension     Peripheral neuropathy     hands    Ureteral stone     left        No Known Allergies     Past Surgical History:   Procedure Laterality Date    BLADDER SURGERY      needle biopsy    CATARACT EXTRACTION W/  INTRAOCULAR LENS IMPLANT Right 10/03/2024    Dr. Pandya    CATARACT EXTRACTION W/  INTRAOCULAR LENS IMPLANT Left 10/24/2024    Dr. Pandya    CERVICAL FUSION      COLONOSCOPY      CORONARY STENT PLACEMENT      2016    CYSTOURETHROSCOPY       KNEE ARTHROPLASTY Left     OTHER SURGICAL HISTORY      ileal conduit, 2021    PROSTATECTOMY      2021        Family History   Problem Relation Name Age of Onset    Glaucoma Neg Hx      Macular degeneration Neg Hx          Social History     Socioeconomic History    Marital status:      Spouse name: Not on file    Number of children: Not on file    Years of education: Not on file    Highest education level: Not on file   Occupational History    Not on file   Tobacco Use    Smoking status: Every Day     Current packs/day: 0.50     Types: Cigarettes    Smokeless tobacco: Never    Tobacco comments:     No hx of anesthesia reactions. No FH of MH.     No illnesses in the past 30 days. Is on immunotherapy trial at , suspended until after eye surgery.     No SOB with a flight of stairs.     No cane, walker, or wheelchair.     Is able to lie flat for 30 minutes.     Vaping Use    Vaping status: Never Used   Substance and Sexual Activity    Alcohol use: Yes     Comment: one beer every 3 months    Drug use: Not Currently    Sexual activity: Not on file   Other Topics Concern    Not on file   Social History Narrative    Not on file     Social Drivers of Health     Financial Resource Strain: Low Risk  (5/18/2020)    Received from [facility]    Overall Financial Resource Strain (CARDIA)     Difficulty of Paying Living Expenses: Not very hard   Food Insecurity: No Food Insecurity (5/18/2020)    Received from [facility]    Hunger Vital Sign     Worried About Running Out of Food in the Last Year: Never true     Ran Out of Food in the Last Year: Never true   Transportation Needs: No Transportation Needs (5/18/2020)    Received from [facility]    PRAPARE - Transportation     Lack of Transportation (Medical): No     Lack of Transportation (Non-Medical): No   Physical Activity: Insufficiently Active (12/9/2019)    Received from [facility],  [facility]    Exercise Vital Sign     Days of Exercise per Week: 1 day     Minutes of Exercise per Session: 10 min   Stress: Stress Concern Present (12/9/2019)    Received from [facility]    [employer] [place] [employer] - Occupational Stress Questionnaire     Feeling of Stress : To some extent   Social Connections: Moderately Integrated (12/9/2019)    Received from [facility]    Social Connection and Isolation Panel [NHANES]     Frequency of Communication with Friends and Family: More than three times a week     Frequency of Social Gatherings with Friends and Family: Twice a week     Attends Yazidi Services: 1 to 4 times per year     Active Member of Clubs or Organizations: Yes     Attends Club or Organization Meetings: More than 4 times per year     Marital Status:    Intimate Partner Violence: Not on file   Housing Stability: Unknown (2/8/2021)    Received from [facility]    Housing Stability Vital Sign     Unable to Pay for Housing in the Last Year: No     Number of Places Lived in the Last Year: 1     Unstable Housing in the Last Year: Not on file        CURRENT MEDICATIONS:   Current Outpatient Medications   Medication Sig Dispense Refill    acetaminophen (Tylenol) 500 mg tablet Take 2 tablets (1,000 mg) by mouth every 8 hours if needed.      Advair Diskus 100-50 mcg/dose diskus inhaler INHALE 1 PUFF BY MOUTH TWICE DAILY AS DIRECTED. RINSE AND GARGLE MOUTH WITH WATER AFTER EACH USE      ALPRAZolam (Xanax) 0.5 mg tablet Take one tablet one hour before anxiety-provoking situation. Limits one tab/day, three tabs/week.      aspirin 81 mg EC tablet Take 1 tablet (81 mg) by mouth once daily.      gabapentin (Neurontin) 300 mg capsule Take 2 capsules (600 mg) by mouth early in the morning. AND 1 capsule (300 mg) once daily at bedtime. 90 capsule 2    lisinopril 20 mg tablet Take 1 tablet (20 mg) by mouth once daily.    "   metoprolol tartrate (Lopressor) 25 mg tablet Take 1 tablet (25 mg) by mouth once daily.      nitroglycerin (Nitrostat) 0.4 mg SL tablet DISSOLVE 1 TABLET UNDER THE TONGUE EVERY 5 MINUTES AS NEEDED FOR CHEST PAIN FOR UP TO 3 TABLET TOTAL. CALL 911 WITH 2ND TABLET IF NO RELIEF      pantoprazole (ProtoNix) 40 mg EC tablet Take 1 tablet (40 mg) by mouth once daily.      predniSONE (Deltasone) 10 mg tablet Take by mouth.      rosuvastatin (Crestor) 40 mg tablet Take 1 tablet (40 mg) by mouth once daily.      sertraline (Zoloft) 100 mg tablet        No current facility-administered medications for this visit.        OBJECTIVE    PHYSICAL EXAM      12/11/2024     8:23 AM 12/24/2024     7:54 AM 1/8/2025     8:30 AM 2/10/2025    12:27 PM 3/10/2025    10:41 AM 3/26/2025     1:10 PM 3/31/2025     9:07 AM   Vitals   Systolic 100 113 122 131 133 147    Diastolic 62 74 80 73 81 88    BP Location  Right arm Right arm Left arm      Heart Rate 69 78 75 77 75 86    Temp 36 °C (96.8 °F) 36.5 °C (97.7 °F) 36.8 °C (98.2 °F) 36 °C (96.8 °F) 36.2 °C (97.2 °F) 36 °C (96.8 °F)    Resp 16 16 18 20 20 17    Height       1.778 m (5' 10\")   Weight (lb) 251.32 254.41 254.19 264.99 259.48 265.21 260   BMI 37.11 kg/m2 37.57 kg/m2 37.54 kg/m2 39.13 kg/m2 38.32 kg/m2 39.17 kg/m2 37.31 kg/m2   BSA (m2) 2.36 m2 2.37 m2 2.37 m2 2.42 m2 2.4 m2 2.42 m2 2.41 m2   Visit Report Report Report Report Report Report Report Report      Body mass index is 37.31 kg/m².    GENERAL: A/Ox3, NAD. Appears healthy, well nourished  PSYCHIATRIC: Mood stable, appropriate memory recall  EYES: EOM intact, no scleral icterus  CARDIAC: regular rate  LUNGS: Breathing non-labored  SKIN: no erythema, rashes, or ecchymoses     MUSCULOSKELETAL:  Laterality: right Hip Exam  - ROM, Extension: full, no flexion contracture  - Strength: Abduction 5/5 against resitance, Flexion 5/5  - Palpation: mild TTP along greater trochanter  - Log roll/IR exam: painful and limited motion. Pain " with hip flexion past 90 degrees and internal rotation  - Straight leg raise: negative  - EHL/PF/DF motor intact  - Gait: antalgic to arthritic side, negative Trendelenburg gait   - Special Tests: none performed    Laterality: left Knee Exam  - Alignment: partial correctible valgus deformity  - ROM: 5 to 115 degrees  - Effusion: none  - Strength: knee extension and flexion 5/5, EHL/PF/DF motor intact  - Palpation: TTP along  medial and lateral  joint line  - Stability: Anterior/Posterior stable, varus/valgus stable  - Gait: normal  - Hip Exam: flexion to 100+ degrees, full extension, internal/external rotation adequate, and no pain with log roll  - Special Tests: none performed      NEUROVASCULAR:  - Neurovascular Status: sensation intact to light touch distally  - Capillary refill brisk at extremities, Bilateral dorsalis pedis pulse 2+       IMAGING:  Multiple views of the affected right hip(s) demonstrate: Moderate to severe arthritis with joint space narrowing, subchondral sclerosis, marginal osteophytic change.  Multi views of left knee demonstrate severe arthritis of the lateral compartment complete joint space narrowing, subchondral sclerosis, marginal osteophytic change and valgus deformity.   X-rays were personally reviewed and interpreted by me.  Radiology reports were reviewed by me as well, if readily available at the time.        Mari Helms DO  Attending Surgeon  Joint Replacement and Adult Reconstructive Surgery  [place], [place]

## 2025-03-31 NOTE — PATIENT INSTRUCTIONS
BMI was above normal measurement. Current weight: 118 kg (260 lb)  Weight change since last visit (-) denotes wt loss -5.21 lbs   Weight loss needed to achieve BMI 25: 86.1 Lbs  Weight loss needed to achieve BMI 30: 51.4 Lbs  Advised to Increase physical activity.

## 2025-04-09 ENCOUNTER — HOSPITAL ENCOUNTER (OUTPATIENT)
Dept: RADIOLOGY | Facility: HOSPITAL | Age: 70
Discharge: HOME | End: 2025-04-09
Payer: MEDICARE

## 2025-04-09 DIAGNOSIS — Z00.6 EXAM FOR CLINICAL RESEARCH: ICD-10-CM

## 2025-04-09 PROCEDURE — 2550000001 HC RX 255 CONTRASTS: Performed by: INTERNAL MEDICINE

## 2025-04-09 PROCEDURE — 71260 CT THORAX DX C+: CPT

## 2025-04-09 RX ADMIN — IOHEXOL 75 ML: 350 INJECTION, SOLUTION INTRAVENOUS at 11:12

## 2025-04-10 ENCOUNTER — LAB (OUTPATIENT)
Dept: LAB | Facility: HOSPITAL | Age: 70
End: 2025-04-10
Payer: MEDICARE

## 2025-04-10 ENCOUNTER — EDUCATION (OUTPATIENT)
Dept: HEMATOLOGY/ONCOLOGY | Facility: HOSPITAL | Age: 70
End: 2025-04-10
Payer: MEDICARE

## 2025-04-10 ENCOUNTER — OFFICE VISIT (OUTPATIENT)
Dept: HEMATOLOGY/ONCOLOGY | Facility: HOSPITAL | Age: 70
End: 2025-04-10
Payer: MEDICARE

## 2025-04-10 VITALS
HEART RATE: 64 BPM | RESPIRATION RATE: 18 BRPM | DIASTOLIC BLOOD PRESSURE: 55 MMHG | OXYGEN SATURATION: 99 % | SYSTOLIC BLOOD PRESSURE: 111 MMHG | TEMPERATURE: 97.3 F | WEIGHT: 268.96 LBS | BODY MASS INDEX: 38.59 KG/M2

## 2025-04-10 DIAGNOSIS — C77.9 REGIONAL LYMPH NODE METASTASIS PRESENT (MULTI): ICD-10-CM

## 2025-04-10 DIAGNOSIS — G62.0 DRUG-INDUCED POLYNEUROPATHY (MULTI): ICD-10-CM

## 2025-04-10 DIAGNOSIS — Z00.6 EXAM FOR CLINICAL RESEARCH: ICD-10-CM

## 2025-04-10 DIAGNOSIS — C67.8 MALIGNANT NEOPLASM OF OVERLAPPING SITES OF BLADDER (MULTI): Primary | ICD-10-CM

## 2025-04-10 DIAGNOSIS — Z92.21 S/P CHEMOTHERAPY, TIME SINCE GREATER THAN 12 WEEKS: ICD-10-CM

## 2025-04-10 DIAGNOSIS — Z00.6 RESEARCH SUBJECT: ICD-10-CM

## 2025-04-10 DIAGNOSIS — C67.9 MALIGNANT NEOPLASM OF URINARY BLADDER, UNSPECIFIED SITE (MULTI): ICD-10-CM

## 2025-04-10 LAB
ALBUMIN SERPL BCP-MCNC: 4.2 G/DL (ref 3.4–5)
ALP SERPL-CCNC: 165 U/L (ref 33–136)
ALT SERPL W P-5'-P-CCNC: 17 U/L (ref 10–52)
ANION GAP SERPL CALC-SCNC: 13 MMOL/L (ref 10–20)
AST SERPL W P-5'-P-CCNC: 13 U/L (ref 9–39)
BASOPHILS # BLD AUTO: 0.03 X10*3/UL (ref 0–0.1)
BASOPHILS NFR BLD AUTO: 0.3 %
BILIRUB DIRECT SERPL-MCNC: 0.1 MG/DL (ref 0–0.3)
BILIRUB SERPL-MCNC: 0.4 MG/DL (ref 0–1.2)
BUN SERPL-MCNC: 26 MG/DL (ref 6–23)
CALCIUM SERPL-MCNC: 9.2 MG/DL (ref 8.6–10.6)
CHLORIDE SERPL-SCNC: 100 MMOL/L (ref 98–107)
CO2 SERPL-SCNC: 30 MMOL/L (ref 21–32)
CREAT SERPL-MCNC: 1.19 MG/DL (ref 0.5–1.3)
EGFRCR SERPLBLD CKD-EPI 2021: 66 ML/MIN/1.73M*2
EOSINOPHIL # BLD AUTO: 0.32 X10*3/UL (ref 0–0.7)
EOSINOPHIL NFR BLD AUTO: 3.4 %
ERYTHROCYTE [DISTWIDTH] IN BLOOD BY AUTOMATED COUNT: 14.3 % (ref 11.5–14.5)
GLUCOSE SERPL-MCNC: 95 MG/DL (ref 74–99)
HCT VFR BLD AUTO: 41.5 % (ref 41–52)
HGB BLD-MCNC: 13.5 G/DL (ref 13.5–17.5)
IMM GRANULOCYTES # BLD AUTO: 0.05 X10*3/UL (ref 0–0.7)
IMM GRANULOCYTES NFR BLD AUTO: 0.5 % (ref 0–0.9)
LYMPHOCYTES # BLD AUTO: 1.91 X10*3/UL (ref 1.2–4.8)
LYMPHOCYTES NFR BLD AUTO: 20 %
MAGNESIUM SERPL-MCNC: 1.91 MG/DL (ref 1.6–2.4)
MCH RBC QN AUTO: 29.9 PG (ref 26–34)
MCHC RBC AUTO-ENTMCNC: 32.5 G/DL (ref 32–36)
MCV RBC AUTO: 92 FL (ref 80–100)
MONOCYTES # BLD AUTO: 0.66 X10*3/UL (ref 0.1–1)
MONOCYTES NFR BLD AUTO: 6.9 %
NEUTROPHILS # BLD AUTO: 6.56 X10*3/UL (ref 1.2–7.7)
NEUTROPHILS NFR BLD AUTO: 68.9 %
NRBC BLD-RTO: 0 /100 WBCS (ref 0–0)
PHOSPHATE SERPL-MCNC: 4 MG/DL (ref 2.5–4.9)
PLATELET # BLD AUTO: 233 X10*3/UL (ref 150–450)
POTASSIUM SERPL-SCNC: 4.7 MMOL/L (ref 3.5–5.3)
PROT SERPL-MCNC: 7.5 G/DL (ref 6.4–8.2)
RBC # BLD AUTO: 4.51 X10*6/UL (ref 4.5–5.9)
SODIUM SERPL-SCNC: 138 MMOL/L (ref 136–145)
WBC # BLD AUTO: 9.5 X10*3/UL (ref 4.4–11.3)

## 2025-04-10 PROCEDURE — 84100 ASSAY OF PHOSPHORUS: CPT

## 2025-04-10 PROCEDURE — 82248 BILIRUBIN DIRECT: CPT

## 2025-04-10 PROCEDURE — 83735 ASSAY OF MAGNESIUM: CPT

## 2025-04-10 PROCEDURE — 80053 COMPREHEN METABOLIC PANEL: CPT

## 2025-04-10 PROCEDURE — 99214 OFFICE O/P EST MOD 30 MIN: CPT | Performed by: INTERNAL MEDICINE

## 2025-04-10 PROCEDURE — 85025 COMPLETE CBC W/AUTO DIFF WBC: CPT

## 2025-04-10 PROCEDURE — 36415 COLL VENOUS BLD VENIPUNCTURE: CPT

## 2025-04-10 ASSESSMENT — PAIN SCALES - GENERAL: PAINLEVEL_OUTOF10: 4

## 2025-04-10 NOTE — RESEARCH NOTES
Research Note Follow Up Visit       Toro Lujan is here today for and unscheduled visit on [id number].  Follow up procedures completed per protocol. Patient is aware of continued follow up plan. Recall, patient is on an extended treatment hold for sensory peripheral neuropathy.  HOLD continues today.    Scans reviewed and patient has continued UT.  +G2 sensory peripheral neuropathy continues but improving.  +G1 pain right hip continues. Finished steroid taper 2 days ago.  Improved.  +G1 pain left knee  +G1 Alkaline Phosphatase increased continues.    Saw orthopedic medicine.  Recommended steroid injections for both hip and knee.  Patient encouraged to schedule ASAP.    Broached topic of knee replacement.  Allowed per study.  While on treatment hold, perfect time.  Patient next out of town [facility] - [id number].    Most likely will consider re-treatment, but not until after next scans. Patient will continue q2 week assessments.  Parking pass given.   clinical completed for reimbursement.        Education Documentation  Discuss the Use of Pain Control Measures Before Pain Becomes Severe, taught by Emma Mann RN at 4/10/2025  4:24 PM.  Learner: Patient  Readiness: Eager  Method: Explanation  Response: Verbalizes Understanding    Pain Management, taught by Emma Mann RN at 4/10/2025  4:24 PM.  Learner: Patient  Readiness: Eager  Method: Explanation  Response: Verbalizes Understanding    Pain Rating Scale, taught by Emma Mann RN at 4/10/2025  4:24 PM.  Learner: Patient  Readiness: Eager  Method: Explanation  Response: Verbalizes Understanding    Care of Neuropathy, taught by Emma Mann RN at 4/10/2025  4:24 PM.  Learner: Patient  Readiness: Eager  Method: Explanation  Response: Verbalizes Understanding    Fatigue, taught by Emma Mann RN at 4/10/2025  4:24 PM.  Learner: Patient  Readiness: Eager  Method: Explanation  Response: Verbalizes Understanding    Treatment Plan and Schedule, taught by  Emma Mann RN at 4/10/2025  4:24 PM.  Learner: Patient  Readiness: Eager  Method: Explanation  Response: Verbalizes Understanding    Comprehensive Metabolic Panel (CMP), taught by Emma Mann RN at 4/10/2025  4:24 PM.  Learner: Patient  Readiness: Eager  Method: Explanation  Response: Verbalizes Understanding    Complete Blood Count with Differential (CBC w/ Diff), taught by Emma Mann RN at 4/10/2025  4:24 PM.  Learner: Patient  Readiness: Eager  Method: Explanation  Response: Verbalizes Understanding    Education Comments  No comments found.

## 2025-04-14 DIAGNOSIS — Z00.6 EXAM FOR CLINICAL RESEARCH: ICD-10-CM

## 2025-04-17 ENCOUNTER — APPOINTMENT (OUTPATIENT)
Dept: HEMATOLOGY/ONCOLOGY | Facility: HOSPITAL | Age: 70
End: 2025-04-17
Payer: COMMERCIAL

## 2025-04-17 ENCOUNTER — APPOINTMENT (OUTPATIENT)
Dept: CARDIOLOGY | Facility: HOSPITAL | Age: 70
End: 2025-04-17
Payer: COMMERCIAL

## 2025-04-21 DIAGNOSIS — Z00.6 EXAM FOR CLINICAL RESEARCH: ICD-10-CM

## 2025-04-21 NOTE — PROGRESS NOTES
" Medical Oncology Out Patient Clinic Note    Patient's Name: Toro Lujan  MRN: [id number]  Date of Service: 4/10/25  In- Person Visit: YES    Reason for Visit: FU    HPI: 69 yo male known to our  Med Onc team with Met TCC. He is currently participating on the SEGE 1822: Disitamab [place], cohorts A and B in HER2-overexpressing IHC 1+, 2+, 3+ for locally advanced unresectable or metastatic UC who have received prior platinum-based chemotherapy  Dose reduced to 1 mg/kg IV every 2 weeks - on HOLD sec Peripheral neuropathy  Best response to therapy IN    Prior regimens; CCDP-based chemo and Avelumab maintenance followed by PD    Feels well but dealing with some DJD issues in knees- not related to TCC or therapy  Peripheral neuropathy is improving    Updated PMHx  None    Review of Systems  13 point review negative    Physical Exam:  /55 (BP Location: Left arm, Patient Position: Sitting, BP Cuff Size: Large adult)   Pulse 64   Temp 36.3 °C (97.3 °F) (Skin)   Resp 18   Wt 122 kg (268 lb 15.4 oz)   SpO2 99%   BMI 38.59 kg/m²   ECOG Performance Status: 1 (sec DJD in knee)  HEENT: Normal  ENT: Normal  CV:  NA  Pulmonary: NA  GI: NA  : NA  Extremities: No Edema   Neurologic: Normal  Skin: Normal skin turgor  Psych: Appropriate mood      LABS:  No results found for: \"PSA\"  Lab Results   Component Value Date    WBC 9.5 04/10/2025    HGB 13.5 04/10/2025    HCT 41.5 04/10/2025    MCV 92 04/10/2025     04/10/2025     Lab Results   Component Value Date    GLUCOSE 95 04/10/2025    CALCIUM 9.2 04/10/2025     04/10/2025    K 4.7 04/10/2025    CO2 30 04/10/2025     04/10/2025    BUN 26 (H) 04/10/2025    CREATININE 1.19 04/10/2025     No results found for: \"TESTOSTERONE\"  Lab Results   Component Value Date    ALT 17 04/10/2025    AST 13 04/10/2025    ALKPHOS 165 (H) 04/10/2025    BILITOT 0.4 04/10/2025       IMAGING:  Study Result    Narrative & Impression   Interpreted By:  Schoenberger, Joseph, "   STUDY:  CT CHEST ABDOMEN PELVIS W IV CONTRAST;  4/9/2025 11:22 am      INDICATION:  Signs/Symptoms:required per clinical trial. History of bladder  carcinoma status post cystectomy and ileal conduit creation      ,Z00.6 Encounter for examination for normal comparison and control in  clinical research program      COMPARISON:  03/03/2025. 01/15/2025.      ACCESSION NUMBER(S):  [id number]      ORDERING CLINICIAN:  YRN MANUEL      TECHNIQUE:  CT of the chest, abdomen, and pelvis was performed.  Contiguous axial  images were obtained at 3 mm slice thickness through the chest,  abdomen and pelvis. Coronal and sagittal reconstructions at 3 mm  slice thickness were performed. 75 ML of Omnipaque 350 was  administered intravenously without immediate complication.      FINDINGS:  CHEST:      LUNG/PLEURA/LARGE AIRWAYS:  The large airways appear intact. There is no pleural effusion or  pneumothorax or airspace opacity. There is no new focal lung opacity  that would suggest emerging metastatic disease.      VESSELS:  Aorta and pulmonary arteries are normal caliber.  Mild  atherosclerotic changes are noted of the aorta and branching vessels.  Moderate coronary artery calcifications are present.      HEART:  Normal size.  No pericardial effusion      MEDIASTINUM AND EBONY:  No mediastinal, hilar or axillary lymphadenopathy is present.  The  esophagus is normal.      CHEST WALL AND LOWER NECK:  Chest wall soft tissues appear normal. Normal thyroid. No  supraclavicular axillary adenopathy.      ABDOMEN:      LIVER:  There are multiple distinctly marginated water attenuating lesions in  the liver. There are stable in size and appearance from the prior.  The larger of these suggest cysts. The smaller ones are too small to  characterize. However they are stable in size and likely cysts.      BILE DUCTS:  No dilation      GALLBLADDER:  No calcified stones. No wall thickening.      PANCREAS:  Within normal limits.       SPLEEN:  Within normal limits.      ADRENAL GLANDS:  Heterogeneous hypoattenuating large min of the left adrenal gland  stable in size in appearance from the prior. Small hypoattenuated  nodularity in the anterior limb of the right adrenal gland again  stable from prior.      KIDNEYS AND URETERS:  Multifocal cortical scarring in the left kidney is unchanged from  prior. The right kidney appears normal. There are symmetric  nephrograms with no perinephric fluid or infiltration. There is a  lesion with macroscopic fat projecting posteriorly from the mid to  upper left renal pole likely a tiny angiomyolipoma. There is a 3 mm  nonobstructing upper pole right renal stone posteriorly. The this  stone is not apparent on the prior exam. There is no hydroureteral  nephrosis or ureter stone.      PELVIS:      BLADDER:  The bladder is surgically absent. The patient is status post ileal  conduit creation. There is a right lower quadrant stoma. No  distension of the conduit. No mural thickening of the conduit.  Calcification within the conduit is unchanged from prior.      REPRODUCTIVE ORGANS:  Prostate appears to be surgically absent      BOWEL:  The stomach is unremarkable. The small and large bowel are normal in  caliber and demonstrate no wall thickening.  The appendix appears  normal.          VESSELS:  There is no aneurysmal dilatation of the abdominal aorta. The IVC  appears normal.      PERITONEUM/RETROPERITONEUM/LYMPH NODES:  No ascites or free air, no fluid collection.  Mildly prominent right  external iliac lymph node short axis measurement 10 mm not  significantly different from prior. No overt pathologic enlargement  of abdominal or pelvic lymph nodes.      BONE AND SOFT TISSUE:  No suspicious osseous lesions are identified. Degenerative discogenic  disease is noted in the lower thoracic and lumbar spine.  There is a  peristomal hernia associated with the ileal conduit containing only  fat. Just medial to this to  the right of midline in the periumbilical  region there is a abdominal wall hernia with herniation of fat as  well as a small bowel loop. No mural thickening this loop. This loop  of bowel is associated with the previous anastomosis. This appearance  is unchanged from the prior.      IMPRESSION:  CHEST:  1.  No significant change from prior convincing evidence for emerging  metastatic disease.      ABDOMEN-PELVIS:  1.  Liver lesions which are likely cysts are unchanged from prior.  2. This slightly prominent distal right external iliac lymph node is  unchanged from prior.  3. Appearance of abdominal wall hernias and ileal conduit are not  unchanged from prior.  4. There has been interval development of a 3 mm upper pole right  renal stone when compared to prior.  5. No convincing evidence for emerging abdominal or pelvic metastatic  disease.          MACRO:  None      Signed by: Joseph Schoenberger 4/9/2025 1:23 PM  Dictation workstation:   [id number]     GENOMICS:  ** Copied from [address] [place] [address] on 21-Apr-2025 01:22PM by Topher Castanon **    Test Name: [employer] xF+ (XF.V3)  Laboratory Name: [facility]  Report Id: [id number]    Result Interpretation:  * No potentially actionable variants and no reportable treatment options found.  * Treatment Notes: No reportable treatment options found.    Molecular Findings:  * Gene: TET2, Variant: Frameshift - LOF, Biologically Relevant, p.D162fs (Allele Frequency - 4.4%)  * Gene: TP53, Variant: Missense variant - LOF, Biologically Relevant, p.C238F (Allele Frequency - 0.5%)  * Biomarker: Microsatellite Instability, Status: MSI-High not detected  * Biomarker: Tumor Mutation [place] (6.1 Muts/Mb)  * 6 variants of unknown significance    ** End of copied information **    A/P: Met TCC  CT remains despite the hold on his systemic therapy  Reviewed the importance of addressing his ortho needs- perfect timing for him to do it since he remains on a break from therapy  RTC per  study      Discussed importance of a healthier diet - offered to see Nutritional Services; Also discussed the importance of daily regular exercise (aerobic and resistance differences noted)  Offered to see Supportive Services if needed as well as integrative Oncology and Psychosocial Support    Topher Castanon MD, FACP  Chief, Solid Tumor Oncology Division   Medical Oncology  Professor of Medicine and Urology  /[facility]

## 2025-04-22 ENCOUNTER — APPOINTMENT (OUTPATIENT)
Dept: ORTHOPEDIC SURGERY | Facility: CLINIC | Age: 70
End: 2025-04-22
Payer: COMMERCIAL

## 2025-04-22 VITALS — WEIGHT: 260 LBS | BODY MASS INDEX: 37.22 KG/M2 | HEIGHT: 70 IN

## 2025-04-22 DIAGNOSIS — M17.12 PRIMARY OSTEOARTHRITIS OF LEFT KNEE: Primary | ICD-10-CM

## 2025-04-22 ASSESSMENT — PAIN - FUNCTIONAL ASSESSMENT: PAIN_FUNCTIONAL_ASSESSMENT: 0-10

## 2025-04-22 ASSESSMENT — PAIN SCALES - GENERAL: PAINLEVEL_OUTOF10: 1

## 2025-04-22 NOTE — PROGRESS NOTES
ORTHOPEDIC FOLLOW UP      ============================  IMPRESSION/PLAN:  ============================  70 y.o. male with Primary osteoarthritis, left knee    PLAN:  Discussed with patient his daughter's findings above and reviewed his current x-rays with him.  He does have severe arthritis in left knee and moderate to severe arthritis of the right hip.  We discussed treatment options and at this point wants to continue conservative care.  Will proceed with left knee corticosteroid injection today.  May repeat every 3 to 4 months.  Risk and benefits discussed.  She will physician details.          Toro Lujan presents today for follow up of the above condition. They are here today for a L knee CSI, which would be their first. They have spoken with their oncologist who has cleared them for this. They were given a verbal clearance and nothing was noted in their visit summary.    FUNCTIONAL STATUS: limited:  unable to perform activities of daily living.  AMBULATORY STATUS: Cane   PREVIOUS TREATMENTS: NSAIDS Advil with mild improvement, still taking, PRN  Therapy PT still taking at [facility]  chiropractor still taking, in [place], [place]  Surgery L knee scope about 30 years ago   Analgesics Tylenol still taking PRN and Gabapentin   predniSONE, completed, does not know if this helped  HISTORY OF SURGERY ON AFFECTED KNEE(S): Yes   BACK PAIN REPORTED: No     Review of Systems:   Constitutional: See HPI for pain assessment, No significant weight loss, recent trauma. Denies fevers/chills  Cardiovascular: No chest pain, shortness of breath  Respiratory: No difficulty breathing, cough  Gastrointestinal: No nausea, vomiting, diarrhea, constipation  Musculoskeletal: Noted in HPI, no arthralgias   Integumentary: No rashes, easy bruising, redness   Neurological: no numbness or tingling in extremities, no gait disturbances     Problem List[1]    Medical History[2]     Allergies[3]     Surgical History[4]      Family History[5]     Social History     Socioeconomic History    Marital status:      Spouse name: Not on file    Number of children: Not on file    Years of education: Not on file    Highest education level: Not on file   Occupational History    Not on file   Tobacco Use    Smoking status: Every Day     Current packs/day: 0.50     Types: Cigarettes    Smokeless tobacco: Never    Tobacco comments:     No hx of anesthesia reactions. No FH of MH.     No illnesses in the past 30 days. Is on immunotherapy trial at , suspended until after eye surgery.     No SOB with a flight of stairs.     No cane, walker, or wheelchair.     Is able to lie flat for 30 minutes.     Vaping Use    Vaping status: Never Used   Substance and Sexual Activity    Alcohol use: Yes     Comment: one beer every 3 months    Drug use: Not Currently    Sexual activity: Not on file   Other Topics Concern    Not on file   Social History Narrative    Not on file     Social Drivers of Health     Financial Resource Strain: Low Risk  (5/18/2020)    Received from [facility]    Overall Financial Resource Strain (CARDIA)     Difficulty of Paying Living Expenses: Not very hard   Food Insecurity: No Food Insecurity (5/18/2020)    Received from [facility]    Hunger Vital Sign     Worried About Running Out of Food in the Last Year: Never true     Ran Out of Food in the Last Year: Never true   Transportation Needs: No Transportation Needs (5/18/2020)    Received from [facility]    PRAPARE - Transportation     Lack of Transportation (Medical): No     Lack of Transportation (Non-Medical): No   Physical Activity: Insufficiently Active (12/9/2019)    Received from [facility]    Exercise Vital Sign     Days of Exercise per Week: 1 day     Minutes of Exercise per Session: 10 min   Stress: Stress Concern Present (12/9/2019)    Received from [facility]    [employer] - Occupational Stress  Questionnaire     Feeling of Stress : To some extent   Social Connections: Moderately Integrated (12/9/2019)    Received from [facility]    Social Connection and Isolation Panel [NHANES]     Frequency of Communication with Friends and Family: More than three times a week     Frequency of Social Gatherings with Friends and Family: Twice a week     Attends Church Services: 1 to 4 times per year     Active Member of Clubs or Organizations: Yes     Attends Club or Organization Meetings: More than 4 times per year     Marital Status:    Intimate Partner Violence: Not on file   Housing Stability: Unknown (2/8/2021)    Received from [facility]    Housing Stability Vital Sign     Unable to Pay for Housing in the Last Year: No     Number of Places Lived in the Last Year: 1     Unstable Housing in the Last Year: Not on file        CURRENT MEDICATIONS:   Current Medications[6]     =================================  EXAM  =================================  GENERAL: A/Ox3, NAD. Appears healthy, well nourished  PSYCHIATRIC: Mood stable, appropriate memory recall  EYES: EOM intact, no scleral icterus  CARDIAC: regular rate  LUNGS: Breathing non-labored  SKIN: no erythema, rashes, or ecchymoses      MUSCULOSKELETAL:  Laterality: left Knee Exam  - Alignment: partial correctible valgus deformity  - ROM: 5 to 115 degrees  - Effusion: none  - Strength: knee extension and flexion 5/5, EHL/PF/DF motor intact  - Palpation: TTP along  medial and lateral  joint line  - Stability: Anterior/Posterior stable, varus/valgus stable  - Gait: normal  - Hip Exam: flexion to 100+ degrees, full extension, internal/external rotation adequate, and no pain with log roll  - Special Tests: none performed      NEUROVASCULAR:  - Neurovascular Status: sensation intact to light touch distally  - Capillary refill brisk at extremities, Bilateral dorsalis pedis pulse 2+         IMAGING:  Multiple views of the affected right hip(s)  demonstrate: Moderate to severe arthritis with joint space narrowing, subchondral sclerosis, marginal osteophytic change.  Multi views of left knee demonstrate severe arthritis of the lateral compartment complete joint space narrowing, subchondral sclerosis, marginal osteophytic change and valgus deformity.   X-rays were personally reviewed and interpreted by me.  Radiology reports were reviewed by me as well, if readily available at the time.          Body mass index is 37.31 kg/m².      Mari Helms DO  Attending Surgeon  Joint Replacement and Adult Reconstructive Surgery  [place], [place]           [1]   Patient Active Problem List  Diagnosis    Malignant neoplasm of overlapping sites of bladder (Multi)    Combined form of age-related cataract, right eye    Visual disturbance    S/P radical cystoprostatectomy    S/P ileal conduit (Multi)    Obesity, Class II, BMI 35-39.9    Pelvic lymphadenopathy    Left ureteral stone    Dyslipidemia    Essential hypertension    KARLEY (generalized anxiety disorder)    GERD (gastroesophageal reflux disease)    Coronary artery disease involving native coronary artery of native heart without angina pectoris    Chronic obstructive pulmonary disease (Multi)    Cervical spine disease    Mature cataract    Combined form of age-related cataract, left eye    Myopia of both eyes    Astigmatism of both eyes    Presbyopia    Pseudophakia    PCO (posterior capsular opacification), right   [2]   Past Medical History:  Diagnosis Date    Anxiety     Bladder cancer (Multi)     chemo 2022, 2023    Cataract     Cervical spine disease     COPD (chronic obstructive pulmonary disease) (Multi)     mild    Coronary artery disease     GERD (gastroesophageal reflux disease)     Hyperlipidemia     Hypertension     Peripheral neuropathy     hands    Ureteral stone     left   [3] No Known Allergies  [4]   Past Surgical History:  Procedure Laterality Date    BLADDER SURGERY      needle  biopsy    CATARACT EXTRACTION W/  INTRAOCULAR LENS IMPLANT Right 10/03/2024    Dr. Pandya    CATARACT EXTRACTION W/  INTRAOCULAR LENS IMPLANT Left 10/24/2024    Dr. Pandya    CERVICAL FUSION      COLONOSCOPY      CORONARY STENT PLACEMENT      2016    CYSTOURETHROSCOPY      KNEE ARTHROPLASTY Left     OTHER SURGICAL HISTORY      ileal conduit, 2021    PROSTATECTOMY      2021   [5]   Family History  Problem Relation Name Age of Onset    Glaucoma Neg Hx      Macular degeneration Neg Hx     [6]   Current Outpatient Medications   Medication Sig Dispense Refill    acetaminophen (Tylenol) 500 mg tablet Take 2 tablets (1,000 mg) by mouth every 8 hours if needed.      Advair Diskus 100-50 mcg/dose diskus inhaler INHALE 1 PUFF BY MOUTH TWICE DAILY AS DIRECTED. RINSE AND GARGLE MOUTH WITH WATER AFTER EACH USE      ALPRAZolam (Xanax) 0.5 mg tablet Take one tablet one hour before anxiety-provoking situation. Limits one tab/day, three tabs/week.      aspirin 81 mg EC tablet Take 1 tablet (81 mg) by mouth once daily.      gabapentin (Neurontin) 300 mg capsule Take 2 capsules (600 mg) by mouth early in the morning. AND 1 capsule (300 mg) once daily at bedtime. 90 capsule 2    lisinopril 20 mg tablet Take 1 tablet (20 mg) by mouth once daily.      metoprolol tartrate (Lopressor) 25 mg tablet Take 1 tablet (25 mg) by mouth once daily.      nitroglycerin (Nitrostat) 0.4 mg SL tablet DISSOLVE 1 TABLET UNDER THE TONGUE EVERY 5 MINUTES AS NEEDED FOR CHEST PAIN FOR UP TO 3 TABLET TOTAL. CALL 911 WITH 2ND TABLET IF NO RELIEF      pantoprazole (ProtoNix) 40 mg EC tablet Take 1 tablet (40 mg) by mouth once daily.      rosuvastatin (Crestor) 40 mg tablet Take 1 tablet (40 mg) by mouth once daily.      sertraline (Zoloft) 100 mg tablet        No current facility-administered medications for this visit.

## 2025-04-23 ENCOUNTER — APPOINTMENT (OUTPATIENT)
Dept: CARDIOLOGY | Facility: HOSPITAL | Age: 70
End: 2025-04-23
Payer: MEDICARE

## 2025-04-23 ENCOUNTER — TELEPHONE (OUTPATIENT)
Dept: HEMATOLOGY/ONCOLOGY | Facility: HOSPITAL | Age: 70
End: 2025-04-23
Payer: MEDICARE

## 2025-04-23 ENCOUNTER — APPOINTMENT (OUTPATIENT)
Dept: HEMATOLOGY/ONCOLOGY | Facility: HOSPITAL | Age: 70
End: 2025-04-23
Payer: MEDICARE

## 2025-04-23 NOTE — TELEPHONE ENCOUNTER
Pt called to state that he had a steroid injection in his knee yesterday and is not very mobile today. He is cancelling his appointment today with greg Pineda CNP and would like to reschedule.    Pt transferred to [facility] to complete.    Provider and RN Partner notified via secure chat.

## 2025-04-28 ENCOUNTER — APPOINTMENT (OUTPATIENT)
Dept: HEMATOLOGY/ONCOLOGY | Facility: HOSPITAL | Age: 70
End: 2025-04-28
Payer: MEDICARE

## 2025-04-30 ENCOUNTER — OFFICE VISIT (OUTPATIENT)
Dept: HEMATOLOGY/ONCOLOGY | Facility: HOSPITAL | Age: 70
End: 2025-04-30
Payer: MEDICARE

## 2025-04-30 ENCOUNTER — EDUCATION (OUTPATIENT)
Dept: HEMATOLOGY/ONCOLOGY | Facility: HOSPITAL | Age: 70
End: 2025-04-30

## 2025-04-30 ENCOUNTER — HOSPITAL ENCOUNTER (OUTPATIENT)
Dept: CARDIOLOGY | Facility: HOSPITAL | Age: 70
Discharge: HOME | End: 2025-04-30
Payer: MEDICARE

## 2025-04-30 ENCOUNTER — LAB (OUTPATIENT)
Dept: LAB | Facility: HOSPITAL | Age: 70
End: 2025-04-30
Payer: MEDICARE

## 2025-04-30 VITALS
RESPIRATION RATE: 17 BRPM | HEART RATE: 72 BPM | OXYGEN SATURATION: 99 % | DIASTOLIC BLOOD PRESSURE: 95 MMHG | TEMPERATURE: 97.2 F | SYSTOLIC BLOOD PRESSURE: 121 MMHG | WEIGHT: 272.71 LBS | BODY MASS INDEX: 39.13 KG/M2

## 2025-04-30 DIAGNOSIS — C67.8 MALIGNANT NEOPLASM OF OVERLAPPING SITES OF BLADDER (MULTI): Primary | ICD-10-CM

## 2025-04-30 DIAGNOSIS — Z00.6 EXAM FOR CLINICAL RESEARCH: ICD-10-CM

## 2025-04-30 DIAGNOSIS — C61 PROSTATE CANCER (MULTI): Primary | ICD-10-CM

## 2025-04-30 DIAGNOSIS — R60.0 LOWER EXTREMITY EDEMA: ICD-10-CM

## 2025-04-30 LAB
ALBUMIN SERPL BCP-MCNC: 4.6 G/DL (ref 3.4–5)
ALP SERPL-CCNC: 147 U/L (ref 33–136)
ALT SERPL W P-5'-P-CCNC: 16 U/L (ref 10–52)
ANION GAP SERPL CALC-SCNC: 12 MMOL/L (ref 10–20)
AST SERPL W P-5'-P-CCNC: 15 U/L (ref 9–39)
BASOPHILS # BLD AUTO: 0.07 X10*3/UL (ref 0–0.1)
BASOPHILS NFR BLD AUTO: 0.7 %
BILIRUB DIRECT SERPL-MCNC: 0.1 MG/DL (ref 0–0.3)
BILIRUB SERPL-MCNC: 0.7 MG/DL (ref 0–1.2)
BUN SERPL-MCNC: 18 MG/DL (ref 6–23)
CALCIUM SERPL-MCNC: 9.4 MG/DL (ref 8.6–10.3)
CHLORIDE SERPL-SCNC: 101 MMOL/L (ref 98–107)
CO2 SERPL-SCNC: 28 MMOL/L (ref 21–32)
CREAT SERPL-MCNC: 1.02 MG/DL (ref 0.5–1.3)
EGFRCR SERPLBLD CKD-EPI 2021: 79 ML/MIN/1.73M*2
EOSINOPHIL # BLD AUTO: 0.28 X10*3/UL (ref 0–0.7)
EOSINOPHIL NFR BLD AUTO: 2.9 %
ERYTHROCYTE [DISTWIDTH] IN BLOOD BY AUTOMATED COUNT: 14.5 % (ref 11.5–14.5)
GLUCOSE SERPL-MCNC: 86 MG/DL (ref 74–99)
HCT VFR BLD AUTO: 44.2 % (ref 41–52)
HGB BLD-MCNC: 14.4 G/DL (ref 13.5–17.5)
IMM GRANULOCYTES # BLD AUTO: 0.05 X10*3/UL (ref 0–0.7)
IMM GRANULOCYTES NFR BLD AUTO: 0.5 % (ref 0–0.9)
LYMPHOCYTES # BLD AUTO: 2.2 X10*3/UL (ref 1.2–4.8)
LYMPHOCYTES NFR BLD AUTO: 22.7 %
MAGNESIUM SERPL-MCNC: 1.75 MG/DL (ref 1.6–2.4)
MCH RBC QN AUTO: 29.6 PG (ref 26–34)
MCHC RBC AUTO-ENTMCNC: 32.6 G/DL (ref 32–36)
MCV RBC AUTO: 91 FL (ref 80–100)
MONOCYTES # BLD AUTO: 0.68 X10*3/UL (ref 0.1–1)
MONOCYTES NFR BLD AUTO: 7 %
NEUTROPHILS # BLD AUTO: 6.43 X10*3/UL (ref 1.2–7.7)
NEUTROPHILS NFR BLD AUTO: 66.2 %
NRBC BLD-RTO: 0 /100 WBCS (ref 0–0)
PHOSPHATE SERPL-MCNC: 3.4 MG/DL (ref 2.5–4.9)
PLATELET # BLD AUTO: 195 X10*3/UL (ref 150–450)
POTASSIUM SERPL-SCNC: 4.1 MMOL/L (ref 3.5–5.3)
PROT SERPL-MCNC: 7.6 G/DL (ref 6.4–8.2)
RBC # BLD AUTO: 4.87 X10*6/UL (ref 4.5–5.9)
SODIUM SERPL-SCNC: 137 MMOL/L (ref 136–145)
WBC # BLD AUTO: 9.7 X10*3/UL (ref 4.4–11.3)

## 2025-04-30 PROCEDURE — 83735 ASSAY OF MAGNESIUM: CPT

## 2025-04-30 PROCEDURE — 2500000004 HC RX 250 GENERAL PHARMACY W/ HCPCS (ALT 636 FOR OP/ED): Performed by: INTERNAL MEDICINE

## 2025-04-30 PROCEDURE — 36415 COLL VENOUS BLD VENIPUNCTURE: CPT

## 2025-04-30 PROCEDURE — 84075 ASSAY ALKALINE PHOSPHATASE: CPT

## 2025-04-30 PROCEDURE — 82248 BILIRUBIN DIRECT: CPT

## 2025-04-30 PROCEDURE — C8929 TTE W OR WO FOL WCON,DOPPLER: HCPCS

## 2025-04-30 PROCEDURE — 84100 ASSAY OF PHOSPHORUS: CPT

## 2025-04-30 PROCEDURE — 85025 COMPLETE CBC W/AUTO DIFF WBC: CPT

## 2025-04-30 PROCEDURE — 99215 OFFICE O/P EST HI 40 MIN: CPT | Performed by: NURSE PRACTITIONER

## 2025-04-30 RX ADMIN — PERFLUTREN 3 ML OF DILUTION: 6.52 INJECTION, SUSPENSION INTRAVENOUS at 16:00

## 2025-04-30 ASSESSMENT — PAIN SCALES - GENERAL: PAINLEVEL_OUTOF10: 8

## 2025-04-30 NOTE — PROGRESS NOTES
Patient ID: Toro Lujan is a 70 y.o. male.  Attending Physician: Dr. Topher Castanon  Cancer Diagnosis:  Cancer Staging   No matching staging information was found for the patient.    Current Therapy: SEGE 1822: Disitamab Vedotin, cohorts A and B in HER2-overexpressing IHC 1+, 2+, 3+ for locally advanced unresectable or metastatic UC who have received prior platinum-based chemotherapy  Dose reduced to 1 mg/kg IV every 2 weeks - on HOLD    Genetics: NA    Subjective      Cancer History:  Oncology History   Malignant neoplasm of overlapping sites of bladder (Multi)   2/13/2024 Initial Diagnosis    Malignant neoplasm of overlapping sites of bladder (CMS/HCC)     2/14/2024 - 8/21/2024 Research Study Participant    (RUST) [id number] Intensive PK Cohort - Disitamab Vedotin, 14 Day Cycles  Plan Provider: Fan Rapp MD PhD  Treatment goal: Control  Line of treatment: Second Line  Associated studies: A Study of Disitamab Vedotin in Subjects With HER2 Expressing Urothelial Carcinoma     2/14/2024 -  Research Study Participant    (RUST) [id number] Intensive PK Cohort - Disitamab Vedotin, 14 Day Cycles  Plan Provider: Topher Castanon MD  Treatment goal: Control  Line of treatment: Second Line  Associated studies: A Study of Disitamab Vedotin in Subjects With HER2 Expressing Urothelial Carcinoma     8/8/2024 - 8/8/2024 Research Study Participant    (RUST) [id number] Intensive PK Cohort - Disitamab Vedotin, 14 Day Cycles  Plan Provider: MERLIN Delong-CNP  Treatment goal: Control  Line of treatment: Second Line  Associated studies: A Study of Disitamab Vedotin in Subjects With HER2 Expressing Urothelial Carcinoma       11/5/2020: CTU shows mildly asymmetric generalized urinary bladder wall thickening with mild mucosal hyperenhancement, large prostate  1/29/2021: TURBT. 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 Posterior tumor reveals papillary high-grade at least  pTa urothelial carcinoma with no definite invasion, no muscle present  2/24/2021: Bladder tumor path shows high grade focally invasive urothelial carcinoma into the lamina propria, non-muscle invasive.   5/6/2021: Underwent radical cystectomy with ilial conduit due to multiple multiple disease recurrences without MIBC.  12/13/2021: CTU suspicious for new enlarged pelvic LN  2/17/2022: Started chemotherapy with split-dose gemcitabine and cisplatin  3/10/2022: C2 gem/cis  3/31/2022: C3 gem/cis  4/20/2022: Restaging scans revealed response  4/21/2022: C4 gem/cis  5/12/2022: C5 gem/cis  6/24/2022: Started maintenance avelumab. Treatment was placed on hold due to insurance coverage issues after 1 cycle  8/24/2022: CT scans stable  9/2/2022: Restarted avelumab    3/2/2023: Last dose avelumab, off after  12/28/2023: CT reveals increase in left para-aortic LN highly suspicious for metastatic LAD.  1/18/2024: Again increase in para-aortic LN.  1/31/2024: Screening for SEGE 1822: Disitamab vedotin  2/7/2024: Screening for SEGE 1822  2/14/2024: Cycle 1 day 1 disitamab vedotin as part of clinical trial  7/24/2024: Cycle 12 day 1 DV, dose reduction for neuropathy  9/4/2024: Cycle 15 day 1 DV  9/18/2024: Cycle 16 day 1 DV HELD due to neuropathy, extended hold due to disease response and continued symptoms.    Interval History:  Mr. Lujan presents today in follow up. He continues to have significant pain in his hip and knee. He is working with ortho. Injection helped. Has been eating a lot lately, plenty of takeout chinese food. No SOB, cough, SMITH, pain in leg other than hip and knee. Neuropathy seems to be improving a bit. Otherwise, no new or concerning symptoms. The remainder of his ROS is otherwise negative.  HPI    Objective    BSA: 2.47 meters squared  BP (!) 121/95   Pulse 72   Temp 36.2 °C (97.2 °F)   Resp 17   Wt 124 kg (272 lb 11.3 oz)   SpO2 99%   BMI 39.13 kg/m²     Physical Exam  General: alert, well-dressed  in NAD. Speech is fluent and coherent, words clear. Good insight.   Skin: warm, dry, and pink without cyanosis or nail clubbing. No rash, petechiae, or ecchymoses.  HEENT: Normocephalic atraumatic. Sclera white, conjunctiva pink. EOMs intact. Hearing intact to spoken voice. Oral mucosa pink. No visible lesions.  Respiratory: Chest expansion symmetric. Breath sounds vesicular in all lobes without crackles, wheezes, or rhonchi bilaterally.  CV: S1S2 audible and crisp without murmurs, rubs, or extra sounds. RRR. Radial pulses strong and regular. Extremities warm and pink. Bilateral 1+ edema  GI: abdomen is round, soft, and non-tender. Active bowel sounds.  Psych: engaged, polite, appropriate conversation and eye contact.    Current Medications:    Current Outpatient Medications:     acetaminophen (Tylenol) 500 mg tablet, Take 2 tablets (1,000 mg) by mouth every 8 hours if needed., Disp: , Rfl:     Advair Diskus 100-50 mcg/dose diskus inhaler, INHALE 1 PUFF BY MOUTH TWICE DAILY AS DIRECTED. RINSE AND GARGLE MOUTH WITH WATER AFTER EACH USE, Disp: , Rfl:     ALPRAZolam (Xanax) 0.5 mg tablet, Take one tablet one hour before anxiety-provoking situation. Limits one tab/day, three tabs/week., Disp: , Rfl:     aspirin 81 mg EC tablet, Take 1 tablet (81 mg) by mouth once daily., Disp: , Rfl:     gabapentin (Neurontin) 300 mg capsule, Take 2 capsules (600 mg) by mouth early in the morning. AND 1 capsule (300 mg) once daily at bedtime., Disp: 90 capsule, Rfl: 2    lisinopril 20 mg tablet, Take 1 tablet (20 mg) by mouth once daily., Disp: , Rfl:     metoprolol tartrate (Lopressor) 25 mg tablet, Take 1 tablet (25 mg) by mouth once daily., Disp: , Rfl:     nitroglycerin (Nitrostat) 0.4 mg SL tablet, DISSOLVE 1 TABLET UNDER THE TONGUE EVERY 5 MINUTES AS NEEDED FOR CHEST PAIN FOR UP TO 3 TABLET TOTAL. CALL 911 WITH 2ND TABLET IF NO RELIEF, Disp: , Rfl:     pantoprazole (ProtoNix) 40 mg EC tablet, Take 1 tablet (40 mg) by mouth once  "daily., Disp: , Rfl:     rosuvastatin (Crestor) 40 mg tablet, Take 1 tablet (40 mg) by mouth once daily., Disp: , Rfl:     sertraline (Zoloft) 100 mg tablet, , Disp: , Rfl:      Most Recent Labs:  Results for orders placed or performed in visit on 04/30/25   CBC and Auto Differential    Collection Time: 04/30/25  1:11 PM   Result Value Ref Range    WBC 9.7 4.4 - 11.3 x10*3/uL    nRBC 0.0 0.0 - 0.0 /100 WBCs    RBC 4.87 4.50 - 5.90 x10*6/uL    Hemoglobin 14.4 13.5 - 17.5 g/dL    Hematocrit 44.2 41.0 - 52.0 %    MCV 91 80 - 100 fL    MCH 29.6 26.0 - 34.0 pg    MCHC 32.6 32.0 - 36.0 g/dL    RDW 14.5 11.5 - 14.5 %    Platelets 195 150 - 450 x10*3/uL    Neutrophils % 66.2 40.0 - 80.0 %    Immature Granulocytes %, Automated 0.5 0.0 - 0.9 %    Lymphocytes % 22.7 13.0 - 44.0 %    Monocytes % 7.0 2.0 - 10.0 %    Eosinophils % 2.9 0.0 - 6.0 %    Basophils % 0.7 0.0 - 2.0 %    Neutrophils Absolute 6.43 1.20 - 7.70 x10*3/uL    Immature Granulocytes Absolute, Automated 0.05 0.00 - 0.70 x10*3/uL    Lymphocytes Absolute 2.20 1.20 - 4.80 x10*3/uL    Monocytes Absolute 0.68 0.10 - 1.00 x10*3/uL    Eosinophils Absolute 0.28 0.00 - 0.70 x10*3/uL    Basophils Absolute 0.07 0.00 - 0.10 x10*3/uL   Comprehensive Metabolic Panel    Collection Time: 04/30/25  1:11 PM   Result Value Ref Range    Glucose 86 74 - 99 mg/dL    Sodium 137 136 - 145 mmol/L    Potassium 4.1 3.5 - 5.3 mmol/L    Chloride 101 98 - 107 mmol/L    Bicarbonate 28 21 - 32 mmol/L    Anion Gap 12 10 - 20 mmol/L    Urea Nitrogen 18 6 - 23 mg/dL    Creatinine 1.02 0.50 - 1.30 mg/dL    eGFR 79 >60 mL/min/1.73m*2    Calcium 9.4 8.6 - 10.3 mg/dL    Albumin 4.6 3.4 - 5.0 g/dL    Alkaline Phosphatase 147 (H) 33 - 136 U/L    Total Protein 7.6 6.4 - 8.2 g/dL    AST 15 9 - 39 U/L    Bilirubin, Total 0.7 0.0 - 1.2 mg/dL    ALT 16 10 - 52 U/L   Magnesium    Collection Time: 04/30/25  1:11 PM   Result Value Ref Range    Magnesium 1.75 1.60 - 2.40 mg/dL      No results found for: \"PSA\" "     Performance Status:  ECOG Score: 0- Fully active, able to carry on all pre-disease performance w/o restriction.  Karnofsky Score: 100 - Fully active, able to carry on all pre-disease performed without restriction    Assessment/Plan   Toro Lujan is a 70 y.o. male with mUC to LN who presents in follow up and consideration of cycle 16 day 1 SEGE 1822 on DV. He looks and feels well, though has neuropathy, grade 2. This is continuing to improve. Labs unremarkable. Has been on hold for about 6 months at this time. Will continue hold.    He has significant edema, which is new. Discussed watching dietary salt and limiting takeout/fried/processed foods. He will diary weights with salt intake as well. Given LE edema, will obtain US to rule out blood clot in setting of active cancer.    He is agreeable to see ortho for back and knee pain.    # mUC  - HOLD DV today due to neuropathy  - Continue to monitor labs    # LE edema  - Watch salt  - STAT US  - Weights daily    # Neuropathy  - Continue gabapentin 300 mg PO daily, 600 mg at night  - On Zoloft, working well for anxiety so will avoid duloxetine at this time.  - Dose reduction further on resumption, HOLD tx today  - Continue PT/OT for motor function    # Back pain  # L knee pain  - Referral to ortho, walked him to scheduling today    # Triglycerides  - Continue with cardiology  - Monitor    # Health Maintenance  - Continue with PCP and other healthcare providers  - Advised exercise, heart-healthy diet    RTC per protocol    Total time spent on this encounter was 55 minutes, which included preparation, direct time with patient, documentation, and care coordination on the day of visit.    Daniella Pineda, MSN, APRN, AGNP-C, AOCNP  Associate Nurse Practitioner  [facility]

## 2025-04-30 NOTE — RESEARCH NOTES
"  Research Note Unscheduled Visit     Troo Lujan is enrolled on [id number] cohort A/B and is here today for his q2 week assessment given that he is on hold for sensory peripheral neuropathy.  Upon review of vitals, labs, exam AE's and conmeds, TREATMENT HOLD continues.    Weight today 124 kg.  This is an increase of 6 kg since last weight on [id number]. Patient c/o feeling \"puffy\" in bilateral feet and lower extremities.  Venous duplex ultrasound ordered to rule out DVT.  Patient reports having eaten a lot over the last week and especially very salty food.  Urine looks very clear and light yellow.  Patient denies pinching, burning, redness, warmth or pain. Patient encouraged to eat less salty food and to drink more water as he reported that he does not drink a lot of water.    Patient got a cortisone injection in left knee on 22APR. He says it has helped relieve some pain.  He cancelled appointments 23APR due to discomfort from the injection.    Today, he appears fatigued and a little down;  walking slowly with a cane.  Pain in right hip still present but improved.  Patient encouraged to schedule cortisone injection.    +G1 Alkaline phosphatase increased continues.  Otherwise, labs look great.  Echo done today.  Out of window because patient cancelled appointments last week.  Deviation filed.  Calendar reviewed.  Parking pass given.  Reimbursement submitted through  clinical.          Education Documentation  Monitoring Weight, taught by Emma Mann RN at 4/30/2025  3:00 PM.  Learner: Patient  Readiness: Eager  Method: Explanation  Response: Verbalizes Understanding    Pain Rating Scale, taught by Emma Mann RN at 4/30/2025  3:00 PM.  Learner: Patient  Readiness: Eager  Method: Explanation  Response: Verbalizes Understanding    Edema Management, taught by Emma Mann RN at 4/30/2025  3:00 PM.  Learner: Patient  Readiness: Eager  Method: Explanation  Response: Verbalizes Understanding    Care of " Neuropathy, taught by Emma Mann RN at 4/30/2025  3:00 PM.  Learner: Patient  Readiness: Eager  Method: Explanation  Response: Verbalizes Understanding    Fatigue, taught by Emma Mann RN at 4/30/2025  3:00 PM.  Learner: Patient  Readiness: Eager  Method: Explanation  Response: Verbalizes Understanding    Treatment Plan and Schedule, taught by Emma Mann RN at 4/30/2025  3:00 PM.  Learner: Patient  Readiness: Eager  Method: Explanation  Response: Verbalizes Understanding    Comprehensive Metabolic Panel (CMP), taught by Emma Mann RN at 4/30/2025  3:00 PM.  Learner: Patient  Readiness: Eager  Method: Explanation  Response: Verbalizes Understanding    Complete Blood Count with Differential (CBC w/ Diff), taught by Emma Mann RN at 4/30/2025  3:00 PM.  Learner: Patient  Readiness: Eager  Method: Explanation  Response: Verbalizes Understanding    Education Comments  No comments found.

## 2025-05-01 LAB
AORTIC VALVE PEAK VELOCITY: 1.57 M/S
AV PEAK GRADIENT: 10 MMHG
AVA (PEAK VEL): 3.91 CM2
EJECTION FRACTION APICAL 4 CHAMBER: 61.8
EJECTION FRACTION: 70 %
LEFT VENTRICULAR OUTFLOW TRACT DIAMETER: 2.49 CM
MITRAL VALVE E/A RATIO: 1.18
RIGHT VENTRICLE FREE WALL PEAK S': 12 CM/S
TRICUSPID ANNULAR PLANE SYSTOLIC EXCURSION: 2.8 CM

## 2025-05-07 ENCOUNTER — APPOINTMENT (OUTPATIENT)
Dept: HEMATOLOGY/ONCOLOGY | Facility: HOSPITAL | Age: 70
End: 2025-05-07
Payer: MEDICARE

## 2025-05-07 ENCOUNTER — EDUCATION (OUTPATIENT)
Dept: HEMATOLOGY/ONCOLOGY | Facility: HOSPITAL | Age: 70
End: 2025-05-07
Payer: COMMERCIAL

## 2025-05-08 NOTE — PROGRESS NOTES
Patient cancelled appointment for today.  Next visit is scans on [id number].    This nurse confirmed with patient his schedule through [id number] and patient confirmed receipt of the message.

## 2025-05-16 ENCOUNTER — HOSPITAL ENCOUNTER (OUTPATIENT)
Dept: RADIOLOGY | Facility: CLINIC | Age: 70
Discharge: HOME | End: 2025-05-16
Payer: COMMERCIAL

## 2025-05-16 DIAGNOSIS — R60.0 LOWER EXTREMITY EDEMA: ICD-10-CM

## 2025-05-16 DIAGNOSIS — C67.8 MALIGNANT NEOPLASM OF OVERLAPPING SITES OF BLADDER (MULTI): ICD-10-CM

## 2025-05-16 PROCEDURE — 93970 EXTREMITY STUDY: CPT

## 2025-05-19 ENCOUNTER — HOSPITAL ENCOUNTER (OUTPATIENT)
Dept: RADIOLOGY | Facility: HOSPITAL | Age: 70
Discharge: HOME | End: 2025-05-19
Payer: COMMERCIAL

## 2025-05-19 DIAGNOSIS — Z00.6 EXAM FOR CLINICAL RESEARCH: ICD-10-CM

## 2025-05-19 PROCEDURE — 2550000001 HC RX 255 CONTRASTS: Performed by: INTERNAL MEDICINE

## 2025-05-19 PROCEDURE — 74177 CT ABD & PELVIS W/CONTRAST: CPT

## 2025-05-19 RX ADMIN — IOHEXOL 75 ML: 350 INJECTION, SOLUTION INTRAVENOUS at 11:38

## 2025-05-22 ENCOUNTER — EDUCATION (OUTPATIENT)
Dept: HEMATOLOGY/ONCOLOGY | Facility: HOSPITAL | Age: 70
End: 2025-05-22

## 2025-05-22 ENCOUNTER — LAB (OUTPATIENT)
Dept: LAB | Facility: HOSPITAL | Age: 70
End: 2025-05-22
Payer: COMMERCIAL

## 2025-05-22 ENCOUNTER — OFFICE VISIT (OUTPATIENT)
Dept: HEMATOLOGY/ONCOLOGY | Facility: HOSPITAL | Age: 70
End: 2025-05-22
Payer: COMMERCIAL

## 2025-05-22 VITALS
HEART RATE: 72 BPM | SYSTOLIC BLOOD PRESSURE: 110 MMHG | TEMPERATURE: 96.8 F | BODY MASS INDEX: 37.58 KG/M2 | DIASTOLIC BLOOD PRESSURE: 62 MMHG | WEIGHT: 261.91 LBS | OXYGEN SATURATION: 99 % | RESPIRATION RATE: 18 BRPM

## 2025-05-22 DIAGNOSIS — C77.9 REGIONAL LYMPH NODE METASTASIS PRESENT (MULTI): ICD-10-CM

## 2025-05-22 DIAGNOSIS — Z00.6 RESEARCH SUBJECT: ICD-10-CM

## 2025-05-22 DIAGNOSIS — M89.8X9 BONE PAIN: ICD-10-CM

## 2025-05-22 DIAGNOSIS — C67.8 MALIGNANT NEOPLASM OF OVERLAPPING SITES OF BLADDER (MULTI): Primary | ICD-10-CM

## 2025-05-22 DIAGNOSIS — R60.0 LOWER EXTREMITY EDEMA: ICD-10-CM

## 2025-05-22 DIAGNOSIS — Z92.21 S/P CHEMOTHERAPY, TIME SINCE GREATER THAN 12 WEEKS: ICD-10-CM

## 2025-05-22 LAB
ALBUMIN SERPL BCP-MCNC: 4.3 G/DL (ref 3.4–5)
ALP SERPL-CCNC: 120 U/L (ref 33–136)
ALT SERPL W P-5'-P-CCNC: 12 U/L (ref 10–52)
ANION GAP SERPL CALC-SCNC: 12 MMOL/L (ref 10–20)
AST SERPL W P-5'-P-CCNC: 13 U/L (ref 9–39)
BASOPHILS # BLD AUTO: 0.03 X10*3/UL (ref 0–0.1)
BASOPHILS NFR BLD AUTO: 0.3 %
BILIRUB DIRECT SERPL-MCNC: 0.1 MG/DL (ref 0–0.3)
BILIRUB SERPL-MCNC: 0.6 MG/DL (ref 0–1.2)
BUN SERPL-MCNC: 24 MG/DL (ref 6–23)
CALCIUM SERPL-MCNC: 9.1 MG/DL (ref 8.6–10.3)
CHLORIDE SERPL-SCNC: 104 MMOL/L (ref 98–107)
CO2 SERPL-SCNC: 25 MMOL/L (ref 21–32)
CREAT SERPL-MCNC: 1.04 MG/DL (ref 0.5–1.3)
EGFRCR SERPLBLD CKD-EPI 2021: 77 ML/MIN/1.73M*2
EOSINOPHIL # BLD AUTO: 0.35 X10*3/UL (ref 0–0.7)
EOSINOPHIL NFR BLD AUTO: 3.6 %
ERYTHROCYTE [DISTWIDTH] IN BLOOD BY AUTOMATED COUNT: 14.1 % (ref 11.5–14.5)
GLUCOSE SERPL-MCNC: 93 MG/DL (ref 74–99)
HCT VFR BLD AUTO: 41.7 % (ref 41–52)
HGB BLD-MCNC: 14.1 G/DL (ref 13.5–17.5)
IMM GRANULOCYTES # BLD AUTO: 0.03 X10*3/UL (ref 0–0.7)
IMM GRANULOCYTES NFR BLD AUTO: 0.3 % (ref 0–0.9)
LYMPHOCYTES # BLD AUTO: 2.37 X10*3/UL (ref 1.2–4.8)
LYMPHOCYTES NFR BLD AUTO: 24.6 %
MAGNESIUM SERPL-MCNC: 1.82 MG/DL (ref 1.6–2.4)
MCH RBC QN AUTO: 29.9 PG (ref 26–34)
MCHC RBC AUTO-ENTMCNC: 33.8 G/DL (ref 32–36)
MCV RBC AUTO: 89 FL (ref 80–100)
MONOCYTES # BLD AUTO: 0.71 X10*3/UL (ref 0.1–1)
MONOCYTES NFR BLD AUTO: 7.4 %
NEUTROPHILS # BLD AUTO: 6.15 X10*3/UL (ref 1.2–7.7)
NEUTROPHILS NFR BLD AUTO: 63.8 %
NRBC BLD-RTO: 0 /100 WBCS (ref 0–0)
PHOSPHATE SERPL-MCNC: 3.1 MG/DL (ref 2.5–4.9)
PLATELET # BLD AUTO: 208 X10*3/UL (ref 150–450)
POTASSIUM SERPL-SCNC: 4.3 MMOL/L (ref 3.5–5.3)
PROT SERPL-MCNC: 7.1 G/DL (ref 6.4–8.2)
RBC # BLD AUTO: 4.71 X10*6/UL (ref 4.5–5.9)
SODIUM SERPL-SCNC: 137 MMOL/L (ref 136–145)
WBC # BLD AUTO: 9.6 X10*3/UL (ref 4.4–11.3)

## 2025-05-22 PROCEDURE — 84100 ASSAY OF PHOSPHORUS: CPT

## 2025-05-22 PROCEDURE — 36415 COLL VENOUS BLD VENIPUNCTURE: CPT

## 2025-05-22 PROCEDURE — 80053 COMPREHEN METABOLIC PANEL: CPT

## 2025-05-22 PROCEDURE — 99214 OFFICE O/P EST MOD 30 MIN: CPT | Performed by: INTERNAL MEDICINE

## 2025-05-22 PROCEDURE — 83735 ASSAY OF MAGNESIUM: CPT

## 2025-05-22 PROCEDURE — 85025 COMPLETE CBC W/AUTO DIFF WBC: CPT

## 2025-05-22 PROCEDURE — 82248 BILIRUBIN DIRECT: CPT

## 2025-05-22 NOTE — PROGRESS NOTES
HEMATOLOGY AND MEDICAL ONCOLOGY  -----------------------------------------------------------------------------------     Medical Oncology Out Patient Clinic Note      Patient's Name: Toro Lujan  MRN: [id number]  Date of Service: 4/10/25  In- Person Visit: YES    Reason for Visit: FU    HPI: 69 yo male known to our [facility] team with Met TCC. He is currently participating on the SEGE 1822: Disitamab Vedotin, cohorts A and B in HER2-overexpressing IHC 1+, 2+, 3+ for locally advanced unresectable or metastatic UC who have received prior platinum-based chemotherapy    Dose reduced to 1 mg/kg IV every 2 weeks - on HOLD sec Peripheral neuropathy since Sep 2024, the patient has been in follow up with CT scans. Last CT scan done on May 19, 2025    Resulted as   IMPRESSION:  Urothelial carcinoma restaging scan. When compared to the prior examination dated 04/09/2025:  1. Stable postsurgical changes of cystoprostatectomy with right lower quadrant ileal conduit.  2. No definite evidence of new metastatic disease in the abdomen and pelvis.  3. Ventral abdominal hernia containing anastomosis containing loop of  bowel adjacent to the ileostomy site as described above.  4. Anterolisthesis of L5 on S1 with chronic bilateral pars defects        Best response to therapy MA    Prior regimens; CCDP-based chemo and Avelumab maintenance followed by PD    Feels well but dealing with some DJD issues in knees- not related to TCC or therapy  Peripheral neuropathy is improving    Updated PMHx  None    Review of Systems  Last three months he has been having pain in the joints.  Started on right hip, then left knee, and now is worse and painful in the right.  He was seen by orthopedics, they mentioned OA, underwent steroids in the left knee, and may have steroid in the hip as well.  NO fever, no changes in the urine, stoma is working appropriately.    Physical Exam:  /62 (BP Location: Left arm, Patient Position: Sitting, BP Cuff  "Size: Large adult)   Pulse 72   Temp 36 °C (96.8 °F) (Skin)   Resp 18   Wt 119 kg (261 lb 14.5 oz)   SpO2 99%   BMI 37.58 kg/m²     ECOG Performance Status: 1 (sec DJD in knee)    GEN: Alert, well hydrated, in no acute distress.  EYES: Pupils equal, round, reactive to light and accommodation.  ENT: Hearing is adequate. Nose and nasal mucosa normal. Oropharynx normal; no erythema or exudate. Neck supple, without adenopathy. No JVD.  RESPIRATORY: Lungs sound clear to auscultation.  CARDIAC: Heart has regular rate and rhythm; no murmur or gallop.  ABD: Soft, nontender, without masses or organomegaly. Conduit working Ok, no changes in the skin.  MSK: No lower extremity edema, cyanosis, or clubbing.  NEURO: No neurological deficit, no lateralization, no focal signs, mobilizing four extremities.  SKIN: No bruises, lacerations, or ulcerations noted      LABS:  No results found for: \"PSA\"  Lab Results   Component Value Date    WBC 9.6 05/22/2025    HGB 14.1 05/22/2025    HCT 41.7 05/22/2025    MCV 89 05/22/2025     05/22/2025     Lab Results   Component Value Date    GLUCOSE 93 05/22/2025    CALCIUM 9.1 05/22/2025     05/22/2025    K 4.3 05/22/2025    CO2 25 05/22/2025     05/22/2025    BUN 24 (H) 05/22/2025    CREATININE 1.04 05/22/2025     No results found for: \"TESTOSTERONE\"  Lab Results   Component Value Date    ALT 12 05/22/2025    AST 13 05/22/2025    ALKPHOS 120 05/22/2025    BILITOT 0.6 05/22/2025       IMAGING:  Study Result    Narrative & Impression   Interpreted By:  Schoenberger, Joseph,   STUDY:  CT CHEST ABDOMEN PELVIS W IV CONTRAST;  4/9/2025 11:22 am      INDICATION:  Signs/Symptoms:required per clinical trial. History of bladder  carcinoma status post cystectomy and ileal conduit creation      ,Z00.6 Encounter for examination for normal comparison and control in  clinical research program      COMPARISON:  03/03/2025. 01/15/2025.      ACCESSION NUMBER(S):  [id number]      ORDERING " CLINICIAN:  YRN MANUEL      TECHNIQUE:  CT of the chest, abdomen, and pelvis was performed.  Contiguous axial  images were obtained at 3 mm slice thickness through the chest,  abdomen and pelvis. Coronal and sagittal reconstructions at 3 mm  slice thickness were performed. 75 ML of Omnipaque 350 was  administered intravenously without immediate complication.      FINDINGS:  CHEST:      LUNG/PLEURA/LARGE AIRWAYS:  The large airways appear intact. There is no pleural effusion or  pneumothorax or airspace opacity. There is no new focal lung opacity  that would suggest emerging metastatic disease.      VESSELS:  Aorta and pulmonary arteries are normal caliber.  Mild  atherosclerotic changes are noted of the aorta and branching vessels.  Moderate coronary artery calcifications are present.      HEART:  Normal size.  No pericardial effusion      MEDIASTINUM AND EBONY:  No mediastinal, hilar or axillary lymphadenopathy is present.  The  esophagus is normal.      CHEST WALL AND LOWER NECK:  Chest wall soft tissues appear normal. Normal thyroid. No  supraclavicular axillary adenopathy.      ABDOMEN:      LIVER:  There are multiple distinctly marginated water attenuating lesions in  the liver. There are stable in size and appearance from the prior.  The larger of these suggest cysts. The smaller ones are too small to  characterize. However they are stable in size and likely cysts.      BILE DUCTS:  No dilation      GALLBLADDER:  No calcified stones. No wall thickening.      PANCREAS:  Within normal limits.      SPLEEN:  Within normal limits.      ADRENAL GLANDS:  Heterogeneous hypoattenuating large min of the left adrenal gland  stable in size in appearance from the prior. Small hypoattenuated  nodularity in the anterior limb of the right adrenal gland again  stable from prior.      KIDNEYS AND URETERS:  Multifocal cortical scarring in the left kidney is unchanged from  prior. The right kidney appears normal. There are  symmetric  nephrograms with no perinephric fluid or infiltration. There is a  lesion with macroscopic fat projecting posteriorly from the mid to  upper left renal pole likely a tiny angiomyolipoma. There is a 3 mm  nonobstructing upper pole right renal stone posteriorly. The this  stone is not apparent on the prior exam. There is no hydroureteral  nephrosis or ureter stone.      PELVIS:      BLADDER:  The bladder is surgically absent. The patient is status post ileal  conduit creation. There is a right lower quadrant stoma. No  distension of the conduit. No mural thickening of the conduit.  Calcification within the conduit is unchanged from prior.      REPRODUCTIVE ORGANS:  Prostate appears to be surgically absent      BOWEL:  The stomach is unremarkable. The small and large bowel are normal in  caliber and demonstrate no wall thickening.  The appendix appears  normal.          VESSELS:  There is no aneurysmal dilatation of the abdominal aorta. The IVC  appears normal.      PERITONEUM/RETROPERITONEUM/LYMPH NODES:  No ascites or free air, no fluid collection.  Mildly prominent right  external iliac lymph node short axis measurement 10 mm not  significantly different from prior. No overt pathologic enlargement  of abdominal or pelvic lymph nodes.      BONE AND SOFT TISSUE:  No suspicious osseous lesions are identified. Degenerative discogenic  disease is noted in the lower thoracic and lumbar spine.  There is a  peristomal hernia associated with the ileal conduit containing only  fat. Just medial to this to the right of midline in the periumbilical  region there is a abdominal wall hernia with herniation of fat as  well as a small bowel loop. No mural thickening this loop. This loop  of bowel is associated with the previous anastomosis. This appearance  is unchanged from the prior.      IMPRESSION:  CHEST:  1.  No significant change from prior convincing evidence for emerging  metastatic disease.       ABDOMEN-PELVIS:  1.  Liver lesions which are likely cysts are unchanged from prior.  2. This slightly prominent distal right external iliac lymph node is  unchanged from prior.  3. Appearance of abdominal wall hernias and ileal conduit are not  unchanged from prior.  4. There has been interval development of a 3 mm upper pole right  renal stone when compared to prior.  5. No convincing evidence for emerging abdominal or pelvic metastatic  disease.          MACRO:  None      Signed by: Joseph Schoenberger 4/9/2025 1:23 PM  Dictation workstation:   [id number]         05/19/2025  CT AP  MPRESSION:  Urothelial carcinoma restaging scan. When compared to the prior examination dated 04/09/2025:  1. Stable postsurgical changes of cystoprostatectomy with right lower quadrant ileal conduit.  2. No definite evidence of new metastatic disease in the abdomen and pelvis.  3. Ventral abdominal hernia containing anastomosis containing loop of  bowel adjacent to the ileostomy site as described above.  4. Anterolisthesis of L5 on S1 with chronic bilateral pars defects      GENOMICS:  ** Copied from [address] [place] [address] on 21-Apr-2025 01:22PM by Topher Castanon **    Test Name: Buru Buru xF+ (XF.V3)  Laboratory Name: [facility]  Report Id: [id number]    Result Interpretation:  * No potentially actionable variants and no reportable treatment options found.  * Treatment Notes: No reportable treatment options found.    Molecular Findings:  * Gene: TET2, Variant: Frameshift - LOF, Biologically Relevant, p.D162fs (Allele Frequency - 4.4%)  * Gene: TP53, Variant: Missense variant - LOF, Biologically Relevant, p.C238F (Allele Frequency - 0.5%)  * Biomarker: Microsatellite Instability, Status: MSI-High not detected  * Biomarker: Tumor Mutation [place] (6.1 Muts/Mb)  * 6 variants of unknown significance    ** End of copied information **      A/P: Met TCC  KS remains despite the hold on his systemic therapy  The patient has continued with pain in  the joints, had in the right knee, improved after steroids injections. And now the patient has pain in the right knee.    Plan:  Obtain a bone scan to rule out if there is any bone mets. The patient has been dealing with joint pain.    Discussed importance of a healthier diet - offered to see Nutritional Services; Also discussed the importance of daily regular exercise (aerobic and resistance differences noted)  Offered to see Supportive Services if needed as well as integrative Oncology and Psychosocial Support    RTC after bone scan done.    Patient seen, examined, and assessed with Dr. Topher Castanon MD. The patient's questions, doubts and concerns were addressed and resolved apropriately.   A thorough and detailed explanation of the patient's case was provided. The patient demonstrated an understanding of the information and agreed with the proposed treatment plan and laboratory tests. The patient expressed gratitude towards our team for the care provided.       -----------------------------------------------------------------------------------  Alex Irvin MD  Hematology and Medical Oncology Fellow      TEACHING ATTENDING ATTESTATION    I saw and evaluated this patient with Resident/Fellow. I reviewed the resident/fellow's documentation and discussed the patient with the resident/fellow. I agree with the resident/fellow's medical decision making as documented in the note.     Doing well on trial drug on hold  Scans continue to show response  AEs per CRC team- reviewed and discussed with pt and team  Ortho related issues new and of concern- now here on wheel chair sec R knee pain; injected by ortho I Left knee better but now R hip bothering him  Unlikely he has bone mets but will proceed and have him get a WBS        Topher Castanon MD, FACP  Chief, Solid Tumor Oncology Division   Medical Oncology  Professor of Medicine and Urology  /[facility]

## 2025-06-02 ENCOUNTER — EDUCATION (OUTPATIENT)
Dept: HEMATOLOGY/ONCOLOGY | Facility: HOSPITAL | Age: 70
End: 2025-06-02

## 2025-06-02 ENCOUNTER — LAB (OUTPATIENT)
Dept: LAB | Facility: HOSPITAL | Age: 70
End: 2025-06-02
Payer: MEDICARE

## 2025-06-02 ENCOUNTER — OFFICE VISIT (OUTPATIENT)
Dept: HEMATOLOGY/ONCOLOGY | Facility: HOSPITAL | Age: 70
End: 2025-06-02
Payer: MEDICARE

## 2025-06-02 VITALS
BODY MASS INDEX: 36.85 KG/M2 | WEIGHT: 256.84 LBS | RESPIRATION RATE: 20 BRPM | DIASTOLIC BLOOD PRESSURE: 71 MMHG | SYSTOLIC BLOOD PRESSURE: 129 MMHG | OXYGEN SATURATION: 96 % | TEMPERATURE: 97.2 F | HEART RATE: 80 BPM

## 2025-06-02 DIAGNOSIS — Z00.6 RESEARCH SUBJECT: ICD-10-CM

## 2025-06-02 DIAGNOSIS — C67.8 MALIGNANT NEOPLASM OF OVERLAPPING SITES OF BLADDER (MULTI): ICD-10-CM

## 2025-06-02 DIAGNOSIS — M89.8X9 BONE PAIN: Primary | ICD-10-CM

## 2025-06-02 DIAGNOSIS — C67.9 MALIGNANT NEOPLASM OF URINARY BLADDER, UNSPECIFIED SITE (MULTI): ICD-10-CM

## 2025-06-02 LAB
ALBUMIN SERPL BCP-MCNC: 4.2 G/DL (ref 3.4–5)
ALP SERPL-CCNC: 123 U/L (ref 33–136)
ALT SERPL W P-5'-P-CCNC: 15 U/L (ref 10–52)
ANION GAP SERPL CALC-SCNC: 12 MMOL/L (ref 10–20)
AST SERPL W P-5'-P-CCNC: 13 U/L (ref 9–39)
BASOPHILS # BLD AUTO: 0.06 X10*3/UL (ref 0–0.1)
BASOPHILS NFR BLD AUTO: 0.5 %
BILIRUB DIRECT SERPL-MCNC: 0.2 MG/DL (ref 0–0.3)
BILIRUB SERPL-MCNC: 0.6 MG/DL (ref 0–1.2)
BUN SERPL-MCNC: 18 MG/DL (ref 6–23)
CALCIUM SERPL-MCNC: 9.1 MG/DL (ref 8.6–10.3)
CHLORIDE SERPL-SCNC: 102 MMOL/L (ref 98–107)
CO2 SERPL-SCNC: 25 MMOL/L (ref 21–32)
CREAT SERPL-MCNC: 1.01 MG/DL (ref 0.5–1.3)
EGFRCR SERPLBLD CKD-EPI 2021: 80 ML/MIN/1.73M*2
EOSINOPHIL # BLD AUTO: 0.31 X10*3/UL (ref 0–0.7)
EOSINOPHIL NFR BLD AUTO: 2.7 %
ERYTHROCYTE [DISTWIDTH] IN BLOOD BY AUTOMATED COUNT: 14.3 % (ref 11.5–14.5)
GLUCOSE SERPL-MCNC: 95 MG/DL (ref 74–99)
HCT VFR BLD AUTO: 43.8 % (ref 41–52)
HGB BLD-MCNC: 14.5 G/DL (ref 13.5–17.5)
IMM GRANULOCYTES # BLD AUTO: 0.04 X10*3/UL (ref 0–0.7)
IMM GRANULOCYTES NFR BLD AUTO: 0.4 % (ref 0–0.9)
LYMPHOCYTES # BLD AUTO: 1.9 X10*3/UL (ref 1.2–4.8)
LYMPHOCYTES NFR BLD AUTO: 16.7 %
MAGNESIUM SERPL-MCNC: 1.77 MG/DL (ref 1.6–2.4)
MCH RBC QN AUTO: 29.2 PG (ref 26–34)
MCHC RBC AUTO-ENTMCNC: 33.1 G/DL (ref 32–36)
MCV RBC AUTO: 88 FL (ref 80–100)
MONOCYTES # BLD AUTO: 0.88 X10*3/UL (ref 0.1–1)
MONOCYTES NFR BLD AUTO: 7.7 %
NEUTROPHILS # BLD AUTO: 8.21 X10*3/UL (ref 1.2–7.7)
NEUTROPHILS NFR BLD AUTO: 72 %
NRBC BLD-RTO: 0 /100 WBCS (ref 0–0)
PHOSPHATE SERPL-MCNC: 3.3 MG/DL (ref 2.5–4.9)
PLATELET # BLD AUTO: 244 X10*3/UL (ref 150–450)
POTASSIUM SERPL-SCNC: 4.4 MMOL/L (ref 3.5–5.3)
PROT SERPL-MCNC: 7.2 G/DL (ref 6.4–8.2)
RBC # BLD AUTO: 4.96 X10*6/UL (ref 4.5–5.9)
SODIUM SERPL-SCNC: 135 MMOL/L (ref 136–145)
WBC # BLD AUTO: 11.4 X10*3/UL (ref 4.4–11.3)

## 2025-06-02 PROCEDURE — 84100 ASSAY OF PHOSPHORUS: CPT

## 2025-06-02 PROCEDURE — 80053 COMPREHEN METABOLIC PANEL: CPT

## 2025-06-02 PROCEDURE — 36415 COLL VENOUS BLD VENIPUNCTURE: CPT

## 2025-06-02 PROCEDURE — 99215 OFFICE O/P EST HI 40 MIN: CPT | Performed by: NURSE PRACTITIONER

## 2025-06-02 PROCEDURE — 85025 COMPLETE CBC W/AUTO DIFF WBC: CPT

## 2025-06-02 PROCEDURE — 82248 BILIRUBIN DIRECT: CPT

## 2025-06-02 PROCEDURE — 83735 ASSAY OF MAGNESIUM: CPT

## 2025-06-02 ASSESSMENT — PAIN SCALES - GENERAL: PAINLEVEL_OUTOF10: 0-NO PAIN

## 2025-06-02 NOTE — PROGRESS NOTES
Patient ID: Toro Lujan is a 70 y.o. male.  Attending Physician: Dr. Topher Castanon  Cancer Diagnosis:  Cancer Staging   No matching staging information was found for the patient.    Current Therapy: SEGE 1822: Disitamab Vedotin, cohorts A and B in HER2-overexpressing IHC 1+, 2+, 3+ for locally advanced unresectable or metastatic UC who have received prior platinum-based chemotherapy  Dose reduced to 1 mg/kg IV every 2 weeks - on HOLD    Genetics: NA    Subjective      Cancer History:  Oncology History   Malignant neoplasm of overlapping sites of bladder (Multi)   2/13/2024 Initial Diagnosis    Malignant neoplasm of overlapping sites of bladder (CMS/HCC)     2/14/2024 - 8/21/2024 Research Study Participant    ([facility]) [id number] Intensive PK Cohort - Disitamab Vedotin, 14 Day Cycles  Plan Provider: Fan Rapp MD PhD  Treatment goal: Control  Line of treatment: Second Line  Associated studies: A Study of Disitamab Vedotin in Subjects With HER2 Expressing Urothelial Carcinoma     2/14/2024 -  Research Study Participant    ([facility]) [id number] Intensive PK Cohort - Disitamab Vedotin, 14 Day Cycles  Plan Provider: Topher Castanon MD  Treatment goal: Control  Line of treatment: Second Line  Associated studies: A Study of Disitamab Vedotin in Subjects With HER2 Expressing Urothelial Carcinoma     8/8/2024 - 8/8/2024 Research Study Participant    ([facility]) [id number] Intensive PK Cohort - Disitamab Vedotin, 14 Day Cycles  Plan Provider: MERLIN Delong-CNP  Treatment goal: Control  Line of treatment: Second Line  Associated studies: A Study of Disitamab Vedotin in Subjects With HER2 Expressing Urothelial Carcinoma       11/5/2020: CTU shows mildly asymmetric generalized urinary bladder wall thickening with mild mucosal hyperenhancement, large prostate  1/29/2021: TURBT. 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 Posterior tumor reveals papillary high-grade at least  "pTa urothelial carcinoma with no definite invasion, no muscle present  2/24/2021: Bladder tumor path shows high grade focally invasive urothelial carcinoma into the lamina propria, non-muscle invasive.   5/6/2021: Underwent radical cystectomy with ilial conduit due to multiple multiple disease recurrences without MIBC.  12/13/2021: CTU suspicious for new enlarged pelvic LN  2/17/2022: Started chemotherapy with split-dose gemcitabine and cisplatin  3/10/2022: C2 gem/cis  3/31/2022: C3 gem/cis  4/20/2022: Restaging scans revealed response  4/21/2022: C4 gem/cis  5/12/2022: C5 gem/cis  6/24/2022: Started maintenance avelumab. Treatment was placed on hold due to insurance coverage issues after 1 cycle  8/24/2022: CT scans stable  9/2/2022: Restarted avelumab    3/2/2023: Last dose avelumab, off after  12/28/2023: CT reveals increase in left para-aortic LN highly suspicious for metastatic LAD.  1/18/2024: Again increase in para-aortic LN.  1/31/2024: Screening for SEGE 1822: Disitamab vedotin  2/7/2024: Screening for SEGE 1822  2/14/2024: Cycle 1 day 1 disitamab vedotin as part of clinical trial  7/24/2024: Cycle 12 day 1 DV, dose reduction for neuropathy  9/4/2024: Cycle 15 day 1 DV  9/18/2024: Cycle 16 day 1 DV HELD due to neuropathy, extended hold due to disease response and continued symptoms.    Interval History:  Mr. Lujan presents today in follow up. His pain yesterday was in his left knee, and today is in his right. He is using a cane for balance, but feels \"much more mobile\" today. He has some intermittent right hand cramping, with more neuropathy in the right rather than the left. Still difficulty with opening packages and fine motor movements. He otherwise has not had any new or concerning symptoms. LIZY is much improved now to baseline. Weight down as well. The remainder of his ROS is otherwise negative.  HPI    Objective    BSA: 2.39 meters squared  /71   Pulse 80   Temp 36.2 °C (97.2 °F) (Core)   " Resp 20   Wt 116 kg (256 lb 13.4 oz)   SpO2 96%   BMI 36.85 kg/m²     Physical Exam  General: alert, well-dressed in NAD. Speech is fluent and coherent, words clear. Good insight.   Skin: warm, dry, and pink without cyanosis or nail clubbing. No rash, petechiae, or ecchymoses.  HEENT: Normocephalic atraumatic. Sclera white, conjunctiva pink. EOMs intact. Hearing intact to spoken voice. Oral mucosa pink. No visible lesions.  Respiratory: Chest expansion symmetric. Breath sounds vesicular in all lobes without crackles, wheezes, or rhonchi bilaterally.  CV: S1S2 audible and crisp without murmurs, rubs, or extra sounds. RRR. Radial pulses strong and regular. Extremities warm and pink. NO edema bilaterally  GI: abdomen is round, soft, and non-tender. Active bowel sounds.  Psych: engaged, polite, appropriate conversation and eye contact.    Current Medications:    Current Outpatient Medications:     acetaminophen (Tylenol) 500 mg tablet, Take 2 tablets (1,000 mg) by mouth every 8 hours if needed., Disp: , Rfl:     Advair Diskus 100-50 mcg/dose diskus inhaler, INHALE 1 PUFF BY MOUTH TWICE DAILY AS DIRECTED. RINSE AND GARGLE MOUTH WITH WATER AFTER EACH USE, Disp: , Rfl:     aspirin 81 mg EC tablet, Take 1 tablet (81 mg) by mouth once daily., Disp: , Rfl:     lisinopril 20 mg tablet, Take 1 tablet (20 mg) by mouth once daily., Disp: , Rfl:     metoprolol tartrate (Lopressor) 25 mg tablet, Take 1 tablet (25 mg) by mouth once daily., Disp: , Rfl:     nitroglycerin (Nitrostat) 0.4 mg SL tablet, DISSOLVE 1 TABLET UNDER THE TONGUE EVERY 5 MINUTES AS NEEDED FOR CHEST PAIN FOR UP TO 3 TABLET TOTAL. CALL 911 WITH 2ND TABLET IF NO RELIEF, Disp: , Rfl:     pantoprazole (ProtoNix) 40 mg EC tablet, Take 1 tablet (40 mg) by mouth once daily., Disp: , Rfl:     rosuvastatin (Crestor) 40 mg tablet, Take 1 tablet (40 mg) by mouth once daily., Disp: , Rfl:     sertraline (Zoloft) 100 mg tablet, , Disp: , Rfl:     ALPRAZolam (Xanax) 0.5 mg  "tablet, Take one tablet one hour before anxiety-provoking situation. Limits one tab/day, three tabs/week., Disp: , Rfl:     gabapentin (Neurontin) 300 mg capsule, Take 2 capsules (600 mg) by mouth early in the morning. AND 1 capsule (300 mg) once daily at bedtime., Disp: 90 capsule, Rfl: 2     Most Recent Labs:  Results for orders placed or performed in visit on 06/02/25   CBC and Auto Differential    Collection Time: 06/02/25  1:25 PM   Result Value Ref Range    WBC 11.4 (H) 4.4 - 11.3 x10*3/uL    nRBC 0.0 0.0 - 0.0 /100 WBCs    RBC 4.96 4.50 - 5.90 x10*6/uL    Hemoglobin 14.5 13.5 - 17.5 g/dL    Hematocrit 43.8 41.0 - 52.0 %    MCV 88 80 - 100 fL    MCH 29.2 26.0 - 34.0 pg    MCHC 33.1 32.0 - 36.0 g/dL    RDW 14.3 11.5 - 14.5 %    Platelets 244 150 - 450 x10*3/uL    Neutrophils % 72.0 40.0 - 80.0 %    Immature Granulocytes %, Automated 0.4 0.0 - 0.9 %    Lymphocytes % 16.7 13.0 - 44.0 %    Monocytes % 7.7 2.0 - 10.0 %    Eosinophils % 2.7 0.0 - 6.0 %    Basophils % 0.5 0.0 - 2.0 %    Neutrophils Absolute 8.21 (H) 1.20 - 7.70 x10*3/uL    Immature Granulocytes Absolute, Automated 0.04 0.00 - 0.70 x10*3/uL    Lymphocytes Absolute 1.90 1.20 - 4.80 x10*3/uL    Monocytes Absolute 0.88 0.10 - 1.00 x10*3/uL    Eosinophils Absolute 0.31 0.00 - 0.70 x10*3/uL    Basophils Absolute 0.06 0.00 - 0.10 x10*3/uL     *Note: Due to a large number of results and/or encounters for the requested time period, some results have not been displayed. A complete set of results can be found in Results Review.      No results found for: \"PSA\"     Performance Status:  ECOG Score: 0- Fully active, able to carry on all pre-disease performance w/o restriction.  Karnofsky Score: 100 - Fully active, able to carry on all pre-disease performed without restriction    Assessment/Plan   Toro Lujan is a 70 y.o. male with mUC to LN who presents in follow up and consideration of cycle 16 day 1 SEGE 1822 on DV. He looks and feels well, though has " neuropathy, grade 2. This is continuing to improve. Labs unremarkable. Has been on hold for about 6 months at this time. Will continue hold.    He had scans at last visit, and Dr. Castanon advised to have a bone scan. I placed this order today, and he will have this scheduled. This is due to bone pain to rule out PD.    # mUC  - HOLD DV today due to neuropathy  - Continue to monitor labs    # LE edema  - Resolved    # Neuropathy  - Continue gabapentin 300 mg PO daily, 600 mg at night  - On Zoloft, working well for anxiety so will avoid duloxetine at this time.  - Dose reduction further on resumption, HOLD tx today  - Continue PT/OT for motor function    # Back pain  # L knee pain  - Referral to ortho placed previously  - Bone scan due to continued pain    # Triglycerides  - Continue with cardiology  - Monitor    # Health Maintenance  - Continue with PCP and other healthcare providers  - Advised exercise, heart-healthy diet    RTC per protocol    Total time spent on this encounter was 45 minutes, which included preparation, direct time with patient, documentation, and care coordination on the day of visit.    Daniella Pineda, MSN, APRN, AGNP-C, AOCNP  Associate Nurse Practitioner  [facility], [facility]

## 2025-06-02 NOTE — PROGRESS NOTES
Research Note Unscheduled Visit     Toro Lujan is enrolled on [id number] is here today for follow-up on his neuropathy. Patient is currently on treatment hold due to neuropathy.     Patient reports no change in his neuropathy from his last visit. Knee and joint pain is intermittent. Otherwise he is doing well.     Parking pass given to the patient.     Education Documentation  Escort, Parking, Transportation Home, taught by Teresa Harley RN at 6/2/2025  3:39 PM.  Learner: Patient  Readiness: Acceptance  Method: Explanation  Response: Demonstrated Understanding    Edema Management, taught by Teresa Harley RN at 6/2/2025  3:39 PM.  Learner: Patient  Readiness: Acceptance  Method: Explanation  Response: Demonstrated Understanding    Care of Neuropathy, taught by Teresa Harley RN at 6/2/2025  3:39 PM.  Learner: Patient  Readiness: Acceptance  Method: Explanation  Response: Demonstrated Understanding    Treatment Plan and Schedule, taught by Teresa Harley RN at 6/2/2025  3:39 PM.  Learner: Patient  Readiness: Acceptance  Method: Explanation  Response: Demonstrated Understanding    Diagnostic Studies, taught by Teresa Harley RN at 6/2/2025  3:39 PM.  Learner: Patient  Readiness: Acceptance  Method: Explanation  Response: Demonstrated Understanding    Tumor Markers, taught by Teresa Harley RN at 6/2/2025  3:39 PM.  Learner: Patient  Readiness: Acceptance  Method: Explanation  Response: Demonstrated Understanding    Comprehensive Metabolic Panel (CMP), taught by Teresa Harley RN at 6/2/2025  3:39 PM.  Learner: Patient  Readiness: Acceptance  Method: Explanation  Response: Demonstrated Understanding    Complete Blood Count with Differential (CBC w/ Diff), taught by Teresa Harley RN at 6/2/2025  3:39 PM.  Learner: Patient  Readiness: Acceptance  Method: Explanation  Response: Demonstrated Understanding    Education Comments  No comments found.

## 2025-06-04 ENCOUNTER — APPOINTMENT (OUTPATIENT)
Dept: HEMATOLOGY/ONCOLOGY | Facility: HOSPITAL | Age: 70
End: 2025-06-04
Payer: MEDICARE

## 2025-06-04 DIAGNOSIS — Z00.6 RESEARCH EXAM: ICD-10-CM

## 2025-06-16 ENCOUNTER — HOSPITAL ENCOUNTER (OUTPATIENT)
Dept: RADIOLOGY | Facility: HOSPITAL | Age: 70
Discharge: HOME | End: 2025-06-16
Payer: MEDICARE

## 2025-06-16 DIAGNOSIS — C67.8 MALIGNANT NEOPLASM OF OVERLAPPING SITES OF BLADDER (MULTI): ICD-10-CM

## 2025-06-16 DIAGNOSIS — M89.8X9 BONE PAIN: ICD-10-CM

## 2025-06-16 PROCEDURE — 78306 BONE IMAGING WHOLE BODY: CPT

## 2025-06-16 PROCEDURE — 3430000001 HC RX 343 DIAGNOSTIC RADIOPHARMACEUTICALS: Performed by: NURSE PRACTITIONER

## 2025-06-16 PROCEDURE — A9503 TC99M MEDRONATE: HCPCS | Performed by: NURSE PRACTITIONER

## 2025-06-16 RX ADMIN — TECHNETIUM TC 99M MEDRONATE 27 MILLICURIE: 25 INJECTION, POWDER, FOR SOLUTION INTRAVENOUS at 11:17

## 2025-06-16 NOTE — RESEARCH NOTES
Research Note Follow Up Visit       Toro Lujan is here today for and unscheduled visit for assessment and follow-up on SEGE 1822.  Follow up procedures completed per protocol. Patient is aware of continued follow up plan.    Continue HOLD.    Scans reviewed. TAYLER in hip area.  To get bone scan per Dr. Castanon.    Steroid shot in left knee a few weeks ago - helped  Right hip is better.    Right knee bad for first time started today.  Presents in wheelchair today. Pain “2” out of 10 when sitting; if weight on it “10”  Can't lift right leg up off floor because of pain; can do it, but it hurts. Has not taken anything for pain.    Had swelling in left ankle which resolved 2 days ago.    Out of town 2 weeks from today. Monday or Tuesday.    Traveling leaving [facility] AND RETURNING AFTERNOON OF 09JUN    NEXT VISIT SCHEDULE FOR [facility]    Parking pass given.        Education Documentation  Pain Rating Scale, taught by Emma Mann RN at 5/22/2025  2:30 PM.  Learner: Patient  Readiness: Eager  Method: Explanation  Response: Verbalizes Understanding    Edema Management, taught by Emma Mann RN at 5/22/2025  2:30 PM.  Learner: Patient  Readiness: Eager  Method: Explanation  Response: Verbalizes Understanding    Care of Neuropathy, taught by Emma Mann RN at 5/22/2025  2:30 PM.  Learner: Patient  Readiness: Eager  Method: Explanation  Response: Verbalizes Understanding    Fatigue, taught by Emma Mann RN at 5/22/2025  2:30 PM.  Learner: Patient  Readiness: Eager  Method: Explanation  Response: Verbalizes Understanding    Treatment Plan and Schedule, taught by Emma Mann RN at 5/22/2025  2:30 PM.  Learner: Patient  Readiness: Eager  Method: Explanation  Response: Verbalizes Understanding    Comprehensive Metabolic Panel (CMP), taught by Emma Mann RN at 5/22/2025  2:30 PM.  Learner: Patient  Readiness: Eager  Method: Explanation  Response: Verbalizes Understanding    Complete Blood Count with Differential  (CBC w/ Diff), taught by Emma Mann RN at 5/22/2025  2:30 PM.  Learner: Patient  Readiness: Eager  Method: Explanation  Response: Verbalizes Understanding    Education Comments  No comments found.

## 2025-06-18 ENCOUNTER — LAB (OUTPATIENT)
Dept: LAB | Facility: HOSPITAL | Age: 70
End: 2025-06-18
Payer: MEDICARE

## 2025-06-18 ENCOUNTER — OFFICE VISIT (OUTPATIENT)
Dept: HEMATOLOGY/ONCOLOGY | Facility: HOSPITAL | Age: 70
End: 2025-06-18
Payer: MEDICARE

## 2025-06-18 VITALS
TEMPERATURE: 96.3 F | WEIGHT: 255.3 LBS | SYSTOLIC BLOOD PRESSURE: 145 MMHG | BODY MASS INDEX: 36.63 KG/M2 | HEART RATE: 72 BPM | DIASTOLIC BLOOD PRESSURE: 74 MMHG | OXYGEN SATURATION: 98 % | RESPIRATION RATE: 14 BRPM

## 2025-06-18 DIAGNOSIS — C67.8 MALIGNANT NEOPLASM OF OVERLAPPING SITES OF BLADDER (MULTI): Primary | ICD-10-CM

## 2025-06-18 DIAGNOSIS — Z00.6 RESEARCH SUBJECT: ICD-10-CM

## 2025-06-18 LAB
ALBUMIN SERPL BCP-MCNC: 4.2 G/DL (ref 3.4–5)
ALP SERPL-CCNC: 122 U/L (ref 33–136)
ALT SERPL W P-5'-P-CCNC: 16 U/L (ref 10–52)
ANION GAP SERPL CALC-SCNC: 12 MMOL/L (ref 10–20)
AST SERPL W P-5'-P-CCNC: 15 U/L (ref 9–39)
BASOPHILS # BLD AUTO: 0.06 X10*3/UL (ref 0–0.1)
BASOPHILS NFR BLD AUTO: 0.6 %
BILIRUB DIRECT SERPL-MCNC: 0.1 MG/DL (ref 0–0.3)
BILIRUB SERPL-MCNC: 0.5 MG/DL (ref 0–1.2)
BUN SERPL-MCNC: 17 MG/DL (ref 6–23)
CALCIUM SERPL-MCNC: 9.2 MG/DL (ref 8.6–10.3)
CHLORIDE SERPL-SCNC: 101 MMOL/L (ref 98–107)
CO2 SERPL-SCNC: 25 MMOL/L (ref 21–32)
CREAT SERPL-MCNC: 0.91 MG/DL (ref 0.5–1.3)
EGFRCR SERPLBLD CKD-EPI 2021: >90 ML/MIN/1.73M*2
EOSINOPHIL # BLD AUTO: 0.38 X10*3/UL (ref 0–0.7)
EOSINOPHIL NFR BLD AUTO: 4 %
ERYTHROCYTE [DISTWIDTH] IN BLOOD BY AUTOMATED COUNT: 13.9 % (ref 11.5–14.5)
GLUCOSE SERPL-MCNC: 103 MG/DL (ref 74–99)
HCT VFR BLD AUTO: 43.9 % (ref 41–52)
HGB BLD-MCNC: 14.7 G/DL (ref 13.5–17.5)
IMM GRANULOCYTES # BLD AUTO: 0.05 X10*3/UL (ref 0–0.7)
IMM GRANULOCYTES NFR BLD AUTO: 0.5 % (ref 0–0.9)
LYMPHOCYTES # BLD AUTO: 2.12 X10*3/UL (ref 1.2–4.8)
LYMPHOCYTES NFR BLD AUTO: 22.6 %
MAGNESIUM SERPL-MCNC: 1.71 MG/DL (ref 1.6–2.4)
MCH RBC QN AUTO: 29.2 PG (ref 26–34)
MCHC RBC AUTO-ENTMCNC: 33.5 G/DL (ref 32–36)
MCV RBC AUTO: 87 FL (ref 80–100)
MONOCYTES # BLD AUTO: 0.73 X10*3/UL (ref 0.1–1)
MONOCYTES NFR BLD AUTO: 7.8 %
NEUTROPHILS # BLD AUTO: 6.06 X10*3/UL (ref 1.2–7.7)
NEUTROPHILS NFR BLD AUTO: 64.5 %
NRBC BLD-RTO: 0 /100 WBCS (ref 0–0)
PHOSPHATE SERPL-MCNC: 3.1 MG/DL (ref 2.5–4.9)
PLATELET # BLD AUTO: 207 X10*3/UL (ref 150–450)
POTASSIUM SERPL-SCNC: 4.3 MMOL/L (ref 3.5–5.3)
PROT SERPL-MCNC: 7.2 G/DL (ref 6.4–8.2)
RBC # BLD AUTO: 5.03 X10*6/UL (ref 4.5–5.9)
SODIUM SERPL-SCNC: 134 MMOL/L (ref 136–145)
WBC # BLD AUTO: 9.4 X10*3/UL (ref 4.4–11.3)

## 2025-06-18 PROCEDURE — 99215 OFFICE O/P EST HI 40 MIN: CPT | Performed by: NURSE PRACTITIONER

## 2025-06-18 PROCEDURE — 82248 BILIRUBIN DIRECT: CPT

## 2025-06-18 PROCEDURE — 83735 ASSAY OF MAGNESIUM: CPT

## 2025-06-18 PROCEDURE — 85025 COMPLETE CBC W/AUTO DIFF WBC: CPT

## 2025-06-18 PROCEDURE — 80053 COMPREHEN METABOLIC PANEL: CPT

## 2025-06-18 PROCEDURE — 36415 COLL VENOUS BLD VENIPUNCTURE: CPT

## 2025-06-18 PROCEDURE — 84100 ASSAY OF PHOSPHORUS: CPT

## 2025-06-18 ASSESSMENT — PAIN SCALES - GENERAL: PAINLEVEL_OUTOF10: 0-NO PAIN

## 2025-06-18 NOTE — PROGRESS NOTES
Patient ID: Toro Lujan is a 70 y.o. male.  Attending Physician: Dr. Topher Castanon  Cancer Diagnosis:  Cancer Staging   No matching staging information was found for the patient.    Current Therapy: SEGE 1822: Disitamab Vedotin, cohorts A and B in HER2-overexpressing IHC 1+, 2+, 3+ for locally advanced unresectable or metastatic UC who have received prior platinum-based chemotherapy  Dose reduced to 1 mg/kg IV every 2 weeks - on HOLD    Genetics: NA    Subjective      Cancer History:  Oncology History   Malignant neoplasm of overlapping sites of bladder (Multi)   2/13/2024 Initial Diagnosis    Malignant neoplasm of overlapping sites of bladder (CMS/HCC)     2/14/2024 - 8/21/2024 Research Study Participant    ([facility]) [id number] Intensive PK Cohort - Disitamab Vedotin, 14 Day Cycles  Plan Provider: Fan Rapp MD PhD  Treatment goal: Control  Line of treatment: Second Line  Associated studies: A Study of Disitamab Vedotin in Subjects With HER2 Expressing Urothelial Carcinoma     2/14/2024 -  Research Study Participant    ([facility]) [id number] Intensive PK Cohort - Disitamab Vedotin, 14 Day Cycles  Plan Provider: Topher Castanon MD  Treatment goal: Control  Line of treatment: Second Line  Associated studies: A Study of Disitamab Vedotin in Subjects With HER2 Expressing Urothelial Carcinoma     8/8/2024 - 8/8/2024 Research Study Participant    ([facility]) [id number] Intensive PK Cohort - Disitamab Vedotin, 14 Day Cycles  Plan Provider: MERLIN Delong-CNP  Treatment goal: Control  Line of treatment: Second Line  Associated studies: A Study of Disitamab Vedotin in Subjects With HER2 Expressing Urothelial Carcinoma       11/5/2020: CTU shows mildly asymmetric generalized urinary bladder wall thickening with mild mucosal hyperenhancement, large prostate  1/29/2021: TURBT. 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 Posterior tumor reveals papillary high-grade at least  pTa urothelial carcinoma with no definite invasion, no muscle present  2/24/2021: Bladder tumor path shows high grade focally invasive urothelial carcinoma into the lamina propria, non-muscle invasive.   5/6/2021: Underwent radical cystectomy with ilial conduit due to multiple multiple disease recurrences without MIBC.  12/13/2021: CTU suspicious for new enlarged pelvic LN  2/17/2022: Started chemotherapy with split-dose gemcitabine and cisplatin  3/10/2022: C2 gem/cis  3/31/2022: C3 gem/cis  4/20/2022: Restaging scans revealed response  4/21/2022: C4 gem/cis  5/12/2022: C5 gem/cis  6/24/2022: Started maintenance avelumab. Treatment was placed on hold due to insurance coverage issues after 1 cycle  8/24/2022: CT scans stable  9/2/2022: Restarted avelumab    3/2/2023: Last dose avelumab, off after  12/28/2023: CT reveals increase in left para-aortic LN highly suspicious for metastatic LAD.  1/18/2024: Again increase in para-aortic LN.  1/31/2024: Screening for SEGE 1822: Disitamab vedotin  2/7/2024: Screening for SEGE 1822  2/14/2024: Cycle 1 day 1 disitamab vedotin as part of clinical trial  7/24/2024: Cycle 12 day 1 DV, dose reduction for neuropathy  9/4/2024: Cycle 15 day 1 DV  9/18/2024: Cycle 16 day 1 DV HELD due to neuropathy, extended hold due to disease response and continued symptoms.    Interval History:  Mr. Lujan presents today in follow up. He is doing well. Had an injection in his left knee recently with some relief. Considering for hips and both knees at this time. He still has neuropathy. Overall improving. No new or concerning symptoms. The remainder of his ROS is otherwise negative.  HPI    Objective    BSA: 2.39 meters squared  /74 (BP Location: Right arm, Patient Position: Sitting, BP Cuff Size: Large adult)   Pulse 72   Temp 35.7 °C (96.3 °F) (Skin)   Resp 14   Wt 116 kg (255 lb 4.8 oz)   SpO2 98%   BMI 36.63 kg/m²     Physical Exam  PHYSICAL EXAM:  General: alert, well-dressed in  NAD. Speech is fluent and coherent, words clear. Good insight.  Skin: warm, dry, and pink without cyanosis or nail clubbing. No rash, petechiae, or ecchymoses  HEENT: Normocephalic atraumatic. Sclera white, conjunctiva pink. EOMs intact. Hearing intact to spoken voice. Oral mucosa pink. No visible lesions  Respiratory: Chest expansion symmetric. Breath sounds vesicular in all lobes without crackles, wheezes, or rhonchi bilaterally.  CV: S1S2 audible and crisp without murmurs, rubs, or extra sounds. RRR. Radial pulses strong and regular. Extremities warm and appropriate color for race. No edema bilaterally.  Lymph: no palpable cervical, submandibular, or supraclavicular lymphadenopathy.  GI: abdomen is round, soft, and non-tender. Active bowel sounds.  Psych: engaged, polite, appropriate conversation and eye contact.    Current Medications:    Current Outpatient Medications:     acetaminophen (Tylenol) 500 mg tablet, Take 2 tablets (1,000 mg) by mouth every 8 hours if needed., Disp: , Rfl:     Advair Diskus 100-50 mcg/dose diskus inhaler, INHALE 1 PUFF BY MOUTH TWICE DAILY AS DIRECTED. RINSE AND GARGLE MOUTH WITH WATER AFTER EACH USE, Disp: , Rfl:     ALPRAZolam (Xanax) 0.5 mg tablet, Take one tablet one hour before anxiety-provoking situation. Limits one tab/day, three tabs/week., Disp: , Rfl:     aspirin 81 mg EC tablet, Take 1 tablet (81 mg) by mouth once daily., Disp: , Rfl:     gabapentin (Neurontin) 300 mg capsule, Take 2 capsules (600 mg) by mouth early in the morning. AND 1 capsule (300 mg) once daily at bedtime., Disp: 90 capsule, Rfl: 2    lisinopril 20 mg tablet, Take 1 tablet (20 mg) by mouth once daily., Disp: , Rfl:     metoprolol tartrate (Lopressor) 25 mg tablet, Take 1 tablet (25 mg) by mouth once daily., Disp: , Rfl:     nitroglycerin (Nitrostat) 0.4 mg SL tablet, DISSOLVE 1 TABLET UNDER THE TONGUE EVERY 5 MINUTES AS NEEDED FOR CHEST PAIN FOR UP TO 3 TABLET TOTAL. CALL 911 WITH 2ND TABLET IF NO  RELIEF, Disp: , Rfl:     pantoprazole (ProtoNix) 40 mg EC tablet, Take 1 tablet (40 mg) by mouth once daily., Disp: , Rfl:     rosuvastatin (Crestor) 40 mg tablet, Take 1 tablet (40 mg) by mouth once daily., Disp: , Rfl:     sertraline (Zoloft) 100 mg tablet, , Disp: , Rfl:      Most Recent Labs:  Results for orders placed or performed in visit on 06/18/25   CBC and Auto Differential    Collection Time: 06/18/25 11:05 AM   Result Value Ref Range    WBC 9.4 4.4 - 11.3 x10*3/uL    nRBC 0.0 0.0 - 0.0 /100 WBCs    RBC 5.03 4.50 - 5.90 x10*6/uL    Hemoglobin 14.7 13.5 - 17.5 g/dL    Hematocrit 43.9 41.0 - 52.0 %    MCV 87 80 - 100 fL    MCH 29.2 26.0 - 34.0 pg    MCHC 33.5 32.0 - 36.0 g/dL    RDW 13.9 11.5 - 14.5 %    Platelets 207 150 - 450 x10*3/uL    Neutrophils % 64.5 40.0 - 80.0 %    Immature Granulocytes %, Automated 0.5 0.0 - 0.9 %    Lymphocytes % 22.6 13.0 - 44.0 %    Monocytes % 7.8 2.0 - 10.0 %    Eosinophils % 4.0 0.0 - 6.0 %    Basophils % 0.6 0.0 - 2.0 %    Neutrophils Absolute 6.06 1.20 - 7.70 x10*3/uL    Immature Granulocytes Absolute, Automated 0.05 0.00 - 0.70 x10*3/uL    Lymphocytes Absolute 2.12 1.20 - 4.80 x10*3/uL    Monocytes Absolute 0.73 0.10 - 1.00 x10*3/uL    Eosinophils Absolute 0.38 0.00 - 0.70 x10*3/uL    Basophils Absolute 0.06 0.00 - 0.10 x10*3/uL   Comprehensive Metabolic Panel    Collection Time: 06/18/25 11:05 AM   Result Value Ref Range    Glucose 103 (H) 74 - 99 mg/dL    Sodium 134 (L) 136 - 145 mmol/L    Potassium 4.3 3.5 - 5.3 mmol/L    Chloride 101 98 - 107 mmol/L    Bicarbonate 25 21 - 32 mmol/L    Anion Gap 12 10 - 20 mmol/L    Urea Nitrogen 17 6 - 23 mg/dL    Creatinine 0.91 0.50 - 1.30 mg/dL    eGFR >90 >60 mL/min/1.73m*2    Calcium 9.2 8.6 - 10.3 mg/dL    Albumin 4.2 3.4 - 5.0 g/dL    Alkaline Phosphatase 122 33 - 136 U/L    Total Protein 7.2 6.4 - 8.2 g/dL    AST 15 9 - 39 U/L    Bilirubin, Total 0.5 0.0 - 1.2 mg/dL    ALT 16 10 - 52 U/L   Magnesium    Collection Time:  "06/18/25 11:05 AM   Result Value Ref Range    Magnesium 1.71 1.60 - 2.40 mg/dL     *Note: Due to a large number of results and/or encounters for the requested time period, some results have not been displayed. A complete set of results can be found in Results Review.      No results found for: \"PSA\"     Performance Status:  ECOG Score: 0- Fully active, able to carry on all pre-disease performance w/o restriction.  Karnofsky Score: 100 - Fully active, able to carry on all pre-disease performed without restriction    Assessment/Plan   Toro Lujan is a 70 y.o. male with mUC to LN who presents in follow up and consideration of cycle 16 day 1 SEGE 1822 on DV. He looks and feels well, though has neuropathy, grade 2. This is continuing to improve. Labs unremarkable. Has been on hold for about 6 months at this time. Will continue hold.    Bone scan without concern for disease. Will continue with ortho.    # mUC  - HOLD DV today due to neuropathy  - Continue to monitor labs    # Neuropathy  - Continue gabapentin 300 mg PO daily, 600 mg at night  - On Zoloft, working well for anxiety so will avoid duloxetine at this time.  - Dose reduction further on resumption, HOLD tx today  - Continue PT/OT for motor function    # Back pain  # Arthritis in multiple joints  - Continue with ortho    # Triglycerides  - Continue with cardiology  - Monitor    # Health Maintenance  - Continue with PCP and other healthcare providers  - Advised exercise, heart-healthy diet    RTC per protocol    Total time spent on this encounter was 40 minutes, which included preparation, direct time with patient, documentation, and care coordination on the day of visit.    Daniella Pineda, MSN, APRN, AGNP-C, AOCNP  Associate Nurse Practitioner  [facility]  "

## 2025-06-23 ENCOUNTER — APPOINTMENT (OUTPATIENT)
Dept: ORTHOPEDIC SURGERY | Age: 70
End: 2025-06-23
Payer: MEDICARE

## 2025-06-23 VITALS — WEIGHT: 253 LBS | BODY MASS INDEX: 37.47 KG/M2 | HEIGHT: 69 IN

## 2025-06-23 DIAGNOSIS — M16.11 PRIMARY OSTEOARTHRITIS OF RIGHT HIP: Primary | ICD-10-CM

## 2025-06-23 RX ORDER — TRIAMCINOLONE ACETONIDE 40 MG/ML
40 INJECTION, SUSPENSION INTRA-ARTICULAR; INTRAMUSCULAR
Status: COMPLETED | OUTPATIENT
Start: 2025-06-23 | End: 2025-06-23

## 2025-06-23 RX ORDER — LIDOCAINE HYDROCHLORIDE 10 MG/ML
4 INJECTION, SOLUTION INFILTRATION; PERINEURAL
Status: COMPLETED | OUTPATIENT
Start: 2025-06-23 | End: 2025-06-23

## 2025-06-23 RX ADMIN — TRIAMCINOLONE ACETONIDE 40 MG: 40 INJECTION, SUSPENSION INTRA-ARTICULAR; INTRAMUSCULAR at 14:26

## 2025-06-23 RX ADMIN — LIDOCAINE HYDROCHLORIDE 4 ML: 10 INJECTION, SOLUTION INFILTRATION; PERINEURAL at 14:26

## 2025-06-23 ASSESSMENT — PAIN - FUNCTIONAL ASSESSMENT: PAIN_FUNCTIONAL_ASSESSMENT: 0-10

## 2025-06-23 ASSESSMENT — PAIN SCALES - GENERAL: PAINLEVEL_OUTOF10: 3

## 2025-06-23 NOTE — PROGRESS NOTES
PRIMARY CARE PHYSICIAN: Topher Castanon MD  REFERRING PROVIDER: No referring provider defined for this encounter.     CONSULT ORTHOPAEDIC: Knee Evaluation        ASSESSMENT & PLAN    IMPRESSION:   1.  Primary arthritis, right hip  2.  Primary arthritis, right knee    PLAN:   Reviewed patient's findings with him.  He did have previous x-rays demonstrating arthritis in his right hip he is relatively asymptomatic at this time but feels like his symptoms are getting worse and wants to consider additional intervention.  He is now at the point when she wants to discuss surgical intervention but given that he responded well to corticosteroid injections would like to try 1 on his right hip.  We did review this has to be under ultrasound and we can arrange an appointment with one of our nonoperative partners to proceed with an injection which she was agreeable with.  Regarding the right knee additionally reviewed that while he does have some mild lateral, arthritis where his pain localizes I do think this is likely from offloading to severe arthritis in his left knee.  He additionally would like to try an injection of the right knee as well to see if this helps his symptoms and would potentially consider formal physical therapy and conditioning in the future if he fails to improve.  Patient is understanding of plan of care.  Will proceed with right knee injection as described below.  Risk and benefits discussed.  Understanding that we can repeat these every 3 to 4 months.    L Inj/Asp: R knee on 6/23/2025 2:26 PM  Indications: pain  Details: 25 G needle, anterolateral (Inferolateral) approach  Medications: 40 mg triamcinolone acetonide 40 mg/mL; 4 mL lidocaine 10 mg/mL (1 %)  Outcome: tolerated well, no immediate complications    Prepped with alcohol, bandaid applied to injection site.   Procedure, treatment alternatives, risks and benefits explained, specific risks discussed. Consent was given by the patient.                  SUBJECTIVE  CHIEF COMPLAINT:   Chief Complaint   Patient presents with    Right Knee - Pain, Injections        HPI: Toro Lujan is a 70 y.o. patient. Toro Lujan has had progressive problems with their right knee over the past 3 months. They do not report any history of trauma to the area(s). They deny having any numbness and tingling in their legs or feet.  In addition has a known history of osteoarthritis of right hip and this to be coming because she symptomatic wants to discuss any intervention in the form of injections of possible.  Their symptoms that are interfering with their daily life include localized lateral and medial sided knee pain, some swelling, and some instability. Worse with going to a standing position and a few steps afterwards. XR done 3/31/2025. They are an active research participant, but were given verbal clearance for their last R knee CSI on 3/2025.    FUNCTIONAL STATUS: limited:  unable to perform activities of daily living.  AMBULATORY STATUS: Cane  PREVIOUS TREATMENTS: NSAIDS Asprin still taking  Therapy PT for their gait 4/2025 completed  Analgesics Tylenol and Gabapentin PRN  HISTORY OF SURGERY ON AFFECTED KNEE(S): No   PREVIOUS NOTES REVIEWED: Yes       REVIEW OF SYSTEMS  Constitutional: See HPI for pain assessment, No significant weight loss, recent trauma  Cardiovascular: No chest pain, shortness of breath  Respiratory: No difficulty breathing, cough  Gastrointestinal: No nausea, vomiting, diarrhea, constipation  Musculoskeletal: Noted in HPI, positive for pain, restricted motion, stiffness  Integumentary: No rashes, easy bruising, redness   Neurological: no numbness or tingling in extremities, no gait disturbances   Psychiatric: No mood changes, memory changes, social issues  Heme/Lymph: no excessive swelling, easy bruising, excessive bleeding  ENT: No hearing changes  Eyes: No vision changes    Medical History[1]     Allergies[2]     Surgical History[3]      Family History[4]     Social History     Socioeconomic History    Marital status:      Spouse name: Not on file    Number of children: Not on file    Years of education: Not on file    Highest education level: Not on file   Occupational History    Not on file   Tobacco Use    Smoking status: Every Day     Current packs/day: 0.50     Types: Cigarettes    Smokeless tobacco: Never    Tobacco comments:     No hx of anesthesia reactions. No FH of MH.     No illnesses in the past 30 days. Is on immunotherapy trial at , suspended until after eye surgery.     No SOB with a flight of stairs.     No cane, walker, or wheelchair.     Is able to lie flat for 30 minutes.     Vaping Use    Vaping status: Never Used   Substance and Sexual Activity    Alcohol use: Yes     Comment: one beer every 3 months    Drug use: Not Currently    Sexual activity: Not on file   Other Topics Concern    Not on file   Social History Narrative    Not on file     Social Drivers of Health     Financial Resource Strain: Low Risk  (5/18/2020)    Received from [facility]    Overall Financial Resource Strain (CARDIA)     Difficulty of Paying Living Expenses: Not very hard   Food Insecurity: No Food Insecurity (5/18/2020)    Received from [facility]    Hunger Vital Sign     Worried About Running Out of Food in the Last Year: Never true     Ran Out of Food in the Last Year: Never true   Transportation Needs: No Transportation Needs (5/18/2020)    Received from [facility]    PRAPARE - Transportation     Lack of Transportation (Medical): No     Lack of Transportation (Non-Medical): No   Physical Activity: Insufficiently Active (12/9/2019)    Received from [facility]    Exercise Vital Sign     Days of Exercise per Week: 1 day     Minutes of Exercise per Session: 10 min   Stress: Stress Concern Present (12/9/2019)    Received from [facility]    [place] [employer] - Occupational Stress  "Questionnaire     Feeling of Stress : To some extent   Social Connections: Moderately Integrated (12/9/2019)    Received from [facility]    Social Connection and Isolation Panel [NHANES]     Frequency of Communication with Friends and Family: More than three times a week     Frequency of Social Gatherings with Friends and Family: Twice a week     Attends Sikhism Services: 1 to 4 times per year     Active Member of Clubs or Organizations: Yes     Attends Club or Organization Meetings: More than 4 times per year     Marital Status:    Intimate Partner Violence: Not on file   Housing Stability: Unknown (2/8/2021)    Received from [facility]    Housing Stability Vital Sign     Unable to Pay for Housing in the Last Year: No     Number of Places Lived in the Last Year: 1     Unstable Housing in the Last Year: Not on file        CURRENT MEDICATIONS:   Current Medications[5]     OBJECTIVE    PHYSICAL EXAM      4/10/2025     1:59 PM 4/22/2025    11:26 AM 4/30/2025     1:55 PM 5/22/2025     2:06 PM 6/2/2025     1:36 PM 6/18/2025    11:23 AM 6/23/2025    12:59 PM   Vitals   Systolic 111  121 110 129 145    Diastolic 55  95 62 71 74    BP Location Left arm   Left arm  Right arm    Heart Rate 64  72 72 80 72    Temp 36.3 °C (97.3 °F)  36.2 °C (97.2 °F) 36 °C (96.8 °F) 36.2 °C (97.2 °F) 35.7 °C (96.3 °F)    Resp 18  17 18 20 14    Height  1.778 m (5' 10\")     1.742 m (5' 8.58\")   Weight (lb) 268.96 260 272.71 261.91 256.84 255.3 253   BMI 38.59 kg/m2 37.31 kg/m2 39.13 kg/m2 37.58 kg/m2 36.85 kg/m2 36.63 kg/m2 37.82 kg/m2   BSA (m2) 2.45 m2 2.41 m2 2.47 m2 2.42 m2 2.4 m2 2.39 m2 2.36 m2   Visit Report Report Report Report Report Report Report Report      Body mass index is 37.82 kg/m².    GENERAL: A/Ox3, NAD. Appears healthy, well nourished  PSYCHIATRIC: Mood stable, appropriate memory recall  EYES: EOM intact, no scleral icterus  CARDIAC: regular rate  LUNGS: Breathing non-labored  SKIN: no erythema, rashes, " or ecchymoses     MUSCULOSKELETAL:  Laterality: right Knee Exam  - Alignment: Neutral  - ROM: 0 to 120 degrees  - Effusion: none  - Strength: knee extension and flexion 5/5, EHL/PF/DF motor intact  - Palpation: TTP along lateral joint line  - Stability: Anterior/Posterior stable, varus/valgus stable  - Gait: normal  - Hip Exam: flexion to 100+ degrees, full extension, internal/external rotation adequate, and no pain with log roll  - Special Tests: none performed    Laterality: right Hip Exam  - ROM, Extension: full, no flexion contracture  - Strength: Abduction 5/5 against resitance, Flexion 5/5  - Palpation: mild TTP along greater trochanter  - Log roll/IR exam: painful and limited motion. Pain with hip flexion past 90 degrees and internal rotation  - Straight leg raise: negative  - EHL/PF/DF motor intact  - Gait: antalgic to arthritic side, negative Trendelenburg gait   - Special Tests: none performed    NEUROVASCULAR:  - Neurovascular Status: sensation intact to light touch distally  - Capillary refill brisk at extremities, Bilateral dorsalis pedis pulse 2+     IMAGING:  Multiple views of the affected right knee(s) demonstrate: Mild lateral, arthritis with joint space narrowing and neutral deformity.   Previous x-rays of right hip reviewed which demonstrates moderate arthritis with joint space narrowing subchondral sclerosis with underlying cam deformity.  X-rays were personally reviewed and interpreted by me.  Radiology reports were reviewed by me as well, if readily available at the time.    ** Voice Recognition software was used to dictate this note. Please be aware that minor errors in the transcription may be present **    Mari Helms DO  Attending Surgeon  Joint Replacement and Adult Reconstructive Surgery  [place], [place]          [1]   Past Medical History:  Diagnosis Date    Anxiety     Bladder cancer (Multi)     chemo 2022, 2023    Cataract     Cervical spine disease     COPD  (chronic obstructive pulmonary disease) (Multi)     mild    Coronary artery disease     GERD (gastroesophageal reflux disease)     Hyperlipidemia     Hypertension     Peripheral neuropathy     hands    Ureteral stone     left   [2]   Allergies  Allergen Reactions    Pollen Extracts Agitation   [3]   Past Surgical History:  Procedure Laterality Date    BLADDER SURGERY      needle biopsy    CATARACT EXTRACTION W/  INTRAOCULAR LENS IMPLANT Right 10/03/2024    Dr. Pandya    CATARACT EXTRACTION W/  INTRAOCULAR LENS IMPLANT Left 10/24/2024    Dr. Pandya    CERVICAL FUSION      COLONOSCOPY      CORONARY STENT PLACEMENT      2016    CYSTOURETHROSCOPY      KNEE ARTHROPLASTY Left     OTHER SURGICAL HISTORY      ileal conduit, 2021    PROSTATECTOMY      2021   [4]   Family History  Problem Relation Name Age of Onset    Glaucoma Neg Hx      Macular degeneration Neg Hx     [5]   Current Outpatient Medications   Medication Sig Dispense Refill    acetaminophen (Tylenol) 500 mg tablet Take 2 tablets (1,000 mg) by mouth every 8 hours if needed.      Advair Diskus 100-50 mcg/dose diskus inhaler INHALE 1 PUFF BY MOUTH TWICE DAILY AS DIRECTED. RINSE AND GARGLE MOUTH WITH WATER AFTER EACH USE      ALPRAZolam (Xanax) 0.5 mg tablet Take one tablet one hour before anxiety-provoking situation. Limits one tab/day, three tabs/week.      aspirin 81 mg EC tablet Take 1 tablet (81 mg) by mouth once daily.      gabapentin (Neurontin) 300 mg capsule Take 2 capsules (600 mg) by mouth early in the morning. AND 1 capsule (300 mg) once daily at bedtime. 90 capsule 2    lisinopril 20 mg tablet Take 1 tablet (20 mg) by mouth once daily.      metoprolol tartrate (Lopressor) 25 mg tablet Take 1 tablet (25 mg) by mouth once daily.      nitroglycerin (Nitrostat) 0.4 mg SL tablet DISSOLVE 1 TABLET UNDER THE TONGUE EVERY 5 MINUTES AS NEEDED FOR CHEST PAIN FOR UP TO 3 TABLET TOTAL. CALL 911 WITH 2ND TABLET IF NO RELIEF      pantoprazole (ProtoNix) 40 mg EC  tablet Take 1 tablet (40 mg) by mouth once daily.      rosuvastatin (Crestor) 40 mg tablet Take 1 tablet (40 mg) by mouth once daily.      sertraline (Zoloft) 100 mg tablet        No current facility-administered medications for this visit.

## 2025-07-01 DIAGNOSIS — Z00.6 EXAM FOR CLINICAL RESEARCH: ICD-10-CM

## 2025-07-02 ENCOUNTER — OFFICE VISIT (OUTPATIENT)
Dept: HEMATOLOGY/ONCOLOGY | Facility: HOSPITAL | Age: 70
End: 2025-07-02
Payer: COMMERCIAL

## 2025-07-02 ENCOUNTER — APPOINTMENT (OUTPATIENT)
Dept: RADIOLOGY | Facility: CLINIC | Age: 70
End: 2025-07-02
Payer: COMMERCIAL

## 2025-07-02 ENCOUNTER — EDUCATION (OUTPATIENT)
Dept: HEMATOLOGY/ONCOLOGY | Facility: HOSPITAL | Age: 70
End: 2025-07-02

## 2025-07-02 ENCOUNTER — HOSPITAL ENCOUNTER (OUTPATIENT)
Dept: RADIOLOGY | Facility: HOSPITAL | Age: 70
Discharge: HOME | End: 2025-07-02
Payer: MEDICARE

## 2025-07-02 ENCOUNTER — APPOINTMENT (OUTPATIENT)
Dept: CARDIOLOGY | Facility: HOSPITAL | Age: 70
End: 2025-07-02
Payer: COMMERCIAL

## 2025-07-02 ENCOUNTER — LAB (OUTPATIENT)
Dept: LAB | Facility: HOSPITAL | Age: 70
End: 2025-07-02
Payer: COMMERCIAL

## 2025-07-02 VITALS
BODY MASS INDEX: 36.51 KG/M2 | HEIGHT: 69 IN | SYSTOLIC BLOOD PRESSURE: 145 MMHG | TEMPERATURE: 97.5 F | WEIGHT: 246.47 LBS | OXYGEN SATURATION: 99 % | DIASTOLIC BLOOD PRESSURE: 96 MMHG | HEART RATE: 72 BPM

## 2025-07-02 DIAGNOSIS — Z00.6 RESEARCH EXAM: ICD-10-CM

## 2025-07-02 DIAGNOSIS — Z00.6 EXAM FOR CLINICAL RESEARCH: ICD-10-CM

## 2025-07-02 DIAGNOSIS — C67.8 MALIGNANT NEOPLASM OF OVERLAPPING SITES OF BLADDER (MULTI): Primary | ICD-10-CM

## 2025-07-02 LAB
ALBUMIN SERPL BCP-MCNC: 4.3 G/DL (ref 3.4–5)
ALP SERPL-CCNC: 127 U/L (ref 33–136)
ALT SERPL W P-5'-P-CCNC: 21 U/L (ref 10–52)
ANION GAP SERPL CALC-SCNC: 12 MMOL/L (ref 10–20)
AST SERPL W P-5'-P-CCNC: 16 U/L (ref 9–39)
BASOPHILS # BLD AUTO: 0.07 X10*3/UL (ref 0–0.1)
BASOPHILS NFR BLD AUTO: 0.6 %
BILIRUB DIRECT SERPL-MCNC: 0.1 MG/DL (ref 0–0.3)
BILIRUB SERPL-MCNC: 0.5 MG/DL (ref 0–1.2)
BUN SERPL-MCNC: 20 MG/DL (ref 6–23)
CALCIUM SERPL-MCNC: 9.5 MG/DL (ref 8.6–10.3)
CHLORIDE SERPL-SCNC: 99 MMOL/L (ref 98–107)
CHOLEST SERPL-MCNC: 117 MG/DL (ref 0–199)
CHOLESTEROL/HDL RATIO: 2.7
CO2 SERPL-SCNC: 24 MMOL/L (ref 21–32)
CREAT SERPL-MCNC: 1.06 MG/DL (ref 0.5–1.3)
EGFRCR SERPLBLD CKD-EPI 2021: 75 ML/MIN/1.73M*2
EOSINOPHIL # BLD AUTO: 0.22 X10*3/UL (ref 0–0.7)
EOSINOPHIL NFR BLD AUTO: 1.9 %
ERYTHROCYTE [DISTWIDTH] IN BLOOD BY AUTOMATED COUNT: 13.8 % (ref 11.5–14.5)
GLUCOSE SERPL-MCNC: 105 MG/DL (ref 74–99)
HCT VFR BLD AUTO: 46.7 % (ref 41–52)
HDLC SERPL-MCNC: 43.9 MG/DL
HGB BLD-MCNC: 15.8 G/DL (ref 13.5–17.5)
IMM GRANULOCYTES # BLD AUTO: 0.06 X10*3/UL (ref 0–0.7)
IMM GRANULOCYTES NFR BLD AUTO: 0.5 % (ref 0–0.9)
LDLC SERPL CALC-MCNC: 61 MG/DL
LYMPHOCYTES # BLD AUTO: 2.34 X10*3/UL (ref 1.2–4.8)
LYMPHOCYTES NFR BLD AUTO: 20.7 %
MAGNESIUM SERPL-MCNC: 1.77 MG/DL (ref 1.6–2.4)
MCH RBC QN AUTO: 29.2 PG (ref 26–34)
MCHC RBC AUTO-ENTMCNC: 33.8 G/DL (ref 32–36)
MCV RBC AUTO: 86 FL (ref 80–100)
MONOCYTES # BLD AUTO: 0.84 X10*3/UL (ref 0.1–1)
MONOCYTES NFR BLD AUTO: 7.4 %
NEUTROPHILS # BLD AUTO: 7.77 X10*3/UL (ref 1.2–7.7)
NEUTROPHILS NFR BLD AUTO: 68.9 %
NON HDL CHOLESTEROL: 73 MG/DL (ref 0–149)
NRBC BLD-RTO: 0 /100 WBCS (ref 0–0)
PHOSPHATE SERPL-MCNC: 3.1 MG/DL (ref 2.5–4.9)
PLATELET # BLD AUTO: 234 X10*3/UL (ref 150–450)
POTASSIUM SERPL-SCNC: 4.4 MMOL/L (ref 3.5–5.3)
PROT SERPL-MCNC: 7.5 G/DL (ref 6.4–8.2)
RBC # BLD AUTO: 5.42 X10*6/UL (ref 4.5–5.9)
SODIUM SERPL-SCNC: 131 MMOL/L (ref 136–145)
TRIGL SERPL-MCNC: 60 MG/DL (ref 0–149)
VLDL: 12 MG/DL (ref 0–40)
WBC # BLD AUTO: 11.3 X10*3/UL (ref 4.4–11.3)

## 2025-07-02 PROCEDURE — 84100 ASSAY OF PHOSPHORUS: CPT

## 2025-07-02 PROCEDURE — 80061 LIPID PANEL: CPT

## 2025-07-02 PROCEDURE — 71260 CT THORAX DX C+: CPT

## 2025-07-02 PROCEDURE — 82248 BILIRUBIN DIRECT: CPT

## 2025-07-02 PROCEDURE — 2550000001 HC RX 255 CONTRASTS: Performed by: INTERNAL MEDICINE

## 2025-07-02 PROCEDURE — 80053 COMPREHEN METABOLIC PANEL: CPT

## 2025-07-02 PROCEDURE — 99215 OFFICE O/P EST HI 40 MIN: CPT | Performed by: NURSE PRACTITIONER

## 2025-07-02 PROCEDURE — 36415 COLL VENOUS BLD VENIPUNCTURE: CPT

## 2025-07-02 PROCEDURE — 83735 ASSAY OF MAGNESIUM: CPT

## 2025-07-02 PROCEDURE — 85025 COMPLETE CBC W/AUTO DIFF WBC: CPT

## 2025-07-02 RX ADMIN — IOHEXOL 75 ML: 350 INJECTION, SOLUTION INTRAVENOUS at 12:56

## 2025-07-02 ASSESSMENT — PAIN SCALES - GENERAL: PAINLEVEL_OUTOF10: 2

## 2025-07-02 NOTE — PROGRESS NOTES
Patient ID: Toro Lujan is a 70 y.o. male.  Attending Physician: Dr. Topher Castanon  Cancer Diagnosis:  Cancer Staging   No matching staging information was found for the patient.    Current Therapy: SEGE 1822: Disitamab Vedotin, cohorts A and B in HER2-overexpressing IHC 1+, 2+, 3+ for locally advanced unresectable or metastatic UC who have received prior platinum-based chemotherapy  Dose reduced to 1 mg/kg IV every 2 weeks - on HOLD    Genetics: NA    Subjective      Cancer History:  Oncology History   Malignant neoplasm of overlapping sites of bladder (Multi)   2/13/2024 Initial Diagnosis    Malignant neoplasm of overlapping sites of bladder (CMS/HCC)     2/14/2024 - 8/21/2024 Research Study Participant    ([facility]) [id number] Intensive PK Cohort - Disitamab Vedotin, 14 Day Cycles  Plan Provider: Fan Rapp MD PhD  Treatment goal: Control  Line of treatment: Second Line  Associated studies: A Study of Disitamab Vedotin in Subjects With HER2 Expressing Urothelial Carcinoma     2/14/2024 -  Research Study Participant    ([facility]) [id number] Intensive PK Cohort - Disitamab Vedotin, 14 Day Cycles  Plan Provider: Topher Castanon MD  Treatment goal: Control  Line of treatment: Second Line  Associated studies: A Study of Disitamab Vedotin in Subjects With HER2 Expressing Urothelial Carcinoma     8/8/2024 - 8/8/2024 Research Study Participant    ([facility]) [id number] Intensive PK Cohort - Disitamab Vedotin, 14 Day Cycles  Plan Provider: MERLIN Delong-CNP  Treatment goal: Control  Line of treatment: Second Line  Associated studies: A Study of Disitamab Vedotin in Subjects With HER2 Expressing Urothelial Carcinoma       11/5/2020: CTU shows mildly asymmetric generalized urinary bladder wall thickening with mild mucosal hyperenhancement, large prostate  1/29/2021: TURBT. 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 Posterior tumor reveals papillary high-grade at least  "pTa urothelial carcinoma with no definite invasion, no muscle present  2/24/2021: Bladder tumor path shows high grade focally invasive urothelial carcinoma into the lamina propria, non-muscle invasive.   5/6/2021: Underwent radical cystectomy with ilial conduit due to multiple multiple disease recurrences without MIBC.  12/13/2021: CTU suspicious for new enlarged pelvic LN  2/17/2022: Started chemotherapy with split-dose gemcitabine and cisplatin  3/10/2022: C2 gem/cis  3/31/2022: C3 gem/cis  4/20/2022: Restaging scans revealed response  4/21/2022: C4 gem/cis  5/12/2022: C5 gem/cis  6/24/2022: Started maintenance avelumab. Treatment was placed on hold due to insurance coverage issues after 1 cycle  8/24/2022: CT scans stable  9/2/2022: Restarted avelumab    3/2/2023: Last dose avelumab, off after  12/28/2023: CT reveals increase in left para-aortic LN highly suspicious for metastatic LAD.  1/18/2024: Again increase in para-aortic LN.  1/31/2024: Screening for SEGE 1822: Disitamab vedotin  2/7/2024: Screening for SEGE 1822  2/14/2024: Cycle 1 day 1 disitamab vedotin as part of clinical trial  7/24/2024: Cycle 12 day 1 DV, dose reduction for neuropathy  9/4/2024: Cycle 15 day 1 DV  9/18/2024: Cycle 16 day 1 DV HELD due to neuropathy, extended hold due to disease response and continued symptoms.    Interval History:  Mr. Lujan presents today in follow up. He continues to do well. He is planning to undergo a hip injection next week due to worsening OA. He feels otherwise well. Neuropathy is about the same. He otherwise has not had new or concerning symptoms. The remainder of his ROS is otherwise negative.  HPI    Objective    BSA: 2.34 meters squared  BP (!) 145/96 Comment: NOTIFIED KRISTEN  Pulse 72   Temp 36.4 °C (97.5 °F)   Ht (S) 1.764 m (5' 9.45\")   Wt 112 kg (246 lb 7.6 oz)   SpO2 99%   BMI 35.93 kg/m²     Physical Exam  PHYSICAL EXAM:  General: alert, well-dressed in NAD. Speech is fluent and coherent, " words clear. Good insight.  Skin: warm, dry, and pink without cyanosis or nail clubbing. No rash, petechiae, or ecchymoses  HEENT: Normocephalic atraumatic. Sclera white, conjunctiva pink. EOMs intact. Hearing intact to spoken voice. Oral mucosa pink. No visible lesions  Respiratory: Chest expansion symmetric. Breath sounds vesicular in all lobes without crackles, wheezes, or rhonchi bilaterally.  CV: S1S2 audible and crisp without murmurs, rubs, or extra sounds. RRR. Radial pulses strong and regular. Extremities warm and appropriate color for race. No edema bilaterally.  Lymph: no palpable cervical, submandibular, or supraclavicular lymphadenopathy.  GI: abdomen is round, soft, and non-tender. Active bowel sounds.  Psych: engaged, polite, appropriate conversation and eye contact.    Current Medications:    Current Outpatient Medications:     acetaminophen (Tylenol) 500 mg tablet, Take 2 tablets (1,000 mg) by mouth every 8 hours if needed., Disp: , Rfl:     Advair Diskus 100-50 mcg/dose diskus inhaler, INHALE 1 PUFF BY MOUTH TWICE DAILY AS DIRECTED. RINSE AND GARGLE MOUTH WITH WATER AFTER EACH USE, Disp: , Rfl:     aspirin 81 mg EC tablet, Take 1 tablet (81 mg) by mouth once daily., Disp: , Rfl:     lisinopril 20 mg tablet, Take 1 tablet (20 mg) by mouth once daily., Disp: , Rfl:     metoprolol tartrate (Lopressor) 25 mg tablet, Take 1 tablet (25 mg) by mouth once daily., Disp: , Rfl:     nitroglycerin (Nitrostat) 0.4 mg SL tablet, DISSOLVE 1 TABLET UNDER THE TONGUE EVERY 5 MINUTES AS NEEDED FOR CHEST PAIN FOR UP TO 3 TABLET TOTAL. CALL 911 WITH 2ND TABLET IF NO RELIEF, Disp: , Rfl:     pantoprazole (ProtoNix) 40 mg EC tablet, Take 1 tablet (40 mg) by mouth once daily., Disp: , Rfl:     rosuvastatin (Crestor) 40 mg tablet, Take 1 tablet (40 mg) by mouth once daily., Disp: , Rfl:     sertraline (Zoloft) 100 mg tablet, , Disp: , Rfl:     ALPRAZolam (Xanax) 0.5 mg tablet, Take one tablet one hour before  anxiety-provoking situation. Limits one tab/day, three tabs/week., Disp: , Rfl:     gabapentin (Neurontin) 300 mg capsule, Take 2 capsules (600 mg) by mouth early in the morning. AND 1 capsule (300 mg) once daily at bedtime., Disp: 90 capsule, Rfl: 2  No current facility-administered medications for this visit.     Most Recent Labs:  Results for orders placed or performed in visit on 07/02/25   CBC and Auto Differential    Collection Time: 07/02/25 12:15 PM   Result Value Ref Range    WBC 11.3 4.4 - 11.3 x10*3/uL    nRBC 0.0 0.0 - 0.0 /100 WBCs    RBC 5.42 4.50 - 5.90 x10*6/uL    Hemoglobin 15.8 13.5 - 17.5 g/dL    Hematocrit 46.7 41.0 - 52.0 %    MCV 86 80 - 100 fL    MCH 29.2 26.0 - 34.0 pg    MCHC 33.8 32.0 - 36.0 g/dL    RDW 13.8 11.5 - 14.5 %    Platelets 234 150 - 450 x10*3/uL    Neutrophils % 68.9 40.0 - 80.0 %    Immature Granulocytes %, Automated 0.5 0.0 - 0.9 %    Lymphocytes % 20.7 13.0 - 44.0 %    Monocytes % 7.4 2.0 - 10.0 %    Eosinophils % 1.9 0.0 - 6.0 %    Basophils % 0.6 0.0 - 2.0 %    Neutrophils Absolute 7.77 (H) 1.20 - 7.70 x10*3/uL    Immature Granulocytes Absolute, Automated 0.06 0.00 - 0.70 x10*3/uL    Lymphocytes Absolute 2.34 1.20 - 4.80 x10*3/uL    Monocytes Absolute 0.84 0.10 - 1.00 x10*3/uL    Eosinophils Absolute 0.22 0.00 - 0.70 x10*3/uL    Basophils Absolute 0.07 0.00 - 0.10 x10*3/uL   Comprehensive Metabolic Panel    Collection Time: 07/02/25 12:15 PM   Result Value Ref Range    Glucose 105 (H) 74 - 99 mg/dL    Sodium 131 (L) 136 - 145 mmol/L    Potassium 4.4 3.5 - 5.3 mmol/L    Chloride 99 98 - 107 mmol/L    Bicarbonate 24 21 - 32 mmol/L    Anion Gap 12 10 - 20 mmol/L    Urea Nitrogen 20 6 - 23 mg/dL    Creatinine 1.06 0.50 - 1.30 mg/dL    eGFR 75 >60 mL/min/1.73m*2    Calcium 9.5 8.6 - 10.3 mg/dL    Albumin 4.3 3.4 - 5.0 g/dL    Alkaline Phosphatase 127 33 - 136 U/L    Total Protein 7.5 6.4 - 8.2 g/dL    AST 16 9 - 39 U/L    Bilirubin, Total 0.5 0.0 - 1.2 mg/dL    ALT 21 10 - 52  "U/L   Magnesium    Collection Time: 07/02/25 12:15 PM   Result Value Ref Range    Magnesium 1.77 1.60 - 2.40 mg/dL      No results found for: \"PSA\"     Performance Status:  ECOG Score: 0- Fully active, able to carry on all pre-disease performance w/o restriction.  Karnofsky Score: 100 - Fully active, able to carry on all pre-disease performed without restriction    Assessment/Plan   Toro Lujan is a 70 y.o. male with mUC to LN who presents in follow up and consideration of cycle 16 day 1 SEGE 1822 on DV. He looks and feels well, though has neuropathy, grade 2. This is continuing to improve. Labs unremarkable. Has been on hold for about 6 months at this time. Will continue hold.     He previous had a bone scan, which showed no concern for bone involvement. We will continue medication hold. Scans completed today, and we will consider restart pending scans and AE's.    # mUC  - HOLD DV today due to neuropathy  - Continue to monitor labs    # Neuropathy  - Continue gabapentin 300 mg PO daily, 600 mg at night  - On Zoloft, working well for anxiety so will avoid duloxetine at this time.  - Dose reduction further on resumption, HOLD tx today  - Continue PT/OT for motor function    # Back pain  # Arthritis in multiple joints  - Continue with ortho    # Triglycerides  - Continue with cardiology  - Monitor    # Health Maintenance  - Continue with PCP and other healthcare providers  - Advised exercise, heart-healthy diet    RTC per protocol    Total time spent on this encounter was 40 minutes, which included preparation, direct time with patient, documentation, and care coordination on the day of visit.    Daniella Pineda, MSN, APRN, AGNP-C, AOCNP  Associate Nurse Practitioner  [facility]  "

## 2025-07-02 NOTE — PROGRESS NOTES
Research Note Follow Up Visit       Toro Lujan is here today for an unscheduled visit for assessment of sensory peripheral neuropathy, which has caused him to be on a long-term treatment HOLD.   HOLD continues, although patient would like to resume treatment.      Fasting lipid panel collected today.  Patient cancelled echo scheduled for today.  Rescheduled for 16JUL, within protocol required window.    Patient had scans today and will review virtually with Dr. Castanon on 10JUL and will discuss continuation of treatment.     Patient reports continuing pain in right hip.  To get a steroid injection next week in his RIGHT hip 07JUL.    Patient reports G2 in fingers and feet still present.  Continues to improve. Does not affect function.    +G1 hyponatremia  +G1 Skin disorder, other - red bump on right lower extremity.  This nurse advised patient that if it doesn't go away in a few weeks he should consult a dermatologist.  Parking pass given.  Reimbursement submitted.    Education Documentation  Monitoring Weight, taught by Emma Mann RN at 7/2/2025  3:07 PM.  Learner: Patient  Readiness: Eager  Method: Explanation  Response: Verbalizes Understanding    Pain Rating Scale, taught by Emma Mann RN at 7/2/2025  3:07 PM.  Learner: Patient  Readiness: Eager  Method: Explanation  Response: Verbalizes Understanding    Skin and Nail Changes, taught by Emma Mann RN at 7/2/2025  3:07 PM.  Learner: Patient  Readiness: Eager  Method: Explanation  Response: Verbalizes Understanding    Care of Neuropathy, taught by Emma Mann RN at 7/2/2025  3:07 PM.  Learner: Patient  Readiness: Eager  Method: Explanation  Response: Verbalizes Understanding    Treatment Plan and Schedule, taught by Emma Mann RN at 7/2/2025  3:07 PM.  Learner: Patient  Readiness: Eager  Method: Explanation  Response: Verbalizes Understanding    Comprehensive Metabolic Panel (CMP), taught by Emma Mann RN at 7/2/2025  3:07 PM.  Learner:  Patient  Readiness: Eager  Method: Explanation  Response: Verbalizes Understanding    Complete Blood Count with Differential (CBC w/ Diff), taught by Emma Mann RN at 7/2/2025  3:07 PM.  Learner: Patient  Readiness: Eager  Method: Explanation  Response: Verbalizes Understanding    Education Comments  No comments found.

## 2025-07-03 ENCOUNTER — LAB (OUTPATIENT)
Dept: LAB | Facility: HOSPITAL | Age: 70
End: 2025-07-03
Payer: COMMERCIAL

## 2025-07-03 ENCOUNTER — TELEPHONE (OUTPATIENT)
Dept: ADMISSION | Facility: HOSPITAL | Age: 70
End: 2025-07-03
Payer: COMMERCIAL

## 2025-07-03 DIAGNOSIS — N13.9 URINARY (TRACT) OBSTRUCTION: ICD-10-CM

## 2025-07-03 DIAGNOSIS — N34.2 INFECTIVE URETHRITIS: ICD-10-CM

## 2025-07-03 DIAGNOSIS — C67.8 MALIGNANT NEOPLASM OF OVERLAPPING SITES OF BLADDER (MULTI): Primary | ICD-10-CM

## 2025-07-03 LAB
APPEARANCE UR: ABNORMAL
BACTERIA #/AREA URNS AUTO: ABNORMAL /HPF
BILIRUB UR STRIP.AUTO-MCNC: NEGATIVE MG/DL
COLOR UR: ABNORMAL
GLUCOSE UR STRIP.AUTO-MCNC: NORMAL MG/DL
KETONES UR STRIP.AUTO-MCNC: NEGATIVE MG/DL
LEUKOCYTE ESTERASE UR QL STRIP.AUTO: ABNORMAL
MUCOUS THREADS #/AREA URNS AUTO: ABNORMAL /LPF
NITRITE UR QL STRIP.AUTO: ABNORMAL
PH UR STRIP.AUTO: 8.5 [PH]
PROT UR STRIP.AUTO-MCNC: ABNORMAL MG/DL
RBC # UR STRIP.AUTO: ABNORMAL MG/DL
RBC #/AREA URNS AUTO: >20 /HPF
SP GR UR STRIP.AUTO: 1.02
URATE CRY #/AREA UR COMP ASSIST: ABNORMAL /HPF
UROBILINOGEN UR STRIP.AUTO-MCNC: NORMAL MG/DL
WBC #/AREA URNS AUTO: ABNORMAL /HPF

## 2025-07-03 PROCEDURE — 81001 URINALYSIS AUTO W/SCOPE: CPT

## 2025-07-03 PROCEDURE — 87086 URINE CULTURE/COLONY COUNT: CPT

## 2025-07-04 ENCOUNTER — EDUCATION (OUTPATIENT)
Dept: HEMATOLOGY/ONCOLOGY | Facility: HOSPITAL | Age: 70
End: 2025-07-04
Payer: COMMERCIAL

## 2025-07-04 NOTE — PROGRESS NOTES
Advised patient to call [phone] and speak with the doctor on call regarding his urinalysis results and CT results. Culture pending.

## 2025-07-05 ENCOUNTER — TELEPHONE (OUTPATIENT)
Dept: ADMISSION | Facility: HOSPITAL | Age: 70
End: 2025-07-05
Payer: COMMERCIAL

## 2025-07-05 LAB — BACTERIA UR CULT: NORMAL

## 2025-07-05 NOTE — TELEPHONE ENCOUNTER
Patient had UA and culture completed and was advised to call us for results. Spoke with daughter who was told CT showed obstructing stones. He has urostomy, she states he is currently passing urine into the bag, but asking if stone will pass into bag? They are aware the will need to see urology, but are concerned this weekend since offices are closed. He is asymptomatic today    Per on call MD..  I think stones could pass into the bag. UA showed inflammation (pyuria) but since he's asymptomatic, would not recommend abx. encourage a lot of water intake. If he develops fever/chills/flank pain, need to go to ED.    Patient and daughter updated and educated on above, no further needs at this time

## 2025-07-07 ENCOUNTER — HOSPITAL ENCOUNTER (OUTPATIENT)
Dept: RADIOLOGY | Facility: EXTERNAL LOCATION | Age: 70
Discharge: HOME | End: 2025-07-07

## 2025-07-07 ENCOUNTER — APPOINTMENT (OUTPATIENT)
Dept: ORTHOPEDIC SURGERY | Age: 70
End: 2025-07-07
Payer: COMMERCIAL

## 2025-07-07 ENCOUNTER — TELEPHONE (OUTPATIENT)
Dept: HEMATOLOGY/ONCOLOGY | Facility: HOSPITAL | Age: 70
End: 2025-07-07

## 2025-07-07 VITALS — WEIGHT: 246 LBS | HEIGHT: 69 IN | BODY MASS INDEX: 36.43 KG/M2

## 2025-07-07 DIAGNOSIS — M16.11 PRIMARY OSTEOARTHRITIS OF RIGHT HIP: Primary | ICD-10-CM

## 2025-07-07 LAB — HOLD SPECIMEN: NORMAL

## 2025-07-07 RX ORDER — LIDOCAINE HYDROCHLORIDE 10 MG/ML
4 INJECTION, SOLUTION INFILTRATION; PERINEURAL
Status: COMPLETED | OUTPATIENT
Start: 2025-07-07 | End: 2025-07-07

## 2025-07-07 RX ORDER — METHYLPREDNISOLONE ACETATE 40 MG/ML
80 INJECTION, SUSPENSION INTRA-ARTICULAR; INTRALESIONAL; INTRAMUSCULAR; SOFT TISSUE
Status: COMPLETED | OUTPATIENT
Start: 2025-07-07 | End: 2025-07-07

## 2025-07-07 RX ADMIN — METHYLPREDNISOLONE ACETATE 80 MG: 40 INJECTION, SUSPENSION INTRA-ARTICULAR; INTRALESIONAL; INTRAMUSCULAR; SOFT TISSUE at 14:54

## 2025-07-07 RX ADMIN — LIDOCAINE HYDROCHLORIDE 4 ML: 10 INJECTION, SOLUTION INFILTRATION; PERINEURAL at 14:54

## 2025-07-07 ASSESSMENT — PAIN - FUNCTIONAL ASSESSMENT: PAIN_FUNCTIONAL_ASSESSMENT: NO/DENIES PAIN

## 2025-07-07 NOTE — PROGRESS NOTES
Subjective:  Chief Complaint: Pain of the Right Hip       Patient ID: Toro Lujan is a 70 y.o. coming in with right hip pain . Patient states Pain began 3/1/2025. They do not report any history of trauma to the area(s) They deny having any numbness and tingling in their hips. Would like an injection in his right hip. Denies lower back pain. Patient has kidney stones.  XR done 3/31/2025.  Diabetic: No  Neuropathy: in his hands  PREVIOUS TREATMENTS: None    Last Surgery: Phacoemulsification Cataract with Insertion Intraocular Lens - Left   Last Surgery Date: 10/24/2024     Toro is a very pleasant 70-year-old male coming in with R hip pain.  He says that he was in [facility] in March for his 70th birthday party and started having posterior right hip pain.  Since that time he has had cortisone injections in his knees and he was evaluated by Dr. Helms, who referred him here for a possible right hip cortisone injection under ultrasound guidance for pretty severe right hip osteoarthritis.  Patient has a cancer history and has an ileostomy bag.  He is trying to avoid additional surgeries for as long as possible.  Posterior lateral hip pain does radiate towards the groin at times and is worse with internal range of motion, and with activity.  He does not take any medications  regularly for the pain and has only had Tylenol a few times over the past year.         Current Medications[1]     RX Allergies[2]     Objective:   Right Hip Exam     Tenderness   The patient is experiencing no tenderness.     Range of Motion   Abduction:  abnormal   Adduction:  normal   Extension:  normal   Flexion:  abnormal   External rotation:  abnormal   Internal rotation:  abnormal     Muscle Strength   The patient has normal right hip strength.    Tests   CARMINE: positive    Other   Erythema: absent  Sensation: normal    Comments:  Pain mostly reproduced with internal range of motion      Left Hip Exam   Left hip exam is normal.              Imaging Results:   X-rays of the right hip and pelvis were reviewed and interpreted by me on 7/7/2025 and revealed moderate to severe right hip osteoarthritis.  No evidence of acute injury or fracture    L Inj/Asp: R hip joint on 7/7/2025 2:54 PM  Indications: pain  Details: 20 G needle, ultrasound-guided anterior approach  Medications: 80 mg methylPREDNISolone acetate 40 mg/mL; 4 mL lidocaine 10 mg/mL (1 %)  Outcome: tolerated well, no immediate complications  Procedure, treatment alternatives, risks and benefits explained, specific risks discussed. Consent was given by the patient. Immediately prior to procedure a time out was called to verify the correct patient, procedure, equipment, support staff and site/side marked as required. Patient was prepped and draped in the usual sterile fashion.            Assessment/Plan:  Referring Provider: No ref. provider found    Encounter Diagnoses:   Primary osteoarthritis of right hip     Orders Placed This Encounter    L Inj/Asp    Point of Care Ultrasound      No follow-ups on file.     We discussed the patient's treatment options at length and eventually decided to perform a right hip joint cortisone injection under ultrasound guidance here today in the office.  The patient tolerated the procedure well without any complications and activity modifications were reviewed. They will begin using prescription dose Tylenol at 1000 mg three times daily and  The patient will follow-up with me as needed depending on how they respond to this injection.     ** Please excuse any errors in grammar or translation related to this dictation. Voice recognition software was utilized to prepare this document. **         Levi Ha MD  [facility]           [1]   Current Outpatient Medications   Medication Sig Dispense Refill    acetaminophen (Tylenol) 500 mg tablet Take 2 tablets (1,000 mg) by mouth every 8 hours if needed.      Advair Diskus 100-50  mcg/dose diskus inhaler INHALE 1 PUFF BY MOUTH TWICE DAILY AS DIRECTED. RINSE AND GARGLE MOUTH WITH WATER AFTER EACH USE      ALPRAZolam (Xanax) 0.5 mg tablet Take one tablet one hour before anxiety-provoking situation. Limits one tab/day, three tabs/week.      aspirin 81 mg EC tablet Take 1 tablet (81 mg) by mouth once daily.      gabapentin (Neurontin) 300 mg capsule Take 2 capsules (600 mg) by mouth early in the morning. AND 1 capsule (300 mg) once daily at bedtime. 90 capsule 2    lisinopril 20 mg tablet Take 1 tablet (20 mg) by mouth once daily.      metoprolol tartrate (Lopressor) 25 mg tablet Take 1 tablet (25 mg) by mouth once daily.      nitroglycerin (Nitrostat) 0.4 mg SL tablet DISSOLVE 1 TABLET UNDER THE TONGUE EVERY 5 MINUTES AS NEEDED FOR CHEST PAIN FOR UP TO 3 TABLET TOTAL. CALL 911 WITH 2ND TABLET IF NO RELIEF      pantoprazole (ProtoNix) 40 mg EC tablet Take 1 tablet (40 mg) by mouth once daily.      rosuvastatin (Crestor) 40 mg tablet Take 1 tablet (40 mg) by mouth once daily.      sertraline (Zoloft) 100 mg tablet        No current facility-administered medications for this visit.   [2]   Allergies  Allergen Reactions    Pollen Extracts Agitation

## 2025-07-10 ENCOUNTER — TELEMEDICINE (OUTPATIENT)
Dept: HEMATOLOGY/ONCOLOGY | Facility: HOSPITAL | Age: 70
End: 2025-07-10
Payer: COMMERCIAL

## 2025-07-10 DIAGNOSIS — Z92.21 S/P CHEMOTHERAPY, TIME SINCE GREATER THAN 12 WEEKS: ICD-10-CM

## 2025-07-10 DIAGNOSIS — Z00.6 RESEARCH SUBJECT: ICD-10-CM

## 2025-07-10 DIAGNOSIS — C77.9 REGIONAL LYMPH NODE METASTASIS PRESENT (MULTI): ICD-10-CM

## 2025-07-10 DIAGNOSIS — R60.0 LOWER EXTREMITY EDEMA: ICD-10-CM

## 2025-07-10 DIAGNOSIS — G62.0 DRUG-INDUCED POLYNEUROPATHY (MULTI): ICD-10-CM

## 2025-07-10 DIAGNOSIS — C67.8 MALIGNANT NEOPLASM OF OVERLAPPING SITES OF BLADDER (MULTI): Primary | ICD-10-CM

## 2025-07-10 PROCEDURE — 99214 OFFICE O/P EST MOD 30 MIN: CPT | Performed by: INTERNAL MEDICINE

## 2025-07-10 NOTE — PROGRESS NOTES
" Medical Oncology Out Patient Clinic Note    Patient's Name: Toro Lujan  MRN: [id number]  Date of Service: 7/10/2025  Virtual/Call Visit: YES      Reason for Visit: FU    HPI:       Updated PMHx  None    Review of Systems  13 point review negative    Physical Exam:  There were no vitals taken for this visit.  ECOG Performance Status:   HEENT: Normal  ENT: Normal  CV:   Pulmonary:  GI:  :  Extremities: No Edema   Neurologic: Normal  Skin: Normal skin turgor  Psych: Appropriate mood      LABS:    No results found for: \"PSA\"  Lab Results   Component Value Date    WBC 11.3 07/02/2025    HGB 15.8 07/02/2025    HCT 46.7 07/02/2025    MCV 86 07/02/2025     07/02/2025     Lab Results   Component Value Date    GLUCOSE 105 (H) 07/02/2025    CALCIUM 9.5 07/02/2025     (L) 07/02/2025    K 4.4 07/02/2025    CO2 24 07/02/2025    CL 99 07/02/2025    BUN 20 07/02/2025    CREATININE 1.06 07/02/2025     No results found for: \"TESTOSTERONE\"  Lab Results   Component Value Date    ALT 21 07/02/2025    AST 16 07/02/2025    ALKPHOS 127 07/02/2025    BILITOT 0.5 07/02/2025       IMAGING:  Narrative & Impression   Interpreted By:  Thai Orta and Ogievich Taessa   STUDY:  CT CHEST ABDOMEN PELVIS W IV CONTRAST;  7/2/2025 12:56 pm      INDICATION:  Signs/Symptoms:required per clinical trial. ,Z00.6 Encounter for  examination for normal comparison and control in clinical research  program      Per EMR: Metastatic urothelial carcinoma of the bladder with  metastasis of lymph nodes. Status post radical cystectomy with ileal  conduit received prior platinum-based chemotherapy. Consideration of  cycle 16 chemotherapy for clinical trial.      COMPARISON:  CT ABDOMEN PELVIS W IV CONTRAST 5/19/2025, CT CHEST ABDOMEN PELVIS W  IV CONTRAST 4/9/2025      ACCESSION NUMBER(S):  [id number]      ORDERING CLINICIAN:  YRN MANUEL      TECHNIQUE:  CT of the chest, abdomen, and pelvis was performed.  Contiguous axial  images were " obtained at 3 mm slice thickness through the chest,  abdomen and pelvis. Coronal and sagittal reconstructions at 3 mm  slice thickness were performed. 75 ML of Omnipaque 350 was  administered intravenously without immediate complication.      FINDINGS:  CHEST:      LUNG/PLEURA/LARGE AIRWAYS:  Trachea central airways are patent. Nodularity is noted of the lower  trachea likely representing adherent mucus.      No suspicious pulmonary nodules. No focal consolidation or pleural  effusion.      VESSELS:  Aorta and pulmonary arteries are normal caliber.  No atherosclerotic  changes of the aorta are identified.  Moderate-to-severe coronary  artery calcifications are present.      HEART:  The heart is normal in size.  No pericardial effusion      MEDIASTINUM AND EBONY:  No mediastinal, hilar or axillary lymphadenopathy is present.  The  esophagus is normal.      CHEST WALL AND LOWER NECK:  The soft tissues of the chest wall demonstrate no gross abnormality.  The visualized thyroid gland appears within normal limits.      ABDOMEN:      LIVER:  Liver is normal in size with again seen hepatic steatosis. Stable  subcentimeter hepatic hypodensities which are too small to  characterize however favored to represent hepatic cysts. No new  worrisome hepatic lesions.      BILE DUCTS:  Normal caliber.      GALLBLADDER:  No calcified stones. No wall thickening.      PANCREAS:  Within normal limits.      SPLEEN:  Within normal limits.      ADRENAL GLANDS:  Stable indeterminate 1.6 cm right adrenal gland nodule (series 2,  image 103).      Stable indeterminate 2.9 x 1.7 cm left adrenal gland  thickening/nodule (series 2, image 108).      KIDNEYS AND URETERS:  Right kidney slightly greater in size when compared to the left.  Left-greater-than-right macrolobulated contour of the bilateral  kidneys which may represent cortical thinning/scarring.      Postsurgical changes from ileal conduit creation.      There is a obstructing 0.6 x 0.5 cm  renal calculi at the distal  ureter/small bowel anastomosis which likely has refluxed from the  ileal conduit into the distal ureter when compared to prior CT. There  is an additional 0.9 x 0.5 cm ureteral calculi in the distal ureter  (series 2, image 169) which may be from the previously noted left  nonobstructive 0.4 cm renal calculi which has likely increased in  size in the interim. There is upstream mild left  hydroureteronephrosis. There is mild-to-moderate ureteral wall  enhancement, thickening, and stranding most prominent in the mid to  distal ureter (series 2, image 166).      Right kidney without evidence of nephroureterolithiasis or  hydroureteronephrosis.      PELVIS:      BLADDER:  Postsurgical changes of radical cystectomy with ileal conduit  creation.      REPRODUCTIVE ORGANS:  Prostate surgically absent.      BOWEL:  Stomach is within normal limits. Postsurgical changes of ileal  conduit creation without evidence of obstruction. Small and large  bowel without evidence of inflammatory changes or obstruction.  Appendix is normal.      VESSELS:  Scattered atherosclerosis of the abdominal aorta and its branching  vessels. There is no aneurysmal dilatation of the abdominal aorta.  The IVC appears normal.      PERITONEUM/RETROPERITONEUM/LYMPH NODES:  No ascites or free air, no fluid collection.  No abdominopelvic  lymphadenopathy is present.      BONE AND SOFT TISSUE:  No suspicious osseous lesions are identified. Again seen grade 1/2  anterolisthesis of L5-S1.      ABDOMINAL WALL:  Right lower quadrant ileal conduit creation. However, medial to the  ileostomy, there is a ventral abdominal wall hernia containing a loop  of bowel without evidence of obstruction.      IMPRESSION:  Urothelial carcinoma restaging scan when compared to CT  abdomen/pelvis 05/19/2025 and CT chest 04/09/2025.  1. Two obstructive left distal ureter calculi resulting in upstream  mild hydroureteronephrosis with mild-to-moderate  ureteritis most  pronounced in the mid to distal ureter. Probable migration of the  left renal calculi seen on the prior study responsible for the new  obstructive uropathy. One of the dominant calculus was noted on the  prior study in the conduit. Recommend correlation with urinalysis to  exclude infection.  2. Postsurgical changes of cystoprostatectomy with right lower  quadrant ileal conduit creation without evidence of local disease  recurrence.  3. No evidence of metastatic disease in the chest, abdomen, or pelvis.      I personally reviewed the images/study and I agree with the findings  as stated by Swapna Henning DO, PGY-4. This study was interpreted  at [place], [place].      MACRO:  Critical Finding:  See findings. Notification was initiated on  7/2/2025 at 3:12 pm by  Swapna Henning.  (**-YCF-**) Instructions:      Signed by: Thai Orta 7/2/2025 3:18 PM  Dictation workstation:   [id number]     GENOMICS:  ** Copied from [address] [place] [address] on 10-Jul-2025 04:47PM by Topher Castanon **    Test Name: QVPN xF+ (XF.V3)  Laboratory Name: [facility]  Report Id: [id number]    Result Interpretation:  * No potentially actionable variants and no reportable treatment options found.  * Treatment Notes: No reportable treatment options found.    Molecular Findings:  * Gene: TET2, Variant: Frameshift - LOF, Biologically Relevant, p.D162fs (Allele Frequency - 4.4%)  * Gene: TP53, Variant: Missense variant - LOF, Biologically Relevant, p.C238F (Allele Frequency - 0.5%)  * Biomarker: Microsatellite Instability, Status: MSI-High not detected  * Biomarker: Tumor Mutation [place] (6.1 Muts/Mb)  * 6 variants of unknown significance    ** End of copied information **    A/P:    Remain on trial  No  related issues  No back pain and no hematuria    Discussed importance of a healthier diet - offered to see Nutritional Services; Also discussed the importance of daily regular  exercise (aerobic and resistance differences noted)  Offered to see Supportive Services if needed as well as integrative Oncology and Psychosocial Support    Topher Castanon MD, FACP  Chief, Solid Tumor Oncology Division   Medical Oncology  Professor of Medicine and Urology  /[facility]

## 2025-07-10 NOTE — TELEPHONE ENCOUNTER
Spoke to patient it was for kidney stones, he called the urology office and did not need a referral to be seen. No further needs at this time.     TREVIN DOE RN

## 2025-07-14 ENCOUNTER — APPOINTMENT (OUTPATIENT)
Dept: UROLOGY | Facility: CLINIC | Age: 70
End: 2025-07-14
Payer: COMMERCIAL

## 2025-07-14 VITALS — BODY MASS INDEX: 36.43 KG/M2 | WEIGHT: 246 LBS | HEIGHT: 69 IN | TEMPERATURE: 97.3 F

## 2025-07-14 DIAGNOSIS — N13.2 URETERAL STONE WITH HYDRONEPHROSIS: Primary | ICD-10-CM

## 2025-07-14 PROCEDURE — 1126F AMNT PAIN NOTED NONE PRSNT: CPT

## 2025-07-14 PROCEDURE — 1160F RVW MEDS BY RX/DR IN RCRD: CPT

## 2025-07-14 PROCEDURE — 3008F BODY MASS INDEX DOCD: CPT

## 2025-07-14 PROCEDURE — 1159F MED LIST DOCD IN RCRD: CPT

## 2025-07-14 PROCEDURE — 99214 OFFICE O/P EST MOD 30 MIN: CPT

## 2025-07-14 PROCEDURE — 4004F PT TOBACCO SCREEN RCVD TLK: CPT

## 2025-07-14 ASSESSMENT — PAIN SCALES - GENERAL: PAINLEVEL_OUTOF10: 0-NO PAIN

## 2025-07-14 NOTE — PROGRESS NOTES
Chief complaint:  Nephrolithiasis  Referring physician:  No ref. provider found     SUBJECTIVE:    Medical history:   has a past medical history of Anxiety, Bladder cancer (Multi), Cataract, Cervical spine disease, COPD (chronic obstructive pulmonary disease) (Multi), Coronary artery disease, GERD (gastroesophageal reflux disease), Hyperlipidemia, Hypertension, Peripheral neuropathy, and Ureteral stone.   Surgical history:   has a past surgical history that includes Prostatectomy; Other surgical history; Cystourethroscopy; Bladder surgery; Colonoscopy; Coronary stent placement; Cervical fusion; Knee Arthroplasty (Left); Cataract extraction w/  intraocular lens implant (Right, 10/03/2024); and Cataract extraction w/  intraocular lens implant (Left, 10/24/2024).  Family history:  family history is not on file.  Social history:   reports that he has been smoking cigarettes. He has never used smokeless tobacco. He reports current alcohol use. He reports that he does not currently use drugs.    Medications:    Current Outpatient Medications   Medication Instructions    acetaminophen (TYLENOL) 1,000 mg, Every 8 hours PRN    Advair Diskus 100-50 mcg/dose diskus inhaler INHALE 1 PUFF BY MOUTH TWICE DAILY AS DIRECTED. RINSE AND GARGLE MOUTH WITH WATER AFTER EACH USE    ALPRAZolam (Xanax) 0.5 mg tablet Take one tablet one hour before anxiety-provoking situation. Limits one tab/day, three tabs/week.    aspirin 81 mg EC tablet 1 tablet, Daily    gabapentin (Neurontin) 300 mg capsule Take 2 capsules (600 mg) by mouth early in the morning. AND 1 capsule (300 mg) once daily at bedtime.    lisinopril 20 mg, Daily    metoprolol tartrate (LOPRESSOR) 25 mg, Daily    nitroglycerin (Nitrostat) 0.4 mg SL tablet DISSOLVE 1 TABLET UNDER THE TONGUE EVERY 5 MINUTES AS NEEDED FOR CHEST PAIN FOR UP TO 3 TABLET TOTAL. CALL 911 WITH 2ND TABLET IF NO RELIEF    pantoprazole (PROTONIX) 40 mg, Daily    rosuvastatin (CRESTOR) 40 mg, Daily     "sertraline (Zoloft) 100 mg tablet       Allergies:    RX Allergies[1]     ROS:  14-point review of systems negative except as noted above.      HPI:  Toro Lujan is a 70 y.o. male with a history of urolithiasis who presents for initial evaluation of left distal ureteral stones (2 , anastomosis)  The patient underwent a cystoprostatectomy in 2021. In 2022, he was informed of the presence of left-sided renal stones and treatment was recommended, but not performed. He is currently under oncologic follow-up.    The most recent CT scan shows two stones in the left distal ureter, the largest measuring 9 mm, located near the ileal conduit anastomosis, with associated mild left-sided hydronephrosis.    Renal function remains stable at this time. The patient reports no hematuria, pain, or urinary tract infections.    OBJECTIVE:  Visit Vitals  Temp 36.3 °C (97.3 °F)   Body mass index is 36.33 kg/m².    Physical exam  General:  No acute distress  HEENT:  EOMI  CV:  Regular rate  Pulm:  Nonlabored respirations  Abd:  Soft, non-distended  :  No suprapubic or CVA tenderness  MSK:  No contractures  Neuro:  Motor intact  Psych:  Appropriate affect    Labs:    I have personally reviewed your lab tests  Lab Results   Component Value Date    WBC 11.3 07/02/2025    HGB 15.8 07/02/2025    HCT 46.7 07/02/2025     07/02/2025    ALT 21 07/02/2025    AST 16 07/02/2025     (L) 07/02/2025    K 4.4 07/02/2025    CL 99 07/02/2025    CREATININE 1.06 07/02/2025    BUN 20 07/02/2025    CO2 24 07/02/2025    INR 1.0 02/07/2024    HGBA1C 6.3 (H) 09/19/2020     Urine Culture (no units)   Date Value   07/03/2025     Growth indicates contamination with mixed bacterial conrad. Repeat culture if clinically indicated.    No results found for: \"PSA\"    Imaging:    I have personally reviewed images    ASSESSMENT:   Toro Lujan is a 70 y.o. male presenting with    No symptoms  No previous stent,  no previous procedure  stone " characteristics: 2 distal left ureteral stones, 9  and 6 mm  theurapeutics alternatives: anterograde RIRS, minipcnl access  Risk: sepsis, bleeding  PLAN:  RIRS anterograde, nephrostomy by IR  Problem List Items Addressed This Visit    None       Follow-up OR    Anirudh Linton MD    Problem List Items Addressed This Visit    None          [1]   Allergies  Allergen Reactions    Pollen Extracts Agitation

## 2025-07-15 ENCOUNTER — TELEPHONE (OUTPATIENT)
Dept: HEMATOLOGY/ONCOLOGY | Facility: HOSPITAL | Age: 70
End: 2025-07-15
Payer: COMMERCIAL

## 2025-07-15 NOTE — TELEPHONE ENCOUNTER
Toro calls today to request a call back from Dr. Castanon regarding a referral for surgical management of a kidney stone.    He can be reached at [phone].    Message sent to Dr. Castanon.

## 2025-07-16 ENCOUNTER — APPOINTMENT (OUTPATIENT)
Dept: HEMATOLOGY/ONCOLOGY | Facility: HOSPITAL | Age: 70
End: 2025-07-16
Payer: COMMERCIAL

## 2025-07-16 ENCOUNTER — APPOINTMENT (OUTPATIENT)
Dept: RESEARCH | Facility: HOSPITAL | Age: 70
End: 2025-07-16
Payer: COMMERCIAL

## 2025-07-16 ENCOUNTER — APPOINTMENT (OUTPATIENT)
Dept: CARDIOLOGY | Facility: HOSPITAL | Age: 70
End: 2025-07-16
Payer: COMMERCIAL

## 2025-07-16 DIAGNOSIS — Z00.6 EXAM FOR CLINICAL RESEARCH: ICD-10-CM

## 2025-07-21 ENCOUNTER — EDUCATION (OUTPATIENT)
Dept: HEMATOLOGY/ONCOLOGY | Facility: HOSPITAL | Age: 70
End: 2025-07-21
Payer: COMMERCIAL

## 2025-07-21 NOTE — RESEARCH NOTES
Patient reached out to this nurse via text asking if Dr. Castanon thought that Dr. Linton is the right doctor for him to see re: kidney stones seen on most recent CT scans.  Dr. Castanon confirmed that yes, Dr. Linton is the appropriate doctor to see. This nurse left a VM message with the [facility] administrative line requesting a call-back to the patient directly to schedule.

## 2025-07-22 ENCOUNTER — APPOINTMENT (OUTPATIENT)
Dept: ORTHOPEDIC SURGERY | Facility: CLINIC | Age: 70
End: 2025-07-22
Payer: COMMERCIAL

## 2025-07-23 DIAGNOSIS — N13.2 URETERAL STONE WITH HYDRONEPHROSIS: ICD-10-CM

## 2025-07-25 ENCOUNTER — TELEPHONE (OUTPATIENT)
Dept: UROLOGY | Facility: HOSPITAL | Age: 70
End: 2025-07-25
Payer: COMMERCIAL

## 2025-07-25 PROBLEM — N13.2 URETERAL STONE WITH HYDRONEPHROSIS: Status: ACTIVE | Noted: 2025-07-14

## 2025-07-25 NOTE — TELEPHONE ENCOUNTER
Spoke to patient. Confirmed surgery date of 8/25 at [place]. Surgery packet placed in the mail. Patient aware that PAT will be calling to schedule.    Jayne Alarcon RN

## 2025-07-28 NOTE — PROGRESS NOTES
" Medical Oncology Out Patient Clinic Note    Patient's Name: Toro Lujan  MRN: [id number]  Date of Service: 7/28/2025  Virtual/Call Visit: YES      Reason for Visit: FU    HPI: 71 yo male known to us with Met TCC currently on the [id number] study: SEGE 1822: Disitamab [place], cohorts A and B in HER2-overexpressing IHC 1+, 2+, 3+ for locally advanced unresectable or metastatic UC who have received prior platinum-based chemotherapy  Dose reduced to 1 mg/kg IV every 2 weeks - on HOLD  9/18/2024: Cycle 16 day 1 DV HELD due to neuropathy, extended hold due to disease response and continued symptoms.     Prior treatments:  5/6/2021: Underwent radical cystectomy with ilial conduit due to multiple multiple disease recurrences without MIBC.  12/13/2021: CTU suspicious for new enlarged pelvic LN  2/17/2022: Started chemotherapy with split-dose gemcitabine and cisplatin (completed C5)  6/24/2022: Started maintenance avelumab.     On SEGE has achieved a CR by RECIST and rx remains on hold sec peripheal neuropathy that has appeared to be resolved now- or at least G1    OA issues much better after ortho injected his knees and hips    Updated PMHx  None    Review of Systems  13 point review negative    Physical Exam:  There were no vitals taken for this visit.  ECOG Performance Status: 0    LABS:      Lab Results   Component Value Date    WBC 11.3 07/02/2025    HGB 15.8 07/02/2025    HCT 46.7 07/02/2025    MCV 86 07/02/2025     07/02/2025     Lab Results   Component Value Date    GLUCOSE 105 (H) 07/02/2025    CALCIUM 9.5 07/02/2025     (L) 07/02/2025    K 4.4 07/02/2025    CO2 24 07/02/2025    CL 99 07/02/2025    BUN 20 07/02/2025    CREATININE 1.06 07/02/2025     No results found for: \"TESTOSTERONE\"  Lab Results   Component Value Date    ALT 21 07/02/2025    AST 16 07/02/2025    ALKPHOS 127 07/02/2025    BILITOT 0.5 07/02/2025       IMAGING:  Narrative & Impression   Interpreted By:  Thai Orta,  and Juvencio " Taessa   STUDY:  CT CHEST ABDOMEN PELVIS W IV CONTRAST;  7/2/2025 12:56 pm      INDICATION:  Signs/Symptoms:required per clinical trial. ,Z00.6 Encounter for  examination for normal comparison and control in clinical research  program      Per EMR: Metastatic urothelial carcinoma of the bladder with  metastasis of lymph nodes. Status post radical cystectomy with ileal  conduit received prior platinum-based chemotherapy. Consideration of  cycle 16 chemotherapy for clinical trial.      COMPARISON:  CT ABDOMEN PELVIS W IV CONTRAST 5/19/2025, CT CHEST ABDOMEN PELVIS W  IV CONTRAST 4/9/2025      ACCESSION NUMBER(S):  [id number]      ORDERING CLINICIAN:  YRN MANUEL      TECHNIQUE:  CT of the chest, abdomen, and pelvis was performed.  Contiguous axial  images were obtained at 3 mm slice thickness through the chest,  abdomen and pelvis. Coronal and sagittal reconstructions at 3 mm  slice thickness were performed. 75 ML of Omnipaque 350 was  administered intravenously without immediate complication.      FINDINGS:  CHEST:      LUNG/PLEURA/LARGE AIRWAYS:  Trachea central airways are patent. Nodularity is noted of the lower  trachea likely representing adherent mucus.      No suspicious pulmonary nodules. No focal consolidation or pleural  effusion.      VESSELS:  Aorta and pulmonary arteries are normal caliber.  No atherosclerotic  changes of the aorta are identified.  Moderate-to-severe coronary  artery calcifications are present.      HEART:  The heart is normal in size.  No pericardial effusion      MEDIASTINUM AND EBONY:  No mediastinal, hilar or axillary lymphadenopathy is present.  The  esophagus is normal.      CHEST WALL AND LOWER NECK:  The soft tissues of the chest wall demonstrate no gross abnormality.  The visualized thyroid gland appears within normal limits.      ABDOMEN:      LIVER:  Liver is normal in size with again seen hepatic steatosis. Stable  subcentimeter hepatic hypodensities which are too small  to  characterize however favored to represent hepatic cysts. No new  worrisome hepatic lesions.      BILE DUCTS:  Normal caliber.      GALLBLADDER:  No calcified stones. No wall thickening.      PANCREAS:  Within normal limits.      SPLEEN:  Within normal limits.      ADRENAL GLANDS:  Stable indeterminate 1.6 cm right adrenal gland nodule (series 2,  image 103).      Stable indeterminate 2.9 x 1.7 cm left adrenal gland  thickening/nodule (series 2, image 108).      KIDNEYS AND URETERS:  Right kidney slightly greater in size when compared to the left.  Left-greater-than-right macrolobulated contour of the bilateral  kidneys which may represent cortical thinning/scarring.      Postsurgical changes from ileal conduit creation.      There is a obstructing 0.6 x 0.5 cm renal calculi at the distal  ureter/small bowel anastomosis which likely has refluxed from the  ileal conduit into the distal ureter when compared to prior CT. There  is an additional 0.9 x 0.5 cm ureteral calculi in the distal ureter  (series 2, image 169) which may be from the previously noted left  nonobstructive 0.4 cm renal calculi which has likely increased in  size in the interim. There is upstream mild left  hydroureteronephrosis. There is mild-to-moderate ureteral wall  enhancement, thickening, and stranding most prominent in the mid to  distal ureter (series 2, image 166).      Right kidney without evidence of nephroureterolithiasis or  hydroureteronephrosis.      PELVIS:      BLADDER:  Postsurgical changes of radical cystectomy with ileal conduit  creation.      REPRODUCTIVE ORGANS:  Prostate surgically absent.      BOWEL:  Stomach is within normal limits. Postsurgical changes of ileal  conduit creation without evidence of obstruction. Small and large  bowel without evidence of inflammatory changes or obstruction.  Appendix is normal.      VESSELS:  Scattered atherosclerosis of the abdominal aorta and its branching  vessels. There is no  aneurysmal dilatation of the abdominal aorta.  The IVC appears normal.      PERITONEUM/RETROPERITONEUM/LYMPH NODES:  No ascites or free air, no fluid collection.  No abdominopelvic  lymphadenopathy is present.      BONE AND SOFT TISSUE:  No suspicious osseous lesions are identified. Again seen grade 1/2  anterolisthesis of L5-S1.      ABDOMINAL WALL:  Right lower quadrant ileal conduit creation. However, medial to the  ileostomy, there is a ventral abdominal wall hernia containing a loop  of bowel without evidence of obstruction.      IMPRESSION:  Urothelial carcinoma restaging scan when compared to CT  abdomen/pelvis 05/19/2025 and CT chest 04/09/2025.  1. Two obstructive left distal ureter calculi resulting in upstream  mild hydroureteronephrosis with mild-to-moderate ureteritis most  pronounced in the mid to distal ureter. Probable migration of the  left renal calculi seen on the prior study responsible for the new  obstructive uropathy. One of the dominant calculus was noted on the  prior study in the conduit. Recommend correlation with urinalysis to  exclude infection.  2. Postsurgical changes of cystoprostatectomy with right lower  quadrant ileal conduit creation without evidence of local disease  recurrence.  3. No evidence of metastatic disease in the chest, abdomen, or pelvis.      I personally reviewed the images/study and I agree with the findings  as stated by Swapna Henning DO, PGY-4. This study was interpreted  at [facility], [place], [place].      MACRO:  Critical Finding:  See findings. Notification was initiated on  7/2/2025 at 3:12 pm by  Swapna Henning.  (**-YCF-**) Instructions:      Signed by: Thai Orta 7/2/2025 3:18 PM  Dictation workstation:   [id number]     GENOMICS:  ** Copied from [facility] on 10-Jul-2025 04:47PM by Topher Castanon **    Test Name: Collect xF+ (XF.V3)  Laboratory Name: [facility]  Report Id: [id number]    Result  Interpretation:  * No potentially actionable variants and no reportable treatment options found.  * Treatment Notes: No reportable treatment options found.    Molecular Findings:  * Gene: TET2, Variant: Frameshift - LOF, Biologically Relevant, p.D162fs (Allele Frequency - 4.4%)  * Gene: TP53, Variant: Missense variant - LOF, Biologically Relevant, p.C238F (Allele Frequency - 0.5%)  * Biomarker: Microsatellite Instability, Status: MSI-High not detected  * Biomarker: Tumor Mutation [place] (6.1 Muts/Mb)  * 6 variants of unknown significance    ** End of copied information **    A/P:     Met TCC  - CR on [id number] study  - remains on hold d/t G1 peripheral neuropathy, R > L  - neuropathy gradually getting better, only in hands     Nephrolithiasis   - scheduled for L min perc nephrolithiotomy with Dr Linton 8/2025   - intermittent hematuria, no pain     OA  - improved, less cane use   - pain meds not needed     I spent 30 minutes on the professional and overall care of this patient.   Patient verbalizes understanding of above plan. Time provided for patient's questions. Patient instructed to reach out for any new concerning issues.     Anya Gupta, MSN, APRN-CNP  Acute Care Nurse Practitioner   Genitourinary () Oncology  [facility]  Phone: [phone]

## 2025-07-29 ENCOUNTER — HOSPITAL ENCOUNTER (OUTPATIENT)
Dept: CARDIOLOGY | Facility: HOSPITAL | Age: 70
Discharge: HOME | End: 2025-07-29
Payer: COMMERCIAL

## 2025-07-29 ENCOUNTER — OFFICE VISIT (OUTPATIENT)
Dept: HEMATOLOGY/ONCOLOGY | Facility: HOSPITAL | Age: 70
End: 2025-07-29
Payer: COMMERCIAL

## 2025-07-29 ENCOUNTER — LAB (OUTPATIENT)
Dept: LAB | Facility: HOSPITAL | Age: 70
End: 2025-07-29
Payer: COMMERCIAL

## 2025-07-29 VITALS
RESPIRATION RATE: 18 BRPM | SYSTOLIC BLOOD PRESSURE: 121 MMHG | OXYGEN SATURATION: 96 % | BODY MASS INDEX: 35.68 KG/M2 | TEMPERATURE: 96.3 F | DIASTOLIC BLOOD PRESSURE: 70 MMHG | WEIGHT: 241.62 LBS | HEART RATE: 85 BPM

## 2025-07-29 DIAGNOSIS — C67.8 MALIGNANT NEOPLASM OF OVERLAPPING SITES OF BLADDER (MULTI): Primary | ICD-10-CM

## 2025-07-29 DIAGNOSIS — Z00.6 RESEARCH EXAM: Primary | ICD-10-CM

## 2025-07-29 DIAGNOSIS — Z00.6 EXAM FOR CLINICAL RESEARCH: ICD-10-CM

## 2025-07-29 DIAGNOSIS — Z00.6 RESEARCH EXAM: ICD-10-CM

## 2025-07-29 DIAGNOSIS — N13.2 URETERAL STONE WITH HYDRONEPHROSIS: ICD-10-CM

## 2025-07-29 LAB
ALBUMIN SERPL BCP-MCNC: 3.9 G/DL (ref 3.4–5)
ALP SERPL-CCNC: 105 U/L (ref 33–136)
ALT SERPL W P-5'-P-CCNC: 13 U/L (ref 10–52)
ANION GAP SERPL CALC-SCNC: 13 MMOL/L (ref 10–20)
APPEARANCE UR: ABNORMAL
APTT PPP: 28 SECONDS (ref 26–36)
AST SERPL W P-5'-P-CCNC: 10 U/L (ref 9–39)
BASOPHILS # BLD AUTO: 0.03 X10*3/UL (ref 0–0.1)
BASOPHILS NFR BLD AUTO: 0.3 %
BILIRUB DIRECT SERPL-MCNC: 0.2 MG/DL (ref 0–0.3)
BILIRUB SERPL-MCNC: 0.9 MG/DL (ref 0–1.2)
BILIRUB UR STRIP.AUTO-MCNC: NEGATIVE MG/DL
BUN SERPL-MCNC: 19 MG/DL (ref 6–23)
CALCIUM SERPL-MCNC: 9.1 MG/DL (ref 8.6–10.3)
CHLORIDE SERPL-SCNC: 98 MMOL/L (ref 98–107)
CHOLEST SERPL-MCNC: 152 MG/DL (ref 0–199)
CHOLESTEROL/HDL RATIO: 2.9
CO2 SERPL-SCNC: 25 MMOL/L (ref 21–32)
COLOR UR: ABNORMAL
CREAT SERPL-MCNC: 1.19 MG/DL (ref 0.5–1.3)
EGFRCR SERPLBLD CKD-EPI 2021: 66 ML/MIN/1.73M*2
EOSINOPHIL # BLD AUTO: 0.04 X10*3/UL (ref 0–0.7)
EOSINOPHIL NFR BLD AUTO: 0.3 %
ERYTHROCYTE [DISTWIDTH] IN BLOOD BY AUTOMATED COUNT: 14.7 % (ref 11.5–14.5)
GLUCOSE SERPL-MCNC: 95 MG/DL (ref 74–99)
GLUCOSE UR STRIP.AUTO-MCNC: NORMAL MG/DL
HCT VFR BLD AUTO: 42.5 % (ref 41–52)
HDLC SERPL-MCNC: 52.2 MG/DL
HGB BLD-MCNC: 14.2 G/DL (ref 13.5–17.5)
IMM GRANULOCYTES # BLD AUTO: 0.05 X10*3/UL (ref 0–0.7)
IMM GRANULOCYTES NFR BLD AUTO: 0.4 % (ref 0–0.9)
INR PPP: 1.1 (ref 0.9–1.1)
KETONES UR STRIP.AUTO-MCNC: NEGATIVE MG/DL
LDLC SERPL CALC-MCNC: 81 MG/DL
LEUKOCYTE ESTERASE UR QL STRIP.AUTO: ABNORMAL
LYMPHOCYTES # BLD AUTO: 1.14 X10*3/UL (ref 1.2–4.8)
LYMPHOCYTES NFR BLD AUTO: 9.7 %
MAGNESIUM SERPL-MCNC: 1.74 MG/DL (ref 1.6–2.4)
MCH RBC QN AUTO: 28.6 PG (ref 26–34)
MCHC RBC AUTO-ENTMCNC: 33.4 G/DL (ref 32–36)
MCV RBC AUTO: 86 FL (ref 80–100)
MONOCYTES # BLD AUTO: 0.86 X10*3/UL (ref 0.1–1)
MONOCYTES NFR BLD AUTO: 7.3 %
NEUTROPHILS # BLD AUTO: 9.6 X10*3/UL (ref 1.2–7.7)
NEUTROPHILS NFR BLD AUTO: 82 %
NITRITE UR QL STRIP.AUTO: ABNORMAL
NON HDL CHOLESTEROL: 100 MG/DL (ref 0–149)
NRBC BLD-RTO: 0 /100 WBCS (ref 0–0)
PH UR STRIP.AUTO: 7 [PH]
PHOSPHATE SERPL-MCNC: 3.3 MG/DL (ref 2.5–4.9)
PLATELET # BLD AUTO: 195 X10*3/UL (ref 150–450)
POTASSIUM SERPL-SCNC: 4.1 MMOL/L (ref 3.5–5.3)
PROT SERPL-MCNC: 7.2 G/DL (ref 6.4–8.2)
PROT UR STRIP.AUTO-MCNC: ABNORMAL MG/DL
PROTHROMBIN TIME: 12.2 SECONDS (ref 9.8–12.4)
RBC # BLD AUTO: 4.97 X10*6/UL (ref 4.5–5.9)
RBC # UR STRIP.AUTO: ABNORMAL MG/DL
RBC #/AREA URNS AUTO: ABNORMAL /HPF
SODIUM SERPL-SCNC: 132 MMOL/L (ref 136–145)
SP GR UR STRIP.AUTO: 1.01
TRIGL SERPL-MCNC: 93 MG/DL (ref 0–149)
UROBILINOGEN UR STRIP.AUTO-MCNC: NORMAL MG/DL
VLDL: 19 MG/DL (ref 0–40)
WBC # BLD AUTO: 11.7 X10*3/UL (ref 4.4–11.3)
WBC #/AREA URNS AUTO: >50 /HPF

## 2025-07-29 PROCEDURE — 36415 COLL VENOUS BLD VENIPUNCTURE: CPT

## 2025-07-29 PROCEDURE — 81001 URINALYSIS AUTO W/SCOPE: CPT

## 2025-07-29 PROCEDURE — 99214 OFFICE O/P EST MOD 30 MIN: CPT | Mod: 25

## 2025-07-29 PROCEDURE — 82248 BILIRUBIN DIRECT: CPT

## 2025-07-29 PROCEDURE — 85025 COMPLETE CBC W/AUTO DIFF WBC: CPT

## 2025-07-29 PROCEDURE — 80061 LIPID PANEL: CPT

## 2025-07-29 PROCEDURE — 87086 URINE CULTURE/COLONY COUNT: CPT

## 2025-07-29 PROCEDURE — 85610 PROTHROMBIN TIME: CPT

## 2025-07-29 PROCEDURE — C8929 TTE W OR WO FOL WCON,DOPPLER: HCPCS

## 2025-07-29 PROCEDURE — 84075 ASSAY ALKALINE PHOSPHATASE: CPT

## 2025-07-29 PROCEDURE — 83735 ASSAY OF MAGNESIUM: CPT

## 2025-07-29 PROCEDURE — 84100 ASSAY OF PHOSPHORUS: CPT

## 2025-07-29 PROCEDURE — 2500000004 HC RX 250 GENERAL PHARMACY W/ HCPCS (ALT 636 FOR OP/ED): Mod: JW | Performed by: INTERNAL MEDICINE

## 2025-07-29 RX ADMIN — HUMAN ALBUMIN MICROSPHERES AND PERFLUTREN 0.5 ML: 10; .22 INJECTION, SOLUTION INTRAVENOUS at 16:02

## 2025-07-29 ASSESSMENT — PAIN SCALES - GENERAL: PAINLEVEL_OUTOF10: 0-NO PAIN

## 2025-07-30 ENCOUNTER — APPOINTMENT (OUTPATIENT)
Dept: UROLOGY | Facility: HOSPITAL | Age: 70
End: 2025-07-30
Payer: COMMERCIAL

## 2025-07-30 ENCOUNTER — TELEPHONE (OUTPATIENT)
Dept: HEMATOLOGY/ONCOLOGY | Facility: HOSPITAL | Age: 70
End: 2025-07-30
Payer: COMMERCIAL

## 2025-07-30 ENCOUNTER — APPOINTMENT (OUTPATIENT)
Dept: RESEARCH | Facility: HOSPITAL | Age: 70
End: 2025-07-30
Payer: COMMERCIAL

## 2025-07-30 ENCOUNTER — APPOINTMENT (OUTPATIENT)
Dept: CARDIOLOGY | Facility: HOSPITAL | Age: 70
End: 2025-07-30
Payer: COMMERCIAL

## 2025-07-30 ENCOUNTER — APPOINTMENT (OUTPATIENT)
Dept: HEMATOLOGY/ONCOLOGY | Facility: HOSPITAL | Age: 70
End: 2025-07-30
Payer: COMMERCIAL

## 2025-07-30 LAB
AORTIC VALVE MEAN GRADIENT: 5 MMHG
AORTIC VALVE PEAK VELOCITY: 1.58 M/S
AV PEAK GRADIENT: 10 MMHG
AVA (PEAK VEL): 2.77 CM2
AVA (VTI): 2.72 CM2
BACTERIA UR CULT: NORMAL
EJECTION FRACTION: 65 %
HOLD SPECIMEN: NORMAL
LEFT ATRIUM VOLUME AREA LENGTH INDEX BSA: 21.1 ML/M2
LEFT VENTRICLE INTERNAL DIMENSION DIASTOLE: 4.27 CM (ref 3.5–6)
LEFT VENTRICULAR OUTFLOW TRACT DIAMETER: 2.13 CM
MITRAL VALVE E/A RATIO: 1.01
RIGHT VENTRICLE FREE WALL PEAK S': 12 CM/S
RIGHT VENTRICLE PEAK SYSTOLIC PRESSURE: 10 MMHG
TRICUSPID ANNULAR PLANE SYSTOLIC EXCURSION: 2.2 CM

## 2025-08-04 ENCOUNTER — APPOINTMENT (OUTPATIENT)
Dept: HEMATOLOGY/ONCOLOGY | Facility: HOSPITAL | Age: 70
End: 2025-08-04
Payer: COMMERCIAL

## 2025-08-06 DIAGNOSIS — Z00.6 RESEARCH SUBJECT: ICD-10-CM

## 2025-08-07 DIAGNOSIS — Z00.6 EXAM FOR CLINICAL RESEARCH: ICD-10-CM

## 2025-08-11 ENCOUNTER — OFFICE VISIT (OUTPATIENT)
Dept: HEMATOLOGY/ONCOLOGY | Facility: HOSPITAL | Age: 70
End: 2025-08-11
Payer: MEDICARE

## 2025-08-11 ENCOUNTER — LAB (OUTPATIENT)
Dept: LAB | Facility: HOSPITAL | Age: 70
End: 2025-08-11
Payer: MEDICARE

## 2025-08-11 VITALS
OXYGEN SATURATION: 95 % | TEMPERATURE: 96.8 F | WEIGHT: 245.59 LBS | HEART RATE: 77 BPM | BODY MASS INDEX: 36.27 KG/M2 | DIASTOLIC BLOOD PRESSURE: 89 MMHG | RESPIRATION RATE: 20 BRPM | SYSTOLIC BLOOD PRESSURE: 149 MMHG

## 2025-08-11 DIAGNOSIS — C67.8 MALIGNANT NEOPLASM OF OVERLAPPING SITES OF BLADDER (MULTI): Primary | ICD-10-CM

## 2025-08-11 DIAGNOSIS — Z00.6 EXAM FOR CLINICAL RESEARCH: ICD-10-CM

## 2025-08-11 LAB
ALBUMIN SERPL BCP-MCNC: 4.2 G/DL (ref 3.4–5)
ALP SERPL-CCNC: 114 U/L (ref 33–136)
ALT SERPL W P-5'-P-CCNC: 13 U/L (ref 10–52)
ANION GAP SERPL CALC-SCNC: 19 MMOL/L (ref 10–20)
AST SERPL W P-5'-P-CCNC: 12 U/L (ref 9–39)
BASOPHILS # BLD AUTO: 0.07 X10*3/UL (ref 0–0.1)
BASOPHILS NFR BLD AUTO: 0.8 %
BILIRUB DIRECT SERPL-MCNC: 0.1 MG/DL (ref 0–0.3)
BILIRUB SERPL-MCNC: 0.4 MG/DL (ref 0–1.2)
BUN SERPL-MCNC: 22 MG/DL (ref 6–23)
CALCIUM SERPL-MCNC: 9.2 MG/DL (ref 8.6–10.3)
CHLORIDE SERPL-SCNC: 100 MMOL/L (ref 98–107)
CO2 SERPL-SCNC: 24 MMOL/L (ref 21–32)
CREAT SERPL-MCNC: 1.17 MG/DL (ref 0.5–1.3)
EGFRCR SERPLBLD CKD-EPI 2021: 67 ML/MIN/1.73M*2
EOSINOPHIL # BLD AUTO: 0.15 X10*3/UL (ref 0–0.7)
EOSINOPHIL NFR BLD AUTO: 1.7 %
ERYTHROCYTE [DISTWIDTH] IN BLOOD BY AUTOMATED COUNT: 15 % (ref 11.5–14.5)
GLUCOSE SERPL-MCNC: 107 MG/DL (ref 74–99)
HCT VFR BLD AUTO: 43.7 % (ref 41–52)
HGB BLD-MCNC: 14.7 G/DL (ref 13.5–17.5)
IMM GRANULOCYTES # BLD AUTO: 0.14 X10*3/UL (ref 0–0.7)
IMM GRANULOCYTES NFR BLD AUTO: 1.6 % (ref 0–0.9)
LYMPHOCYTES # BLD AUTO: 1.95 X10*3/UL (ref 1.2–4.8)
LYMPHOCYTES NFR BLD AUTO: 21.7 %
MAGNESIUM SERPL-MCNC: 2.02 MG/DL (ref 1.6–2.4)
MCH RBC QN AUTO: 28.9 PG (ref 26–34)
MCHC RBC AUTO-ENTMCNC: 33.6 G/DL (ref 32–36)
MCV RBC AUTO: 86 FL (ref 80–100)
MONOCYTES # BLD AUTO: 0.88 X10*3/UL (ref 0.1–1)
MONOCYTES NFR BLD AUTO: 9.8 %
NEUTROPHILS # BLD AUTO: 5.8 X10*3/UL (ref 1.2–7.7)
NEUTROPHILS NFR BLD AUTO: 64.4 %
NRBC BLD-RTO: 0 /100 WBCS (ref 0–0)
PHOSPHATE SERPL-MCNC: 3.4 MG/DL (ref 2.5–4.9)
PLATELET # BLD AUTO: 286 X10*3/UL (ref 150–450)
POTASSIUM SERPL-SCNC: 4.7 MMOL/L (ref 3.5–5.3)
PROT SERPL-MCNC: 7.4 G/DL (ref 6.4–8.2)
RBC # BLD AUTO: 5.09 X10*6/UL (ref 4.5–5.9)
SODIUM SERPL-SCNC: 138 MMOL/L (ref 136–145)
WBC # BLD AUTO: 9 X10*3/UL (ref 4.4–11.3)

## 2025-08-11 PROCEDURE — 83735 ASSAY OF MAGNESIUM: CPT

## 2025-08-11 PROCEDURE — 84075 ASSAY ALKALINE PHOSPHATASE: CPT

## 2025-08-11 PROCEDURE — 82248 BILIRUBIN DIRECT: CPT

## 2025-08-11 PROCEDURE — 85025 COMPLETE CBC W/AUTO DIFF WBC: CPT

## 2025-08-11 PROCEDURE — 36415 COLL VENOUS BLD VENIPUNCTURE: CPT

## 2025-08-11 PROCEDURE — 84100 ASSAY OF PHOSPHORUS: CPT

## 2025-08-11 PROCEDURE — 99215 OFFICE O/P EST HI 40 MIN: CPT | Performed by: NURSE PRACTITIONER

## 2025-08-11 ASSESSMENT — PAIN SCALES - GENERAL: PAINLEVEL_OUTOF10: 0-NO PAIN

## 2025-08-13 ENCOUNTER — PRE-ADMISSION TESTING (OUTPATIENT)
Dept: PREADMISSION TESTING | Facility: HOSPITAL | Age: 70
End: 2025-08-13
Payer: COMMERCIAL

## 2025-08-13 VITALS
DIASTOLIC BLOOD PRESSURE: 79 MMHG | BODY MASS INDEX: 36.24 KG/M2 | HEIGHT: 69 IN | SYSTOLIC BLOOD PRESSURE: 137 MMHG | TEMPERATURE: 97.3 F | HEART RATE: 69 BPM | OXYGEN SATURATION: 99 % | RESPIRATION RATE: 18 BRPM | WEIGHT: 244.71 LBS

## 2025-08-13 DIAGNOSIS — N13.2 URETERAL STONE WITH HYDRONEPHROSIS: ICD-10-CM

## 2025-08-13 DIAGNOSIS — N20.0 KIDNEY STONE: ICD-10-CM

## 2025-08-13 DIAGNOSIS — Z01.818 PRE-OP TESTING: Primary | ICD-10-CM

## 2025-08-13 PROCEDURE — 93005 ELECTROCARDIOGRAM TRACING: CPT

## 2025-08-13 PROCEDURE — 87081 CULTURE SCREEN ONLY: CPT | Mod: PARLAB

## 2025-08-13 RX ORDER — CHLORHEXIDINE GLUCONATE 40 MG/ML
SOLUTION TOPICAL DAILY
Qty: 118 ML | Refills: 0 | Status: SHIPPED | OUTPATIENT
Start: 2025-08-13 | End: 2025-08-25

## 2025-08-13 RX ORDER — CHLORHEXIDINE GLUCONATE ORAL RINSE 1.2 MG/ML
15 SOLUTION DENTAL AS NEEDED
Qty: 120 ML | Refills: 0 | Status: SHIPPED | OUTPATIENT
Start: 2025-08-13

## 2025-08-13 ASSESSMENT — DUKE ACTIVITY SCORE INDEX (DASI)
CAN YOU HAVE SEXUAL RELATIONS: NO
CAN YOU PARTICIPATE IN MODERATE RECREATIONAL ACTIVITIES LIKE GOLF, BOWLING, DANCING, DOUBLES TENNIS OR THROWING A BASEBALL OR FOOTBALL: NO
CAN YOU WALK INDOORS, SUCH AS AROUND YOUR HOUSE: YES
CAN YOU DO HEAVY WORK AROUND THE HOUSE LIKE SCRUBBING FLOORS OR LIFTING AND MOVING HEAVY FURNITURE: YES
CAN YOU DO YARD WORK LIKE RAKING LEAVES, WEEDING OR PUSHING A MOWER: NO
TOTAL_SCORE: 23.45
CAN YOU DO LIGHT WORK AROUND THE HOUSE LIKE DUSTING OR WASHING DISHES: YES
CAN YOU DO MODERATE WORK AROUND THE HOUSE LIKE VACUUMING, SWEEPING FLOORS OR CARRYING GROCERIES: NO
CAN YOU TAKE CARE OF YOURSELF (EAT, DRESS, BATHE, OR USE TOILET): YES
CAN YOU WALK A BLOCK OR TWO ON LEVEL GROUND: YES
CAN YOU RUN A SHORT DISTANCE: NO
DASI METS SCORE: 5.6
CAN YOU PARTICIPATE IN STRENOUS SPORTS LIKE SWIMMING, SINGLES TENNIS, FOOTBALL, BASKETBALL, OR SKIING: NO
CAN YOU CLIMB A FLIGHT OF STAIRS OR WALK UP A HILL: YES

## 2025-08-14 LAB
ATRIAL RATE: 64 BPM
P AXIS: 46 DEGREES
P OFFSET: 181 MS
P ONSET: 126 MS
PR INTERVAL: 188 MS
Q ONSET: 220 MS
QRS COUNT: 10 BEATS
QRS DURATION: 98 MS
QT INTERVAL: 382 MS
QTC CALCULATION(BAZETT): 394 MS
QTC FREDERICIA: 390 MS
R AXIS: 7 DEGREES
T AXIS: 40 DEGREES
T OFFSET: 411 MS
VENTRICULAR RATE: 64 BPM

## 2025-08-15 LAB — STAPHYLOCOCCUS SPEC CULT: NORMAL

## 2025-08-22 ENCOUNTER — HOSPITAL ENCOUNTER (OUTPATIENT)
Dept: RADIOLOGY | Facility: HOSPITAL | Age: 70
Discharge: HOME | End: 2025-08-22
Payer: COMMERCIAL

## 2025-08-22 VITALS
DIASTOLIC BLOOD PRESSURE: 69 MMHG | HEART RATE: 69 BPM | OXYGEN SATURATION: 95 % | SYSTOLIC BLOOD PRESSURE: 135 MMHG | TEMPERATURE: 97.9 F | RESPIRATION RATE: 18 BRPM

## 2025-08-22 DIAGNOSIS — N13.2 URETERAL STONE WITH HYDRONEPHROSIS: ICD-10-CM

## 2025-08-22 PROCEDURE — 2500000004 HC RX 250 GENERAL PHARMACY W/ HCPCS (ALT 636 FOR OP/ED): Performed by: STUDENT IN AN ORGANIZED HEALTH CARE EDUCATION/TRAINING PROGRAM

## 2025-08-22 PROCEDURE — 7100000010 HC PHASE TWO TIME - EACH INCREMENTAL 1 MINUTE

## 2025-08-22 PROCEDURE — 50433 PLMT NEPHROURETERAL CATHETER: CPT

## 2025-08-22 PROCEDURE — C2625 STENT, NON-COR, TEM W/DEL SY: HCPCS

## 2025-08-22 PROCEDURE — 2720000007 HC OR 272 NO HCPCS

## 2025-08-22 PROCEDURE — 99152 MOD SED SAME PHYS/QHP 5/>YRS: CPT

## 2025-08-22 PROCEDURE — 2500000005 HC RX 250 GENERAL PHARMACY W/O HCPCS: Performed by: STUDENT IN AN ORGANIZED HEALTH CARE EDUCATION/TRAINING PROGRAM

## 2025-08-22 PROCEDURE — 2780000003 HC OR 278 NO HCPCS

## 2025-08-22 PROCEDURE — C1769 GUIDE WIRE: HCPCS

## 2025-08-22 PROCEDURE — 76942 ECHO GUIDE FOR BIOPSY: CPT

## 2025-08-22 PROCEDURE — 2550000001 HC RX 255 CONTRASTS

## 2025-08-22 PROCEDURE — C1887 CATHETER, GUIDING: HCPCS

## 2025-08-22 PROCEDURE — 7100000009 HC PHASE TWO TIME - INITIAL BASE CHARGE

## 2025-08-22 PROCEDURE — 99153 MOD SED SAME PHYS/QHP EA: CPT

## 2025-08-22 PROCEDURE — 50430 NJX PX NFROSGRM &/URTRGRM: CPT

## 2025-08-22 PROCEDURE — C1894 INTRO/SHEATH, NON-LASER: HCPCS

## 2025-08-22 RX ORDER — CEFTRIAXONE 1 G/50ML
1 INJECTION, SOLUTION INTRAVENOUS ONCE
Status: COMPLETED | OUTPATIENT
Start: 2025-08-22 | End: 2025-08-22

## 2025-08-22 RX ORDER — CEFTRIAXONE 1 G/1
1 INJECTION, POWDER, FOR SOLUTION INTRAMUSCULAR; INTRAVENOUS ONCE
Status: DISCONTINUED | OUTPATIENT
Start: 2025-08-22 | End: 2025-08-22

## 2025-08-22 RX ORDER — MIDAZOLAM HYDROCHLORIDE 1 MG/ML
INJECTION, SOLUTION INTRAMUSCULAR; INTRAVENOUS
Status: COMPLETED | OUTPATIENT
Start: 2025-08-22 | End: 2025-08-22

## 2025-08-22 RX ORDER — LIDOCAINE HYDROCHLORIDE 10 MG/ML
INJECTION, SOLUTION EPIDURAL; INFILTRATION; INTRACAUDAL; PERINEURAL
Status: COMPLETED | OUTPATIENT
Start: 2025-08-22 | End: 2025-08-22

## 2025-08-22 RX ORDER — FENTANYL CITRATE 50 UG/ML
INJECTION, SOLUTION INTRAMUSCULAR; INTRAVENOUS
Status: COMPLETED | OUTPATIENT
Start: 2025-08-22 | End: 2025-08-22

## 2025-08-22 RX ADMIN — IOHEXOL 25 ML: 350 INJECTION, SOLUTION INTRAVENOUS at 14:27

## 2025-08-22 RX ADMIN — MIDAZOLAM 1 MG: 1 INJECTION INTRAMUSCULAR; INTRAVENOUS at 13:40

## 2025-08-22 RX ADMIN — CEFTRIAXONE 1 G: 1 INJECTION, SOLUTION INTRAVENOUS at 13:55

## 2025-08-22 RX ADMIN — FENTANYL CITRATE 50 MCG: 50 INJECTION, SOLUTION INTRAMUSCULAR; INTRAVENOUS at 13:40

## 2025-08-22 RX ADMIN — Medication: at 13:14

## 2025-08-22 RX ADMIN — LIDOCAINE HYDROCHLORIDE 10 ML: 10 INJECTION, SOLUTION EPIDURAL; INFILTRATION; INTRACAUDAL; PERINEURAL at 13:41

## 2025-08-22 ASSESSMENT — PAIN SCALES - GENERAL

## 2025-08-25 ENCOUNTER — APPOINTMENT (OUTPATIENT)
Dept: RADIOLOGY | Facility: HOSPITAL | Age: 70
End: 2025-08-25
Payer: COMMERCIAL

## 2025-08-25 ENCOUNTER — HOSPITAL ENCOUNTER (OUTPATIENT)
Facility: HOSPITAL | Age: 70
Setting detail: OUTPATIENT SURGERY
Discharge: HOME | End: 2025-08-25
Payer: COMMERCIAL

## 2025-08-25 ENCOUNTER — ANESTHESIA (OUTPATIENT)
Dept: OPERATING ROOM | Facility: HOSPITAL | Age: 70
End: 2025-08-25
Payer: COMMERCIAL

## 2025-08-25 ENCOUNTER — APPOINTMENT (OUTPATIENT)
Dept: HEMATOLOGY/ONCOLOGY | Facility: HOSPITAL | Age: 70
End: 2025-08-25
Payer: COMMERCIAL

## 2025-08-25 ENCOUNTER — ANESTHESIA EVENT (OUTPATIENT)
Dept: OPERATING ROOM | Facility: HOSPITAL | Age: 70
End: 2025-08-25
Payer: COMMERCIAL

## 2025-08-25 VITALS
TEMPERATURE: 97.5 F | BODY MASS INDEX: 36.24 KG/M2 | WEIGHT: 244.71 LBS | HEIGHT: 69 IN | DIASTOLIC BLOOD PRESSURE: 86 MMHG | RESPIRATION RATE: 16 BRPM | SYSTOLIC BLOOD PRESSURE: 138 MMHG | OXYGEN SATURATION: 97 % | HEART RATE: 90 BPM

## 2025-08-25 DIAGNOSIS — N13.2 URETERAL STONE WITH HYDRONEPHROSIS: Primary | ICD-10-CM

## 2025-08-25 LAB — GLUCOSE BLD MANUAL STRIP-MCNC: 195 MG/DL (ref 74–99)

## 2025-08-25 PROCEDURE — 3600000004 HC OR TIME - INITIAL BASE CHARGE - PROCEDURE LEVEL FOUR

## 2025-08-25 PROCEDURE — C1726 CATH, BAL DIL, NON-VASCULAR: HCPCS

## 2025-08-25 PROCEDURE — C1747 HC OR 272 NO HCPCS: HCPCS

## 2025-08-25 PROCEDURE — A50081 PR PERCUT REMV KID STONE,2+ CM: Performed by: ANESTHESIOLOGY

## 2025-08-25 PROCEDURE — 50436 DILAT XST TRC NDURLGC PX: CPT

## 2025-08-25 PROCEDURE — 3700000001 HC GENERAL ANESTHESIA TIME - INITIAL BASE CHARGE

## 2025-08-25 PROCEDURE — 3600000009 HC OR TIME - EACH INCREMENTAL 1 MINUTE - PROCEDURE LEVEL FOUR

## 2025-08-25 PROCEDURE — 7100000009 HC PHASE TWO TIME - INITIAL BASE CHARGE

## 2025-08-25 PROCEDURE — 2500000005 HC RX 250 GENERAL PHARMACY W/O HCPCS

## 2025-08-25 PROCEDURE — 2720000007 HC OR 272 NO HCPCS

## 2025-08-25 PROCEDURE — 76000 FLUOROSCOPY <1 HR PHYS/QHP: CPT

## 2025-08-25 PROCEDURE — 2500000004 HC RX 250 GENERAL PHARMACY W/ HCPCS (ALT 636 FOR OP/ED): Performed by: ANESTHESIOLOGY

## 2025-08-25 PROCEDURE — 82947 ASSAY GLUCOSE BLOOD QUANT: CPT

## 2025-08-25 PROCEDURE — 82365 CALCULUS SPECTROSCOPY: CPT

## 2025-08-25 PROCEDURE — 7100000001 HC RECOVERY ROOM TIME - INITIAL BASE CHARGE

## 2025-08-25 PROCEDURE — C1769 GUIDE WIRE: HCPCS

## 2025-08-25 PROCEDURE — 2500000004 HC RX 250 GENERAL PHARMACY W/ HCPCS (ALT 636 FOR OP/ED): Performed by: NURSE ANESTHETIST, CERTIFIED REGISTERED

## 2025-08-25 PROCEDURE — 3700000002 HC GENERAL ANESTHESIA TIME - EACH INCREMENTAL 1 MINUTE

## 2025-08-25 PROCEDURE — 7100000002 HC RECOVERY ROOM TIME - EACH INCREMENTAL 1 MINUTE

## 2025-08-25 PROCEDURE — 50080 PERQ NL/PL LITHOTRP SMPL<2CM: CPT

## 2025-08-25 PROCEDURE — C1729 CATH, DRAINAGE: HCPCS

## 2025-08-25 PROCEDURE — 2500000004 HC RX 250 GENERAL PHARMACY W/ HCPCS (ALT 636 FOR OP/ED)

## 2025-08-25 PROCEDURE — 2500000001 HC RX 250 WO HCPCS SELF ADMINISTERED DRUGS (ALT 637 FOR MEDICARE OP): Performed by: ANESTHESIOLOGY

## 2025-08-25 PROCEDURE — 7100000010 HC PHASE TWO TIME - EACH INCREMENTAL 1 MINUTE

## 2025-08-25 PROCEDURE — A50081 PR PERCUT REMV KID STONE,2+ CM: Performed by: NURSE ANESTHETIST, CERTIFIED REGISTERED

## 2025-08-25 RX ORDER — KETOROLAC TROMETHAMINE 10 MG/1
10 TABLET, FILM COATED ORAL EVERY 8 HOURS PRN
Qty: 9 TABLET | Refills: 0 | Status: SHIPPED | OUTPATIENT
Start: 2025-08-25 | End: 2025-08-28

## 2025-08-25 RX ORDER — HYDRALAZINE HYDROCHLORIDE 20 MG/ML
10 INJECTION INTRAMUSCULAR; INTRAVENOUS EVERY 30 MIN PRN
Status: DISCONTINUED | OUTPATIENT
Start: 2025-08-25 | End: 2025-08-25 | Stop reason: HOSPADM

## 2025-08-25 RX ORDER — FENTANYL CITRATE 50 UG/ML
INJECTION, SOLUTION INTRAMUSCULAR; INTRAVENOUS AS NEEDED
Status: DISCONTINUED | OUTPATIENT
Start: 2025-08-25 | End: 2025-08-25

## 2025-08-25 RX ORDER — CEFAZOLIN SODIUM 2 G/50ML
2 SOLUTION INTRAVENOUS ONCE
Status: COMPLETED | OUTPATIENT
Start: 2025-08-25 | End: 2025-08-25

## 2025-08-25 RX ORDER — LABETALOL HYDROCHLORIDE 5 MG/ML
5 INJECTION, SOLUTION INTRAVENOUS ONCE AS NEEDED
Status: DISCONTINUED | OUTPATIENT
Start: 2025-08-25 | End: 2025-08-25 | Stop reason: HOSPADM

## 2025-08-25 RX ORDER — SODIUM CHLORIDE 0.9 G/100ML
INJECTION, SOLUTION IRRIGATION AS NEEDED
Status: DISCONTINUED | OUTPATIENT
Start: 2025-08-25 | End: 2025-08-25 | Stop reason: HOSPADM

## 2025-08-25 RX ORDER — CHLORHEXIDINE GLUCONATE 40 MG/ML
SOLUTION TOPICAL DAILY PRN
Status: DISCONTINUED | OUTPATIENT
Start: 2025-08-25 | End: 2025-08-25 | Stop reason: HOSPADM

## 2025-08-25 RX ORDER — ONDANSETRON HYDROCHLORIDE 2 MG/ML
INJECTION, SOLUTION INTRAVENOUS AS NEEDED
Status: DISCONTINUED | OUTPATIENT
Start: 2025-08-25 | End: 2025-08-25

## 2025-08-25 RX ORDER — PROPOFOL 10 MG/ML
INJECTION, EMULSION INTRAVENOUS AS NEEDED
Status: DISCONTINUED | OUTPATIENT
Start: 2025-08-25 | End: 2025-08-25

## 2025-08-25 RX ORDER — ONDANSETRON HYDROCHLORIDE 2 MG/ML
4 INJECTION, SOLUTION INTRAVENOUS ONCE AS NEEDED
Status: DISCONTINUED | OUTPATIENT
Start: 2025-08-25 | End: 2025-08-25 | Stop reason: HOSPADM

## 2025-08-25 RX ORDER — CEPHALEXIN 500 MG/1
500 CAPSULE ORAL 4 TIMES DAILY
Qty: 20 CAPSULE | Refills: 0 | Status: SHIPPED | OUTPATIENT
Start: 2025-08-25 | End: 2025-08-30

## 2025-08-25 RX ORDER — ROCURONIUM BROMIDE 10 MG/ML
INJECTION, SOLUTION INTRAVENOUS AS NEEDED
Status: DISCONTINUED | OUTPATIENT
Start: 2025-08-25 | End: 2025-08-25

## 2025-08-25 RX ORDER — DIPHENHYDRAMINE HYDROCHLORIDE 50 MG/ML
12.5 INJECTION, SOLUTION INTRAMUSCULAR; INTRAVENOUS ONCE AS NEEDED
Status: DISCONTINUED | OUTPATIENT
Start: 2025-08-25 | End: 2025-08-25 | Stop reason: HOSPADM

## 2025-08-25 RX ORDER — LABETALOL HYDROCHLORIDE 5 MG/ML
INJECTION, SOLUTION INTRAVENOUS AS NEEDED
Status: DISCONTINUED | OUTPATIENT
Start: 2025-08-25 | End: 2025-08-25

## 2025-08-25 RX ORDER — ACETAMINOPHEN 325 MG/1
650 TABLET ORAL EVERY 4 HOURS PRN
Status: DISCONTINUED | OUTPATIENT
Start: 2025-08-25 | End: 2025-08-25 | Stop reason: HOSPADM

## 2025-08-25 RX ORDER — PHENYLEPHRINE HCL IN 0.9% NACL 1 MG/10 ML
SYRINGE (ML) INTRAVENOUS AS NEEDED
Status: DISCONTINUED | OUTPATIENT
Start: 2025-08-25 | End: 2025-08-25

## 2025-08-25 RX ORDER — SODIUM CHLORIDE, SODIUM LACTATE, POTASSIUM CHLORIDE, CALCIUM CHLORIDE 600; 310; 30; 20 MG/100ML; MG/100ML; MG/100ML; MG/100ML
100 INJECTION, SOLUTION INTRAVENOUS CONTINUOUS
Status: DISCONTINUED | OUTPATIENT
Start: 2025-08-25 | End: 2025-08-25 | Stop reason: HOSPADM

## 2025-08-25 RX ORDER — LIDOCAINE HCL/PF 100 MG/5ML
SYRINGE (ML) INTRAVENOUS AS NEEDED
Status: DISCONTINUED | OUTPATIENT
Start: 2025-08-25 | End: 2025-08-25

## 2025-08-25 RX ORDER — HYDROMORPHONE HYDROCHLORIDE 1 MG/ML
1 INJECTION, SOLUTION INTRAMUSCULAR; INTRAVENOUS; SUBCUTANEOUS EVERY 5 MIN PRN
Status: DISCONTINUED | OUTPATIENT
Start: 2025-08-25 | End: 2025-08-25 | Stop reason: HOSPADM

## 2025-08-25 RX ADMIN — ACETAMINOPHEN 650 MG: 325 TABLET ORAL at 12:32

## 2025-08-25 RX ADMIN — Medication 100 MCG: at 08:30

## 2025-08-25 RX ADMIN — FENTANYL CITRATE 50 MCG: 50 INJECTION, SOLUTION INTRAMUSCULAR; INTRAVENOUS at 09:00

## 2025-08-25 RX ADMIN — ROCURONIUM BROMIDE 50 MG: 10 INJECTION, SOLUTION INTRAVENOUS at 07:48

## 2025-08-25 RX ADMIN — CEFAZOLIN SODIUM 2 G: 2 SOLUTION INTRAVENOUS at 07:55

## 2025-08-25 RX ADMIN — FENTANYL CITRATE 50 MCG: 50 INJECTION, SOLUTION INTRAMUSCULAR; INTRAVENOUS at 09:10

## 2025-08-25 RX ADMIN — LABETALOL HYDROCHLORIDE 10 MG: 5 INJECTION INTRAVENOUS at 09:03

## 2025-08-25 RX ADMIN — PROPOFOL 50 MG: 10 INJECTION, EMULSION INTRAVENOUS at 09:02

## 2025-08-25 RX ADMIN — HYDROMORPHONE HYDROCHLORIDE 0.5 MG: 1 INJECTION, SOLUTION INTRAMUSCULAR; INTRAVENOUS; SUBCUTANEOUS at 09:53

## 2025-08-25 RX ADMIN — DEXAMETHASONE SODIUM PHOSPHATE 4 MG: 4 INJECTION, SOLUTION INTRAMUSCULAR; INTRAVENOUS at 07:56

## 2025-08-25 RX ADMIN — FENTANYL CITRATE 100 MCG: 50 INJECTION, SOLUTION INTRAMUSCULAR; INTRAVENOUS at 07:48

## 2025-08-25 RX ADMIN — HYDRALAZINE HYDROCHLORIDE 10 MG: 20 INJECTION INTRAMUSCULAR; INTRAVENOUS at 09:38

## 2025-08-25 RX ADMIN — ONDANSETRON 4 MG: 2 INJECTION INTRAMUSCULAR; INTRAVENOUS at 08:57

## 2025-08-25 RX ADMIN — SUGAMMADEX 200 MG: 100 INJECTION, SOLUTION INTRAVENOUS at 09:03

## 2025-08-25 RX ADMIN — HYDROMORPHONE HYDROCHLORIDE 1 MG: 1 INJECTION, SOLUTION INTRAMUSCULAR; INTRAVENOUS; SUBCUTANEOUS at 10:04

## 2025-08-25 RX ADMIN — PROPOFOL 150 MG: 10 INJECTION, EMULSION INTRAVENOUS at 07:48

## 2025-08-25 RX ADMIN — ROCURONIUM BROMIDE 20 MG: 10 INJECTION, SOLUTION INTRAVENOUS at 08:29

## 2025-08-25 RX ADMIN — HYDROMORPHONE HYDROCHLORIDE 0.5 MG: 1 INJECTION, SOLUTION INTRAMUSCULAR; INTRAVENOUS; SUBCUTANEOUS at 09:37

## 2025-08-25 RX ADMIN — LIDOCAINE HYDROCHLORIDE 40 MG: 20 INJECTION INTRAVENOUS at 07:48

## 2025-08-25 RX ADMIN — SODIUM CHLORIDE, SODIUM LACTATE, POTASSIUM CHLORIDE, AND CALCIUM CHLORIDE 100 ML/HR: .6; .31; .03; .02 INJECTION, SOLUTION INTRAVENOUS at 09:46

## 2025-08-25 RX ADMIN — SODIUM CHLORIDE, POTASSIUM CHLORIDE, SODIUM LACTATE AND CALCIUM CHLORIDE: 600; 310; 30; 20 INJECTION, SOLUTION INTRAVENOUS at 07:45

## 2025-08-25 SDOH — HEALTH STABILITY: MENTAL HEALTH: CURRENT SMOKER: 1

## 2025-08-25 ASSESSMENT — PAIN - FUNCTIONAL ASSESSMENT

## 2025-08-25 ASSESSMENT — PAIN DESCRIPTION - DESCRIPTORS
DESCRIPTORS: ACHING

## 2025-08-25 ASSESSMENT — PAIN SCALES - GENERAL
PAINLEVEL_OUTOF10: 5 - MODERATE PAIN
PAINLEVEL_OUTOF10: 0 - NO PAIN
PAINLEVEL_OUTOF10: 5 - MODERATE PAIN
PAINLEVEL_OUTOF10: 5 - MODERATE PAIN
PAINLEVEL_OUTOF10: 0 - NO PAIN
PAINLEVEL_OUTOF10: 8
PAINLEVEL_OUTOF10: 5 - MODERATE PAIN
PAINLEVEL_OUTOF10: 3
PAINLEVEL_OUTOF10: 0 - NO PAIN
PAINLEVEL_OUTOF10: 3
PAINLEVEL_OUTOF10: 8

## 2025-08-25 ASSESSMENT — PAIN DESCRIPTION - LOCATION
LOCATION: OTHER (COMMENT)

## 2025-08-25 ASSESSMENT — PAIN DESCRIPTION - ORIENTATION
ORIENTATION: LEFT

## 2025-09-01 LAB
APPEARANCE STONE: NORMAL
COMPN STONE: NORMAL
SPECIMEN WT: 33 MG

## 2025-09-05 ENCOUNTER — APPOINTMENT (OUTPATIENT)
Dept: UROLOGY | Facility: CLINIC | Age: 70
End: 2025-09-05
Payer: COMMERCIAL

## 2025-09-05 ASSESSMENT — PAIN SCALES - GENERAL: PAINLEVEL_OUTOF10: 2

## 2025-09-11 ENCOUNTER — APPOINTMENT (OUTPATIENT)
Dept: UROLOGY | Facility: CLINIC | Age: 70
End: 2025-09-11
Payer: COMMERCIAL

## 2025-09-15 ENCOUNTER — APPOINTMENT (OUTPATIENT)
Dept: UROLOGY | Facility: HOSPITAL | Age: 70
End: 2025-09-15
Payer: COMMERCIAL

## 2025-10-21 ENCOUNTER — APPOINTMENT (OUTPATIENT)
Dept: UROLOGY | Facility: CLINIC | Age: 70
End: 2025-10-21
Payer: COMMERCIAL

## (undated) DEVICE — DRESSING, TRANSPARENT, TEGADERM, 4 X 4-3/4 IN

## (undated) DEVICE — DRAPE, CRANIOTOMY

## (undated) DEVICE — Device

## (undated) DEVICE — APPLICATOR, COTTON TIP, 6 IN, 2PK, STERILE

## (undated) DEVICE — SYRINGE, 30 CC, LUER LOCK

## (undated) DEVICE — DILATOR SET, RENAL, AMPLATZ, W/CATHETER, 8 FR, 84 CM, 6FR-30FR

## (undated) DEVICE — DRESSING, GAUZE, SPONGE, 12 PLY, CURITY, 4 X 4 IN, RIGID TRAY, STERILE

## (undated) DEVICE — VALVE, ENDOSCOPIC, SURESEAL II, SIZE 0-5FR

## (undated) DEVICE — TUBING, CLEAR N-COND, 5MM X 10, LF

## (undated) DEVICE — IRRIGATION SET, CYSTOSCOPY, TURP, Y, CONTINUOUS, 81 IN

## (undated) DEVICE — LASER FIBER, HOLMIUM 200 (5/BOX)

## (undated) DEVICE — SOLUTION, OPHTHALMIC, TETRACAINE HCL 0.5%, 2 ML, VIAL

## (undated) DEVICE — GEL, ECOVUE, ULTRASOUND, 20GRAM, STERILE

## (undated) DEVICE — SYRINGE, 50 CC, LUER LOCK

## (undated) DEVICE — ADAPTER, UROLOK

## (undated) DEVICE — HOLSTER, ELECTROSURGERY ACCESSORY, STERILE

## (undated) DEVICE — SOLUTION, INJECTION, CONTRAST, OMNIPAQUE 240MG 50ML, PLUS PAK

## (undated) DEVICE — GUIDEWIRE, STRAIGHT, ZIPWIRE, .035 X 150CM, STANDARD

## (undated) DEVICE — DRAPE COVER, C ARM, FLOUROSCAN IMAGING SYS

## (undated) DEVICE — SLEEVE, VASO PRESS, CALF GARMENT, MEDIUM, GREEN

## (undated) DEVICE — SCOPE, LITHOVUE SUD ONLY, GLOBAL STANDARD

## (undated) DEVICE — BAG, BANDED, 36IN X 28IN

## (undated) DEVICE — DRESSING, ABDOMINAL, TENDERSORB, 8 X 7-1/2 IN, STERILE

## (undated) DEVICE — GUIDEWIRE, DUAL SENSOR, .035 X 150 STRAIGHT,  3CM

## (undated) DEVICE — SYRINGE, 10 CC, LUER LOCK